# Patient Record
Sex: MALE | Race: WHITE | NOT HISPANIC OR LATINO | Employment: OTHER | URBAN - METROPOLITAN AREA
[De-identification: names, ages, dates, MRNs, and addresses within clinical notes are randomized per-mention and may not be internally consistent; named-entity substitution may affect disease eponyms.]

---

## 2017-01-01 ENCOUNTER — HOSPITAL ENCOUNTER (OUTPATIENT)
Dept: CARDIOLOGY | Facility: HOSPITAL | Age: 62
Discharge: HOME | End: 2017-12-06
Attending: INTERNAL MEDICINE

## 2017-01-01 ENCOUNTER — HOSPITAL ENCOUNTER (OUTPATIENT)
Dept: CARDIOLOGY | Facility: HOSPITAL | Age: 62
Discharge: HOME | End: 2017-12-06
Attending: INTERNAL MEDICINE | Admitting: INTERNAL MEDICINE

## 2017-01-01 ENCOUNTER — TELEPHONE (OUTPATIENT)
Dept: CARDIOLOGY | Facility: CLINIC | Age: 62
End: 2017-01-01

## 2017-01-01 ENCOUNTER — OFFICE VISIT (OUTPATIENT)
Dept: CARDIOLOGY | Facility: CLINIC | Age: 62
End: 2017-01-01

## 2017-01-01 ENCOUNTER — HOSPITAL ENCOUNTER (OUTPATIENT)
Facility: HOSPITAL | Age: 62
Setting detail: HOSPITAL OUTPATIENT SURGERY
Discharge: HOME | End: 2017-12-12
Attending: INTERNAL MEDICINE | Admitting: INTERNAL MEDICINE

## 2017-01-01 VITALS
BODY MASS INDEX: 34.24 KG/M2 | WEIGHT: 258.31 LBS | RESPIRATION RATE: 18 BRPM | HEIGHT: 73 IN | HEART RATE: 77 BPM | DIASTOLIC BLOOD PRESSURE: 86 MMHG | SYSTOLIC BLOOD PRESSURE: 142 MMHG | OXYGEN SATURATION: 95 % | TEMPERATURE: 98.5 F

## 2017-01-01 VITALS
SYSTOLIC BLOOD PRESSURE: 120 MMHG | HEART RATE: 81 BPM | BODY MASS INDEX: 35.65 KG/M2 | WEIGHT: 269 LBS | DIASTOLIC BLOOD PRESSURE: 74 MMHG | HEIGHT: 73 IN

## 2017-01-01 VITALS
HEART RATE: 74 BPM | BODY MASS INDEX: 35.65 KG/M2 | DIASTOLIC BLOOD PRESSURE: 62 MMHG | HEIGHT: 73 IN | SYSTOLIC BLOOD PRESSURE: 100 MMHG | WEIGHT: 269 LBS

## 2017-01-01 DIAGNOSIS — G47.33 OBSTRUCTIVE SLEEP APNEA: ICD-10-CM

## 2017-01-01 DIAGNOSIS — I27.20 PULMONARY HTN (HCC): ICD-10-CM

## 2017-01-01 DIAGNOSIS — I35.0 NONRHEUMATIC AORTIC VALVE STENOSIS: ICD-10-CM

## 2017-01-01 DIAGNOSIS — I36.1 NON-RHEUMATIC TRICUSPID VALVE INSUFFICIENCY: ICD-10-CM

## 2017-01-01 DIAGNOSIS — I48.21 PERMANENT ATRIAL FIBRILLATION (HCC): ICD-10-CM

## 2017-01-01 DIAGNOSIS — E11.51 TYPE 2 DIABETES MELLITUS WITH DIABETIC PERIPHERAL ANGIOPATHY WITHOUT GANGRENE, WITHOUT LONG-TERM CURRENT USE OF INSULIN (HCC): ICD-10-CM

## 2017-01-01 DIAGNOSIS — I27.20 PULMONARY HTN (HCC): Primary | ICD-10-CM

## 2017-01-01 DIAGNOSIS — N18.30 CKD (CHRONIC KIDNEY DISEASE) STAGE 3, GFR 30-59 ML/MIN (HCC): ICD-10-CM

## 2017-01-01 DIAGNOSIS — R06.09 DOE (DYSPNEA ON EXERTION): ICD-10-CM

## 2017-01-01 DIAGNOSIS — I50.810 RIGHT HEART FAILURE (HCC): ICD-10-CM

## 2017-01-01 DIAGNOSIS — I50.810 RIGHT HEART FAILURE (HCC): Primary | ICD-10-CM

## 2017-01-01 LAB
ANION GAP SERPL CALCULATED.3IONS-SCNC: 9.2 MMOL/L
AORTIC DIMENSIONLESS INDEX: 0.2 (DI)
ASCENDING AORTA: 3.6 CM
BASOPHILS # BLD AUTO: 0.02 10*3/MM3 (ref 0–0.2)
BASOPHILS NFR BLD AUTO: 0.2 % (ref 0–1.5)
BH CV ECHO MEAS - ACS: 0.92 CM
BH CV ECHO MEAS - AO MAX PG (FULL): 54.9 MMHG
BH CV ECHO MEAS - AO MAX PG: 56.7 MMHG
BH CV ECHO MEAS - AO MEAN PG (FULL): 27.7 MMHG
BH CV ECHO MEAS - AO MEAN PG: 28.9 MMHG
BH CV ECHO MEAS - AO ROOT AREA (BSA CORRECTED): 1.5
BH CV ECHO MEAS - AO ROOT AREA: 10.5 CM^2
BH CV ECHO MEAS - AO ROOT DIAM: 3.7 CM
BH CV ECHO MEAS - AO V2 MAX: 376.4 CM/SEC
BH CV ECHO MEAS - AO V2 MEAN: 237.9 CM/SEC
BH CV ECHO MEAS - AO V2 VTI: 91.3 CM
BH CV ECHO MEAS - AVA(I,A): 1 CM^2
BH CV ECHO MEAS - AVA(I,D): 1 CM^2
BH CV ECHO MEAS - AVA(V,A): 0.9 CM^2
BH CV ECHO MEAS - AVA(V,D): 0.9 CM^2
BH CV ECHO MEAS - BSA(HAYCOCK): 2.5 M^2
BH CV ECHO MEAS - BSA: 2.4 M^2
BH CV ECHO MEAS - BZI_BMI: 35.5 KILOGRAMS/M^2
BH CV ECHO MEAS - BZI_METRIC_HEIGHT: 185.4 CM
BH CV ECHO MEAS - BZI_METRIC_WEIGHT: 122 KG
BH CV ECHO MEAS - CONTRAST EF (2CH): 56.4 ML/M^2
BH CV ECHO MEAS - CONTRAST EF 4CH: 55.7 ML/M^2
BH CV ECHO MEAS - EDV(MOD-SP2): 117 ML
BH CV ECHO MEAS - EDV(MOD-SP4): 106 ML
BH CV ECHO MEAS - EDV(TEICH): 95.4 ML
BH CV ECHO MEAS - EF(CUBED): 67.8 %
BH CV ECHO MEAS - EF(MOD-SP2): 56.4 %
BH CV ECHO MEAS - EF(MOD-SP4): 55.7 %
BH CV ECHO MEAS - EF(TEICH): 59.4 %
BH CV ECHO MEAS - ESV(MOD-SP2): 51 ML
BH CV ECHO MEAS - ESV(MOD-SP4): 47 ML
BH CV ECHO MEAS - ESV(TEICH): 38.7 ML
BH CV ECHO MEAS - FS: 31.4 %
BH CV ECHO MEAS - IVS/LVPW: 1
BH CV ECHO MEAS - IVSD: 1.1 CM
BH CV ECHO MEAS - LAT PEAK E' VEL: 10 CM/SEC
BH CV ECHO MEAS - LV DIASTOLIC VOL/BSA (35-75): 43.4 ML/M^2
BH CV ECHO MEAS - LV MASS(C)D: 185.5 GRAMS
BH CV ECHO MEAS - LV MASS(C)DI: 76 GRAMS/M^2
BH CV ECHO MEAS - LV MAX PG: 1.8 MMHG
BH CV ECHO MEAS - LV MEAN PG: 1.1 MMHG
BH CV ECHO MEAS - LV SYSTOLIC VOL/BSA (12-30): 19.3 ML/M^2
BH CV ECHO MEAS - LV V1 MAX: 66.5 CM/SEC
BH CV ECHO MEAS - LV V1 MEAN: 50.8 CM/SEC
BH CV ECHO MEAS - LV V1 VTI: 18.4 CM
BH CV ECHO MEAS - LVIDD: 4.6 CM
BH CV ECHO MEAS - LVIDS: 3.1 CM
BH CV ECHO MEAS - LVLD AP2: 7.8 CM
BH CV ECHO MEAS - LVLD AP4: 8.5 CM
BH CV ECHO MEAS - LVLS AP2: 6.9 CM
BH CV ECHO MEAS - LVLS AP4: 7.1 CM
BH CV ECHO MEAS - LVOT AREA (M): 5.3 CM^2
BH CV ECHO MEAS - LVOT AREA: 5.1 CM^2
BH CV ECHO MEAS - LVOT DIAM: 2.6 CM
BH CV ECHO MEAS - LVPWD: 1.1 CM
BH CV ECHO MEAS - MED PEAK E' VEL: 9 CM/SEC
BH CV ECHO MEAS - MR MAX PG: 26.3 MMHG
BH CV ECHO MEAS - MR MAX VEL: 256.3 CM/SEC
BH CV ECHO MEAS - MV DEC SLOPE: 663 CM/SEC^2
BH CV ECHO MEAS - MV DEC TIME: 0.17 SEC
BH CV ECHO MEAS - MV E MAX VEL: 113.5 CM/SEC
BH CV ECHO MEAS - MV MAX PG: 5.9 MMHG
BH CV ECHO MEAS - MV MEAN PG: 2.5 MMHG
BH CV ECHO MEAS - MV P1/2T MAX VEL: 112 CM/SEC
BH CV ECHO MEAS - MV P1/2T: 49.5 MSEC
BH CV ECHO MEAS - MV V2 MAX: 121.3 CM/SEC
BH CV ECHO MEAS - MV V2 MEAN: 73.3 CM/SEC
BH CV ECHO MEAS - MV V2 VTI: 25.6 CM
BH CV ECHO MEAS - MVA P1/2T LCG: 2 CM^2
BH CV ECHO MEAS - MVA(P1/2T): 4.4 CM^2
BH CV ECHO MEAS - MVA(VTI): 3.7 CM^2
BH CV ECHO MEAS - PA ACC TIME: 0.12 SEC
BH CV ECHO MEAS - PA MAX PG (FULL): 1.8 MMHG
BH CV ECHO MEAS - PA MAX PG: 2.9 MMHG
BH CV ECHO MEAS - PA PR(ACCEL): 25.1 MMHG
BH CV ECHO MEAS - PA V2 MAX: 85.8 CM/SEC
BH CV ECHO MEAS - PVA(V,A): 2.6 CM^2
BH CV ECHO MEAS - PVA(V,D): 2.6 CM^2
BH CV ECHO MEAS - QP/QS: 0.43
BH CV ECHO MEAS - RAP SYSTOLE: 15 MMHG
BH CV ECHO MEAS - RV MAX PG: 1.2 MMHG
BH CV ECHO MEAS - RV MEAN PG: 0.51 MMHG
BH CV ECHO MEAS - RV V1 MAX: 53.8 CM/SEC
BH CV ECHO MEAS - RV V1 MEAN: 32.3 CM/SEC
BH CV ECHO MEAS - RV V1 VTI: 9.7 CM
BH CV ECHO MEAS - RVOT AREA: 4.2 CM^2
BH CV ECHO MEAS - RVOT DIAM: 2.3 CM
BH CV ECHO MEAS - RVSP: 77 MMHG
BH CV ECHO MEAS - SI(AO): 394.6 ML/M^2
BH CV ECHO MEAS - SI(CUBED): 26.3 ML/M^2
BH CV ECHO MEAS - SI(LVOT): 38.6 ML/M^2
BH CV ECHO MEAS - SI(MOD-SP2): 27 ML/M^2
BH CV ECHO MEAS - SI(MOD-SP4): 24.2 ML/M^2
BH CV ECHO MEAS - SI(TEICH): 23.2 ML/M^2
BH CV ECHO MEAS - SUP REN AO DIAM: 2.1 CM
BH CV ECHO MEAS - SV(AO): 963.1 ML
BH CV ECHO MEAS - SV(CUBED): 64.3 ML
BH CV ECHO MEAS - SV(LVOT): 94.3 ML
BH CV ECHO MEAS - SV(MOD-SP2): 66 ML
BH CV ECHO MEAS - SV(MOD-SP4): 59 ML
BH CV ECHO MEAS - SV(RVOT): 40.5 ML
BH CV ECHO MEAS - SV(TEICH): 56.7 ML
BH CV ECHO MEAS - TAPSE (>1.6): 1.5 CM2
BH CV ECHO MEAS - TR MAX VEL: 393.6 CM/SEC
BH CV NUCLEAR PRIOR STUDY: 2
BH CV STRESS BP STAGE 1: NORMAL
BH CV STRESS COMMENTS STAGE 1: NORMAL
BH CV STRESS DOSE REGADENOSON STAGE 1: 0.4
BH CV STRESS DURATION MIN STAGE 1: 0
BH CV STRESS DURATION SEC STAGE 1: 15
BH CV STRESS HR STAGE 1: 87
BH CV STRESS PROTOCOL 1: NORMAL
BH CV STRESS RECOVERY BP: NORMAL MMHG
BH CV STRESS RECOVERY HR: 78 BPM
BH CV STRESS STAGE 1: 1
BH CV XLRA - RV BASE: 3.9 CM
BH CV XLRA - TDI S': 7 CM/SEC
BUN BLD-MCNC: 38 MG/DL (ref 8–23)
BUN/CREAT SERPL: 28.6 (ref 7–25)
CALCIUM SPEC-SCNC: 8.4 MG/DL (ref 8.6–10.5)
CHLORIDE SERPL-SCNC: 102 MMOL/L (ref 98–107)
CO2 SERPL-SCNC: 26.8 MMOL/L (ref 22–29)
CREAT BLD-MCNC: 1.33 MG/DL (ref 0.76–1.27)
DEPRECATED RDW RBC AUTO: 48.2 FL (ref 37–54)
E/E' RATIO: 12.5
EOSINOPHIL # BLD AUTO: 0.43 10*3/MM3 (ref 0–0.7)
EOSINOPHIL NFR BLD AUTO: 4.8 % (ref 0.3–6.2)
ERYTHROCYTE [DISTWIDTH] IN BLOOD BY AUTOMATED COUNT: 17.9 % (ref 11.5–14.5)
GFR SERPL CREATININE-BSD FRML MDRD: 55 ML/MIN/1.73
GLUCOSE BLD-MCNC: 107 MG/DL (ref 65–99)
GLUCOSE BLDC GLUCOMTR-MCNC: 103 MG/DL (ref 70–130)
GLUCOSE BLDC GLUCOMTR-MCNC: 110 MG/DL (ref 70–130)
HCT VFR BLD AUTO: 38 % (ref 40.4–52.2)
HCT VFR BLDA CALC: 33 % (ref 38–51)
HCT VFR BLDA CALC: 34 % (ref 38–51)
HGB BLD-MCNC: 11.5 G/DL (ref 13.7–17.6)
HGB BLDA-MCNC: 11.2 G/DL (ref 12–17)
HGB BLDA-MCNC: 11.6 G/DL (ref 12–17)
IMM GRANULOCYTES # BLD: 0.06 10*3/MM3 (ref 0–0.03)
IMM GRANULOCYTES NFR BLD: 0.7 % (ref 0–0.5)
LEFT ATRIUM VOLUME INDEX: 43 ML/M2
LV EF NUC BP: 59 %
LYMPHOCYTES # BLD AUTO: 1.04 10*3/MM3 (ref 0.9–4.8)
LYMPHOCYTES NFR BLD AUTO: 11.6 % (ref 19.6–45.3)
MAXIMAL PREDICTED HEART RATE: 160 BPM
MCH RBC QN AUTO: 22.8 PG (ref 27–32.7)
MCHC RBC AUTO-ENTMCNC: 30.3 G/DL (ref 32.6–36.4)
MCV RBC AUTO: 75.4 FL (ref 79.8–96.2)
MONOCYTES # BLD AUTO: 0.67 10*3/MM3 (ref 0.2–1.2)
MONOCYTES NFR BLD AUTO: 7.5 % (ref 5–12)
NEUTROPHILS # BLD AUTO: 6.72 10*3/MM3 (ref 1.9–8.1)
NEUTROPHILS NFR BLD AUTO: 75.2 % (ref 42.7–76)
PERCENT MAX PREDICTED HR: 54.38 %
PLATELET # BLD AUTO: 172 10*3/MM3 (ref 140–500)
PMV BLD AUTO: 9.5 FL (ref 6–12)
POTASSIUM BLD-SCNC: 3.8 MMOL/L (ref 3.5–5.2)
RBC # BLD AUTO: 5.04 10*6/MM3 (ref 4.6–6)
SAO2 % BLDA: 66 % (ref 95–98)
SAO2 % BLDA: 75 % (ref 95–98)
SINUS: 4.1 CM
SODIUM BLD-SCNC: 138 MMOL/L (ref 136–145)
STJ: 3.1 CM
STRESS BASELINE BP: NORMAL MMHG
STRESS BASELINE HR: 70 BPM
STRESS PERCENT HR: 64 %
STRESS POST EXERCISE DUR SEC: 15 SEC
STRESS POST PEAK BP: NORMAL MMHG
STRESS POST PEAK HR: 87 BPM
STRESS TARGET HR: 136 BPM
WBC NRBC COR # BLD: 8.94 10*3/MM3 (ref 4.5–10.7)
Z-SCORE OF LEFT VENTRICULAR DIMENSION IN END SYSTOLE: 3

## 2017-01-01 PROCEDURE — 78492 MYOCRD IMG PET MLT RST&STRS: CPT

## 2017-01-01 PROCEDURE — 93306 TTE W/DOPPLER COMPLETE: CPT

## 2017-01-01 PROCEDURE — 93463 DRUG ADMIN & HEMODYNMIC MEAS: CPT | Performed by: INTERNAL MEDICINE

## 2017-01-01 PROCEDURE — 93017 CV STRESS TEST TRACING ONLY: CPT

## 2017-01-01 PROCEDURE — 25010000002 REGADENOSON 0.4 MG/5ML SOLUTION: Performed by: INTERNAL MEDICINE

## 2017-01-01 PROCEDURE — 80048 BASIC METABOLIC PNL TOTAL CA: CPT | Performed by: INTERNAL MEDICINE

## 2017-01-01 PROCEDURE — 82962 GLUCOSE BLOOD TEST: CPT

## 2017-01-01 PROCEDURE — 93306 TTE W/DOPPLER COMPLETE: CPT | Performed by: INTERNAL MEDICINE

## 2017-01-01 PROCEDURE — 93451 RIGHT HEART CATH: CPT | Performed by: INTERNAL MEDICINE

## 2017-01-01 PROCEDURE — 93000 ELECTROCARDIOGRAM COMPLETE: CPT | Performed by: INTERNAL MEDICINE

## 2017-01-01 PROCEDURE — C1769 GUIDE WIRE: HCPCS | Performed by: INTERNAL MEDICINE

## 2017-01-01 PROCEDURE — 78492 MYOCRD IMG PET MLT RST&STRS: CPT | Performed by: INTERNAL MEDICINE

## 2017-01-01 PROCEDURE — C1894 INTRO/SHEATH, NON-LASER: HCPCS | Performed by: INTERNAL MEDICINE

## 2017-01-01 PROCEDURE — 85014 HEMATOCRIT: CPT

## 2017-01-01 PROCEDURE — A9555 RB82 RUBIDIUM: HCPCS | Performed by: INTERNAL MEDICINE

## 2017-01-01 PROCEDURE — 93016 CV STRESS TEST SUPVJ ONLY: CPT | Performed by: INTERNAL MEDICINE

## 2017-01-01 PROCEDURE — 99205 OFFICE O/P NEW HI 60 MIN: CPT | Performed by: INTERNAL MEDICINE

## 2017-01-01 PROCEDURE — 85018 HEMOGLOBIN: CPT

## 2017-01-01 PROCEDURE — 25010000002 BH (CUPID ONLY) ADENOSINE CHARGE TOTAL VOLUME: Performed by: INTERNAL MEDICINE

## 2017-01-01 PROCEDURE — 93018 CV STRESS TEST I&R ONLY: CPT | Performed by: INTERNAL MEDICINE

## 2017-01-01 PROCEDURE — 0 RUBIDIUM CHLORIDE: Performed by: INTERNAL MEDICINE

## 2017-01-01 PROCEDURE — 85025 COMPLETE CBC W/AUTO DIFF WBC: CPT | Performed by: INTERNAL MEDICINE

## 2017-01-01 RX ORDER — INSULIN ASPART 100 [IU]/ML
INJECTION, SOLUTION INTRAVENOUS; SUBCUTANEOUS
COMMUNITY
Start: 2017-01-01 | End: 2017-01-01

## 2017-01-01 RX ORDER — BUDESONIDE AND FORMOTEROL FUMARATE DIHYDRATE 160; 4.5 UG/1; UG/1
2 AEROSOL RESPIRATORY (INHALATION)
COMMUNITY

## 2017-01-01 RX ORDER — CARVEDILOL 25 MG/1
25 TABLET ORAL 2 TIMES DAILY WITH MEALS
COMMUNITY
Start: 2017-01-01 | End: 2018-01-01 | Stop reason: HOSPADM

## 2017-01-01 RX ORDER — APIXABAN 5 MG/1
5 TABLET, FILM COATED ORAL EVERY 12 HOURS SCHEDULED
COMMUNITY
Start: 2017-01-01 | End: 2018-01-01

## 2017-01-01 RX ORDER — TORSEMIDE 100 MG/1
TABLET ORAL
Qty: 60 TABLET | Refills: 11 | Status: SHIPPED | OUTPATIENT
Start: 2017-01-01 | End: 2018-01-01 | Stop reason: SDUPTHER

## 2017-01-01 RX ORDER — SODIUM CHLORIDE 9 MG/ML
75 INJECTION, SOLUTION INTRAVENOUS CONTINUOUS
Status: DISCONTINUED | OUTPATIENT
Start: 2017-01-01 | End: 2017-01-01 | Stop reason: HOSPADM

## 2017-01-01 RX ORDER — SPIRONOLACTONE 25 MG/1
TABLET ORAL
Refills: 4 | COMMUNITY
Start: 2017-09-16 | End: 2017-01-01

## 2017-01-01 RX ORDER — BUDESONIDE AND FORMOTEROL FUMARATE DIHYDRATE 160; 4.5 UG/1; UG/1
AEROSOL RESPIRATORY (INHALATION)
COMMUNITY
Start: 2017-01-01 | End: 2017-01-01

## 2017-01-01 RX ORDER — METOLAZONE 2.5 MG/1
2.5 TABLET ORAL
COMMUNITY
Start: 2017-01-01 | End: 2018-01-01

## 2017-01-01 RX ORDER — CARVEDILOL 6.25 MG/1
TABLET ORAL
Refills: 4 | COMMUNITY
Start: 2017-09-16 | End: 2017-01-01 | Stop reason: ALTCHOICE

## 2017-01-01 RX ORDER — DIGOXIN 250 UG/1
TABLET ORAL
Refills: 3 | COMMUNITY
Start: 2017-09-16 | End: 2017-01-01

## 2017-01-01 RX ORDER — SILVER SULFADIAZINE 1 %
CREAM (GRAM) TOPICAL
Refills: 0 | COMMUNITY
Start: 2017-01-01 | End: 2017-01-01

## 2017-01-01 RX ORDER — MONTELUKAST SODIUM 10 MG/1
10 TABLET ORAL NIGHTLY
COMMUNITY
Start: 2017-01-01 | End: 2018-01-01

## 2017-01-01 RX ORDER — ATORVASTATIN CALCIUM 10 MG/1
10 TABLET, FILM COATED ORAL
COMMUNITY
Start: 2017-10-16 | End: 2017-01-01 | Stop reason: ALTCHOICE

## 2017-01-01 RX ORDER — SPIRONOLACTONE 50 MG/1
50 TABLET, FILM COATED ORAL DAILY
COMMUNITY
Start: 2017-01-01 | End: 2018-01-01

## 2017-01-01 RX ORDER — INSULIN GLARGINE 100 [IU]/ML
80 INJECTION, SOLUTION SUBCUTANEOUS
COMMUNITY
End: 2017-01-01

## 2017-01-01 RX ORDER — TORSEMIDE 100 MG/1
100 TABLET ORAL DAILY
COMMUNITY
Start: 2017-01-01 | End: 2017-01-01 | Stop reason: SDUPTHER

## 2017-01-01 RX ORDER — TIZANIDINE 4 MG/1
4 TABLET ORAL
COMMUNITY
End: 2017-01-01

## 2017-01-01 RX ORDER — BUMETANIDE 2 MG/1
TABLET ORAL
Refills: 4 | COMMUNITY
Start: 2017-09-16 | End: 2017-01-01 | Stop reason: ALTCHOICE

## 2017-01-01 RX ORDER — DILTIAZEM HYDROCHLORIDE 120 MG/1
CAPSULE, EXTENDED RELEASE ORAL
Refills: 2 | COMMUNITY
Start: 2017-10-14 | End: 2017-01-01

## 2017-01-01 RX ORDER — SODIUM CHLORIDE 0.9 % (FLUSH) 0.9 %
1-10 SYRINGE (ML) INJECTION AS NEEDED
Status: DISCONTINUED | OUTPATIENT
Start: 2017-01-01 | End: 2017-01-01 | Stop reason: HOSPADM

## 2017-01-01 RX ORDER — TIZANIDINE 4 MG/1
4 TABLET ORAL DAILY
COMMUNITY
Start: 2017-01-01 | End: 2018-01-01

## 2017-01-01 RX ORDER — PRAVASTATIN SODIUM 40 MG
TABLET ORAL
Refills: 4 | COMMUNITY
Start: 2017-10-14 | End: 2017-01-01

## 2017-01-01 RX ORDER — ESZOPICLONE 3 MG/1
3 TABLET, FILM COATED ORAL NIGHTLY
COMMUNITY
End: 2018-01-01

## 2017-01-01 RX ORDER — LOSARTAN POTASSIUM 50 MG/1
TABLET ORAL
COMMUNITY
Start: 2017-01-01 | End: 2017-01-01

## 2017-01-01 RX ORDER — BLOOD-GLUCOSE METER
KIT MISCELLANEOUS
COMMUNITY
Start: 2017-01-01 | End: 2017-01-01

## 2017-01-01 RX ORDER — SODIUM CHLORIDE 9 MG/ML
INJECTION, SOLUTION INTRAVENOUS CONTINUOUS PRN
Status: DISCONTINUED | OUTPATIENT
Start: 2017-01-01 | End: 2017-01-01 | Stop reason: HOSPADM

## 2017-01-01 RX ORDER — WARFARIN SODIUM 5 MG/1
TABLET ORAL
Refills: 4 | COMMUNITY
Start: 2017-09-16 | End: 2017-01-01

## 2017-01-01 RX ORDER — POTASSIUM CHLORIDE 20 MEQ/1
20 TABLET, EXTENDED RELEASE ORAL 2 TIMES DAILY
COMMUNITY
Start: 2017-01-01 | End: 2018-01-01

## 2017-01-01 RX ORDER — LIDOCAINE HYDROCHLORIDE 10 MG/ML
0.1 INJECTION, SOLUTION EPIDURAL; INFILTRATION; INTRACAUDAL; PERINEURAL ONCE AS NEEDED
Status: DISCONTINUED | OUTPATIENT
Start: 2017-01-01 | End: 2017-01-01 | Stop reason: HOSPADM

## 2017-01-01 RX ORDER — LIDOCAINE HYDROCHLORIDE 20 MG/ML
INJECTION, SOLUTION INFILTRATION; PERINEURAL AS NEEDED
Status: DISCONTINUED | OUTPATIENT
Start: 2017-01-01 | End: 2017-01-01 | Stop reason: HOSPADM

## 2017-01-01 RX ORDER — INSULIN DETEMIR 100 [IU]/ML
INJECTION, SOLUTION SUBCUTANEOUS
Refills: 4 | COMMUNITY
Start: 2017-10-20 | End: 2017-01-01

## 2017-01-01 RX ORDER — ESZOPICLONE 3 MG/1
TABLET, FILM COATED ORAL
COMMUNITY
Start: 2017-01-01 | End: 2017-01-01

## 2017-01-01 RX ORDER — POTASSIUM CHLORIDE 750 MG/1
TABLET, FILM COATED, EXTENDED RELEASE ORAL
Refills: 11 | COMMUNITY
Start: 2017-09-16 | End: 2017-01-01 | Stop reason: SDUPTHER

## 2017-01-01 RX ADMIN — REGADENOSON 0.4 MG: 0.08 INJECTION, SOLUTION INTRAVENOUS at 09:20

## 2017-01-01 RX ADMIN — SODIUM CHLORIDE 75 ML/HR: 9 INJECTION, SOLUTION INTRAVENOUS at 08:42

## 2017-01-01 RX ADMIN — RUBIDIUM CHLORIDE RB-82 1 DOSE: 150 INJECTION, SOLUTION INTRAVENOUS at 09:10

## 2017-01-01 RX ADMIN — RUBIDIUM CHLORIDE RB-82 1 DOSE: 150 INJECTION, SOLUTION INTRAVENOUS at 09:20

## 2017-11-15 PROBLEM — G47.33 OBSTRUCTIVE SLEEP APNEA: Status: ACTIVE | Noted: 2017-01-01

## 2017-11-15 PROBLEM — I48.21 PERMANENT ATRIAL FIBRILLATION (HCC): Status: ACTIVE | Noted: 2017-01-01

## 2017-11-15 PROBLEM — I27.20 PULMONARY HTN (HCC): Status: ACTIVE | Noted: 2017-01-01

## 2017-11-15 PROBLEM — I36.1 NON-RHEUMATIC TRICUSPID VALVE INSUFFICIENCY: Status: ACTIVE | Noted: 2017-01-01

## 2017-11-15 PROBLEM — N18.30 CKD (CHRONIC KIDNEY DISEASE) STAGE 3, GFR 30-59 ML/MIN (HCC): Status: ACTIVE | Noted: 2017-01-01

## 2017-11-15 PROBLEM — I35.0 NONRHEUMATIC AORTIC VALVE STENOSIS: Status: ACTIVE | Noted: 2017-01-01

## 2017-11-15 PROBLEM — I50.810 RIGHT HEART FAILURE (HCC): Status: ACTIVE | Noted: 2017-01-01

## 2017-11-15 PROBLEM — E11.51 TYPE 2 DIABETES MELLITUS WITH DIABETIC PERIPHERAL ANGIOPATHY WITHOUT GANGRENE, WITHOUT LONG-TERM CURRENT USE OF INSULIN (HCC): Status: ACTIVE | Noted: 2017-01-01

## 2017-11-15 NOTE — PROGRESS NOTES
PATIENTINFORMATION    Date of Office Visit: 2017  Encounter Provider: Oriana Steinberg MD  Place of Service: [facility] CARDIOLOGY  Patient Name: Bj Duarte  : 1955    Subjective:     Encounter Date:2017      Patient ID: Bj Duarte is a 62 y.o. male.      History of Present Illness    This is a patient who presents to me for the first time.  I see several of his family members and I knew his daughter when she was a drug rep that was living in [place].  The patient was hospitalized at [facility].  He had a foot wound which ended up having some osteomyelitis.  His biggest complaint though was that he was retaining fluid.  He had weeping of his lower extremities.  He reports that when he was admitted he weighed 282 pounds and seven days later he weighed 295 pounds.  He found out that instead of giving him diltiazem at 120 mg per day, he was being given 120 mg four times a day.  When his dose was decreased to just 240 mg per day he rapidly started losing fluid weight.  He was discharged on diltiazem but he ended up discontinuing this himself and his weight in my office today is 269 pounds.  He has a history of diabetes.  He has no known coronary disease.  He does have chronic kidney disease and asthma.  He has obstructive sleep apnea but cannot use CPAP.  He also reports a weight loss of 70 pounds since his sleep study.  He does not have any symptoms of sleep apnea at this time.  His echocardiogram at [facility] revealed normal left ventricular systolic function with an ejection fraction of 60-65%.  Diastolic dysfunction was not commented on due to atrial fibrillation.  He had mild-to-moderate left atrial enlargement.  There was mild right atrial enlargement.  He had moderate aortic stenosis.  He had mild-to-moderate tricuspid regurgitation with a right ventricular systolic pressure of 73 mmHg.  Currently he denies palpitations,  "lightheadedness, chest pain or fatigue.  He does complain of shortness of breath with exertion.  He states his edema is significantly improved but notes that he still has some.        Review of Systems   Constitution: Positive for malaise/fatigue and weight loss. Negative for fever and weight gain.   HENT: Negative for ear pain, hearing loss, nosebleeds and sore throat.    Eyes: Negative for double vision, pain, vision loss in left eye and vision loss in right eye.   Cardiovascular: Positive for leg swelling.        See history of present illness.   Respiratory: Positive for shortness of breath. Negative for cough, sleep disturbances due to breathing, snoring and wheezing.    Endocrine: Negative for cold intolerance, heat intolerance and polyuria.   Skin: Positive for poor wound healing. Negative for itching and rash.   Musculoskeletal: Positive for joint pain, joint swelling and myalgias.   Gastrointestinal: Negative for abdominal pain, diarrhea, hematochezia, nausea and vomiting.   Genitourinary: Negative for hematuria and hesitancy.   Neurological: Negative for numbness, paresthesias and seizures.   Psychiatric/Behavioral: Negative for depression. The patient is nervous/anxious.            ECG 12 Lead  Date/Time: 11/14/2017 9:25 AM  Performed by: DARWIN ENRIQUEZ  Authorized by: DARWIN ENRIQUEZ   Comparison: compared with previous ECG from 10/3/2017  Similar to previous ECG  Rhythm: atrial fibrillation  BPM: 81  Conduction: conduction normal  ST Segments: ST segments normal  QRS axis: right  Clinical impression: abnormal ECG               Objective:     /74 (BP Location: Left arm)  Pulse 81  Ht 73\" (185.4 cm)  Wt 269 lb (122 kg)  BMI 35.49 kg/m2 Body mass index is 35.49 kg/(m^2).     Physical Exam   Constitutional: He appears well-developed.   HENT:   Head: Normocephalic and atraumatic.   Eyes: Conjunctivae and lids are normal. Pupils are equal, round, and reactive to light. Lids are everted and swept, " no foreign bodies found.   Neck: Normal range of motion. No JVD present. Carotid bruit is not present. No tracheal deviation present. No thyroid mass present.   Cardiovascular: Normal rate and normal heart sounds.  An irregularly irregular rhythm present.   Pulses:       Dorsalis pedis pulses are 2+ on the right side, and 2+ on the left side.   1+ pitting edema in both lower extremities   Pulmonary/Chest: Effort normal and breath sounds normal.   Abdominal: Normal appearance and bowel sounds are normal.   Musculoskeletal: Normal range of motion.   Neurological: He is alert. He has normal strength.   Skin: Skin is warm, dry and intact.   Psychiatric: He has a normal mood and affect. His behavior is normal.   Vitals reviewed.          Assessment/Plan:       1. Chronic diastolic heart failure.  This has improved significantly since coming off calcium channel blockers.  He is currently on torsemide 100 mg daily and metolazone 2.5 mg twice a week.  He is also on Aldactone 25 mg twice a day.  Continue the same for now.    2. Dyspnea on exertion.  He had significant pulmonary hypertension in the hospital.  I am going to recheck an echocardiogram to see if that is still the case.  He may need a right heart catheterization.  He denies ever smoking.  He does have a history of asthma.  I do not see any cardiac reasons for his right ventricular systolic pressure to be so high.  I am hoping that since he has been diuresed so much, that his pulmonary pressures will have decreased.  He may also have dyspnea as his ischemic equivalent given that he is a diabetic.  I am going to check a PET stress test as well.    3. Permanent atrial fibrillation.  He is anticoagulated with Eliquis and rate controlled with Coreg.   4. Chronic kidney disease.  He requests a new nephrologist and I referred him to Dr. Ronald Olivares.   5. Obstructive sleep apnea.  He could not tolerate the CPAP.  He has lost a significant amount of weight since his  sleep study and no longer complains of symptoms consistent with sleep apnea.    6. Diabetes.   7. Foot wound.  He is currently on IV antibiotics.  Those should be completed and his PICC line removed on 11/20/2017.      I will go over the results of his stress test and echocardiogram when they are available.  I will see the patient back no later than three months.    He also has moderate aortic stenosis which I plan on following.  Pulmonary hypertension.    Orders Placed This Encounter   Procedures   • Ambulatory Referral to Nephrology     Referral Priority:   Routine     Referral Type:   Consultation     Referral Reason:   Specialty Services Required     Referred to Provider:   Ronald Olivares MD     Requested Specialty:   Nephrology     Number of Visits Requested:   1   • Stress Test With Pet Myocardial Perfusion     Standing Status:   Future     Standing Expiration Date:   11/14/2018     Order Specific Question:   What stress agent will be used?     Answer:   Regadenoson (Lexiscan)     Order Specific Question:   Difficulty walking criteria?     Answer:   Vascular - Claudication     Order Specific Question:   Reason for exam?     Answer:   Angina   • ECG 12 Lead     This order was created via procedure documentation   • Adult Transthoracic Echo Complete W/ Cont if Necessary Per Protocol     Standing Status:   Future     Standing Expiration Date:   11/14/2018     Order Specific Question:   Reason for exam?     Answer:   Dyspnea     Comments:   pulm htn      Head, Bj   Home Medication Instructions SARATH:    Printed on:11/15/17 0923   Medication Information                      carvedilol (COREG) 25 MG tablet  Take 25 mg by mouth 2 (Two) Times a Day With Meals.             ELIQUIS 5 MG tablet tablet  Take 5 mg by mouth Every 12 (Twelve) Hours.             eszopiclone (LUNESTA) 3 MG tablet  Take 3 mg by mouth Every Night. Take immediately before bedtime             metOLazone (ZAROXOLYN) 2.5 MG tablet  2.5mg po  two or three times weekly prn             montelukast (SINGULAIR) 10 MG tablet  Take 10 mg by mouth Every Night.             potassium chloride (K-DUR,KLOR-CON) 20 MEQ CR tablet  Take 20 mEq by mouth 2 (Two) Times a Day.             spironolactone (ALDACTONE) 50 MG tablet  Take 25 mg by mouth 2 (Two) Times a Day.             tiZANidine (ZANAFLEX) 4 MG tablet  Take 4 mg by mouth Daily.             torsemide (DEMADEX) 100 MG tablet  Take 100 mg by mouth Daily.                        Oriana Steinberg MD  11/15/17  9:26 AM

## 2017-12-06 NOTE — TELEPHONE ENCOUNTER
I called and spoke with him.  His stress test looked okay.  His echo showed continued moderate aortic stenosis.  He still has grade 3 diastolic dysfunction.  He still has severe pulmonary hypertension.  I talked to him about a right heart catheterization with adenosine challenge.

## 2017-12-07 NOTE — TELEPHONE ENCOUNTER
Patient has been scheduled for cath on 12/12 with Dr. Gallardo @ 10:30am. Pt has been called with instructions.   Thanks,   Georgina

## 2017-12-08 NOTE — TELEPHONE ENCOUNTER
Pt has procedure next week and has questions about which medications he needs to hold and what to take the morning of. Can you call and advise him

## 2017-12-11 NOTE — PERIOPERATIVE NURSING NOTE
Spoke w/pt. And verified time and location for arrival.  Explained  npo guidelines. Req.patient to bring a responsible  for d/c home and also to bring a written list of medications.  Reinforced need to hold the diabetic meds and water pill in the morning.  Patient acknowledges understanding of  Instruction.

## 2017-12-12 NOTE — TELEPHONE ENCOUNTER
On Pts after visit summary he was instructed to make an appointment after 1/11/2018. He wants to be seen before the end of the year, there are no currently open appointments we would have to open a spot. Do you want to see him before January? Any particular day or time?

## 2017-12-12 NOTE — PLAN OF CARE
Problem: Patient Care Overview (Adult)  Goal: Plan of Care Review  Outcome: Outcome(s) achieved Date Met:  12/12/17 12/12/17 1336   Coping/Psychosocial Response Interventions   Plan Of Care Reviewed With patient;family   Patient Care Overview   Progress improving   Outcome Evaluation   Outcome Summary/Follow up Plan ready for discharge       Goal: Adult Individualization and Mutuality  Outcome: Outcome(s) achieved Date Met:  12/12/17  Goal: Discharge Needs Assessment  Outcome: Outcome(s) achieved Date Met:  12/12/17    Problem: Cardiac Catheterization with/without PCI (Adult)  Goal: Signs and Symptoms of Listed Potential Problems Will be Absent or Manageable (Cardiac Catheterization with/without PCI)  Outcome: Outcome(s) achieved Date Met:  12/12/17

## 2017-12-12 NOTE — DISCHARGE INSTRUCTIONS
Outpatient Surgery Guidelines, Adult  Outpatient procedures are those for which the person having the procedure is allowed to go home the same day as the procedure. Various procedures are done on an outpatient basis. You should follow some general guidelines if you will be having an outpatient procedure.  AFTER THE  PROCEDURE  After surgery, you will be taken to a recovery area, where your progress will be monitored. If there are no complications, you will be allowed to go home when you are awake, stable, and taking fluids well. You may have numbness around the surgical site. Healing will take some time. You will have tenderness at the surgical site and may have some swelling and bruising. You may also have some nausea.  HOME CARE INSTRUCTIONS  · Do not lift anything heavier than 10 pounds (4.5 kg) or play contact sports until your health care provider says it is okay.  · Only take over-the-counter or prescription medicines as directed by your health care provider.  · Follow up with your health care provider as directed.  SEEK MEDICAL CARE IF:  · You have increased bleeding (more than a small spot) from the surgical site.  · You have redness, swelling, or increasing pain in the wound.  · You see pus coming from the wound.  · You have a fever > 101.  · You notice a bad smell coming from the wound or dressing.  · You feel lightheaded or faint.  · You develop a rash.  · You have trouble breathing.  · You develop allergies.  MAKE SURE YOU:  · Understand these instructions.  · Will watch your condition.  · Will get help right away if you are not doing well or get worse.

## 2017-12-12 NOTE — H&P (VIEW-ONLY)
PATIENTINFORMATION    Date of Office Visit: 2017  Encounter Provider: Oriana Steinberg MD  Place of Service: [facility] CARDIOLOGY  Patient Name: Bj Duarte  : 1955    Subjective:     Encounter Date:2017      Patient ID: Bj Duarte is a 62 y.o. male.      History of Present Illness    This is a patient who presents to me for the first time.  I see several of his family members and I knew his daughter when she was a drug rep that was living in [place].  The patient was hospitalized at [facility].  He had a foot wound which ended up having some osteomyelitis.  His biggest complaint though was that he was retaining fluid.  He had weeping of his lower extremities.  He reports that when he was admitted he weighed 282 pounds and seven days later he weighed 295 pounds.  He found out that instead of giving him diltiazem at 120 mg per day, he was being given 120 mg four times a day.  When his dose was decreased to just 240 mg per day he rapidly started losing fluid weight.  He was discharged on diltiazem but he ended up discontinuing this himself and his weight in my office today is 269 pounds.  He has a history of diabetes.  He has no known coronary disease.  He does have chronic kidney disease and asthma.  He has obstructive sleep apnea but cannot use CPAP.  He also reports a weight loss of 70 pounds since his sleep study.  He does not have any symptoms of sleep apnea at this time.  His echocardiogram at [facility] revealed normal left ventricular systolic function with an ejection fraction of 60-65%.  Diastolic dysfunction was not commented on due to atrial fibrillation.  He had mild-to-moderate left atrial enlargement.  There was mild right atrial enlargement.  He had moderate aortic stenosis.  He had mild-to-moderate tricuspid regurgitation with a right ventricular systolic pressure of 73 mmHg.  Currently he denies palpitations,  "lightheadedness, chest pain or fatigue.  He does complain of shortness of breath with exertion.  He states his edema is significantly improved but notes that he still has some.        Review of Systems   Constitution: Positive for malaise/fatigue and weight loss. Negative for fever and weight gain.   HENT: Negative for ear pain, hearing loss, nosebleeds and sore throat.    Eyes: Negative for double vision, pain, vision loss in left eye and vision loss in right eye.   Cardiovascular: Positive for leg swelling.        See history of present illness.   Respiratory: Positive for shortness of breath. Negative for cough, sleep disturbances due to breathing, snoring and wheezing.    Endocrine: Negative for cold intolerance, heat intolerance and polyuria.   Skin: Positive for poor wound healing. Negative for itching and rash.   Musculoskeletal: Positive for joint pain, joint swelling and myalgias.   Gastrointestinal: Negative for abdominal pain, diarrhea, hematochezia, nausea and vomiting.   Genitourinary: Negative for hematuria and hesitancy.   Neurological: Negative for numbness, paresthesias and seizures.   Psychiatric/Behavioral: Negative for depression. The patient is nervous/anxious.            ECG 12 Lead  Date/Time: 11/14/2017 9:25 AM  Performed by: DARWIN ENRIQUEZ  Authorized by: DARWIN ENRIQUEZ   Comparison: compared with previous ECG from 10/3/2017  Similar to previous ECG  Rhythm: atrial fibrillation  BPM: 81  Conduction: conduction normal  ST Segments: ST segments normal  QRS axis: right  Clinical impression: abnormal ECG               Objective:     /74 (BP Location: Left arm)  Pulse 81  Ht 73\" (185.4 cm)  Wt 269 lb (122 kg)  BMI 35.49 kg/m2 Body mass index is 35.49 kg/(m^2).     Physical Exam   Constitutional: He appears well-developed.   HENT:   Head: Normocephalic and atraumatic.   Eyes: Conjunctivae and lids are normal. Pupils are equal, round, and reactive to light. Lids are everted and swept, " no foreign bodies found.   Neck: Normal range of motion. No JVD present. Carotid bruit is not present. No tracheal deviation present. No thyroid mass present.   Cardiovascular: Normal rate and normal heart sounds.  An irregularly irregular rhythm present.   Pulses:       Dorsalis pedis pulses are 2+ on the right side, and 2+ on the left side.   1+ pitting edema in both lower extremities   Pulmonary/Chest: Effort normal and breath sounds normal.   Abdominal: Normal appearance and bowel sounds are normal.   Musculoskeletal: Normal range of motion.   Neurological: He is alert. He has normal strength.   Skin: Skin is warm, dry and intact.   Psychiatric: He has a normal mood and affect. His behavior is normal.   Vitals reviewed.          Assessment/Plan:       1. Chronic diastolic heart failure.  This has improved significantly since coming off calcium channel blockers.  He is currently on torsemide 100 mg daily and metolazone 2.5 mg twice a week.  He is also on Aldactone 25 mg twice a day.  Continue the same for now.    2. Dyspnea on exertion.  He had significant pulmonary hypertension in the hospital.  I am going to recheck an echocardiogram to see if that is still the case.  He may need a right heart catheterization.  He denies ever smoking.  He does have a history of asthma.  I do not see any cardiac reasons for his right ventricular systolic pressure to be so high.  I am hoping that since he has been diuresed so much, that his pulmonary pressures will have decreased.  He may also have dyspnea as his ischemic equivalent given that he is a diabetic.  I am going to check a PET stress test as well.    3. Permanent atrial fibrillation.  He is anticoagulated with Eliquis and rate controlled with Coreg.   4. Chronic kidney disease.  He requests a new nephrologist and I referred him to Dr. Ronald Olivares.   5. Obstructive sleep apnea.  He could not tolerate the CPAP.  He has lost a significant amount of weight since his  sleep study and no longer complains of symptoms consistent with sleep apnea.    6. Diabetes.   7. Foot wound.  He is currently on IV antibiotics.  Those should be completed and his PICC line removed on 11/20/2017.      I will go over the results of his stress test and echocardiogram when they are available.  I will see the patient back no later than three months.    He also has moderate aortic stenosis which I plan on following.  Pulmonary hypertension.    Orders Placed This Encounter   Procedures   • Ambulatory Referral to Nephrology     Referral Priority:   Routine     Referral Type:   Consultation     Referral Reason:   Specialty Services Required     Referred to Provider:   Ronald Olivares MD     Requested Specialty:   Nephrology     Number of Visits Requested:   1   • Stress Test With Pet Myocardial Perfusion     Standing Status:   Future     Standing Expiration Date:   11/14/2018     Order Specific Question:   What stress agent will be used?     Answer:   Regadenoson (Lexiscan)     Order Specific Question:   Difficulty walking criteria?     Answer:   Vascular - Claudication     Order Specific Question:   Reason for exam?     Answer:   Angina   • ECG 12 Lead     This order was created via procedure documentation   • Adult Transthoracic Echo Complete W/ Cont if Necessary Per Protocol     Standing Status:   Future     Standing Expiration Date:   11/14/2018     Order Specific Question:   Reason for exam?     Answer:   Dyspnea     Comments:   pulm htn      Head, Bj   Home Medication Instructions SARATH:    Printed on:11/15/17 0923   Medication Information                      carvedilol (COREG) 25 MG tablet  Take 25 mg by mouth 2 (Two) Times a Day With Meals.             ELIQUIS 5 MG tablet tablet  Take 5 mg by mouth Every 12 (Twelve) Hours.             eszopiclone (LUNESTA) 3 MG tablet  Take 3 mg by mouth Every Night. Take immediately before bedtime             metOLazone (ZAROXOLYN) 2.5 MG tablet  2.5mg po  two or three times weekly prn             montelukast (SINGULAIR) 10 MG tablet  Take 10 mg by mouth Every Night.             potassium chloride (K-DUR,KLOR-CON) 20 MEQ CR tablet  Take 20 mEq by mouth 2 (Two) Times a Day.             spironolactone (ALDACTONE) 50 MG tablet  Take 25 mg by mouth 2 (Two) Times a Day.             tiZANidine (ZANAFLEX) 4 MG tablet  Take 4 mg by mouth Daily.             torsemide (DEMADEX) 100 MG tablet  Take 100 mg by mouth Daily.                        Oriana Steinberg MD  11/15/17  9:26 AM

## 2017-12-12 NOTE — TELEPHONE ENCOUNTER
I will call him tomorrow.  The dictation on his catheter is not back.  We cannot open up a spot for him there is no emergency.

## 2017-12-12 NOTE — INTERVAL H&P NOTE
H&P reviewed. The patient was examined and there are no changes to the H&P.     Severe pulmonary hypertension.  TR.  Moderate AS.  Here for right heart catheterization and possible vasodilator study.

## 2017-12-13 NOTE — TELEPHONE ENCOUNTER
FYI daughter has called back stating that she wants to have him seen before the end of the year, next week, for surgery clearance. Please advise

## 2017-12-13 NOTE — TELEPHONE ENCOUNTER
Call Pt's daughter, Estrella. She advised that the Pt was initially scheduled to have a skin graft on his foot before he was seen and had cath performed yesterday. The Pt still needs to have the graft procedure but the surgeon will not go ahead with the procedure until he is cleared.    Estrella [phone]

## 2017-12-13 NOTE — TELEPHONE ENCOUNTER
Okay.  He is not showing up in epic.  I put a letter in epic.  It printed out if you can fax it to him.

## 2017-12-13 NOTE — TELEPHONE ENCOUNTER
I called both numbers for him.  Left messages at both numbers I was trying to reach him.  I don't really need to see him prior to surgery most likely.  I do need to talk to him about what kind of surgery it is.  I would like to increase his torsemide.  His wedge pressure was too high.

## 2017-12-13 NOTE — TELEPHONE ENCOUNTER
[phone] (p)  Chaka Yoon  [address] [facility] [address]   [place] [address]    I just wanted to clarify, you want the Pt to take 200mg torsemide in the morning and 50 mg in the afternoon correct?

## 2018-01-01 ENCOUNTER — HOSPITAL ENCOUNTER (INPATIENT)
Facility: HOSPITAL | Age: 63
LOS: 46 days | Discharge: NURSING, REHAB OR LONG-TERM CARE | End: 2018-09-09
Attending: INTERNAL MEDICINE | Admitting: THORACIC SURGERY (CARDIOTHORACIC VASCULAR SURGERY)

## 2018-01-01 ENCOUNTER — OUTSIDE FACILITY SERVICE (OUTPATIENT)
Dept: INTERNAL MEDICINE | Facility: CLINIC | Age: 63
End: 2018-01-01

## 2018-01-01 ENCOUNTER — APPOINTMENT (OUTPATIENT)
Dept: GENERAL RADIOLOGY | Facility: HOSPITAL | Age: 63
End: 2018-01-01

## 2018-01-01 ENCOUNTER — APPOINTMENT (OUTPATIENT)
Dept: GENERAL RADIOLOGY | Facility: HOSPITAL | Age: 63
End: 2018-01-01
Attending: INTERNAL MEDICINE

## 2018-01-01 ENCOUNTER — TELEPHONE (OUTPATIENT)
Dept: CARDIOLOGY | Facility: CLINIC | Age: 63
End: 2018-01-01

## 2018-01-01 ENCOUNTER — APPOINTMENT (OUTPATIENT)
Dept: CARDIOLOGY | Facility: HOSPITAL | Age: 63
End: 2018-01-01
Attending: SURGERY

## 2018-01-01 ENCOUNTER — TRANSCRIBE ORDERS (OUTPATIENT)
Dept: ADMINISTRATIVE | Facility: HOSPITAL | Age: 63
End: 2018-01-01

## 2018-01-01 ENCOUNTER — LAB (OUTPATIENT)
Dept: LAB | Facility: HOSPITAL | Age: 63
End: 2018-01-01

## 2018-01-01 ENCOUNTER — APPOINTMENT (OUTPATIENT)
Dept: CARDIOLOGY | Facility: HOSPITAL | Age: 63
End: 2018-01-01
Attending: HOSPITALIST

## 2018-01-01 ENCOUNTER — OFFICE VISIT (OUTPATIENT)
Dept: CARDIOLOGY | Facility: CLINIC | Age: 63
End: 2018-01-01

## 2018-01-01 ENCOUNTER — LAB (OUTPATIENT)
Dept: LAB | Facility: HOSPITAL | Age: 63
End: 2018-01-01
Attending: INTERNAL MEDICINE

## 2018-01-01 ENCOUNTER — APPOINTMENT (OUTPATIENT)
Dept: MRI IMAGING | Facility: HOSPITAL | Age: 63
End: 2018-01-01

## 2018-01-01 ENCOUNTER — ANESTHESIA (OUTPATIENT)
Dept: PERIOP | Facility: HOSPITAL | Age: 63
End: 2018-01-01

## 2018-01-01 ENCOUNTER — ANCILLARY PROCEDURE (OUTPATIENT)
Dept: PERIOP | Facility: HOSPITAL | Age: 63
End: 2018-01-01
Attending: ANESTHESIOLOGY

## 2018-01-01 ENCOUNTER — APPOINTMENT (OUTPATIENT)
Dept: ULTRASOUND IMAGING | Facility: HOSPITAL | Age: 63
End: 2018-01-01

## 2018-01-01 ENCOUNTER — HOSPITAL ENCOUNTER (INPATIENT)
Facility: HOSPITAL | Age: 63
LOS: 8 days | Discharge: HOME | End: 2018-06-30
Attending: FAMILY MEDICINE | Admitting: HOSPITALIST

## 2018-01-01 ENCOUNTER — HOSPITAL ENCOUNTER (INPATIENT)
Facility: HOSPITAL | Age: 63
LOS: 2 days | End: 2018-10-30
Attending: EMERGENCY MEDICINE | Admitting: INTERNAL MEDICINE

## 2018-01-01 ENCOUNTER — APPOINTMENT (OUTPATIENT)
Dept: CARDIOLOGY | Facility: HOSPITAL | Age: 63
End: 2018-01-01
Attending: THORACIC SURGERY (CARDIOTHORACIC VASCULAR SURGERY)

## 2018-01-01 ENCOUNTER — HOSPITAL ENCOUNTER (INPATIENT)
Facility: HOSPITAL | Age: 63
LOS: 1 days | End: 2018-10-30
Attending: INTERNAL MEDICINE | Admitting: INTERNAL MEDICINE

## 2018-01-01 ENCOUNTER — APPOINTMENT (OUTPATIENT)
Dept: GENERAL RADIOLOGY | Facility: HOSPITAL | Age: 63
End: 2018-01-01
Attending: SURGERY

## 2018-01-01 ENCOUNTER — TRANSCRIBE ORDERS (OUTPATIENT)
Dept: CARDIOLOGY | Facility: CLINIC | Age: 63
End: 2018-01-01

## 2018-01-01 ENCOUNTER — ANESTHESIA EVENT (OUTPATIENT)
Dept: PERIOP | Facility: HOSPITAL | Age: 63
End: 2018-01-01

## 2018-01-01 ENCOUNTER — APPOINTMENT (OUTPATIENT)
Dept: CT IMAGING | Facility: HOSPITAL | Age: 63
End: 2018-01-01

## 2018-01-01 ENCOUNTER — HOSPITAL ENCOUNTER (OUTPATIENT)
Dept: CARDIOLOGY | Facility: HOSPITAL | Age: 63
Discharge: HOME | End: 2018-07-12
Admitting: NURSE PRACTITIONER

## 2018-01-01 ENCOUNTER — OFFICE VISIT (OUTPATIENT)
Dept: CARDIAC SURGERY | Facility: CLINIC | Age: 63
End: 2018-01-01

## 2018-01-01 ENCOUNTER — APPOINTMENT (OUTPATIENT)
Dept: SLEEP MEDICINE | Facility: HOSPITAL | Age: 63
End: 2018-01-01

## 2018-01-01 ENCOUNTER — APPOINTMENT (OUTPATIENT)
Dept: CARDIOLOGY | Facility: HOSPITAL | Age: 63
End: 2018-01-01

## 2018-01-01 ENCOUNTER — HOSPITAL ENCOUNTER (OUTPATIENT)
Facility: HOSPITAL | Age: 63
Setting detail: HOSPITAL OUTPATIENT SURGERY
End: 2018-01-01
Attending: SURGERY | Admitting: SURGERY

## 2018-01-01 ENCOUNTER — APPOINTMENT (OUTPATIENT)
Dept: LAB | Facility: HOSPITAL | Age: 63
End: 2018-01-01

## 2018-01-01 ENCOUNTER — HOSPITAL ENCOUNTER (OUTPATIENT)
Dept: CARDIOLOGY | Facility: HOSPITAL | Age: 63
Discharge: HOME | End: 2018-07-17
Admitting: NURSE PRACTITIONER

## 2018-01-01 ENCOUNTER — APPOINTMENT (OUTPATIENT)
Dept: ULTRASOUND IMAGING | Facility: HOSPITAL | Age: 63
End: 2018-01-01
Attending: SURGERY

## 2018-01-01 ENCOUNTER — DOCUMENTATION (OUTPATIENT)
Dept: PODIATRY | Facility: HOSPITAL | Age: 63
End: 2018-01-01

## 2018-01-01 VITALS
WEIGHT: 257 LBS | HEART RATE: 70 BPM | SYSTOLIC BLOOD PRESSURE: 120 MMHG | RESPIRATION RATE: 16 BRPM | HEIGHT: 73 IN | BODY MASS INDEX: 34.06 KG/M2 | DIASTOLIC BLOOD PRESSURE: 70 MMHG

## 2018-01-01 VITALS
DIASTOLIC BLOOD PRESSURE: 68 MMHG | HEART RATE: 68 BPM | HEIGHT: 73 IN | WEIGHT: 284.6 LBS | SYSTOLIC BLOOD PRESSURE: 102 MMHG | BODY MASS INDEX: 37.72 KG/M2

## 2018-01-01 VITALS — HEART RATE: 113 BPM | OXYGEN SATURATION: 88 % | TEMPERATURE: 97.1 F | RESPIRATION RATE: 12 BRPM

## 2018-01-01 VITALS
RESPIRATION RATE: 16 BRPM | HEART RATE: 63 BPM | SYSTOLIC BLOOD PRESSURE: 110 MMHG | WEIGHT: 264.4 LBS | BODY MASS INDEX: 35.04 KG/M2 | HEIGHT: 73 IN | DIASTOLIC BLOOD PRESSURE: 78 MMHG

## 2018-01-01 VITALS
WEIGHT: 280 LBS | SYSTOLIC BLOOD PRESSURE: 105 MMHG | TEMPERATURE: 98.2 F | RESPIRATION RATE: 14 BRPM | OXYGEN SATURATION: 100 % | HEART RATE: 52 BPM | HEIGHT: 73 IN | BODY MASS INDEX: 37.11 KG/M2 | DIASTOLIC BLOOD PRESSURE: 61 MMHG

## 2018-01-01 VITALS
WEIGHT: 286 LBS | TEMPERATURE: 98.4 F | OXYGEN SATURATION: 95 % | SYSTOLIC BLOOD PRESSURE: 117 MMHG | DIASTOLIC BLOOD PRESSURE: 74 MMHG | HEIGHT: 73 IN | HEART RATE: 91 BPM | BODY MASS INDEX: 37.91 KG/M2 | RESPIRATION RATE: 16 BRPM

## 2018-01-01 VITALS
WEIGHT: 264.2 LBS | RESPIRATION RATE: 16 BRPM | OXYGEN SATURATION: 98 % | HEIGHT: 74 IN | BODY MASS INDEX: 33.91 KG/M2 | SYSTOLIC BLOOD PRESSURE: 95 MMHG | TEMPERATURE: 97.6 F | HEART RATE: 96 BPM | DIASTOLIC BLOOD PRESSURE: 59 MMHG

## 2018-01-01 VITALS
HEIGHT: 73 IN | DIASTOLIC BLOOD PRESSURE: 78 MMHG | RESPIRATION RATE: 16 BRPM | HEART RATE: 61 BPM | BODY MASS INDEX: 35.36 KG/M2 | WEIGHT: 266.8 LBS | SYSTOLIC BLOOD PRESSURE: 126 MMHG

## 2018-01-01 VITALS
HEART RATE: 71 BPM | DIASTOLIC BLOOD PRESSURE: 62 MMHG | HEIGHT: 73 IN | SYSTOLIC BLOOD PRESSURE: 108 MMHG | WEIGHT: 258 LBS | BODY MASS INDEX: 34.19 KG/M2

## 2018-01-01 VITALS
SYSTOLIC BLOOD PRESSURE: 120 MMHG | HEART RATE: 60 BPM | DIASTOLIC BLOOD PRESSURE: 68 MMHG | WEIGHT: 275 LBS | HEIGHT: 73 IN | BODY MASS INDEX: 36.45 KG/M2

## 2018-01-01 VITALS
SYSTOLIC BLOOD PRESSURE: 118 MMHG | RESPIRATION RATE: 20 BRPM | HEART RATE: 55 BPM | BODY MASS INDEX: 36.45 KG/M2 | DIASTOLIC BLOOD PRESSURE: 78 MMHG | TEMPERATURE: 98.9 F | OXYGEN SATURATION: 98 % | HEIGHT: 73 IN | WEIGHT: 275 LBS

## 2018-01-01 DIAGNOSIS — R60.1 GENERALIZED EDEMA: ICD-10-CM

## 2018-01-01 DIAGNOSIS — I36.1 NON-RHEUMATIC TRICUSPID VALVE INSUFFICIENCY: Primary | ICD-10-CM

## 2018-01-01 DIAGNOSIS — I27.20 PULMONARY HTN (HCC): ICD-10-CM

## 2018-01-01 DIAGNOSIS — Z13.6 SCREENING FOR ISCHEMIC HEART DISEASE: ICD-10-CM

## 2018-01-01 DIAGNOSIS — N18.30 CKD (CHRONIC KIDNEY DISEASE) STAGE 3, GFR 30-59 ML/MIN (HCC): ICD-10-CM

## 2018-01-01 DIAGNOSIS — I50.810 RIGHT HEART FAILURE (HCC): ICD-10-CM

## 2018-01-01 DIAGNOSIS — I50.23 ACUTE ON CHRONIC SYSTOLIC CONGESTIVE HEART FAILURE (HCC): ICD-10-CM

## 2018-01-01 DIAGNOSIS — I48.21 PERMANENT ATRIAL FIBRILLATION (HCC): ICD-10-CM

## 2018-01-01 DIAGNOSIS — D69.6 THROMBOCYTOPENIA (HCC): ICD-10-CM

## 2018-01-01 DIAGNOSIS — I35.0 NONRHEUMATIC AORTIC VALVE STENOSIS: ICD-10-CM

## 2018-01-01 DIAGNOSIS — E11.51 TYPE 2 DIABETES MELLITUS WITH DIABETIC PERIPHERAL ANGIOPATHY WITHOUT GANGRENE, WITHOUT LONG-TERM CURRENT USE OF INSULIN (HCC): ICD-10-CM

## 2018-01-01 DIAGNOSIS — Z01.810 PRE-OPERATIVE CARDIOVASCULAR EXAMINATION: Primary | ICD-10-CM

## 2018-01-01 DIAGNOSIS — G47.33 OBSTRUCTIVE SLEEP APNEA: ICD-10-CM

## 2018-01-01 DIAGNOSIS — I48.19 PERSISTENT ATRIAL FIBRILLATION (HCC): ICD-10-CM

## 2018-01-01 DIAGNOSIS — A41.9 SEPSIS, DUE TO UNSPECIFIED ORGANISM: Primary | ICD-10-CM

## 2018-01-01 DIAGNOSIS — N18.9 CHRONIC KIDNEY DISEASE, UNSPECIFIED CKD STAGE: ICD-10-CM

## 2018-01-01 DIAGNOSIS — E83.41 HYPERMAGNESEMIA: ICD-10-CM

## 2018-01-01 DIAGNOSIS — E87.5 HYPERKALEMIA: ICD-10-CM

## 2018-01-01 DIAGNOSIS — N18.30 CHRONIC KIDNEY DISEASE, STAGE III (MODERATE) (HCC): ICD-10-CM

## 2018-01-01 DIAGNOSIS — I50.33 ACUTE ON CHRONIC DIASTOLIC CONGESTIVE HEART FAILURE (HCC): Primary | ICD-10-CM

## 2018-01-01 DIAGNOSIS — I50.23 ACUTE ON CHRONIC SYSTOLIC CONGESTIVE HEART FAILURE (HCC): Primary | ICD-10-CM

## 2018-01-01 DIAGNOSIS — I27.20 PULMONARY HYPERTENSION (HCC): ICD-10-CM

## 2018-01-01 DIAGNOSIS — R60.1 GENERALIZED EDEMA: Primary | ICD-10-CM

## 2018-01-01 DIAGNOSIS — I36.1 NON-RHEUMATIC TRICUSPID VALVE INSUFFICIENCY: ICD-10-CM

## 2018-01-01 DIAGNOSIS — N18.30 CHRONIC KIDNEY DISEASE, STAGE III (MODERATE) (HCC): Primary | ICD-10-CM

## 2018-01-01 DIAGNOSIS — R11.0 NAUSEA: ICD-10-CM

## 2018-01-01 DIAGNOSIS — L08.9 INFECTED SEBACEOUS CYST: ICD-10-CM

## 2018-01-01 DIAGNOSIS — I35.0 NONRHEUMATIC AORTIC VALVE STENOSIS: Primary | ICD-10-CM

## 2018-01-01 DIAGNOSIS — I50.810 RIGHT HEART FAILURE (HCC): Primary | ICD-10-CM

## 2018-01-01 DIAGNOSIS — J90 PLEURAL EFFUSION, RIGHT: ICD-10-CM

## 2018-01-01 DIAGNOSIS — E87.6 HYPOKALEMIA: Primary | ICD-10-CM

## 2018-01-01 DIAGNOSIS — R94.31 ABNORMAL EKG: ICD-10-CM

## 2018-01-01 DIAGNOSIS — E87.6 HYPOKALEMIA: ICD-10-CM

## 2018-01-01 DIAGNOSIS — Z97.8 INDWELLING FOLEY CATHETER PRESENT: ICD-10-CM

## 2018-01-01 DIAGNOSIS — L72.3 INFECTED SEBACEOUS CYST: ICD-10-CM

## 2018-01-01 DIAGNOSIS — I50.33 ACUTE ON CHRONIC DIASTOLIC CONGESTIVE HEART FAILURE (HCC): ICD-10-CM

## 2018-01-01 DIAGNOSIS — I48.21 PERMANENT ATRIAL FIBRILLATION (HCC): Primary | ICD-10-CM

## 2018-01-01 DIAGNOSIS — Z01.810 PRE-OPERATIVE CARDIOVASCULAR EXAMINATION: ICD-10-CM

## 2018-01-01 DIAGNOSIS — I50.32 CHRONIC DIASTOLIC HEART FAILURE (HCC): ICD-10-CM

## 2018-01-01 DIAGNOSIS — R93.5 ABNORMAL CT OF THE ABDOMEN: ICD-10-CM

## 2018-01-01 LAB
A2 MACROGLOB SERPL-MCNC: 206 MG/DL (ref 110–276)
ABO + RH BLD: NORMAL
ABO GROUP BLD: NORMAL
ABO GROUP BLD: NORMAL
ACT BLD: 125 SECONDS (ref 82–152)
ACT BLD: 312 SECONDS (ref 82–152)
ACT BLD: 334 SECONDS (ref 82–152)
ACT BLD: 351 SECONDS (ref 82–152)
ACT BLD: 373 SECONDS (ref 82–152)
ACT BLD: 384 SECONDS (ref 82–152)
ACT BLD: 92 SECONDS (ref 82–152)
ALBUMIN 24H MFR UR ELPH: 19.9 %
ALBUMIN SERPL-MCNC: 1.6 G/DL (ref 3.5–5.2)
ALBUMIN SERPL-MCNC: 1.7 G/DL (ref 3.5–5.2)
ALBUMIN SERPL-MCNC: 1.8 G/DL (ref 3.5–5.2)
ALBUMIN SERPL-MCNC: 1.8 G/DL (ref 3.5–5.2)
ALBUMIN SERPL-MCNC: 1.9 G/DL (ref 2.9–4.4)
ALBUMIN SERPL-MCNC: 1.9 G/DL (ref 3.5–5.2)
ALBUMIN SERPL-MCNC: 2 G/DL (ref 3.5–5.2)
ALBUMIN SERPL-MCNC: 2.1 G/DL (ref 3.5–5.2)
ALBUMIN SERPL-MCNC: 2.2 G/DL (ref 3.5–5.2)
ALBUMIN SERPL-MCNC: 2.3 G/DL (ref 3.5–5.2)
ALBUMIN SERPL-MCNC: 2.4 G/DL (ref 3.5–5.2)
ALBUMIN SERPL-MCNC: 2.5 G/DL (ref 3.5–5.2)
ALBUMIN SERPL-MCNC: 2.6 G/DL (ref 3.5–5.2)
ALBUMIN SERPL-MCNC: 2.7 G/DL (ref 3.5–5.2)
ALBUMIN SERPL-MCNC: 2.8 G/DL (ref 3.5–5.2)
ALBUMIN SERPL-MCNC: 2.8 G/DL (ref 3.5–5.2)
ALBUMIN SERPL-MCNC: 3 G/DL (ref 3.5–5.2)
ALBUMIN UR-MCNC: <1.2 MG/L
ALBUMIN/GLOB SERPL: 0.5 G/DL
ALBUMIN/GLOB SERPL: 0.7 G/DL
ALBUMIN/GLOB SERPL: 0.8 G/DL
ALBUMIN/GLOB SERPL: 0.8 {RATIO} (ref 0.7–1.7)
ALP SERPL-CCNC: 186 U/L (ref 39–117)
ALP SERPL-CCNC: 540 U/L (ref 39–117)
ALP SERPL-CCNC: 78 U/L (ref 39–117)
ALP SERPL-CCNC: 84 U/L (ref 39–117)
ALP SERPL-CCNC: 87 U/L (ref 39–117)
ALP SERPL-CCNC: 87 U/L (ref 39–117)
ALPHA1 GLOB 24H MFR UR ELPH: 2.4 %
ALPHA1 GLOB FLD ELPH-MCNC: 0.3 G/DL (ref 0–0.4)
ALPHA2 GLOB 24H MFR UR ELPH: 14.2 %
ALPHA2 GLOB SERPL ELPH-MCNC: 0.7 G/DL (ref 0.4–1)
ALT SERPL W P-5'-P-CCNC: 15 U/L (ref 1–41)
ALT SERPL W P-5'-P-CCNC: 16 U/L (ref 1–41)
ALT SERPL W P-5'-P-CCNC: 17 IU/L (ref 0–55)
ALT SERPL W P-5'-P-CCNC: 18 U/L (ref 1–41)
ALT SERPL W P-5'-P-CCNC: 19 U/L (ref 1–41)
ALT SERPL W P-5'-P-CCNC: 20 U/L (ref 1–41)
ALT SERPL W P-5'-P-CCNC: 30 U/L (ref 1–41)
ANION GAP SERPL CALCULATED.3IONS-SCNC: 10.1 MMOL/L
ANION GAP SERPL CALCULATED.3IONS-SCNC: 10.2 MMOL/L
ANION GAP SERPL CALCULATED.3IONS-SCNC: 10.3 MMOL/L
ANION GAP SERPL CALCULATED.3IONS-SCNC: 10.4 MMOL/L
ANION GAP SERPL CALCULATED.3IONS-SCNC: 10.4 MMOL/L
ANION GAP SERPL CALCULATED.3IONS-SCNC: 10.5 MMOL/L
ANION GAP SERPL CALCULATED.3IONS-SCNC: 10.7 MMOL/L
ANION GAP SERPL CALCULATED.3IONS-SCNC: 10.8 MMOL/L
ANION GAP SERPL CALCULATED.3IONS-SCNC: 10.9 MMOL/L
ANION GAP SERPL CALCULATED.3IONS-SCNC: 10.9 MMOL/L
ANION GAP SERPL CALCULATED.3IONS-SCNC: 11 MMOL/L
ANION GAP SERPL CALCULATED.3IONS-SCNC: 11.1 MMOL/L
ANION GAP SERPL CALCULATED.3IONS-SCNC: 11.2 MMOL/L
ANION GAP SERPL CALCULATED.3IONS-SCNC: 11.5 MMOL/L
ANION GAP SERPL CALCULATED.3IONS-SCNC: 11.8 MMOL/L
ANION GAP SERPL CALCULATED.3IONS-SCNC: 11.8 MMOL/L
ANION GAP SERPL CALCULATED.3IONS-SCNC: 11.9 MMOL/L
ANION GAP SERPL CALCULATED.3IONS-SCNC: 11.9 MMOL/L
ANION GAP SERPL CALCULATED.3IONS-SCNC: 12 MMOL/L
ANION GAP SERPL CALCULATED.3IONS-SCNC: 12.3 MMOL/L
ANION GAP SERPL CALCULATED.3IONS-SCNC: 12.3 MMOL/L
ANION GAP SERPL CALCULATED.3IONS-SCNC: 12.6 MMOL/L
ANION GAP SERPL CALCULATED.3IONS-SCNC: 12.7 MMOL/L
ANION GAP SERPL CALCULATED.3IONS-SCNC: 12.8 MMOL/L
ANION GAP SERPL CALCULATED.3IONS-SCNC: 13.5 MMOL/L
ANION GAP SERPL CALCULATED.3IONS-SCNC: 13.8 MMOL/L
ANION GAP SERPL CALCULATED.3IONS-SCNC: 13.8 MMOL/L
ANION GAP SERPL CALCULATED.3IONS-SCNC: 13.9 MMOL/L
ANION GAP SERPL CALCULATED.3IONS-SCNC: 13.9 MMOL/L
ANION GAP SERPL CALCULATED.3IONS-SCNC: 14.2 MMOL/L
ANION GAP SERPL CALCULATED.3IONS-SCNC: 14.3 MMOL/L
ANION GAP SERPL CALCULATED.3IONS-SCNC: 14.3 MMOL/L
ANION GAP SERPL CALCULATED.3IONS-SCNC: 14.4 MMOL/L
ANION GAP SERPL CALCULATED.3IONS-SCNC: 14.8 MMOL/L
ANION GAP SERPL CALCULATED.3IONS-SCNC: 14.9 MMOL/L
ANION GAP SERPL CALCULATED.3IONS-SCNC: 14.9 MMOL/L
ANION GAP SERPL CALCULATED.3IONS-SCNC: 15.7 MMOL/L
ANION GAP SERPL CALCULATED.3IONS-SCNC: 15.8 MMOL/L
ANION GAP SERPL CALCULATED.3IONS-SCNC: 16 MMOL/L
ANION GAP SERPL CALCULATED.3IONS-SCNC: 16.1 MMOL/L
ANION GAP SERPL CALCULATED.3IONS-SCNC: 16.2 MMOL/L
ANION GAP SERPL CALCULATED.3IONS-SCNC: 16.9 MMOL/L
ANION GAP SERPL CALCULATED.3IONS-SCNC: 17.5 MMOL/L
ANION GAP SERPL CALCULATED.3IONS-SCNC: 17.9 MMOL/L
ANION GAP SERPL CALCULATED.3IONS-SCNC: 19.7 MMOL/L
ANION GAP SERPL CALCULATED.3IONS-SCNC: 5.9 MMOL/L
ANION GAP SERPL CALCULATED.3IONS-SCNC: 6.7 MMOL/L
ANION GAP SERPL CALCULATED.3IONS-SCNC: 6.9 MMOL/L
ANION GAP SERPL CALCULATED.3IONS-SCNC: 7 MMOL/L
ANION GAP SERPL CALCULATED.3IONS-SCNC: 7 MMOL/L
ANION GAP SERPL CALCULATED.3IONS-SCNC: 8 MMOL/L
ANION GAP SERPL CALCULATED.3IONS-SCNC: 8 MMOL/L
ANION GAP SERPL CALCULATED.3IONS-SCNC: 8.1 MMOL/L
ANION GAP SERPL CALCULATED.3IONS-SCNC: 8.4 MMOL/L
ANION GAP SERPL CALCULATED.3IONS-SCNC: 9 MMOL/L
ANION GAP SERPL CALCULATED.3IONS-SCNC: 9.2 MMOL/L
ANION GAP SERPL CALCULATED.3IONS-SCNC: 9.2 MMOL/L
ANION GAP SERPL CALCULATED.3IONS-SCNC: 9.3 MMOL/L
ANION GAP SERPL CALCULATED.3IONS-SCNC: 9.4 MMOL/L
ANION GAP SERPL CALCULATED.3IONS-SCNC: 9.6 MMOL/L
ANION GAP SERPL CALCULATED.3IONS-SCNC: 9.7 MMOL/L
ANION GAP SERPL CALCULATED.3IONS-SCNC: 9.8 MMOL/L
ANION GAP SERPL CALCULATED.3IONS-SCNC: 9.8 MMOL/L
ANISOCYTOSIS BLD QL: ABNORMAL
ANISOCYTOSIS BLD QL: NORMAL
AORTIC DIMENSIONLESS INDEX: 0.2 (DI)
APO A-I SERPL-MCNC: 117 MG/DL (ref 101–178)
APTT PPP: 25.6 SECONDS (ref 22.7–35.4)
APTT PPP: 29.8 SECONDS (ref 22.7–35.4)
APTT PPP: 36.1 SECONDS (ref 22.7–35.4)
APTT PPP: 36.7 SECONDS (ref 22.7–35.4)
ARTERIAL PATENCY WRIST A: ABNORMAL
ARTERIAL PATENCY WRIST A: POSITIVE
ASCENDING AORTA: 3.9 CM
AST SERPL W P-5'-P-CCNC: 31 IU/L (ref 0–40)
AST SERPL-CCNC: 17 U/L (ref 1–40)
AST SERPL-CCNC: 22 U/L (ref 1–40)
AST SERPL-CCNC: 24 U/L (ref 1–40)
AST SERPL-CCNC: 27 U/L (ref 1–40)
AST SERPL-CCNC: 33 U/L (ref 1–40)
AST SERPL-CCNC: 33 U/L (ref 1–40)
ATMOSPHERIC PRESS: 744 MMHG
ATMOSPHERIC PRESS: 747.8 MMHG
ATMOSPHERIC PRESS: 748.2 MMHG
ATMOSPHERIC PRESS: 748.3 MMHG
ATMOSPHERIC PRESS: 749 MMHG
B-GLOBULIN MFR UR ELPH: 24.9 %
B-GLOBULIN SERPL ELPH-MCNC: 0.8 G/DL (ref 0.7–1.3)
BACTERIA BLD CULT: ABNORMAL
BACTERIA SPEC AEROBE CULT: ABNORMAL
BACTERIA UR QL AUTO: ABNORMAL /HPF
BACTERIA UR QL AUTO: NORMAL /HPF
BASE EXCESS BLDA CALC-SCNC: 2 MMOL/L (ref -5–5)
BASE EXCESS BLDA CALC-SCNC: 2 MMOL/L (ref -5–5)
BASE EXCESS BLDA CALC-SCNC: 2.7 MMOL/L (ref 0–2)
BASE EXCESS BLDA CALC-SCNC: 2.8 MMOL/L (ref 0–2)
BASE EXCESS BLDA CALC-SCNC: 3 MMOL/L (ref -5–5)
BASE EXCESS BLDA CALC-SCNC: 3.7 MMOL/L (ref 0–2)
BASE EXCESS BLDA CALC-SCNC: 3.9 MMOL/L (ref 0–2)
BASE EXCESS BLDA CALC-SCNC: 4.4 MMOL/L (ref 0–2)
BASE EXCESS BLDA CALC-SCNC: 5 MMOL/L (ref -5–5)
BASOPHILS # BLD AUTO: 0.01 10*3/MM3 (ref 0–0.2)
BASOPHILS # BLD AUTO: 0.02 10*3/MM3 (ref 0–0.2)
BASOPHILS # BLD AUTO: 0.03 10*3/MM3 (ref 0–0.2)
BASOPHILS # BLD AUTO: 0.04 10*3/MM3 (ref 0–0.2)
BASOPHILS NFR BLD AUTO: 0 % (ref 0–1.5)
BASOPHILS NFR BLD AUTO: 0.1 % (ref 0–1.5)
BASOPHILS NFR BLD AUTO: 0.2 % (ref 0–1.5)
BASOPHILS NFR BLD AUTO: 0.3 % (ref 0–1.5)
BASOPHILS NFR BLD AUTO: 0.3 % (ref 0–1.5)
BASOPHILS NFR BLD AUTO: 0.4 % (ref 0–1.5)
BDY SITE: ABNORMAL
BH BB BLOOD EXPIRATION DATE: NORMAL
BH BB BLOOD TYPE BARCODE: 5100
BH BB BLOOD TYPE BARCODE: 6200
BH BB BLOOD TYPE BARCODE: 7300
BH BB BLOOD TYPE BARCODE: 9500
BH BB DISPENSE STATUS: NORMAL
BH BB PRODUCT CODE: NORMAL
BH BB UNIT NUMBER: NORMAL
BH CV ECHO MEAS - ACS: 1.2 CM
BH CV ECHO MEAS - ACS: 2 CM
BH CV ECHO MEAS - AI DEC SLOPE: 258 CM/SEC^2
BH CV ECHO MEAS - AI MAX PG: 53.5 MMHG
BH CV ECHO MEAS - AI MAX VEL: 365.3 CM/SEC
BH CV ECHO MEAS - AI P1/2T: 414.7 MSEC
BH CV ECHO MEAS - AO MAX PG (FULL): 59.9 MMHG
BH CV ECHO MEAS - AO MAX PG: 61.5 MMHG
BH CV ECHO MEAS - AO MEAN PG (FULL): 13 MMHG
BH CV ECHO MEAS - AO MEAN PG (FULL): 32.5 MMHG
BH CV ECHO MEAS - AO MEAN PG: 15 MMHG
BH CV ECHO MEAS - AO MEAN PG: 33.3 MMHG
BH CV ECHO MEAS - AO ROOT AREA (BSA CORRECTED): 1.8
BH CV ECHO MEAS - AO ROOT AREA: 15.4 CM^2
BH CV ECHO MEAS - AO ROOT DIAM: 4.4 CM
BH CV ECHO MEAS - AO V2 MAX: 246 CM/SEC
BH CV ECHO MEAS - AO V2 MAX: 391 CM/SEC
BH CV ECHO MEAS - AO V2 MEAN: 183 CM/SEC
BH CV ECHO MEAS - AO V2 MEAN: 269.4 CM/SEC
BH CV ECHO MEAS - AO V2 VTI: 48.2 CM
BH CV ECHO MEAS - AO V2 VTI: 86 CM
BH CV ECHO MEAS - AVA(I,A): 0.57 CM^2
BH CV ECHO MEAS - AVA(I,A): 1.2 CM^2
BH CV ECHO MEAS - AVA(I,D): 0.57 CM^2
BH CV ECHO MEAS - AVA(I,D): 1.2 CM^2
BH CV ECHO MEAS - AVA(V,A): 0.52 CM^2
BH CV ECHO MEAS - AVA(V,D): 0.52 CM^2
BH CV ECHO MEAS - BSA(HAYCOCK): 2.6 M^2
BH CV ECHO MEAS - BSA(HAYCOCK): 2.7 M^2
BH CV ECHO MEAS - BSA: 2.5 M^2
BH CV ECHO MEAS - BSA: 2.5 M^2
BH CV ECHO MEAS - BZI_BMI: 36.3 KILOGRAMS/M^2
BH CV ECHO MEAS - BZI_BMI: 38.4 KILOGRAMS/M^2
BH CV ECHO MEAS - BZI_METRIC_HEIGHT: 185.4 CM
BH CV ECHO MEAS - BZI_METRIC_HEIGHT: 185.4 CM
BH CV ECHO MEAS - BZI_METRIC_WEIGHT: 124.7 KG
BH CV ECHO MEAS - BZI_METRIC_WEIGHT: 132 KG
BH CV ECHO MEAS - CONTRAST EF (2CH): 50.8 ML/M^2
BH CV ECHO MEAS - CONTRAST EF (2CH): 55 ML/M^2
BH CV ECHO MEAS - CONTRAST EF 4CH: 55.8 ML/M^2
BH CV ECHO MEAS - CONTRAST EF 4CH: 56.1 ML/M^2
BH CV ECHO MEAS - EDV(CUBED): 103.8 ML
BH CV ECHO MEAS - EDV(MOD-SP2): 109 ML
BH CV ECHO MEAS - EDV(MOD-SP2): 122 ML
BH CV ECHO MEAS - EDV(MOD-SP4): 114 ML
BH CV ECHO MEAS - EDV(MOD-SP4): 86 ML
BH CV ECHO MEAS - EDV(TEICH): 102.4 ML
BH CV ECHO MEAS - EDV(TEICH): 88.9 ML
BH CV ECHO MEAS - EF(CUBED): 71.7 %
BH CV ECHO MEAS - EF(CUBED): 83.1 %
BH CV ECHO MEAS - EF(MOD-BP): 56 %
BH CV ECHO MEAS - EF(MOD-BP): 60 %
BH CV ECHO MEAS - EF(MOD-SP2): 50.8 %
BH CV ECHO MEAS - EF(MOD-SP2): 55 %
BH CV ECHO MEAS - EF(MOD-SP4): 55.8 %
BH CV ECHO MEAS - EF(MOD-SP4): 56.1 %
BH CV ECHO MEAS - EF(TEICH): 63.6 %
BH CV ECHO MEAS - EF(TEICH): 76 %
BH CV ECHO MEAS - ESV(CUBED): 17.6 ML
BH CV ECHO MEAS - ESV(MOD-SP2): 49 ML
BH CV ECHO MEAS - ESV(MOD-SP2): 60 ML
BH CV ECHO MEAS - ESV(MOD-SP4): 38 ML
BH CV ECHO MEAS - ESV(MOD-SP4): 50 ML
BH CV ECHO MEAS - ESV(TEICH): 24.6 ML
BH CV ECHO MEAS - ESV(TEICH): 32.3 ML
BH CV ECHO MEAS - FS: 34.4 %
BH CV ECHO MEAS - FS: 44.7 %
BH CV ECHO MEAS - IVS/LVPW: 0.97
BH CV ECHO MEAS - IVS/LVPW: 1
BH CV ECHO MEAS - IVSD: 1.1 CM
BH CV ECHO MEAS - IVSD: 1.3 CM
BH CV ECHO MEAS - LA DIMENSION: 6 CM
BH CV ECHO MEAS - LAT PEAK E' VEL: 8 CM/SEC
BH CV ECHO MEAS - LAT PEAK E' VEL: 9 CM/SEC
BH CV ECHO MEAS - LV DIASTOLIC VOL/BSA (35-75): 34.9 ML/M^2
BH CV ECHO MEAS - LV DIASTOLIC VOL/BSA (35-75): 45.2 ML/M^2
BH CV ECHO MEAS - LV MASS(C)D: 187.5 GRAMS
BH CV ECHO MEAS - LV MASS(C)D: 231.9 GRAMS
BH CV ECHO MEAS - LV MASS(C)DI: 74.3 GRAMS/M^2
BH CV ECHO MEAS - LV MASS(C)DI: 94.1 GRAMS/M^2
BH CV ECHO MEAS - LV MAX PG: 1.5 MMHG
BH CV ECHO MEAS - LV MEAN PG: 0.84 MMHG
BH CV ECHO MEAS - LV MEAN PG: 2 MMHG
BH CV ECHO MEAS - LV SYSTOLIC VOL/BSA (12-30): 15.4 ML/M^2
BH CV ECHO MEAS - LV SYSTOLIC VOL/BSA (12-30): 19.8 ML/M^2
BH CV ECHO MEAS - LV V1 MAX: 111 CM/SEC
BH CV ECHO MEAS - LV V1 MAX: 61.6 CM/SEC
BH CV ECHO MEAS - LV V1 MEAN: 43.1 CM/SEC
BH CV ECHO MEAS - LV V1 MEAN: 68.8 CM/SEC
BH CV ECHO MEAS - LV V1 VTI: 14.9 CM
BH CV ECHO MEAS - LV V1 VTI: 16.9 CM
BH CV ECHO MEAS - LVIDD: 4.4 CM
BH CV ECHO MEAS - LVIDD: 4.7 CM
BH CV ECHO MEAS - LVIDS: 2.6 CM
BH CV ECHO MEAS - LVIDS: 2.9 CM
BH CV ECHO MEAS - LVLD AP2: 8.1 CM
BH CV ECHO MEAS - LVLD AP2: 9.2 CM
BH CV ECHO MEAS - LVLD AP4: 8.1 CM
BH CV ECHO MEAS - LVLD AP4: 8.8 CM
BH CV ECHO MEAS - LVLS AP2: 6.4 CM
BH CV ECHO MEAS - LVLS AP2: 8 CM
BH CV ECHO MEAS - LVLS AP4: 7.6 CM
BH CV ECHO MEAS - LVLS AP4: 7.7 CM
BH CV ECHO MEAS - LVOT AREA (M): 3.1 CM^2
BH CV ECHO MEAS - LVOT AREA (M): 3.5 CM^2
BH CV ECHO MEAS - LVOT AREA: 3.3 CM^2
BH CV ECHO MEAS - LVOT AREA: 3.5 CM^2
BH CV ECHO MEAS - LVOT DIAM: 2 CM
BH CV ECHO MEAS - LVOT DIAM: 2.1 CM
BH CV ECHO MEAS - LVPWD: 1.1 CM
BH CV ECHO MEAS - LVPWD: 1.4 CM
BH CV ECHO MEAS - MED PEAK E' VEL: 5 CM/SEC
BH CV ECHO MEAS - MED PEAK E' VEL: 7 CM/SEC
BH CV ECHO MEAS - MV DEC SLOPE: 576.7 CM/SEC^2
BH CV ECHO MEAS - MV DEC SLOPE: 957.5 CM/SEC^2
BH CV ECHO MEAS - MV DEC TIME: 0.12 SEC
BH CV ECHO MEAS - MV DEC TIME: 0.17 SEC
BH CV ECHO MEAS - MV E MAX VEL: 109 CM/SEC
BH CV ECHO MEAS - MV E MAX VEL: 109.6 CM/SEC
BH CV ECHO MEAS - MV MAX PG: 6.2 MMHG
BH CV ECHO MEAS - MV MEAN PG: 2.1 MMHG
BH CV ECHO MEAS - MV MEAN PG: 6 MMHG
BH CV ECHO MEAS - MV P1/2T MAX VEL: 123.8 CM/SEC
BH CV ECHO MEAS - MV P1/2T MAX VEL: 175.5 CM/SEC
BH CV ECHO MEAS - MV P1/2T: 53.7 MSEC
BH CV ECHO MEAS - MV P1/2T: 62.9 MSEC
BH CV ECHO MEAS - MV V2 MAX: 124.3 CM/SEC
BH CV ECHO MEAS - MV V2 MEAN: 107.1 CM/SEC
BH CV ECHO MEAS - MV V2 MEAN: 63.6 CM/SEC
BH CV ECHO MEAS - MV V2 VTI: 27.7 CM
BH CV ECHO MEAS - MV V2 VTI: 28 CM
BH CV ECHO MEAS - MVA P1/2T LCG: 1.3 CM^2
BH CV ECHO MEAS - MVA P1/2T LCG: 1.8 CM^2
BH CV ECHO MEAS - MVA(P1/2T): 3.5 CM^2
BH CV ECHO MEAS - MVA(P1/2T): 4.1 CM^2
BH CV ECHO MEAS - MVA(VTI): 1.8 CM^2
BH CV ECHO MEAS - MVA(VTI): 2.1 CM^2
BH CV ECHO MEAS - PA ACC SLOPE: 13.8 CM/SEC^2
BH CV ECHO MEAS - PA ACC TIME: 0.09 SEC
BH CV ECHO MEAS - PA ACC TIME: 0.1 SEC
BH CV ECHO MEAS - PA MAX PG (FULL): 1.2 MMHG
BH CV ECHO MEAS - PA MAX PG: 1.9 MMHG
BH CV ECHO MEAS - PA MAX PG: 2.8 MMHG
BH CV ECHO MEAS - PA PR(ACCEL): 34.5 MMHG
BH CV ECHO MEAS - PA PR(ACCEL): 39.4 MMHG
BH CV ECHO MEAS - PA V2 MAX: 68.5 CM/SEC
BH CV ECHO MEAS - PA V2 MAX: 84.2 CM/SEC
BH CV ECHO MEAS - PVA(V,A): 2.6 CM^2
BH CV ECHO MEAS - PVA(V,D): 2.6 CM^2
BH CV ECHO MEAS - QP/QS: 0.57
BH CV ECHO MEAS - QP/QS: 0.93
BH CV ECHO MEAS - RAP SYSTOLE: 15 MMHG
BH CV ECHO MEAS - RAP SYSTOLE: 8 MMHG
BH CV ECHO MEAS - RV MAX PG: 0.7 MMHG
BH CV ECHO MEAS - RV MEAN PG: 0 MMHG
BH CV ECHO MEAS - RV MEAN PG: 0.42 MMHG
BH CV ECHO MEAS - RV V1 MAX: 41.7 CM/SEC
BH CV ECHO MEAS - RV V1 MEAN: 29.4 CM/SEC
BH CV ECHO MEAS - RV V1 MEAN: 30.6 CM/SEC
BH CV ECHO MEAS - RV V1 VTI: 10.6 CM
BH CV ECHO MEAS - RV V1 VTI: 7.3 CM
BH CV ECHO MEAS - RVOT AREA: 4.3 CM^2
BH CV ECHO MEAS - RVOT AREA: 4.5 CM^2
BH CV ECHO MEAS - RVOT DIAM: 2.3 CM
BH CV ECHO MEAS - RVOT DIAM: 2.4 CM
BH CV ECHO MEAS - RVSP: 62.2 MMHG
BH CV ECHO MEAS - RVSP: 79 MMHG
BH CV ECHO MEAS - SI(AO): 538.8 ML/M^2
BH CV ECHO MEAS - SI(CUBED): 25.2 ML/M^2
BH CV ECHO MEAS - SI(CUBED): 34.2 ML/M^2
BH CV ECHO MEAS - SI(LVOT): 19.9 ML/M^2
BH CV ECHO MEAS - SI(LVOT): 23.2 ML/M^2
BH CV ECHO MEAS - SI(MOD-SP2): 24.4 ML/M^2
BH CV ECHO MEAS - SI(MOD-SP2): 24.6 ML/M^2
BH CV ECHO MEAS - SI(MOD-SP4): 19.5 ML/M^2
BH CV ECHO MEAS - SI(MOD-SP4): 25.4 ML/M^2
BH CV ECHO MEAS - SI(TEICH): 23 ML/M^2
BH CV ECHO MEAS - SI(TEICH): 30.8 ML/M^2
BH CV ECHO MEAS - SUP REN AO DIAM: 2 CM
BH CV ECHO MEAS - SV(AO): 1327 ML
BH CV ECHO MEAS - SV(CUBED): 62.2 ML
BH CV ECHO MEAS - SV(CUBED): 86.2 ML
BH CV ECHO MEAS - SV(LVOT): 49.1 ML
BH CV ECHO MEAS - SV(LVOT): 58.5 ML
BH CV ECHO MEAS - SV(MOD-SP2): 60 ML
BH CV ECHO MEAS - SV(MOD-SP2): 62 ML
BH CV ECHO MEAS - SV(MOD-SP4): 48 ML
BH CV ECHO MEAS - SV(MOD-SP4): 64 ML
BH CV ECHO MEAS - SV(RVOT): 33.1 ML
BH CV ECHO MEAS - SV(RVOT): 45.6 ML
BH CV ECHO MEAS - SV(TEICH): 56.5 ML
BH CV ECHO MEAS - SV(TEICH): 77.8 ML
BH CV ECHO MEAS - TAPSE (>1.6): 1.4 CM2
BH CV ECHO MEAS - TR MAX VEL: 368 CM/SEC
BH CV ECHO MEAS - TR MAX VEL: 400 CM/SEC
BH CV ECHO MEASUREMENTS AVERAGE E/E' RATIO: 14.61
BH CV ECHO MEASUREMENTS AVERAGE E/E' RATIO: 15.57
BH CV LOWER ARTERIAL LEFT GREAT TOE SYS MAX: 115 MMHG
BH CV LOWER ARTERIAL LEFT TBI RATIO: 0.93
BH CV UPPER VENOUS LEFT AXILLARY AUGMENT: NORMAL
BH CV UPPER VENOUS LEFT AXILLARY COMPETENT: NORMAL
BH CV UPPER VENOUS LEFT AXILLARY COMPRESS: NORMAL
BH CV UPPER VENOUS LEFT AXILLARY PHASIC: NORMAL
BH CV UPPER VENOUS LEFT AXILLARY SPONT: NORMAL
BH CV UPPER VENOUS LEFT BASILIC FOREARM COMPRESS: NORMAL
BH CV UPPER VENOUS LEFT BASILIC UPPER COLOR: 1
BH CV UPPER VENOUS LEFT BASILIC UPPER COMPRESS: NORMAL
BH CV UPPER VENOUS LEFT BASILIC UPPER THROMBUS: NORMAL
BH CV UPPER VENOUS LEFT BRACHIAL COMPRESS: NORMAL
BH CV UPPER VENOUS LEFT CEPHALIC FOREARM COMPRESS: NORMAL
BH CV UPPER VENOUS LEFT CEPHALIC UPPER COMPRESS: NORMAL
BH CV UPPER VENOUS LEFT INTERNAL JUGULAR AUGMENT: NORMAL
BH CV UPPER VENOUS LEFT INTERNAL JUGULAR COMPETENT: NORMAL
BH CV UPPER VENOUS LEFT INTERNAL JUGULAR COMPRESS: NORMAL
BH CV UPPER VENOUS LEFT INTERNAL JUGULAR PHASIC: NORMAL
BH CV UPPER VENOUS LEFT INTERNAL JUGULAR SPONT: NORMAL
BH CV UPPER VENOUS LEFT RADIAL COMPRESS: NORMAL
BH CV UPPER VENOUS LEFT SUBCLAVIAN AUGMENT: NORMAL
BH CV UPPER VENOUS LEFT SUBCLAVIAN COMPETENT: NORMAL
BH CV UPPER VENOUS LEFT SUBCLAVIAN COMPRESS: NORMAL
BH CV UPPER VENOUS LEFT SUBCLAVIAN PHASIC: NORMAL
BH CV UPPER VENOUS LEFT SUBCLAVIAN SPONT: NORMAL
BH CV UPPER VENOUS LEFT ULNAR COMPRESS: NORMAL
BH CV UPPER VENOUS RIGHT INTERNAL JUGULAR AUGMENT: NORMAL
BH CV UPPER VENOUS RIGHT INTERNAL JUGULAR COMPETENT: NORMAL
BH CV UPPER VENOUS RIGHT INTERNAL JUGULAR COMPRESS: NORMAL
BH CV UPPER VENOUS RIGHT INTERNAL JUGULAR PHASIC: NORMAL
BH CV UPPER VENOUS RIGHT INTERNAL JUGULAR SPONT: NORMAL
BH CV UPPER VENOUS RIGHT SUBCLAVIAN AUGMENT: NORMAL
BH CV UPPER VENOUS RIGHT SUBCLAVIAN COMPETENT: NORMAL
BH CV UPPER VENOUS RIGHT SUBCLAVIAN COMPRESS: NORMAL
BH CV UPPER VENOUS RIGHT SUBCLAVIAN PHASIC: NORMAL
BH CV UPPER VENOUS RIGHT SUBCLAVIAN SPONT: NORMAL
BH CV VAS MEAS BASILIC ANTECUBITAL FOSSA LEFT: 0.28 CM
BH CV VAS MEAS BASILIC ANTECUBITAL FOSSA RIGHT: 0.17 CM
BH CV VAS MEAS BASILIC FOREARM LEFT - DIST: 0.25 CM
BH CV VAS MEAS BASILIC FOREARM LEFT - MID: 0.2 CM
BH CV VAS MEAS BASILIC FOREARM LEFT - PROX: 0.25 CM
BH CV VAS MEAS BASILIC FOREARM RIGHT - DIST: 0.17 CM
BH CV VAS MEAS BASILIC FOREARM RIGHT - MID: 0.21 CM
BH CV VAS MEAS BASILIC FOREARM RIGHT - PROX: 0.22 CM
BH CV VAS MEAS BASILIC UPPER ARM LEFT - DIST: 0.43 CM
BH CV VAS MEAS BASILIC UPPER ARM LEFT - MID: 0.43 CM
BH CV VAS MEAS BASILIC UPPER ARM RIGHT - MID: 0.5 CM
BH CV VAS MEAS CEPHALIC ANTECUBITAL FOSSA LEFT: 0.66 CM
BH CV VAS MEAS CEPHALIC ANTECUBITAL FOSSA RIGHT: 0.51 CM
BH CV VAS MEAS CEPHALIC FOREARM LEFT - DIST: 0.23 CM
BH CV VAS MEAS CEPHALIC FOREARM LEFT - MID: 0.26 CM
BH CV VAS MEAS CEPHALIC FOREARM LEFT - PROX: 0.29 CM
BH CV VAS MEAS CEPHALIC FOREARM RIGHT - DIST: 0.31 CM
BH CV VAS MEAS CEPHALIC UPPER ARM LEFT - DIST: 0.36 CM
BH CV VAS MEAS CEPHALIC UPPER ARM LEFT - MID: 0.29 CM
BH CV VAS MEAS CEPHALIC UPPER ARM LEFT - PROX: 0.23 CM
BH CV VAS MEAS CEPHALIC UPPER ARM RIGHT - DIST: 0.37 CM
BH CV VAS MEAS CEPHALIC UPPER ARM RIGHT - MID: 0.4 CM
BH CV VAS MEAS CEPHALIC UPPER ARM RIGHT - PROX: 0.31 CM
BH CV VAS MEAS RADIAL UPPER ARM LEFT - DIST: 0.22 CM
BH CV VAS MEAS RADIAL UPPER ARM LEFT - MID: 0.23 CM
BH CV VAS MEAS RADIAL UPPER ARM LEFT - PROX: 0.25 CM
BH CV VAS MEAS RADIAL UPPER ARM RIGHT - DIST: 0.18 CM
BH CV VAS MEAS RADIAL UPPER ARM RIGHT - MID: 0.18 CM
BH CV VAS MEAS RADIAL UPPER ARM RIGHT - PROX: 0.24 CM
BH CV XLRA - RV BASE: 3.7 CM
BH CV XLRA - RV BASE: 4 CM
BH CV XLRA - TDI S': 10 CM/SEC
BH CV XLRA - TDI S': 7 CM/SEC
BH CV XLRA MEAS LEFT DIST CCA EDV: 10.6 CM/SEC
BH CV XLRA MEAS LEFT DIST CCA PSV: 57.4 CM/SEC
BH CV XLRA MEAS LEFT DIST ICA EDV: -22.8 CM/SEC
BH CV XLRA MEAS LEFT DIST ICA PSV: -50.7 CM/SEC
BH CV XLRA MEAS LEFT ICA/CCA RATIO: 0.9
BH CV XLRA MEAS LEFT MID ICA EDV: -19.3 CM/SEC
BH CV XLRA MEAS LEFT MID ICA PSV: -51.6 CM/SEC
BH CV XLRA MEAS LEFT PROX CCA EDV: 10.2 CM/SEC
BH CV XLRA MEAS LEFT PROX CCA PSV: 60.1 CM/SEC
BH CV XLRA MEAS LEFT PROX ECA EDV: 9.04 CM/SEC
BH CV XLRA MEAS LEFT PROX ECA PSV: 34.2 CM/SEC
BH CV XLRA MEAS LEFT PROX ICA EDV: -12.4 CM/SEC
BH CV XLRA MEAS LEFT PROX ICA PSV: -38.1 CM/SEC
BH CV XLRA MEAS LEFT PROX SCLA PSV: 63.6 CM/SEC
BH CV XLRA MEAS LEFT VERTEBRAL A EDV: 15.3 CM/SEC
BH CV XLRA MEAS LEFT VERTEBRAL A PSV: 37.7 CM/SEC
BH CV XLRA MEAS RIGHT CCA RATIO VEL: 47.1 CM/SEC
BH CV XLRA MEAS RIGHT DIST CCA EDV: 11 CM/SEC
BH CV XLRA MEAS RIGHT DIST CCA PSV: 47.1 CM/SEC
BH CV XLRA MEAS RIGHT DIST ICA EDV: -21.7 CM/SEC
BH CV XLRA MEAS RIGHT DIST ICA PSV: -75 CM/SEC
BH CV XLRA MEAS RIGHT ICA RATIO VEL: -75 CM/SEC
BH CV XLRA MEAS RIGHT ICA/CCA RATIO: 1.5
BH CV XLRA MEAS RIGHT MID ICA EDV: -20.8 CM/SEC
BH CV XLRA MEAS RIGHT MID ICA PSV: -52.2 CM/SEC
BH CV XLRA MEAS RIGHT PROX CCA EDV: -9.4 CM/SEC
BH CV XLRA MEAS RIGHT PROX CCA PSV: -55 CM/SEC
BH CV XLRA MEAS RIGHT PROX ECA EDV: -5.1 CM/SEC
BH CV XLRA MEAS RIGHT PROX ECA PSV: -46.4 CM/SEC
BH CV XLRA MEAS RIGHT PROX ICA EDV: -16.1 CM/SEC
BH CV XLRA MEAS RIGHT PROX ICA PSV: -35.4 CM/SEC
BH CV XLRA MEAS RIGHT PROX SCLA PSV: 56.2 CM/SEC
BH CV XLRA MEAS RIGHT VERTEBRAL A EDV: 13.7 CM/SEC
BH CV XLRA MEAS RIGHT VERTEBRAL A PSV: 44 CM/SEC
BILIRUB CONJ SERPL-MCNC: <0.2 MG/DL (ref 0–0.3)
BILIRUB INDIRECT SERPL-MCNC: ABNORMAL MG/DL
BILIRUB SERPL-MCNC: 0.2 MG/DL (ref 0.1–1.2)
BILIRUB SERPL-MCNC: 0.2 MG/DL (ref 0.1–1.2)
BILIRUB SERPL-MCNC: 0.3 MG/DL (ref 0.1–1.2)
BILIRUB SERPL-MCNC: 0.3 MG/DL (ref 0.1–1.2)
BILIRUB SERPL-MCNC: 0.3 MG/DL (ref 0–1.2)
BILIRUB SERPL-MCNC: 0.4 MG/DL (ref 0.1–1.2)
BILIRUB SERPL-MCNC: 0.4 MG/DL (ref 0.1–1.2)
BILIRUB UR QL STRIP: NEGATIVE
BLD GP AB SCN SERPL QL: NEGATIVE
BUN BLD-MCNC: 26 MG/DL (ref 8–23)
BUN BLD-MCNC: 26 MG/DL (ref 8–23)
BUN BLD-MCNC: 27 MG/DL (ref 8–23)
BUN BLD-MCNC: 30 MG/DL (ref 8–23)
BUN BLD-MCNC: 32 MG/DL (ref 8–23)
BUN BLD-MCNC: 33 MG/DL (ref 8–23)
BUN BLD-MCNC: 33 MG/DL (ref 8–23)
BUN BLD-MCNC: 34 MG/DL (ref 8–23)
BUN BLD-MCNC: 34 MG/DL (ref 8–23)
BUN BLD-MCNC: 35 MG/DL (ref 8–23)
BUN BLD-MCNC: 36 MG/DL (ref 8–23)
BUN BLD-MCNC: 37 MG/DL (ref 8–23)
BUN BLD-MCNC: 40 MG/DL (ref 8–23)
BUN BLD-MCNC: 40 MG/DL (ref 8–23)
BUN BLD-MCNC: 42 MG/DL (ref 8–23)
BUN BLD-MCNC: 43 MG/DL (ref 8–23)
BUN BLD-MCNC: 43 MG/DL (ref 8–23)
BUN BLD-MCNC: 44 MG/DL (ref 8–23)
BUN BLD-MCNC: 46 MG/DL (ref 8–23)
BUN BLD-MCNC: 47 MG/DL (ref 8–23)
BUN BLD-MCNC: 47 MG/DL (ref 8–23)
BUN BLD-MCNC: 48 MG/DL (ref 8–23)
BUN BLD-MCNC: 48 MG/DL (ref 8–23)
BUN BLD-MCNC: 49 MG/DL (ref 8–23)
BUN BLD-MCNC: 50 MG/DL (ref 8–23)
BUN BLD-MCNC: 51 MG/DL (ref 8–23)
BUN BLD-MCNC: 51 MG/DL (ref 8–23)
BUN BLD-MCNC: 54 MG/DL (ref 8–23)
BUN BLD-MCNC: 58 MG/DL (ref 8–23)
BUN BLD-MCNC: 59 MG/DL (ref 8–23)
BUN BLD-MCNC: 64 MG/DL (ref 8–23)
BUN BLD-MCNC: 66 MG/DL (ref 8–23)
BUN BLD-MCNC: 69 MG/DL (ref 8–23)
BUN BLD-MCNC: 73 MG/DL (ref 8–23)
BUN BLD-MCNC: 74 MG/DL (ref 8–23)
BUN BLD-MCNC: 74 MG/DL (ref 8–23)
BUN BLD-MCNC: 75 MG/DL (ref 8–23)
BUN BLD-MCNC: 76 MG/DL (ref 8–23)
BUN BLD-MCNC: 76 MG/DL (ref 8–23)
BUN BLD-MCNC: 77 MG/DL (ref 8–23)
BUN BLD-MCNC: 77 MG/DL (ref 8–23)
BUN BLD-MCNC: 79 MG/DL (ref 8–23)
BUN BLD-MCNC: 80 MG/DL (ref 8–23)
BUN BLD-MCNC: 80 MG/DL (ref 8–23)
BUN BLD-MCNC: 81 MG/DL (ref 8–23)
BUN BLD-MCNC: 82 MG/DL (ref 8–23)
BUN BLD-MCNC: 83 MG/DL (ref 8–23)
BUN BLD-MCNC: 86 MG/DL (ref 8–23)
BUN BLD-MCNC: 87 MG/DL (ref 8–23)
BUN BLD-MCNC: 89 MG/DL (ref 8–23)
BUN BLD-MCNC: 90 MG/DL (ref 8–23)
BUN BLD-MCNC: 90 MG/DL (ref 8–23)
BUN BLD-MCNC: 91 MG/DL (ref 8–23)
BUN BLD-MCNC: 93 MG/DL (ref 8–23)
BUN BLD-MCNC: 96 MG/DL (ref 8–23)
BUN BLD-MCNC: 96 MG/DL (ref 8–23)
BUN BLD-MCNC: 97 MG/DL (ref 8–23)
BUN BLD-MCNC: 97 MG/DL (ref 8–23)
BUN BLD-MCNC: 98 MG/DL (ref 8–23)
BUN BLD-MCNC: 98 MG/DL (ref 8–23)
BUN BLD-MCNC: 99 MG/DL (ref 8–23)
BUN/CREAT SERPL: 13.1 (ref 7–25)
BUN/CREAT SERPL: 13.9 (ref 7–25)
BUN/CREAT SERPL: 14 (ref 7–25)
BUN/CREAT SERPL: 14.3 (ref 7–25)
BUN/CREAT SERPL: 15.1 (ref 7–25)
BUN/CREAT SERPL: 15.1 (ref 7–25)
BUN/CREAT SERPL: 15.2 (ref 7–25)
BUN/CREAT SERPL: 15.6 (ref 7–25)
BUN/CREAT SERPL: 16.1 (ref 7–25)
BUN/CREAT SERPL: 16.7 (ref 7–25)
BUN/CREAT SERPL: 16.7 (ref 7–25)
BUN/CREAT SERPL: 17.1 (ref 7–25)
BUN/CREAT SERPL: 18.5 (ref 7–25)
BUN/CREAT SERPL: 19.5 (ref 7–25)
BUN/CREAT SERPL: 19.6 (ref 7–25)
BUN/CREAT SERPL: 22.2 (ref 7–25)
BUN/CREAT SERPL: 22.9 (ref 7–25)
BUN/CREAT SERPL: 22.9 (ref 7–25)
BUN/CREAT SERPL: 23.3 (ref 7–25)
BUN/CREAT SERPL: 23.9 (ref 7–25)
BUN/CREAT SERPL: 24 (ref 7–25)
BUN/CREAT SERPL: 24.2 (ref 7–25)
BUN/CREAT SERPL: 25.1 (ref 7–25)
BUN/CREAT SERPL: 25.3 (ref 7–25)
BUN/CREAT SERPL: 26 (ref 7–25)
BUN/CREAT SERPL: 26.3 (ref 7–25)
BUN/CREAT SERPL: 26.9 (ref 7–25)
BUN/CREAT SERPL: 27.2 (ref 7–25)
BUN/CREAT SERPL: 27.9 (ref 7–25)
BUN/CREAT SERPL: 28.7 (ref 7–25)
BUN/CREAT SERPL: 28.8 (ref 7–25)
BUN/CREAT SERPL: 29.2 (ref 7–25)
BUN/CREAT SERPL: 29.3 (ref 7–25)
BUN/CREAT SERPL: 29.7 (ref 7–25)
BUN/CREAT SERPL: 29.7 (ref 7–25)
BUN/CREAT SERPL: 30.1 (ref 7–25)
BUN/CREAT SERPL: 30.1 (ref 7–25)
BUN/CREAT SERPL: 30.2 (ref 7–25)
BUN/CREAT SERPL: 30.2 (ref 7–25)
BUN/CREAT SERPL: 30.3 (ref 7–25)
BUN/CREAT SERPL: 32.6 (ref 7–25)
BUN/CREAT SERPL: 32.8 (ref 7–25)
BUN/CREAT SERPL: 32.9 (ref 7–25)
BUN/CREAT SERPL: 32.9 (ref 7–25)
BUN/CREAT SERPL: 33.4 (ref 7–25)
BUN/CREAT SERPL: 33.8 (ref 7–25)
BUN/CREAT SERPL: 33.9 (ref 7–25)
BUN/CREAT SERPL: 34.5 (ref 7–25)
BUN/CREAT SERPL: 34.6 (ref 7–25)
BUN/CREAT SERPL: 34.6 (ref 7–25)
BUN/CREAT SERPL: 34.8 (ref 7–25)
BUN/CREAT SERPL: 34.8 (ref 7–25)
BUN/CREAT SERPL: 35 (ref 7–25)
BUN/CREAT SERPL: 35.8 (ref 7–25)
BUN/CREAT SERPL: 36.1 (ref 7–25)
BUN/CREAT SERPL: 36.6 (ref 7–25)
BUN/CREAT SERPL: 37.4 (ref 7–25)
BUN/CREAT SERPL: 37.4 (ref 7–25)
BUN/CREAT SERPL: 37.7 (ref 7–25)
BUN/CREAT SERPL: 38.3 (ref 7–25)
BUN/CREAT SERPL: 38.7 (ref 7–25)
BUN/CREAT SERPL: 39.5 (ref 7–25)
BUN/CREAT SERPL: 39.8 (ref 7–25)
BUN/CREAT SERPL: 40.1 (ref 7–25)
BUN/CREAT SERPL: 40.9 (ref 7–25)
BUN/CREAT SERPL: 43.9 (ref 7–25)
BUN/CREAT SERPL: 44.4 (ref 7–25)
BUN/CREAT SERPL: 45.5 (ref 7–25)
BUN/CREAT SERPL: 49 (ref 7–25)
CA-I BLD-MCNC: 4.4 MG/DL (ref 4.6–5.4)
CA-I SERPL ISE-MCNC: 1.1 MMOL/L (ref 1.15–1.35)
CALCIUM SPEC-SCNC: 6.4 MG/DL (ref 8.6–10.5)
CALCIUM SPEC-SCNC: 7.1 MG/DL (ref 8.6–10.5)
CALCIUM SPEC-SCNC: 7.3 MG/DL (ref 8.6–10.5)
CALCIUM SPEC-SCNC: 7.4 MG/DL (ref 8.6–10.5)
CALCIUM SPEC-SCNC: 7.4 MG/DL (ref 8.6–10.5)
CALCIUM SPEC-SCNC: 7.5 MG/DL (ref 8.6–10.5)
CALCIUM SPEC-SCNC: 7.6 MG/DL (ref 8.6–10.5)
CALCIUM SPEC-SCNC: 7.7 MG/DL (ref 8.6–10.5)
CALCIUM SPEC-SCNC: 7.8 MG/DL (ref 8.6–10.5)
CALCIUM SPEC-SCNC: 7.9 MG/DL (ref 8.6–10.5)
CALCIUM SPEC-SCNC: 8 MG/DL (ref 8.6–10.5)
CALCIUM SPEC-SCNC: 8.1 MG/DL (ref 8.6–10.5)
CALCIUM SPEC-SCNC: 8.2 MG/DL (ref 8.6–10.5)
CALCIUM SPEC-SCNC: 8.3 MG/DL (ref 8.6–10.5)
CALCIUM SPEC-SCNC: 8.4 MG/DL (ref 8.6–10.5)
CALCIUM SPEC-SCNC: 8.5 MG/DL (ref 8.6–10.5)
CALCIUM SPEC-SCNC: 8.5 MG/DL (ref 8.6–10.5)
CALCIUM SPEC-SCNC: 8.7 MG/DL (ref 8.6–10.5)
CALCIUM SPEC-SCNC: 8.8 MG/DL (ref 8.6–10.5)
CALCIUM SPEC-SCNC: 9.2 MG/DL (ref 8.6–10.5)
CHLORIDE SERPL-SCNC: 100 MMOL/L (ref 98–107)
CHLORIDE SERPL-SCNC: 101 MMOL/L (ref 98–107)
CHLORIDE SERPL-SCNC: 101 MMOL/L (ref 98–107)
CHLORIDE SERPL-SCNC: 102 MMOL/L (ref 98–107)
CHLORIDE SERPL-SCNC: 102 MMOL/L (ref 98–107)
CHLORIDE SERPL-SCNC: 103 MMOL/L (ref 98–107)
CHLORIDE SERPL-SCNC: 109 MMOL/L (ref 98–107)
CHLORIDE SERPL-SCNC: 88 MMOL/L (ref 98–107)
CHLORIDE SERPL-SCNC: 89 MMOL/L (ref 98–107)
CHLORIDE SERPL-SCNC: 90 MMOL/L (ref 98–107)
CHLORIDE SERPL-SCNC: 91 MMOL/L (ref 98–107)
CHLORIDE SERPL-SCNC: 92 MMOL/L (ref 98–107)
CHLORIDE SERPL-SCNC: 93 MMOL/L (ref 98–107)
CHLORIDE SERPL-SCNC: 94 MMOL/L (ref 98–107)
CHLORIDE SERPL-SCNC: 95 MMOL/L (ref 98–107)
CHLORIDE SERPL-SCNC: 96 MMOL/L (ref 98–107)
CHLORIDE SERPL-SCNC: 97 MMOL/L (ref 98–107)
CHLORIDE SERPL-SCNC: 98 MMOL/L (ref 98–107)
CHLORIDE SERPL-SCNC: 99 MMOL/L (ref 98–107)
CHLORIDE UR-SCNC: <20 MMOL/L
CHLORIDE UR-SCNC: <20 MMOL/L
CHOLEST SERPL-MCNC: 162 MG/DL (ref 0–200)
CHOLEST SERPL-MCNC: 173 MG/DL (ref 100–199)
CHOLEST SERPL-MCNC: 174 MG/DL (ref 0–200)
CK SERPL-CCNC: 130 U/L (ref 20–200)
CLARITY UR: ABNORMAL
CLARITY UR: CLEAR
CLOSE TME COLL+ADP + EPINEP PNL BLD: 95 % (ref 86–100)
CO2 BLDA-SCNC: 29 MMOL/L (ref 24–29)
CO2 BLDA-SCNC: 29 MMOL/L (ref 24–29)
CO2 BLDA-SCNC: 30 MMOL/L (ref 24–29)
CO2 BLDA-SCNC: 32 MMOL/L (ref 24–29)
CO2 SERPL-SCNC: 19.1 MMOL/L (ref 22–29)
CO2 SERPL-SCNC: 19.1 MMOL/L (ref 22–29)
CO2 SERPL-SCNC: 19.8 MMOL/L (ref 22–29)
CO2 SERPL-SCNC: 19.8 MMOL/L (ref 22–29)
CO2 SERPL-SCNC: 20.6 MMOL/L (ref 22–29)
CO2 SERPL-SCNC: 21.1 MMOL/L (ref 22–29)
CO2 SERPL-SCNC: 21.1 MMOL/L (ref 22–29)
CO2 SERPL-SCNC: 21.3 MMOL/L (ref 22–29)
CO2 SERPL-SCNC: 22 MMOL/L (ref 22–29)
CO2 SERPL-SCNC: 22.1 MMOL/L (ref 22–29)
CO2 SERPL-SCNC: 22.1 MMOL/L (ref 22–29)
CO2 SERPL-SCNC: 22.2 MMOL/L (ref 22–29)
CO2 SERPL-SCNC: 22.3 MMOL/L (ref 22–29)
CO2 SERPL-SCNC: 22.6 MMOL/L (ref 22–29)
CO2 SERPL-SCNC: 22.8 MMOL/L (ref 22–29)
CO2 SERPL-SCNC: 22.8 MMOL/L (ref 22–29)
CO2 SERPL-SCNC: 22.9 MMOL/L (ref 22–29)
CO2 SERPL-SCNC: 23 MMOL/L (ref 22–29)
CO2 SERPL-SCNC: 23.1 MMOL/L (ref 22–29)
CO2 SERPL-SCNC: 23.5 MMOL/L (ref 22–29)
CO2 SERPL-SCNC: 23.5 MMOL/L (ref 22–29)
CO2 SERPL-SCNC: 23.6 MMOL/L (ref 22–29)
CO2 SERPL-SCNC: 23.7 MMOL/L (ref 22–29)
CO2 SERPL-SCNC: 23.7 MMOL/L (ref 22–29)
CO2 SERPL-SCNC: 23.8 MMOL/L (ref 22–29)
CO2 SERPL-SCNC: 23.9 MMOL/L (ref 22–29)
CO2 SERPL-SCNC: 24.1 MMOL/L (ref 22–29)
CO2 SERPL-SCNC: 24.1 MMOL/L (ref 22–29)
CO2 SERPL-SCNC: 24.2 MMOL/L (ref 22–29)
CO2 SERPL-SCNC: 24.3 MMOL/L (ref 22–29)
CO2 SERPL-SCNC: 24.6 MMOL/L (ref 22–29)
CO2 SERPL-SCNC: 25 MMOL/L (ref 22–29)
CO2 SERPL-SCNC: 25.1 MMOL/L (ref 22–29)
CO2 SERPL-SCNC: 25.2 MMOL/L (ref 22–29)
CO2 SERPL-SCNC: 25.2 MMOL/L (ref 22–29)
CO2 SERPL-SCNC: 25.3 MMOL/L (ref 22–29)
CO2 SERPL-SCNC: 25.4 MMOL/L (ref 22–29)
CO2 SERPL-SCNC: 25.7 MMOL/L (ref 22–29)
CO2 SERPL-SCNC: 26.1 MMOL/L (ref 22–29)
CO2 SERPL-SCNC: 26.2 MMOL/L (ref 22–29)
CO2 SERPL-SCNC: 26.2 MMOL/L (ref 22–29)
CO2 SERPL-SCNC: 26.3 MMOL/L (ref 22–29)
CO2 SERPL-SCNC: 26.3 MMOL/L (ref 22–29)
CO2 SERPL-SCNC: 27.4 MMOL/L (ref 22–29)
CO2 SERPL-SCNC: 27.5 MMOL/L (ref 22–29)
CO2 SERPL-SCNC: 27.8 MMOL/L (ref 22–29)
CO2 SERPL-SCNC: 28 MMOL/L (ref 22–29)
CO2 SERPL-SCNC: 28.2 MMOL/L (ref 22–29)
CO2 SERPL-SCNC: 28.5 MMOL/L (ref 22–29)
CO2 SERPL-SCNC: 28.7 MMOL/L (ref 22–29)
CO2 SERPL-SCNC: 30 MMOL/L (ref 22–29)
CO2 SERPL-SCNC: 30.6 MMOL/L (ref 22–29)
CO2 SERPL-SCNC: 31.3 MMOL/L (ref 22–29)
CO2 SERPL-SCNC: 32.1 MMOL/L (ref 22–29)
CO2 SERPL-SCNC: 32.9 MMOL/L (ref 22–29)
CO2 SERPL-SCNC: 34.9 MMOL/L (ref 22–29)
COLOR UR: ABNORMAL
COLOR UR: ABNORMAL
COLOR UR: YELLOW
CREAT BLD-MCNC: 1.33 MG/DL (ref 0.76–1.27)
CREAT BLD-MCNC: 1.45 MG/DL (ref 0.76–1.27)
CREAT BLD-MCNC: 1.46 MG/DL (ref 0.76–1.27)
CREAT BLD-MCNC: 1.46 MG/DL (ref 0.76–1.27)
CREAT BLD-MCNC: 1.53 MG/DL (ref 0.76–1.27)
CREAT BLD-MCNC: 1.53 MG/DL (ref 0.76–1.27)
CREAT BLD-MCNC: 1.56 MG/DL (ref 0.76–1.27)
CREAT BLD-MCNC: 1.56 MG/DL (ref 0.76–1.27)
CREAT BLD-MCNC: 1.58 MG/DL (ref 0.76–1.27)
CREAT BLD-MCNC: 1.6 MG/DL (ref 0.76–1.27)
CREAT BLD-MCNC: 1.61 MG/DL (ref 0.76–1.27)
CREAT BLD-MCNC: 1.62 MG/DL (ref 0.76–1.27)
CREAT BLD-MCNC: 1.67 MG/DL (ref 0.76–1.27)
CREAT BLD-MCNC: 1.67 MG/DL (ref 0.76–1.27)
CREAT BLD-MCNC: 1.69 MG/DL (ref 0.76–1.27)
CREAT BLD-MCNC: 1.69 MG/DL (ref 0.76–1.27)
CREAT BLD-MCNC: 1.71 MG/DL (ref 0.76–1.27)
CREAT BLD-MCNC: 1.73 MG/DL (ref 0.76–1.27)
CREAT BLD-MCNC: 1.74 MG/DL (ref 0.76–1.27)
CREAT BLD-MCNC: 1.75 MG/DL (ref 0.76–1.27)
CREAT BLD-MCNC: 1.75 MG/DL (ref 0.76–1.27)
CREAT BLD-MCNC: 1.79 MG/DL (ref 0.76–1.27)
CREAT BLD-MCNC: 1.81 MG/DL (ref 0.76–1.27)
CREAT BLD-MCNC: 1.86 MG/DL (ref 0.76–1.27)
CREAT BLD-MCNC: 1.87 MG/DL (ref 0.76–1.27)
CREAT BLD-MCNC: 1.89 MG/DL (ref 0.76–1.27)
CREAT BLD-MCNC: 1.91 MG/DL (ref 0.76–1.27)
CREAT BLD-MCNC: 1.93 MG/DL (ref 0.76–1.27)
CREAT BLD-MCNC: 1.94 MG/DL (ref 0.76–1.27)
CREAT BLD-MCNC: 1.98 MG/DL (ref 0.76–1.27)
CREAT BLD-MCNC: 2 MG/DL (ref 0.76–1.27)
CREAT BLD-MCNC: 2.01 MG/DL (ref 0.76–1.27)
CREAT BLD-MCNC: 2.06 MG/DL (ref 0.76–1.27)
CREAT BLD-MCNC: 2.16 MG/DL (ref 0.76–1.27)
CREAT BLD-MCNC: 2.17 MG/DL (ref 0.76–1.27)
CREAT BLD-MCNC: 2.17 MG/DL (ref 0.76–1.27)
CREAT BLD-MCNC: 2.19 MG/DL (ref 0.76–1.27)
CREAT BLD-MCNC: 2.22 MG/DL (ref 0.76–1.27)
CREAT BLD-MCNC: 2.23 MG/DL (ref 0.76–1.27)
CREAT BLD-MCNC: 2.23 MG/DL (ref 0.76–1.27)
CREAT BLD-MCNC: 2.25 MG/DL (ref 0.76–1.27)
CREAT BLD-MCNC: 2.25 MG/DL (ref 0.76–1.27)
CREAT BLD-MCNC: 2.28 MG/DL (ref 0.76–1.27)
CREAT BLD-MCNC: 2.33 MG/DL (ref 0.76–1.27)
CREAT BLD-MCNC: 2.36 MG/DL (ref 0.76–1.27)
CREAT BLD-MCNC: 2.43 MG/DL (ref 0.76–1.27)
CREAT BLD-MCNC: 2.47 MG/DL (ref 0.76–1.27)
CREAT BLD-MCNC: 2.49 MG/DL (ref 0.76–1.27)
CREAT BLD-MCNC: 2.57 MG/DL (ref 0.76–1.27)
CREAT BLD-MCNC: 2.58 MG/DL (ref 0.76–1.27)
CREAT BLD-MCNC: 2.62 MG/DL (ref 0.76–1.27)
CREAT BLD-MCNC: 2.65 MG/DL (ref 0.76–1.27)
CREAT BLD-MCNC: 2.69 MG/DL (ref 0.76–1.27)
CREAT BLD-MCNC: 2.69 MG/DL (ref 0.76–1.27)
CREAT BLD-MCNC: 2.73 MG/DL (ref 0.76–1.27)
CREAT BLD-MCNC: 2.74 MG/DL (ref 0.76–1.27)
CREAT BLD-MCNC: 2.74 MG/DL (ref 0.76–1.27)
CREAT BLD-MCNC: 2.76 MG/DL (ref 0.76–1.27)
CREAT BLD-MCNC: 2.77 MG/DL (ref 0.76–1.27)
CREAT BLD-MCNC: 2.8 MG/DL (ref 0.76–1.27)
CREAT BLD-MCNC: 2.8 MG/DL (ref 0.76–1.27)
CREAT BLD-MCNC: 2.81 MG/DL (ref 0.76–1.27)
CREAT BLD-MCNC: 2.84 MG/DL (ref 0.76–1.27)
CREAT BLD-MCNC: 2.93 MG/DL (ref 0.76–1.27)
CREAT BLD-MCNC: 3.02 MG/DL (ref 0.76–1.27)
CREAT BLD-MCNC: 3.35 MG/DL (ref 0.76–1.27)
CREAT BLD-MCNC: 3.46 MG/DL (ref 0.76–1.27)
CREAT UR-MCNC: 108.8 MG/DL
CREAT UR-MCNC: 23.9 MG/DL
CREAT UR-MCNC: 30.4 MG/DL
CREAT UR-MCNC: 89.2 MG/DL
CROSSMATCH INTERPRETATION: NORMAL
CROSSMATCH INTERPRETATION: NORMAL
CYTO UR: NORMAL
D-LACTATE SERPL-SCNC: 2.2 MMOL/L (ref 0.5–2)
D-LACTATE SERPL-SCNC: 2.6 MMOL/L (ref 0.5–2)
DACRYOCYTES BLD QL SMEAR: NORMAL
DACRYOCYTES BLD QL SMEAR: NORMAL
DEPRECATED RDW RBC AUTO: 46.9 FL (ref 37–54)
DEPRECATED RDW RBC AUTO: 47.8 FL (ref 37–54)
DEPRECATED RDW RBC AUTO: 48 FL (ref 37–54)
DEPRECATED RDW RBC AUTO: 48.2 FL (ref 37–54)
DEPRECATED RDW RBC AUTO: 48.8 FL (ref 37–54)
DEPRECATED RDW RBC AUTO: 48.9 FL (ref 37–54)
DEPRECATED RDW RBC AUTO: 48.9 FL (ref 37–54)
DEPRECATED RDW RBC AUTO: 49 FL (ref 37–54)
DEPRECATED RDW RBC AUTO: 49 FL (ref 37–54)
DEPRECATED RDW RBC AUTO: 49.1 FL (ref 37–54)
DEPRECATED RDW RBC AUTO: 49.3 FL (ref 37–54)
DEPRECATED RDW RBC AUTO: 49.5 FL (ref 37–54)
DEPRECATED RDW RBC AUTO: 49.6 FL (ref 37–54)
DEPRECATED RDW RBC AUTO: 49.7 FL (ref 37–54)
DEPRECATED RDW RBC AUTO: 49.8 FL (ref 37–54)
DEPRECATED RDW RBC AUTO: 49.9 FL (ref 37–54)
DEPRECATED RDW RBC AUTO: 50.3 FL (ref 37–54)
DEPRECATED RDW RBC AUTO: 50.9 FL (ref 37–54)
DEPRECATED RDW RBC AUTO: 51 FL (ref 37–54)
DEPRECATED RDW RBC AUTO: 52.6 FL (ref 37–54)
DEPRECATED RDW RBC AUTO: 52.8 FL (ref 37–54)
DEPRECATED RDW RBC AUTO: 53.8 FL (ref 37–54)
DEPRECATED RDW RBC AUTO: 54.5 FL (ref 37–54)
DEPRECATED RDW RBC AUTO: 56.4 FL (ref 37–54)
DEPRECATED RDW RBC AUTO: 56.5 FL (ref 37–54)
DEPRECATED RDW RBC AUTO: 57.9 FL (ref 37–54)
DEPRECATED RDW RBC AUTO: 61.4 FL (ref 37–54)
DEPRECATED RDW RBC AUTO: 63.4 FL (ref 37–54)
DEPRECATED RDW RBC AUTO: 64 FL (ref 37–54)
DEPRECATED RDW RBC AUTO: 64.2 FL (ref 37–54)
DEPRECATED RDW RBC AUTO: 66 FL (ref 37–54)
DEPRECATED RDW RBC AUTO: 66.7 FL (ref 37–54)
DEPRECATED RDW RBC AUTO: 67.6 FL (ref 37–54)
ELLIPTOCYTES BLD QL SMEAR: NORMAL
EOSINOPHIL # BLD AUTO: 0.02 10*3/MM3 (ref 0–0.7)
EOSINOPHIL # BLD AUTO: 0.06 10*3/MM3 (ref 0–0.7)
EOSINOPHIL # BLD AUTO: 0.13 10*3/MM3 (ref 0–0.7)
EOSINOPHIL # BLD AUTO: 0.19 10*3/MM3 (ref 0–0.7)
EOSINOPHIL # BLD AUTO: 0.21 10*3/MM3 (ref 0–0.7)
EOSINOPHIL # BLD AUTO: 0.22 10*3/MM3 (ref 0–0.7)
EOSINOPHIL # BLD AUTO: 0.23 10*3/MM3 (ref 0–0.7)
EOSINOPHIL # BLD AUTO: 0.25 10*3/MM3 (ref 0–0.7)
EOSINOPHIL # BLD AUTO: 0.29 10*3/MM3 (ref 0–0.7)
EOSINOPHIL # BLD AUTO: 0.3 10*3/MM3 (ref 0–0.7)
EOSINOPHIL # BLD AUTO: 0.31 10*3/MM3 (ref 0–0.7)
EOSINOPHIL # BLD AUTO: 0.33 10*3/MM3 (ref 0–0.7)
EOSINOPHIL # BLD AUTO: 0.35 10*3/MM3 (ref 0–0.7)
EOSINOPHIL # BLD AUTO: 0.39 10*3/MM3 (ref 0–0.7)
EOSINOPHIL # BLD AUTO: 0.41 10*3/MM3 (ref 0–0.7)
EOSINOPHIL # BLD AUTO: 0.41 10*3/MM3 (ref 0–0.7)
EOSINOPHIL # BLD AUTO: 0.42 10*3/MM3 (ref 0–0.7)
EOSINOPHIL # BLD AUTO: 0.44 10*3/MM3 (ref 0–0.7)
EOSINOPHIL # BLD AUTO: 0.47 10*3/MM3 (ref 0–0.7)
EOSINOPHIL # BLD AUTO: 0.62 10*3/MM3 (ref 0–0.7)
EOSINOPHIL # BLD AUTO: 0.66 10*3/MM3 (ref 0–0.7)
EOSINOPHIL # BLD MANUAL: 0.21 10*3/MM3 (ref 0–0.7)
EOSINOPHIL NFR BLD AUTO: 0.1 % (ref 0.3–6.2)
EOSINOPHIL NFR BLD AUTO: 0.3 % (ref 0.3–6.2)
EOSINOPHIL NFR BLD AUTO: 1.4 % (ref 0.3–6.2)
EOSINOPHIL NFR BLD AUTO: 1.5 % (ref 0.3–6.2)
EOSINOPHIL NFR BLD AUTO: 1.8 % (ref 0.3–6.2)
EOSINOPHIL NFR BLD AUTO: 1.8 % (ref 0.3–6.2)
EOSINOPHIL NFR BLD AUTO: 2.2 % (ref 0.3–6.2)
EOSINOPHIL NFR BLD AUTO: 2.3 % (ref 0.3–6.2)
EOSINOPHIL NFR BLD AUTO: 2.3 % (ref 0.3–6.2)
EOSINOPHIL NFR BLD AUTO: 2.4 % (ref 0.3–6.2)
EOSINOPHIL NFR BLD AUTO: 3.4 % (ref 0.3–6.2)
EOSINOPHIL NFR BLD AUTO: 3.5 % (ref 0.3–6.2)
EOSINOPHIL NFR BLD AUTO: 3.7 % (ref 0.3–6.2)
EOSINOPHIL NFR BLD AUTO: 3.8 % (ref 0.3–6.2)
EOSINOPHIL NFR BLD AUTO: 4.1 % (ref 0.3–6.2)
EOSINOPHIL NFR BLD AUTO: 4.5 % (ref 0.3–6.2)
EOSINOPHIL NFR BLD AUTO: 5 % (ref 0.3–6.2)
EOSINOPHIL NFR BLD AUTO: 5.2 % (ref 0.3–6.2)
EOSINOPHIL NFR BLD AUTO: 8.3 % (ref 0.3–6.2)
EOSINOPHIL NFR BLD MANUAL: 2 % (ref 0.3–6.2)
EOSINOPHIL SPEC QL MICRO: 0 % EOS/100 CELLS (ref 0–0)
ERYTHROCYTE [DISTWIDTH] IN BLOOD BY AUTOMATED COUNT: 16.9 % (ref 11.5–14.5)
ERYTHROCYTE [DISTWIDTH] IN BLOOD BY AUTOMATED COUNT: 17.2 % (ref 11.5–14.5)
ERYTHROCYTE [DISTWIDTH] IN BLOOD BY AUTOMATED COUNT: 17.3 % (ref 11.5–14.5)
ERYTHROCYTE [DISTWIDTH] IN BLOOD BY AUTOMATED COUNT: 17.3 % (ref 11.5–14.5)
ERYTHROCYTE [DISTWIDTH] IN BLOOD BY AUTOMATED COUNT: 17.4 % (ref 11.5–14.5)
ERYTHROCYTE [DISTWIDTH] IN BLOOD BY AUTOMATED COUNT: 17.5 % (ref 11.5–14.5)
ERYTHROCYTE [DISTWIDTH] IN BLOOD BY AUTOMATED COUNT: 17.5 % (ref 11.5–14.5)
ERYTHROCYTE [DISTWIDTH] IN BLOOD BY AUTOMATED COUNT: 17.6 % (ref 11.5–14.5)
ERYTHROCYTE [DISTWIDTH] IN BLOOD BY AUTOMATED COUNT: 17.7 % (ref 11.5–14.5)
ERYTHROCYTE [DISTWIDTH] IN BLOOD BY AUTOMATED COUNT: 17.7 % (ref 11.5–14.5)
ERYTHROCYTE [DISTWIDTH] IN BLOOD BY AUTOMATED COUNT: 17.8 % (ref 11.5–14.5)
ERYTHROCYTE [DISTWIDTH] IN BLOOD BY AUTOMATED COUNT: 18.1 % (ref 11.5–14.5)
ERYTHROCYTE [DISTWIDTH] IN BLOOD BY AUTOMATED COUNT: 18.3 % (ref 11.5–14.5)
ERYTHROCYTE [DISTWIDTH] IN BLOOD BY AUTOMATED COUNT: 18.6 % (ref 11.5–14.5)
ERYTHROCYTE [DISTWIDTH] IN BLOOD BY AUTOMATED COUNT: 19.1 % (ref 11.5–14.5)
ERYTHROCYTE [DISTWIDTH] IN BLOOD BY AUTOMATED COUNT: 19.3 % (ref 11.5–14.5)
ERYTHROCYTE [DISTWIDTH] IN BLOOD BY AUTOMATED COUNT: 19.5 % (ref 11.5–14.5)
ERYTHROCYTE [DISTWIDTH] IN BLOOD BY AUTOMATED COUNT: 19.7 % (ref 11.5–14.5)
ERYTHROCYTE [DISTWIDTH] IN BLOOD BY AUTOMATED COUNT: 20.1 % (ref 11.5–14.5)
ERYTHROCYTE [DISTWIDTH] IN BLOOD BY AUTOMATED COUNT: 20.5 % (ref 11.5–14.5)
ERYTHROCYTE [DISTWIDTH] IN BLOOD BY AUTOMATED COUNT: 20.6 % (ref 11.5–14.5)
ERYTHROCYTE [DISTWIDTH] IN BLOOD BY AUTOMATED COUNT: 20.6 % (ref 11.5–14.5)
ERYTHROCYTE [DISTWIDTH] IN BLOOD BY AUTOMATED COUNT: 20.8 % (ref 11.5–14.5)
ERYTHROCYTE [DISTWIDTH] IN BLOOD BY AUTOMATED COUNT: 21.4 % (ref 11.5–14.5)
ERYTHROCYTE [DISTWIDTH] IN BLOOD BY AUTOMATED COUNT: 22.1 % (ref 11.5–14.5)
ERYTHROCYTE [DISTWIDTH] IN BLOOD BY AUTOMATED COUNT: 22.8 % (ref 11.5–14.5)
ERYTHROCYTE [DISTWIDTH] IN BLOOD BY AUTOMATED COUNT: 22.9 % (ref 11.5–14.5)
ERYTHROCYTE [DISTWIDTH] IN BLOOD BY AUTOMATED COUNT: 22.9 % (ref 11.5–14.5)
FERRITIN SERPL-MCNC: 173.6 NG/ML (ref 30–400)
FIBRINOGEN PPP-MCNC: 357 MG/DL (ref 219–464)
FIBRINOGEN PPP-MCNC: 399 MG/DL (ref 219–464)
FIBROSIS SCORING:: ABNORMAL
FIBROSIS STAGE SERPL QL: ABNORMAL
FOLATE SERPL-MCNC: 5.86 NG/ML (ref 4.78–24.2)
GAMMA GLOB 24H MFR UR ELPH: 38.5 %
GAMMA GLOB SERPL ELPH-MCNC: 0.7 G/DL (ref 0.4–1.8)
GAS FLOW AIRWAY: 2 LPM
GFR SERPL CREATININE-BSD FRML MDRD: 18 ML/MIN/1.73
GFR SERPL CREATININE-BSD FRML MDRD: 19 ML/MIN/1.73
GFR SERPL CREATININE-BSD FRML MDRD: 21 ML/MIN/1.73
GFR SERPL CREATININE-BSD FRML MDRD: 22 ML/MIN/1.73
GFR SERPL CREATININE-BSD FRML MDRD: 23 ML/MIN/1.73
GFR SERPL CREATININE-BSD FRML MDRD: 24 ML/MIN/1.73
GFR SERPL CREATININE-BSD FRML MDRD: 25 ML/MIN/1.73
GFR SERPL CREATININE-BSD FRML MDRD: 26 ML/MIN/1.73
GFR SERPL CREATININE-BSD FRML MDRD: 27 ML/MIN/1.73
GFR SERPL CREATININE-BSD FRML MDRD: 28 ML/MIN/1.73
GFR SERPL CREATININE-BSD FRML MDRD: 29 ML/MIN/1.73
GFR SERPL CREATININE-BSD FRML MDRD: 29 ML/MIN/1.73
GFR SERPL CREATININE-BSD FRML MDRD: 30 ML/MIN/1.73
GFR SERPL CREATININE-BSD FRML MDRD: 31 ML/MIN/1.73
GFR SERPL CREATININE-BSD FRML MDRD: 33 ML/MIN/1.73
GFR SERPL CREATININE-BSD FRML MDRD: 34 ML/MIN/1.73
GFR SERPL CREATININE-BSD FRML MDRD: 34 ML/MIN/1.73
GFR SERPL CREATININE-BSD FRML MDRD: 35 ML/MIN/1.73
GFR SERPL CREATININE-BSD FRML MDRD: 35 ML/MIN/1.73
GFR SERPL CREATININE-BSD FRML MDRD: 36 ML/MIN/1.73
GFR SERPL CREATININE-BSD FRML MDRD: 37 ML/MIN/1.73
GFR SERPL CREATININE-BSD FRML MDRD: 37 ML/MIN/1.73
GFR SERPL CREATININE-BSD FRML MDRD: 38 ML/MIN/1.73
GFR SERPL CREATININE-BSD FRML MDRD: 39 ML/MIN/1.73
GFR SERPL CREATININE-BSD FRML MDRD: 40 ML/MIN/1.73
GFR SERPL CREATININE-BSD FRML MDRD: 41 ML/MIN/1.73
GFR SERPL CREATININE-BSD FRML MDRD: 42 ML/MIN/1.73
GFR SERPL CREATININE-BSD FRML MDRD: 42 ML/MIN/1.73
GFR SERPL CREATININE-BSD FRML MDRD: 44 ML/MIN/1.73
GFR SERPL CREATININE-BSD FRML MDRD: 45 ML/MIN/1.73
GFR SERPL CREATININE-BSD FRML MDRD: 45 ML/MIN/1.73
GFR SERPL CREATININE-BSD FRML MDRD: 46 ML/MIN/1.73
GFR SERPL CREATININE-BSD FRML MDRD: 47 ML/MIN/1.73
GFR SERPL CREATININE-BSD FRML MDRD: 47 ML/MIN/1.73
GFR SERPL CREATININE-BSD FRML MDRD: 49 ML/MIN/1.73
GFR SERPL CREATININE-BSD FRML MDRD: 49 ML/MIN/1.73
GFR SERPL CREATININE-BSD FRML MDRD: 50 ML/MIN/1.73
GFR SERPL CREATININE-BSD FRML MDRD: 55 ML/MIN/1.73
GGT SERPL-CCNC: 29 IU/L (ref 0–65)
GLOBULIN SER CALC-MCNC: 2.4 G/DL (ref 2.2–3.9)
GLOBULIN UR ELPH-MCNC: 2.5 GM/DL
GLOBULIN UR ELPH-MCNC: 2.6 GM/DL
GLOBULIN UR ELPH-MCNC: 2.6 GM/DL
GLOBULIN UR ELPH-MCNC: 2.7 GM/DL
GLOBULIN UR ELPH-MCNC: 3.4 GM/DL
GLUCOSE BLD-MCNC: 101 MG/DL (ref 65–99)
GLUCOSE BLD-MCNC: 103 MG/DL (ref 65–99)
GLUCOSE BLD-MCNC: 104 MG/DL (ref 65–99)
GLUCOSE BLD-MCNC: 104 MG/DL (ref 65–99)
GLUCOSE BLD-MCNC: 105 MG/DL (ref 65–99)
GLUCOSE BLD-MCNC: 106 MG/DL (ref 65–99)
GLUCOSE BLD-MCNC: 110 MG/DL (ref 65–99)
GLUCOSE BLD-MCNC: 110 MG/DL (ref 65–99)
GLUCOSE BLD-MCNC: 111 MG/DL (ref 65–99)
GLUCOSE BLD-MCNC: 113 MG/DL (ref 65–99)
GLUCOSE BLD-MCNC: 115 MG/DL (ref 65–99)
GLUCOSE BLD-MCNC: 116 MG/DL (ref 65–99)
GLUCOSE BLD-MCNC: 116 MG/DL (ref 65–99)
GLUCOSE BLD-MCNC: 117 MG/DL (ref 65–99)
GLUCOSE BLD-MCNC: 118 MG/DL (ref 65–99)
GLUCOSE BLD-MCNC: 122 MG/DL (ref 65–99)
GLUCOSE BLD-MCNC: 123 MG/DL (ref 65–99)
GLUCOSE BLD-MCNC: 126 MG/DL (ref 65–99)
GLUCOSE BLD-MCNC: 127 MG/DL (ref 65–99)
GLUCOSE BLD-MCNC: 128 MG/DL (ref 65–99)
GLUCOSE BLD-MCNC: 129 MG/DL (ref 65–99)
GLUCOSE BLD-MCNC: 130 MG/DL (ref 65–99)
GLUCOSE BLD-MCNC: 134 MG/DL (ref 65–99)
GLUCOSE BLD-MCNC: 135 MG/DL (ref 65–99)
GLUCOSE BLD-MCNC: 139 MG/DL (ref 65–99)
GLUCOSE BLD-MCNC: 140 MG/DL (ref 65–99)
GLUCOSE BLD-MCNC: 141 MG/DL (ref 65–99)
GLUCOSE BLD-MCNC: 144 MG/DL (ref 65–99)
GLUCOSE BLD-MCNC: 148 MG/DL (ref 65–99)
GLUCOSE BLD-MCNC: 148 MG/DL (ref 65–99)
GLUCOSE BLD-MCNC: 153 MG/DL (ref 65–99)
GLUCOSE BLD-MCNC: 154 MG/DL (ref 65–99)
GLUCOSE BLD-MCNC: 155 MG/DL (ref 65–99)
GLUCOSE BLD-MCNC: 156 MG/DL (ref 65–99)
GLUCOSE BLD-MCNC: 157 MG/DL (ref 65–99)
GLUCOSE BLD-MCNC: 159 MG/DL (ref 65–99)
GLUCOSE BLD-MCNC: 160 MG/DL (ref 65–99)
GLUCOSE BLD-MCNC: 161 MG/DL (ref 65–99)
GLUCOSE BLD-MCNC: 164 MG/DL (ref 65–99)
GLUCOSE BLD-MCNC: 167 MG/DL (ref 65–99)
GLUCOSE BLD-MCNC: 167 MG/DL (ref 65–99)
GLUCOSE BLD-MCNC: 168 MG/DL (ref 65–99)
GLUCOSE BLD-MCNC: 168 MG/DL (ref 65–99)
GLUCOSE BLD-MCNC: 169 MG/DL (ref 65–99)
GLUCOSE BLD-MCNC: 169 MG/DL (ref 65–99)
GLUCOSE BLD-MCNC: 172 MG/DL (ref 65–99)
GLUCOSE BLD-MCNC: 173 MG/DL (ref 65–99)
GLUCOSE BLD-MCNC: 181 MG/DL (ref 65–99)
GLUCOSE BLD-MCNC: 186 MG/DL (ref 65–99)
GLUCOSE BLD-MCNC: 195 MG/DL (ref 65–99)
GLUCOSE BLD-MCNC: 197 MG/DL (ref 65–99)
GLUCOSE BLD-MCNC: 198 MG/DL (ref 65–99)
GLUCOSE BLD-MCNC: 208 MG/DL (ref 65–99)
GLUCOSE BLD-MCNC: 209 MG/DL (ref 65–99)
GLUCOSE BLD-MCNC: 212 MG/DL (ref 65–99)
GLUCOSE BLD-MCNC: 216 MG/DL (ref 65–99)
GLUCOSE BLD-MCNC: 221 MG/DL (ref 65–99)
GLUCOSE BLD-MCNC: 230 MG/DL (ref 65–99)
GLUCOSE BLD-MCNC: 250 MG/DL (ref 65–99)
GLUCOSE BLD-MCNC: 41 MG/DL (ref 65–99)
GLUCOSE BLD-MCNC: 88 MG/DL (ref 65–99)
GLUCOSE BLD-MCNC: 89 MG/DL (ref 65–99)
GLUCOSE BLD-MCNC: 91 MG/DL (ref 65–99)
GLUCOSE BLD-MCNC: 93 MG/DL (ref 65–99)
GLUCOSE BLDC GLUCOMTR-MCNC: 100 MG/DL (ref 70–130)
GLUCOSE BLDC GLUCOMTR-MCNC: 102 MG/DL (ref 70–130)
GLUCOSE BLDC GLUCOMTR-MCNC: 103 MG/DL (ref 70–130)
GLUCOSE BLDC GLUCOMTR-MCNC: 103 MG/DL (ref 70–130)
GLUCOSE BLDC GLUCOMTR-MCNC: 104 MG/DL (ref 70–130)
GLUCOSE BLDC GLUCOMTR-MCNC: 104 MG/DL (ref 70–130)
GLUCOSE BLDC GLUCOMTR-MCNC: 105 MG/DL (ref 70–130)
GLUCOSE BLDC GLUCOMTR-MCNC: 106 MG/DL (ref 70–130)
GLUCOSE BLDC GLUCOMTR-MCNC: 106 MG/DL (ref 70–130)
GLUCOSE BLDC GLUCOMTR-MCNC: 107 MG/DL (ref 70–130)
GLUCOSE BLDC GLUCOMTR-MCNC: 108 MG/DL (ref 70–130)
GLUCOSE BLDC GLUCOMTR-MCNC: 109 MG/DL (ref 70–130)
GLUCOSE BLDC GLUCOMTR-MCNC: 110 MG/DL (ref 70–130)
GLUCOSE BLDC GLUCOMTR-MCNC: 110 MG/DL (ref 70–130)
GLUCOSE BLDC GLUCOMTR-MCNC: 112 MG/DL (ref 70–130)
GLUCOSE BLDC GLUCOMTR-MCNC: 113 MG/DL (ref 70–130)
GLUCOSE BLDC GLUCOMTR-MCNC: 114 MG/DL (ref 70–130)
GLUCOSE BLDC GLUCOMTR-MCNC: 114 MG/DL (ref 70–130)
GLUCOSE BLDC GLUCOMTR-MCNC: 115 MG/DL (ref 70–130)
GLUCOSE BLDC GLUCOMTR-MCNC: 116 MG/DL (ref 70–130)
GLUCOSE BLDC GLUCOMTR-MCNC: 117 MG/DL (ref 70–130)
GLUCOSE BLDC GLUCOMTR-MCNC: 117 MG/DL (ref 70–130)
GLUCOSE BLDC GLUCOMTR-MCNC: 118 MG/DL (ref 70–130)
GLUCOSE BLDC GLUCOMTR-MCNC: 119 MG/DL (ref 70–130)
GLUCOSE BLDC GLUCOMTR-MCNC: 120 MG/DL (ref 70–130)
GLUCOSE BLDC GLUCOMTR-MCNC: 121 MG/DL (ref 70–130)
GLUCOSE BLDC GLUCOMTR-MCNC: 122 MG/DL (ref 70–130)
GLUCOSE BLDC GLUCOMTR-MCNC: 123 MG/DL (ref 70–130)
GLUCOSE BLDC GLUCOMTR-MCNC: 124 MG/DL (ref 70–130)
GLUCOSE BLDC GLUCOMTR-MCNC: 124 MG/DL (ref 70–130)
GLUCOSE BLDC GLUCOMTR-MCNC: 125 MG/DL (ref 70–130)
GLUCOSE BLDC GLUCOMTR-MCNC: 126 MG/DL (ref 70–130)
GLUCOSE BLDC GLUCOMTR-MCNC: 127 MG/DL (ref 70–130)
GLUCOSE BLDC GLUCOMTR-MCNC: 127 MG/DL (ref 70–130)
GLUCOSE BLDC GLUCOMTR-MCNC: 128 MG/DL (ref 70–130)
GLUCOSE BLDC GLUCOMTR-MCNC: 128 MG/DL (ref 70–130)
GLUCOSE BLDC GLUCOMTR-MCNC: 129 MG/DL (ref 70–130)
GLUCOSE BLDC GLUCOMTR-MCNC: 129 MG/DL (ref 70–130)
GLUCOSE BLDC GLUCOMTR-MCNC: 130 MG/DL (ref 70–130)
GLUCOSE BLDC GLUCOMTR-MCNC: 131 MG/DL (ref 70–130)
GLUCOSE BLDC GLUCOMTR-MCNC: 131 MG/DL (ref 70–130)
GLUCOSE BLDC GLUCOMTR-MCNC: 132 MG/DL (ref 70–130)
GLUCOSE BLDC GLUCOMTR-MCNC: 132 MG/DL (ref 70–130)
GLUCOSE BLDC GLUCOMTR-MCNC: 134 MG/DL (ref 70–130)
GLUCOSE BLDC GLUCOMTR-MCNC: 135 MG/DL (ref 70–130)
GLUCOSE BLDC GLUCOMTR-MCNC: 136 MG/DL (ref 70–130)
GLUCOSE BLDC GLUCOMTR-MCNC: 138 MG/DL (ref 70–130)
GLUCOSE BLDC GLUCOMTR-MCNC: 139 MG/DL (ref 70–130)
GLUCOSE BLDC GLUCOMTR-MCNC: 139 MG/DL (ref 70–130)
GLUCOSE BLDC GLUCOMTR-MCNC: 142 MG/DL (ref 70–130)
GLUCOSE BLDC GLUCOMTR-MCNC: 143 MG/DL (ref 70–130)
GLUCOSE BLDC GLUCOMTR-MCNC: 144 MG/DL (ref 70–130)
GLUCOSE BLDC GLUCOMTR-MCNC: 146 MG/DL (ref 70–130)
GLUCOSE BLDC GLUCOMTR-MCNC: 146 MG/DL (ref 70–130)
GLUCOSE BLDC GLUCOMTR-MCNC: 147 MG/DL (ref 70–130)
GLUCOSE BLDC GLUCOMTR-MCNC: 149 MG/DL (ref 70–130)
GLUCOSE BLDC GLUCOMTR-MCNC: 151 MG/DL (ref 70–130)
GLUCOSE BLDC GLUCOMTR-MCNC: 152 MG/DL (ref 70–130)
GLUCOSE BLDC GLUCOMTR-MCNC: 153 MG/DL (ref 70–130)
GLUCOSE BLDC GLUCOMTR-MCNC: 154 MG/DL (ref 70–130)
GLUCOSE BLDC GLUCOMTR-MCNC: 156 MG/DL (ref 70–130)
GLUCOSE BLDC GLUCOMTR-MCNC: 157 MG/DL (ref 70–130)
GLUCOSE BLDC GLUCOMTR-MCNC: 157 MG/DL (ref 70–130)
GLUCOSE BLDC GLUCOMTR-MCNC: 159 MG/DL (ref 70–130)
GLUCOSE BLDC GLUCOMTR-MCNC: 160 MG/DL (ref 70–130)
GLUCOSE BLDC GLUCOMTR-MCNC: 161 MG/DL (ref 70–130)
GLUCOSE BLDC GLUCOMTR-MCNC: 161 MG/DL (ref 70–130)
GLUCOSE BLDC GLUCOMTR-MCNC: 162 MG/DL (ref 70–130)
GLUCOSE BLDC GLUCOMTR-MCNC: 163 MG/DL (ref 70–130)
GLUCOSE BLDC GLUCOMTR-MCNC: 164 MG/DL (ref 70–130)
GLUCOSE BLDC GLUCOMTR-MCNC: 164 MG/DL (ref 70–130)
GLUCOSE BLDC GLUCOMTR-MCNC: 165 MG/DL (ref 70–130)
GLUCOSE BLDC GLUCOMTR-MCNC: 165 MG/DL (ref 70–130)
GLUCOSE BLDC GLUCOMTR-MCNC: 166 MG/DL (ref 70–130)
GLUCOSE BLDC GLUCOMTR-MCNC: 167 MG/DL (ref 70–130)
GLUCOSE BLDC GLUCOMTR-MCNC: 168 MG/DL (ref 70–130)
GLUCOSE BLDC GLUCOMTR-MCNC: 168 MG/DL (ref 70–130)
GLUCOSE BLDC GLUCOMTR-MCNC: 169 MG/DL (ref 70–130)
GLUCOSE BLDC GLUCOMTR-MCNC: 170 MG/DL (ref 70–130)
GLUCOSE BLDC GLUCOMTR-MCNC: 171 MG/DL (ref 70–130)
GLUCOSE BLDC GLUCOMTR-MCNC: 171 MG/DL (ref 70–130)
GLUCOSE BLDC GLUCOMTR-MCNC: 172 MG/DL (ref 70–130)
GLUCOSE BLDC GLUCOMTR-MCNC: 173 MG/DL (ref 70–130)
GLUCOSE BLDC GLUCOMTR-MCNC: 174 MG/DL (ref 70–130)
GLUCOSE BLDC GLUCOMTR-MCNC: 174 MG/DL (ref 70–130)
GLUCOSE BLDC GLUCOMTR-MCNC: 175 MG/DL (ref 70–130)
GLUCOSE BLDC GLUCOMTR-MCNC: 176 MG/DL (ref 70–130)
GLUCOSE BLDC GLUCOMTR-MCNC: 177 MG/DL (ref 70–130)
GLUCOSE BLDC GLUCOMTR-MCNC: 178 MG/DL (ref 70–130)
GLUCOSE BLDC GLUCOMTR-MCNC: 179 MG/DL (ref 70–130)
GLUCOSE BLDC GLUCOMTR-MCNC: 180 MG/DL (ref 70–130)
GLUCOSE BLDC GLUCOMTR-MCNC: 182 MG/DL (ref 70–130)
GLUCOSE BLDC GLUCOMTR-MCNC: 182 MG/DL (ref 70–130)
GLUCOSE BLDC GLUCOMTR-MCNC: 183 MG/DL (ref 70–130)
GLUCOSE BLDC GLUCOMTR-MCNC: 184 MG/DL (ref 70–130)
GLUCOSE BLDC GLUCOMTR-MCNC: 185 MG/DL (ref 70–130)
GLUCOSE BLDC GLUCOMTR-MCNC: 186 MG/DL (ref 70–130)
GLUCOSE BLDC GLUCOMTR-MCNC: 187 MG/DL (ref 70–130)
GLUCOSE BLDC GLUCOMTR-MCNC: 188 MG/DL (ref 70–130)
GLUCOSE BLDC GLUCOMTR-MCNC: 189 MG/DL (ref 70–130)
GLUCOSE BLDC GLUCOMTR-MCNC: 189 MG/DL (ref 70–130)
GLUCOSE BLDC GLUCOMTR-MCNC: 190 MG/DL (ref 70–130)
GLUCOSE BLDC GLUCOMTR-MCNC: 190 MG/DL (ref 70–130)
GLUCOSE BLDC GLUCOMTR-MCNC: 191 MG/DL (ref 70–130)
GLUCOSE BLDC GLUCOMTR-MCNC: 193 MG/DL (ref 70–130)
GLUCOSE BLDC GLUCOMTR-MCNC: 193 MG/DL (ref 70–130)
GLUCOSE BLDC GLUCOMTR-MCNC: 194 MG/DL (ref 70–130)
GLUCOSE BLDC GLUCOMTR-MCNC: 194 MG/DL (ref 70–130)
GLUCOSE BLDC GLUCOMTR-MCNC: 195 MG/DL (ref 70–130)
GLUCOSE BLDC GLUCOMTR-MCNC: 197 MG/DL (ref 70–130)
GLUCOSE BLDC GLUCOMTR-MCNC: 198 MG/DL (ref 70–130)
GLUCOSE BLDC GLUCOMTR-MCNC: 200 MG/DL (ref 70–130)
GLUCOSE BLDC GLUCOMTR-MCNC: 201 MG/DL (ref 70–130)
GLUCOSE BLDC GLUCOMTR-MCNC: 202 MG/DL (ref 70–130)
GLUCOSE BLDC GLUCOMTR-MCNC: 203 MG/DL (ref 70–130)
GLUCOSE BLDC GLUCOMTR-MCNC: 204 MG/DL (ref 70–130)
GLUCOSE BLDC GLUCOMTR-MCNC: 204 MG/DL (ref 70–130)
GLUCOSE BLDC GLUCOMTR-MCNC: 205 MG/DL (ref 70–130)
GLUCOSE BLDC GLUCOMTR-MCNC: 206 MG/DL (ref 70–130)
GLUCOSE BLDC GLUCOMTR-MCNC: 206 MG/DL (ref 70–130)
GLUCOSE BLDC GLUCOMTR-MCNC: 207 MG/DL (ref 70–130)
GLUCOSE BLDC GLUCOMTR-MCNC: 208 MG/DL (ref 70–130)
GLUCOSE BLDC GLUCOMTR-MCNC: 209 MG/DL (ref 70–130)
GLUCOSE BLDC GLUCOMTR-MCNC: 210 MG/DL (ref 70–130)
GLUCOSE BLDC GLUCOMTR-MCNC: 211 MG/DL (ref 70–130)
GLUCOSE BLDC GLUCOMTR-MCNC: 212 MG/DL (ref 70–130)
GLUCOSE BLDC GLUCOMTR-MCNC: 212 MG/DL (ref 70–130)
GLUCOSE BLDC GLUCOMTR-MCNC: 213 MG/DL (ref 70–130)
GLUCOSE BLDC GLUCOMTR-MCNC: 213 MG/DL (ref 70–130)
GLUCOSE BLDC GLUCOMTR-MCNC: 214 MG/DL (ref 70–130)
GLUCOSE BLDC GLUCOMTR-MCNC: 214 MG/DL (ref 70–130)
GLUCOSE BLDC GLUCOMTR-MCNC: 215 MG/DL (ref 70–130)
GLUCOSE BLDC GLUCOMTR-MCNC: 216 MG/DL (ref 70–130)
GLUCOSE BLDC GLUCOMTR-MCNC: 217 MG/DL (ref 70–130)
GLUCOSE BLDC GLUCOMTR-MCNC: 217 MG/DL (ref 70–130)
GLUCOSE BLDC GLUCOMTR-MCNC: 218 MG/DL (ref 70–130)
GLUCOSE BLDC GLUCOMTR-MCNC: 219 MG/DL (ref 70–130)
GLUCOSE BLDC GLUCOMTR-MCNC: 220 MG/DL (ref 70–130)
GLUCOSE BLDC GLUCOMTR-MCNC: 220 MG/DL (ref 70–130)
GLUCOSE BLDC GLUCOMTR-MCNC: 221 MG/DL (ref 70–130)
GLUCOSE BLDC GLUCOMTR-MCNC: 225 MG/DL (ref 70–130)
GLUCOSE BLDC GLUCOMTR-MCNC: 227 MG/DL (ref 70–130)
GLUCOSE BLDC GLUCOMTR-MCNC: 228 MG/DL (ref 70–130)
GLUCOSE BLDC GLUCOMTR-MCNC: 229 MG/DL (ref 70–130)
GLUCOSE BLDC GLUCOMTR-MCNC: 231 MG/DL (ref 70–130)
GLUCOSE BLDC GLUCOMTR-MCNC: 233 MG/DL (ref 70–130)
GLUCOSE BLDC GLUCOMTR-MCNC: 233 MG/DL (ref 70–130)
GLUCOSE BLDC GLUCOMTR-MCNC: 237 MG/DL (ref 70–130)
GLUCOSE BLDC GLUCOMTR-MCNC: 239 MG/DL (ref 70–130)
GLUCOSE BLDC GLUCOMTR-MCNC: 241 MG/DL (ref 70–130)
GLUCOSE BLDC GLUCOMTR-MCNC: 243 MG/DL (ref 70–130)
GLUCOSE BLDC GLUCOMTR-MCNC: 245 MG/DL (ref 70–130)
GLUCOSE BLDC GLUCOMTR-MCNC: 250 MG/DL (ref 70–130)
GLUCOSE BLDC GLUCOMTR-MCNC: 251 MG/DL (ref 70–130)
GLUCOSE BLDC GLUCOMTR-MCNC: 259 MG/DL (ref 70–130)
GLUCOSE BLDC GLUCOMTR-MCNC: 260 MG/DL (ref 70–130)
GLUCOSE BLDC GLUCOMTR-MCNC: 261 MG/DL (ref 70–130)
GLUCOSE BLDC GLUCOMTR-MCNC: 279 MG/DL (ref 70–130)
GLUCOSE BLDC GLUCOMTR-MCNC: 284 MG/DL (ref 70–130)
GLUCOSE BLDC GLUCOMTR-MCNC: 285 MG/DL (ref 70–130)
GLUCOSE BLDC GLUCOMTR-MCNC: 285 MG/DL (ref 70–130)
GLUCOSE BLDC GLUCOMTR-MCNC: 296 MG/DL (ref 70–130)
GLUCOSE BLDC GLUCOMTR-MCNC: 30 MG/DL (ref 70–130)
GLUCOSE BLDC GLUCOMTR-MCNC: 309 MG/DL (ref 70–130)
GLUCOSE BLDC GLUCOMTR-MCNC: 330 MG/DL (ref 70–130)
GLUCOSE BLDC GLUCOMTR-MCNC: 342 MG/DL (ref 70–130)
GLUCOSE BLDC GLUCOMTR-MCNC: 36 MG/DL (ref 70–130)
GLUCOSE BLDC GLUCOMTR-MCNC: 44 MG/DL (ref 70–130)
GLUCOSE BLDC GLUCOMTR-MCNC: 46 MG/DL (ref 70–130)
GLUCOSE BLDC GLUCOMTR-MCNC: 50 MG/DL (ref 70–130)
GLUCOSE BLDC GLUCOMTR-MCNC: 58 MG/DL (ref 70–130)
GLUCOSE BLDC GLUCOMTR-MCNC: 58 MG/DL (ref 70–130)
GLUCOSE BLDC GLUCOMTR-MCNC: 64 MG/DL (ref 70–130)
GLUCOSE BLDC GLUCOMTR-MCNC: 67 MG/DL (ref 70–130)
GLUCOSE BLDC GLUCOMTR-MCNC: 68 MG/DL (ref 70–130)
GLUCOSE BLDC GLUCOMTR-MCNC: 72 MG/DL (ref 70–130)
GLUCOSE BLDC GLUCOMTR-MCNC: 72 MG/DL (ref 70–130)
GLUCOSE BLDC GLUCOMTR-MCNC: 74 MG/DL (ref 70–130)
GLUCOSE BLDC GLUCOMTR-MCNC: 75 MG/DL (ref 70–130)
GLUCOSE BLDC GLUCOMTR-MCNC: 80 MG/DL (ref 70–130)
GLUCOSE BLDC GLUCOMTR-MCNC: 82 MG/DL (ref 70–130)
GLUCOSE BLDC GLUCOMTR-MCNC: 83 MG/DL (ref 70–130)
GLUCOSE BLDC GLUCOMTR-MCNC: 85 MG/DL (ref 70–130)
GLUCOSE BLDC GLUCOMTR-MCNC: 87 MG/DL (ref 70–130)
GLUCOSE BLDC GLUCOMTR-MCNC: 88 MG/DL (ref 70–130)
GLUCOSE BLDC GLUCOMTR-MCNC: 88 MG/DL (ref 70–130)
GLUCOSE BLDC GLUCOMTR-MCNC: 89 MG/DL (ref 70–130)
GLUCOSE BLDC GLUCOMTR-MCNC: 90 MG/DL (ref 70–130)
GLUCOSE BLDC GLUCOMTR-MCNC: 92 MG/DL (ref 70–130)
GLUCOSE BLDC GLUCOMTR-MCNC: 92 MG/DL (ref 70–130)
GLUCOSE BLDC GLUCOMTR-MCNC: 94 MG/DL (ref 70–130)
GLUCOSE BLDC GLUCOMTR-MCNC: 94 MG/DL (ref 70–130)
GLUCOSE BLDC GLUCOMTR-MCNC: 95 MG/DL (ref 70–130)
GLUCOSE BLDC GLUCOMTR-MCNC: 96 MG/DL (ref 70–130)
GLUCOSE BLDC GLUCOMTR-MCNC: 97 MG/DL (ref 70–130)
GLUCOSE BLDC GLUCOMTR-MCNC: 97 MG/DL (ref 70–130)
GLUCOSE BLDC GLUCOMTR-MCNC: 98 MG/DL (ref 70–130)
GLUCOSE BLDC GLUCOMTR-MCNC: 98 MG/DL (ref 70–130)
GLUCOSE BLDC GLUCOMTR-MCNC: 99 MG/DL (ref 70–130)
GLUCOSE SERPL-MCNC: 145 MG/DL (ref 65–99)
GLUCOSE UR STRIP-MCNC: NEGATIVE MG/DL
HAPTOGLOB SERPL-MCNC: 176 MG/DL (ref 34–200)
HBA1C MFR BLD: 6.4 % (ref 4.8–5.6)
HBA1C MFR BLD: 7.1 % (ref 4.8–5.6)
HBV SURFACE AG SERPL QL IA: NORMAL
HBV SURFACE AG SERPL QL IA: NORMAL
HCO3 BLDA-SCNC: 27.2 MMOL/L (ref 22–28)
HCO3 BLDA-SCNC: 27.5 MMOL/L (ref 22–28)
HCO3 BLDA-SCNC: 27.6 MMOL/L (ref 22–26)
HCO3 BLDA-SCNC: 27.7 MMOL/L (ref 22–26)
HCO3 BLDA-SCNC: 27.7 MMOL/L (ref 22–28)
HCO3 BLDA-SCNC: 28.1 MMOL/L (ref 22–28)
HCO3 BLDA-SCNC: 28.1 MMOL/L (ref 22–28)
HCO3 BLDA-SCNC: 28.8 MMOL/L (ref 22–26)
HCO3 BLDA-SCNC: 29.8 MMOL/L (ref 22–26)
HCO3 BLDA-SCNC: 30.2 MMOL/L (ref 22–26)
HCO3 BLDA-SCNC: 30.8 MMOL/L (ref 22–26)
HCT VFR BLD AUTO: 25.9 % (ref 40.4–52.2)
HCT VFR BLD AUTO: 26 % (ref 40.4–52.2)
HCT VFR BLD AUTO: 26.3 % (ref 40.4–52.2)
HCT VFR BLD AUTO: 27.9 % (ref 40.4–52.2)
HCT VFR BLD AUTO: 29.3 % (ref 40.4–52.2)
HCT VFR BLD AUTO: 29.5 % (ref 40.4–52.2)
HCT VFR BLD AUTO: 30.1 % (ref 40.4–52.2)
HCT VFR BLD AUTO: 30.5 % (ref 40.4–52.2)
HCT VFR BLD AUTO: 30.5 % (ref 40.4–52.2)
HCT VFR BLD AUTO: 30.6 % (ref 40.4–52.2)
HCT VFR BLD AUTO: 30.7 % (ref 40.4–52.2)
HCT VFR BLD AUTO: 30.9 % (ref 40.4–52.2)
HCT VFR BLD AUTO: 31.2 % (ref 40.4–52.2)
HCT VFR BLD AUTO: 31.3 % (ref 40.4–52.2)
HCT VFR BLD AUTO: 31.3 % (ref 40.4–52.2)
HCT VFR BLD AUTO: 31.6 % (ref 40.4–52.2)
HCT VFR BLD AUTO: 31.7 % (ref 40.4–52.2)
HCT VFR BLD AUTO: 32.4 % (ref 40.4–52.2)
HCT VFR BLD AUTO: 32.5 % (ref 40.4–52.2)
HCT VFR BLD AUTO: 32.6 % (ref 40.4–52.2)
HCT VFR BLD AUTO: 33.1 % (ref 40.4–52.2)
HCT VFR BLD AUTO: 33.3 % (ref 40.4–52.2)
HCT VFR BLD AUTO: 33.6 % (ref 40.4–52.2)
HCT VFR BLD AUTO: 34.4 % (ref 40.4–52.2)
HCT VFR BLD AUTO: 35.9 % (ref 40.4–52.2)
HCT VFR BLD AUTO: 36 % (ref 40.4–52.2)
HCT VFR BLD AUTO: 36.5 % (ref 40.4–52.2)
HCT VFR BLD AUTO: 36.7 % (ref 40.4–52.2)
HCT VFR BLD AUTO: 36.8 % (ref 40.4–52.2)
HCT VFR BLD AUTO: 37 % (ref 40.4–52.2)
HCT VFR BLD AUTO: 37 % (ref 40.4–52.2)
HCT VFR BLD AUTO: 37.4 % (ref 40.4–52.2)
HCT VFR BLD AUTO: 38.3 % (ref 40.4–52.2)
HCT VFR BLD AUTO: 39 % (ref 40.4–52.2)
HCT VFR BLD AUTO: 39.2 % (ref 40.4–52.2)
HCT VFR BLD AUTO: 40.6 % (ref 40.4–52.2)
HCT VFR BLD AUTO: 41.7 % (ref 40.4–52.2)
HCT VFR BLDA CALC: 22 % (ref 38–51)
HCT VFR BLDA CALC: 27 % (ref 38–51)
HCT VFR BLDA CALC: 28 % (ref 38–51)
HCT VFR BLDA CALC: 31 % (ref 38–51)
HCT VFR BLDA CALC: 33 % (ref 38–51)
HCT VFR BLDA CALC: 33 % (ref 38–51)
HCV AB SER DONR QL: NORMAL
HCV AB SER DONR QL: NORMAL
HDLC SERPL-MCNC: 35 MG/DL (ref 40–60)
HDLC SERPL-MCNC: 40 MG/DL (ref 40–60)
HEPATITIS C RNA-NAA: NEGATIVE
HGB BLD-MCNC: 10 G/DL (ref 13.7–17.6)
HGB BLD-MCNC: 10.2 G/DL (ref 13.7–17.6)
HGB BLD-MCNC: 10.3 G/DL (ref 13.7–17.6)
HGB BLD-MCNC: 10.4 G/DL (ref 13.7–17.6)
HGB BLD-MCNC: 11 G/DL (ref 13.7–17.6)
HGB BLD-MCNC: 11.3 G/DL (ref 13.7–17.6)
HGB BLD-MCNC: 11.3 G/DL (ref 13.7–17.6)
HGB BLD-MCNC: 11.5 G/DL (ref 13.7–17.6)
HGB BLD-MCNC: 11.6 G/DL (ref 13.7–17.6)
HGB BLD-MCNC: 11.7 G/DL (ref 13.7–17.6)
HGB BLD-MCNC: 12 G/DL (ref 13.7–17.6)
HGB BLD-MCNC: 12 G/DL (ref 13.7–17.6)
HGB BLD-MCNC: 12.9 G/DL (ref 13.7–17.6)
HGB BLD-MCNC: 13.2 G/DL (ref 13.7–17.6)
HGB BLD-MCNC: 8 G/DL (ref 13.7–17.6)
HGB BLD-MCNC: 8.1 G/DL (ref 13.7–17.6)
HGB BLD-MCNC: 8.3 G/DL (ref 13.7–17.6)
HGB BLD-MCNC: 8.7 G/DL (ref 13.7–17.6)
HGB BLD-MCNC: 8.8 G/DL (ref 13.7–17.6)
HGB BLD-MCNC: 8.8 G/DL (ref 13.7–17.6)
HGB BLD-MCNC: 8.9 G/DL (ref 13.7–17.6)
HGB BLD-MCNC: 8.9 G/DL (ref 13.7–17.6)
HGB BLD-MCNC: 9 G/DL (ref 13.7–17.6)
HGB BLD-MCNC: 9 G/DL (ref 13.7–17.6)
HGB BLD-MCNC: 9.1 G/DL (ref 13.7–17.6)
HGB BLD-MCNC: 9.3 G/DL (ref 13.7–17.6)
HGB BLD-MCNC: 9.3 G/DL (ref 13.7–17.6)
HGB BLD-MCNC: 9.4 G/DL (ref 13.7–17.6)
HGB BLD-MCNC: 9.4 G/DL (ref 13.7–17.6)
HGB BLD-MCNC: 9.5 G/DL (ref 13.7–17.6)
HGB BLD-MCNC: 9.7 G/DL (ref 13.7–17.6)
HGB BLD-MCNC: 9.8 G/DL (ref 13.7–17.6)
HGB BLDA-MCNC: 10.5 G/DL (ref 12–17)
HGB BLDA-MCNC: 11.2 G/DL (ref 12–17)
HGB BLDA-MCNC: 11.2 G/DL (ref 12–17)
HGB BLDA-MCNC: 7.5 G/DL (ref 12–17)
HGB BLDA-MCNC: 9.2 G/DL (ref 12–17)
HGB BLDA-MCNC: 9.5 G/DL (ref 12–17)
HGB UR QL STRIP.AUTO: ABNORMAL
HGB UR QL STRIP.AUTO: NEGATIVE
HIV 1 & 2 AB SER-IMP: NORMAL
HIV1 P24 AG SER QL: NORMAL
HIV1+2 AB SER QL: NORMAL
HOLD SPECIMEN: NORMAL
HOROWITZ INDEX BLD+IHG-RTO: 100 %
HOROWITZ INDEX BLD+IHG-RTO: 40 %
HOROWITZ INDEX BLD+IHG-RTO: 40 %
HYALINE CASTS UR QL AUTO: ABNORMAL /LPF
HYALINE CASTS UR QL AUTO: NORMAL /LPF
HYPOCHROMIA BLD QL: ABNORMAL
HYPOCHROMIA BLD QL: NORMAL
IGA SERPL-MCNC: 228 MG/DL (ref 61–437)
IGG SERPL-MCNC: 631 MG/DL (ref 700–1600)
IGM SERPL-MCNC: 82 MG/DL (ref 20–172)
IMM GRANULOCYTES # BLD: 0.02 10*3/MM3 (ref 0–0.03)
IMM GRANULOCYTES # BLD: 0.03 10*3/MM3 (ref 0–0.03)
IMM GRANULOCYTES # BLD: 0.05 10*3/MM3 (ref 0–0.03)
IMM GRANULOCYTES # BLD: 0.05 10*3/MM3 (ref 0–0.03)
IMM GRANULOCYTES # BLD: 0.06 10*3/MM3 (ref 0–0.03)
IMM GRANULOCYTES # BLD: 0.07 10*3/MM3 (ref 0–0.03)
IMM GRANULOCYTES # BLD: 0.08 10*3/MM3 (ref 0–0.03)
IMM GRANULOCYTES # BLD: 0.09 10*3/MM3 (ref 0–0.03)
IMM GRANULOCYTES # BLD: 0.09 10*3/MM3 (ref 0–0.03)
IMM GRANULOCYTES # BLD: 0.1 10*3/MM3 (ref 0–0.03)
IMM GRANULOCYTES # BLD: 0.12 10*3/MM3 (ref 0–0.03)
IMM GRANULOCYTES # BLD: 0.18 10*3/MM3 (ref 0–0.03)
IMM GRANULOCYTES # BLD: 0.23 10*3/MM3 (ref 0–0.03)
IMM GRANULOCYTES NFR BLD: 0.2 % (ref 0–0.5)
IMM GRANULOCYTES NFR BLD: 0.2 % (ref 0–0.5)
IMM GRANULOCYTES NFR BLD: 0.3 % (ref 0–0.5)
IMM GRANULOCYTES NFR BLD: 0.4 % (ref 0–0.5)
IMM GRANULOCYTES NFR BLD: 0.4 % (ref 0–0.5)
IMM GRANULOCYTES NFR BLD: 0.5 % (ref 0–0.5)
IMM GRANULOCYTES NFR BLD: 0.5 % (ref 0–0.5)
IMM GRANULOCYTES NFR BLD: 0.6 % (ref 0–0.5)
IMM GRANULOCYTES NFR BLD: 0.6 % (ref 0–0.5)
IMM GRANULOCYTES NFR BLD: 0.7 % (ref 0–0.5)
IMM GRANULOCYTES NFR BLD: 0.8 % (ref 0–0.5)
IMM GRANULOCYTES NFR BLD: 0.9 % (ref 0–0.5)
IMM GRANULOCYTES NFR BLD: 1.1 % (ref 0–0.5)
IMM GRANULOCYTES NFR BLD: 1.1 % (ref 0–0.5)
INR PPP: 0.97 (ref 0.9–1.1)
INR PPP: 1.08 (ref 0.9–1.1)
INR PPP: 1.09 (ref 0.9–1.1)
INR PPP: 1.11 (ref 0.9–1.1)
INR PPP: 1.12 (ref 0.9–1.1)
INR PPP: 1.14 (ref 0.9–1.1)
INR PPP: 1.14 (ref 0.9–1.1)
INR PPP: 1.16 (ref 0.9–1.1)
INR PPP: 1.16 (ref 0.9–1.1)
INR PPP: 1.17 (ref 0.9–1.1)
INR PPP: 1.17 (ref 0.9–1.1)
INR PPP: 1.18 (ref 0.9–1.1)
INR PPP: 1.19 (ref 0.9–1.1)
INR PPP: 1.22 (ref 0.9–1.1)
INR PPP: 1.26 (ref 0.9–1.1)
INR PPP: 1.35 (ref 0.9–1.1)
INR PPP: 1.35 (ref 0.9–1.1)
INR PPP: 1.36 (ref 0.9–1.1)
INR PPP: 1.45 (ref 0.9–1.1)
INR PPP: 1.46 (ref 0.9–1.1)
INR PPP: 1.51 (ref 0.9–1.1)
INR PPP: 1.58 (ref 0.9–1.1)
INR PPP: 1.61 (ref 0.9–1.1)
INR PPP: 1.63 (ref 0.9–1.1)
INR PPP: 1.67 (ref 0.9–1.1)
INR PPP: 1.77 (ref 0.9–1.1)
INR PPP: 1.8 (ref 0.9–1.1)
INR PPP: 1.82 (ref 0.9–1.1)
INR PPP: 1.87 (ref 0.9–1.1)
INR PPP: 1.89 (ref 0.9–1.1)
INR PPP: 1.91 (ref 0.9–1.1)
INR PPP: 2.01 (ref 0.9–1.1)
INR PPP: 2.1 (ref 0.9–1.1)
INR PPP: 2.23 (ref 0.9–1.1)
INR PPP: 2.25 (ref 0.9–1.1)
INR PPP: 2.27 (ref 0.9–1.1)
INR PPP: 2.3 (ref 0.9–1.1)
INR PPP: 2.44 (ref 0.9–1.1)
INR PPP: 2.46 (ref 0.9–1.1)
INR PPP: 3.26 (ref 0.9–1.1)
INR PPP: 3.61 (ref 0.9–1.1)
INR PPP: 4.13 (ref 0.9–1.1)
INTERPRETATION SERPL IEP-IMP: ABNORMAL
INTERPRETATION UR IFE-IMP: NORMAL
INTERPRETATIONS: (REFERENCE): ABNORMAL
IRON 24H UR-MRATE: 12 MCG/DL (ref 59–158)
IRON 24H UR-MRATE: 16 MCG/DL (ref 59–158)
IRON SATN MFR SERPL: 5 % (ref 20–50)
IRON SATN MFR SERPL: 6 % (ref 20–50)
KETONES UR QL STRIP: ABNORMAL
KETONES UR QL STRIP: NEGATIVE
LAB AP CASE REPORT: NORMAL
LABORATORY COMMENT REPORT: ABNORMAL
LDLC SERPL CALC-MCNC: 103 MG/DL (ref 0–100)
LDLC SERPL CALC-MCNC: 98 MG/DL (ref 0–100)
LDLC/HDLC SERPL: 2.57 {RATIO}
LDLC/HDLC SERPL: 2.79 {RATIO}
LEFT ARM BP: NORMAL MMHG
LEFT ATRIUM VOLUME INDEX: 30 ML/M2
LEFT ATRIUM VOLUME INDEX: 47 ML/M2
LEFT ATRIUM VOLUME: 72 CM3
LEUKOCYTE ESTERASE UR QL STRIP.AUTO: ABNORMAL
LEUKOCYTE ESTERASE UR QL STRIP.AUTO: NEGATIVE
LIMITATIONS: (REFERENCE): ABNORMAL
LIVER FIBR SCORE SERPL CALC.FIBROSURE: 0.25 (ref 0–0.21)
LV EF 2D ECHO EST: 56 %
LYMPHOCYTES # BLD AUTO: 0.42 10*3/MM3 (ref 0.9–4.8)
LYMPHOCYTES # BLD AUTO: 0.42 10*3/MM3 (ref 0.9–4.8)
LYMPHOCYTES # BLD AUTO: 0.53 10*3/MM3 (ref 0.9–4.8)
LYMPHOCYTES # BLD AUTO: 0.56 10*3/MM3 (ref 0.9–4.8)
LYMPHOCYTES # BLD AUTO: 0.59 10*3/MM3 (ref 0.9–4.8)
LYMPHOCYTES # BLD AUTO: 0.68 10*3/MM3 (ref 0.9–4.8)
LYMPHOCYTES # BLD AUTO: 0.72 10*3/MM3 (ref 0.9–4.8)
LYMPHOCYTES # BLD AUTO: 0.76 10*3/MM3 (ref 0.9–4.8)
LYMPHOCYTES # BLD AUTO: 0.83 10*3/MM3 (ref 0.9–4.8)
LYMPHOCYTES # BLD AUTO: 0.85 10*3/MM3 (ref 0.9–4.8)
LYMPHOCYTES # BLD AUTO: 0.86 10*3/MM3 (ref 0.9–4.8)
LYMPHOCYTES # BLD AUTO: 0.87 10*3/MM3 (ref 0.9–4.8)
LYMPHOCYTES # BLD AUTO: 0.91 10*3/MM3 (ref 0.9–4.8)
LYMPHOCYTES # BLD AUTO: 0.92 10*3/MM3 (ref 0.9–4.8)
LYMPHOCYTES # BLD AUTO: 0.92 10*3/MM3 (ref 0.9–4.8)
LYMPHOCYTES # BLD AUTO: 0.99 10*3/MM3 (ref 0.9–4.8)
LYMPHOCYTES # BLD AUTO: 1 10*3/MM3 (ref 0.9–4.8)
LYMPHOCYTES # BLD AUTO: 1.02 10*3/MM3 (ref 0.9–4.8)
LYMPHOCYTES # BLD AUTO: 1.04 10*3/MM3 (ref 0.9–4.8)
LYMPHOCYTES # BLD AUTO: 1.45 10*3/MM3 (ref 0.9–4.8)
LYMPHOCYTES # BLD AUTO: 1.59 10*3/MM3 (ref 0.9–4.8)
LYMPHOCYTES # BLD MANUAL: 0.42 10*3/MM3 (ref 0.9–4.8)
LYMPHOCYTES NFR BLD AUTO: 10.1 % (ref 19.6–45.3)
LYMPHOCYTES NFR BLD AUTO: 10.1 % (ref 19.6–45.3)
LYMPHOCYTES NFR BLD AUTO: 11.2 % (ref 19.6–45.3)
LYMPHOCYTES NFR BLD AUTO: 11.4 % (ref 19.6–45.3)
LYMPHOCYTES NFR BLD AUTO: 11.4 % (ref 19.6–45.3)
LYMPHOCYTES NFR BLD AUTO: 12.1 % (ref 19.6–45.3)
LYMPHOCYTES NFR BLD AUTO: 13.8 % (ref 19.6–45.3)
LYMPHOCYTES NFR BLD AUTO: 2 % (ref 19.6–45.3)
LYMPHOCYTES NFR BLD AUTO: 2.1 % (ref 19.6–45.3)
LYMPHOCYTES NFR BLD AUTO: 4 % (ref 19.6–45.3)
LYMPHOCYTES NFR BLD AUTO: 4 % (ref 19.6–45.3)
LYMPHOCYTES NFR BLD AUTO: 5 % (ref 19.6–45.3)
LYMPHOCYTES NFR BLD AUTO: 6.2 % (ref 19.6–45.3)
LYMPHOCYTES NFR BLD AUTO: 6.4 % (ref 19.6–45.3)
LYMPHOCYTES NFR BLD AUTO: 6.9 % (ref 19.6–45.3)
LYMPHOCYTES NFR BLD AUTO: 7.4 % (ref 19.6–45.3)
LYMPHOCYTES NFR BLD AUTO: 7.7 % (ref 19.6–45.3)
LYMPHOCYTES NFR BLD AUTO: 8.4 % (ref 19.6–45.3)
LYMPHOCYTES NFR BLD AUTO: 8.5 % (ref 19.6–45.3)
LYMPHOCYTES NFR BLD MANUAL: 4 % (ref 19.6–45.3)
LYMPHOCYTES NFR BLD MANUAL: 5 % (ref 5–12)
Lab: ABNORMAL
M PROTEIN 24H MFR UR ELPH: NORMAL %
M-SPIKE: ABNORMAL G/DL
MAGNESIUM SERPL-MCNC: 1.7 MG/DL (ref 1.6–2.4)
MAGNESIUM SERPL-MCNC: 1.8 MG/DL (ref 1.6–2.4)
MAGNESIUM SERPL-MCNC: 1.9 MG/DL (ref 1.6–2.4)
MAGNESIUM SERPL-MCNC: 2 MG/DL (ref 1.6–2.4)
MAGNESIUM SERPL-MCNC: 2 MG/DL (ref 1.6–2.4)
MAGNESIUM SERPL-MCNC: 2.1 MG/DL (ref 1.6–2.4)
MAGNESIUM SERPL-MCNC: 2.2 MG/DL (ref 1.6–2.4)
MAGNESIUM SERPL-MCNC: 2.3 MG/DL (ref 1.6–2.4)
MAGNESIUM SERPL-MCNC: 2.4 MG/DL (ref 1.6–2.4)
MAGNESIUM SERPL-MCNC: 2.6 MG/DL (ref 1.6–2.4)
MAXIMAL PREDICTED HEART RATE: 159 BPM
MAXIMAL PREDICTED HEART RATE: 160 BPM
MCH RBC QN AUTO: 22.2 PG (ref 27–32.7)
MCH RBC QN AUTO: 22.3 PG (ref 27–32.7)
MCH RBC QN AUTO: 22.4 PG (ref 27–32.7)
MCH RBC QN AUTO: 22.5 PG (ref 27–32.7)
MCH RBC QN AUTO: 22.6 PG (ref 27–32.7)
MCH RBC QN AUTO: 22.7 PG (ref 27–32.7)
MCH RBC QN AUTO: 22.9 PG (ref 27–32.7)
MCH RBC QN AUTO: 23 PG (ref 27–32.7)
MCH RBC QN AUTO: 23 PG (ref 27–32.7)
MCH RBC QN AUTO: 23.1 PG (ref 27–32.7)
MCH RBC QN AUTO: 23.2 PG (ref 27–32.7)
MCH RBC QN AUTO: 23.2 PG (ref 27–32.7)
MCH RBC QN AUTO: 23.3 PG (ref 27–32.7)
MCH RBC QN AUTO: 23.5 PG (ref 27–32.7)
MCH RBC QN AUTO: 23.6 PG (ref 27–32.7)
MCH RBC QN AUTO: 23.6 PG (ref 27–32.7)
MCH RBC QN AUTO: 23.7 PG (ref 27–32.7)
MCH RBC QN AUTO: 23.8 PG (ref 27–32.7)
MCH RBC QN AUTO: 24 PG (ref 27–32.7)
MCH RBC QN AUTO: 24.1 PG (ref 27–32.7)
MCH RBC QN AUTO: 24.2 PG (ref 27–32.7)
MCH RBC QN AUTO: 24.4 PG (ref 27–32.7)
MCH RBC QN AUTO: 24.5 PG (ref 27–32.7)
MCH RBC QN AUTO: 24.6 PG (ref 27–32.7)
MCH RBC QN AUTO: 26.4 PG (ref 27–32.7)
MCH RBC QN AUTO: 26.5 PG (ref 27–32.7)
MCHC RBC AUTO-ENTMCNC: 27.8 G/DL (ref 32.6–36.4)
MCHC RBC AUTO-ENTMCNC: 27.8 G/DL (ref 32.6–36.4)
MCHC RBC AUTO-ENTMCNC: 28.2 G/DL (ref 32.6–36.4)
MCHC RBC AUTO-ENTMCNC: 28.2 G/DL (ref 32.6–36.4)
MCHC RBC AUTO-ENTMCNC: 28.5 G/DL (ref 32.6–36.4)
MCHC RBC AUTO-ENTMCNC: 28.5 G/DL (ref 32.6–36.4)
MCHC RBC AUTO-ENTMCNC: 28.6 G/DL (ref 32.6–36.4)
MCHC RBC AUTO-ENTMCNC: 28.8 G/DL (ref 32.6–36.4)
MCHC RBC AUTO-ENTMCNC: 28.8 G/DL (ref 32.6–36.4)
MCHC RBC AUTO-ENTMCNC: 28.9 G/DL (ref 32.6–36.4)
MCHC RBC AUTO-ENTMCNC: 29.1 G/DL (ref 32.6–36.4)
MCHC RBC AUTO-ENTMCNC: 29.2 G/DL (ref 32.6–36.4)
MCHC RBC AUTO-ENTMCNC: 29.2 G/DL (ref 32.6–36.4)
MCHC RBC AUTO-ENTMCNC: 29.5 G/DL (ref 32.6–36.4)
MCHC RBC AUTO-ENTMCNC: 29.8 G/DL (ref 32.6–36.4)
MCHC RBC AUTO-ENTMCNC: 29.9 G/DL (ref 32.6–36.4)
MCHC RBC AUTO-ENTMCNC: 30 G/DL (ref 32.6–36.4)
MCHC RBC AUTO-ENTMCNC: 30.1 G/DL (ref 32.6–36.4)
MCHC RBC AUTO-ENTMCNC: 30.2 G/DL (ref 32.6–36.4)
MCHC RBC AUTO-ENTMCNC: 30.4 G/DL (ref 32.6–36.4)
MCHC RBC AUTO-ENTMCNC: 30.6 G/DL (ref 32.6–36.4)
MCHC RBC AUTO-ENTMCNC: 30.6 G/DL (ref 32.6–36.4)
MCHC RBC AUTO-ENTMCNC: 30.7 G/DL (ref 32.6–36.4)
MCHC RBC AUTO-ENTMCNC: 30.8 G/DL (ref 32.6–36.4)
MCHC RBC AUTO-ENTMCNC: 30.9 G/DL (ref 32.6–36.4)
MCHC RBC AUTO-ENTMCNC: 31 G/DL (ref 32.6–36.4)
MCHC RBC AUTO-ENTMCNC: 31.1 G/DL (ref 32.6–36.4)
MCHC RBC AUTO-ENTMCNC: 31.2 G/DL (ref 32.6–36.4)
MCHC RBC AUTO-ENTMCNC: 31.3 G/DL (ref 32.6–36.4)
MCHC RBC AUTO-ENTMCNC: 31.4 G/DL (ref 32.6–36.4)
MCHC RBC AUTO-ENTMCNC: 31.5 G/DL (ref 32.6–36.4)
MCHC RBC AUTO-ENTMCNC: 31.5 G/DL (ref 32.6–36.4)
MCHC RBC AUTO-ENTMCNC: 31.6 G/DL (ref 32.6–36.4)
MCHC RBC AUTO-ENTMCNC: 31.7 G/DL (ref 32.6–36.4)
MCHC RBC AUTO-ENTMCNC: 31.8 G/DL (ref 32.6–36.4)
MCV RBC AUTO: 74.4 FL (ref 79.8–96.2)
MCV RBC AUTO: 75.5 FL (ref 79.8–96.2)
MCV RBC AUTO: 75.7 FL (ref 79.8–96.2)
MCV RBC AUTO: 75.8 FL (ref 79.8–96.2)
MCV RBC AUTO: 76.7 FL (ref 79.8–96.2)
MCV RBC AUTO: 76.8 FL (ref 79.8–96.2)
MCV RBC AUTO: 76.9 FL (ref 79.8–96.2)
MCV RBC AUTO: 77 FL (ref 79.8–96.2)
MCV RBC AUTO: 77.1 FL (ref 79.8–96.2)
MCV RBC AUTO: 77.2 FL (ref 79.8–96.2)
MCV RBC AUTO: 77.2 FL (ref 79.8–96.2)
MCV RBC AUTO: 77.3 FL (ref 79.8–96.2)
MCV RBC AUTO: 77.3 FL (ref 79.8–96.2)
MCV RBC AUTO: 77.4 FL (ref 79.8–96.2)
MCV RBC AUTO: 77.4 FL (ref 79.8–96.2)
MCV RBC AUTO: 77.5 FL (ref 79.8–96.2)
MCV RBC AUTO: 77.5 FL (ref 79.8–96.2)
MCV RBC AUTO: 77.8 FL (ref 79.8–96.2)
MCV RBC AUTO: 77.9 FL (ref 79.8–96.2)
MCV RBC AUTO: 78.8 FL (ref 79.8–96.2)
MCV RBC AUTO: 79.1 FL (ref 79.8–96.2)
MCV RBC AUTO: 79.3 FL (ref 79.8–96.2)
MCV RBC AUTO: 79.7 FL (ref 79.8–96.2)
MCV RBC AUTO: 79.9 FL (ref 79.8–96.2)
MCV RBC AUTO: 79.9 FL (ref 79.8–96.2)
MCV RBC AUTO: 80.8 FL (ref 79.8–96.2)
MCV RBC AUTO: 81.1 FL (ref 79.8–96.2)
MCV RBC AUTO: 81.6 FL (ref 79.8–96.2)
MCV RBC AUTO: 81.7 FL (ref 79.8–96.2)
MCV RBC AUTO: 82.8 FL (ref 79.8–96.2)
MCV RBC AUTO: 83 FL (ref 79.8–96.2)
MCV RBC AUTO: 83.3 FL (ref 79.8–96.2)
MCV RBC AUTO: 83.7 FL (ref 79.8–96.2)
MICROALBUMIN/CREAT UR: NORMAL MG/G
MICROCYTES BLD QL: NORMAL
MICROCYTES BLD QL: NORMAL
MODALITY: ABNORMAL
MONOCYTES # BLD AUTO: 0.2 10*3/MM3 (ref 0.2–1.2)
MONOCYTES # BLD AUTO: 0.42 10*3/MM3 (ref 0.2–1.2)
MONOCYTES # BLD AUTO: 0.46 10*3/MM3 (ref 0.2–1.2)
MONOCYTES # BLD AUTO: 0.47 10*3/MM3 (ref 0.2–1.2)
MONOCYTES # BLD AUTO: 0.52 10*3/MM3 (ref 0.2–1.2)
MONOCYTES # BLD AUTO: 0.61 10*3/MM3 (ref 0.2–1.2)
MONOCYTES # BLD AUTO: 0.63 10*3/MM3 (ref 0.2–1.2)
MONOCYTES # BLD AUTO: 0.65 10*3/MM3 (ref 0.2–1.2)
MONOCYTES # BLD AUTO: 0.72 10*3/MM3 (ref 0.2–1.2)
MONOCYTES # BLD AUTO: 0.73 10*3/MM3 (ref 0.2–1.2)
MONOCYTES # BLD AUTO: 0.74 10*3/MM3 (ref 0.2–1.2)
MONOCYTES # BLD AUTO: 0.82 10*3/MM3 (ref 0.2–1.2)
MONOCYTES # BLD AUTO: 0.85 10*3/MM3 (ref 0.2–1.2)
MONOCYTES # BLD AUTO: 0.85 10*3/MM3 (ref 0.2–1.2)
MONOCYTES # BLD AUTO: 0.89 10*3/MM3 (ref 0.2–1.2)
MONOCYTES # BLD AUTO: 1.17 10*3/MM3 (ref 0.2–1.2)
MONOCYTES # BLD AUTO: 1.17 10*3/MM3 (ref 0.2–1.2)
MONOCYTES # BLD AUTO: 1.18 10*3/MM3 (ref 0.2–1.2)
MONOCYTES # BLD AUTO: 1.23 10*3/MM3 (ref 0.2–1.2)
MONOCYTES # BLD AUTO: 1.27 10*3/MM3 (ref 0.2–1.2)
MONOCYTES # BLD AUTO: 1.47 10*3/MM3 (ref 0.2–1.2)
MONOCYTES # BLD AUTO: 1.51 10*3/MM3 (ref 0.2–1.2)
MONOCYTES NFR BLD AUTO: 1 % (ref 5–12)
MONOCYTES NFR BLD AUTO: 10.3 % (ref 5–12)
MONOCYTES NFR BLD AUTO: 10.8 % (ref 5–12)
MONOCYTES NFR BLD AUTO: 11.8 % (ref 5–12)
MONOCYTES NFR BLD AUTO: 2.2 % (ref 5–12)
MONOCYTES NFR BLD AUTO: 4.5 % (ref 5–12)
MONOCYTES NFR BLD AUTO: 5.1 % (ref 5–12)
MONOCYTES NFR BLD AUTO: 5.8 % (ref 5–12)
MONOCYTES NFR BLD AUTO: 6.3 % (ref 5–12)
MONOCYTES NFR BLD AUTO: 6.4 % (ref 5–12)
MONOCYTES NFR BLD AUTO: 6.8 % (ref 5–12)
MONOCYTES NFR BLD AUTO: 7.1 % (ref 5–12)
MONOCYTES NFR BLD AUTO: 7.1 % (ref 5–12)
MONOCYTES NFR BLD AUTO: 7.2 % (ref 5–12)
MONOCYTES NFR BLD AUTO: 7.7 % (ref 5–12)
MONOCYTES NFR BLD AUTO: 8.5 % (ref 5–12)
MONOCYTES NFR BLD AUTO: 8.6 % (ref 5–12)
MONOCYTES NFR BLD AUTO: 8.8 % (ref 5–12)
MONOCYTES NFR BLD AUTO: 9 % (ref 5–12)
MONOCYTES NFR BLD AUTO: 9.7 % (ref 5–12)
MONOCYTES NFR BLD AUTO: 9.7 % (ref 5–12)
NASH GRADE (REFERENCE): ABNORMAL
NASH SCORE (REFERENCE): 0.25
NASH SCORING (REFERENCE): ABNORMAL
NEUTROPHILS # BLD AUTO: 11.44 10*3/MM3 (ref 1.9–8.1)
NEUTROPHILS # BLD AUTO: 12.55 10*3/MM3 (ref 1.9–8.1)
NEUTROPHILS # BLD AUTO: 13 10*3/MM3 (ref 1.9–8.1)
NEUTROPHILS # BLD AUTO: 13.78 10*3/MM3 (ref 1.9–8.1)
NEUTROPHILS # BLD AUTO: 15.67 10*3/MM3 (ref 1.9–8.1)
NEUTROPHILS # BLD AUTO: 18.82 10*3/MM3 (ref 1.9–8.1)
NEUTROPHILS # BLD AUTO: 19.24 10*3/MM3 (ref 1.9–8.1)
NEUTROPHILS # BLD AUTO: 20.4 10*3/MM3 (ref 1.9–8.1)
NEUTROPHILS # BLD AUTO: 5.33 10*3/MM3 (ref 1.9–8.1)
NEUTROPHILS # BLD AUTO: 5.95 10*3/MM3 (ref 1.9–8.1)
NEUTROPHILS # BLD AUTO: 6.43 10*3/MM3 (ref 1.9–8.1)
NEUTROPHILS # BLD AUTO: 6.59 10*3/MM3 (ref 1.9–8.1)
NEUTROPHILS # BLD AUTO: 6.8 10*3/MM3 (ref 1.9–8.1)
NEUTROPHILS # BLD AUTO: 6.86 10*3/MM3 (ref 1.9–8.1)
NEUTROPHILS # BLD AUTO: 7.08 10*3/MM3 (ref 1.9–8.1)
NEUTROPHILS # BLD AUTO: 7.26 10*3/MM3 (ref 1.9–8.1)
NEUTROPHILS # BLD AUTO: 7.82 10*3/MM3 (ref 1.9–8.1)
NEUTROPHILS # BLD AUTO: 8.14 10*3/MM3 (ref 1.9–8.1)
NEUTROPHILS # BLD AUTO: 8.61 10*3/MM3 (ref 1.9–8.1)
NEUTROPHILS # BLD AUTO: 9.01 10*3/MM3 (ref 1.9–8.1)
NEUTROPHILS # BLD AUTO: 9.25 10*3/MM3 (ref 1.9–8.1)
NEUTROPHILS # BLD AUTO: 9.29 10*3/MM3 (ref 1.9–8.1)
NEUTROPHILS NFR BLD AUTO: 73.9 % (ref 42.7–76)
NEUTROPHILS NFR BLD AUTO: 74.2 % (ref 42.7–76)
NEUTROPHILS NFR BLD AUTO: 74.9 % (ref 42.7–76)
NEUTROPHILS NFR BLD AUTO: 75.2 % (ref 42.7–76)
NEUTROPHILS NFR BLD AUTO: 76.8 % (ref 42.7–76)
NEUTROPHILS NFR BLD AUTO: 77.4 % (ref 42.7–76)
NEUTROPHILS NFR BLD AUTO: 77.8 % (ref 42.7–76)
NEUTROPHILS NFR BLD AUTO: 78.5 % (ref 42.7–76)
NEUTROPHILS NFR BLD AUTO: 78.9 % (ref 42.7–76)
NEUTROPHILS NFR BLD AUTO: 80.2 % (ref 42.7–76)
NEUTROPHILS NFR BLD AUTO: 80.7 % (ref 42.7–76)
NEUTROPHILS NFR BLD AUTO: 82.3 % (ref 42.7–76)
NEUTROPHILS NFR BLD AUTO: 82.4 % (ref 42.7–76)
NEUTROPHILS NFR BLD AUTO: 82.6 % (ref 42.7–76)
NEUTROPHILS NFR BLD AUTO: 83 % (ref 42.7–76)
NEUTROPHILS NFR BLD AUTO: 83.6 % (ref 42.7–76)
NEUTROPHILS NFR BLD AUTO: 85 % (ref 42.7–76)
NEUTROPHILS NFR BLD AUTO: 87.7 % (ref 42.7–76)
NEUTROPHILS NFR BLD AUTO: 89.9 % (ref 42.7–76)
NEUTROPHILS NFR BLD AUTO: 95.5 % (ref 42.7–76)
NEUTROPHILS NFR BLD AUTO: 96.7 % (ref 42.7–76)
NEUTROPHILS NFR BLD MANUAL: 89 % (ref 42.7–76)
NITRITE UR QL STRIP: NEGATIVE
NRBC BLD MANUAL-RTO: 0 /100 WBC (ref 0–0)
NRBC BLD MANUAL-RTO: 0.2 /100 WBC (ref 0–0)
NT-PROBNP SERPL-MCNC: 2678 PG/ML (ref 5–900)
NT-PROBNP SERPL-MCNC: 3041 PG/ML (ref 5–900)
NT-PROBNP SERPL-MCNC: 3439 PG/ML (ref 0–900)
NT-PROBNP SERPL-MCNC: ABNORMAL PG/ML (ref 0–900)
NT-PROBNP SERPL-MCNC: ABNORMAL PG/ML (ref 5–900)
O2 A-A PPRESDIFF RESPIRATORY: 0.4 MMHG
O2 A-A PPRESDIFF RESPIRATORY: 0.7 MMHG
O2 A-A PPRESDIFF RESPIRATORY: 0.7 MMHG
OVALOCYTES BLD QL SMEAR: ABNORMAL
OVALOCYTES BLD QL SMEAR: NORMAL
PATH REPORT.FINAL DX SPEC: NORMAL
PATH REPORT.GROSS SPEC: NORMAL
PCO2 BLDA: 37 MM HG (ref 35–45)
PCO2 BLDA: 38.5 MM HG (ref 35–45)
PCO2 BLDA: 39.4 MM HG (ref 35–45)
PCO2 BLDA: 40 MM HG (ref 35–45)
PCO2 BLDA: 40.2 MM HG (ref 35–45)
PCO2 BLDA: 41.8 MM HG (ref 35–45)
PCO2 BLDA: 46.8 MM HG (ref 35–45)
PCO2 BLDA: 52.7 MM HG (ref 35–45)
PCO2 BLDA: 55.1 MM HG (ref 35–45)
PCO2 BLDA: 55.9 MM HG (ref 35–45)
PCO2 BLDA: 60.3 MM HG (ref 35–45)
PEEP RESPIRATORY: 5 CM[H2O]
PEEP RESPIRATORY: 5 CM[H2O]
PEEP RESPIRATORY: 7.5 CM[H2O]
PF4 HEPARIN CMPLX AB SER-ACNC: 0.18 OD (ref 0–0.4)
PF4 HEPARIN CMPLX AB SER-ACNC: 0.26 OD (ref 0–0.4)
PH BLDA: 7.3 PH UNITS (ref 7.35–7.6)
PH BLDA: 7.33 PH UNITS (ref 7.35–7.6)
PH BLDA: 7.35 PH UNITS (ref 7.35–7.6)
PH BLDA: 7.35 PH UNITS (ref 7.35–7.6)
PH BLDA: 7.38 PH UNITS (ref 7.35–7.6)
PH BLDA: 7.42 PH UNITS (ref 7.35–7.45)
PH BLDA: 7.44 PH UNITS (ref 7.35–7.45)
PH BLDA: 7.46 PH UNITS (ref 7.35–7.45)
PH BLDA: 7.46 PH UNITS (ref 7.35–7.45)
PH BLDA: 7.47 PH UNITS (ref 7.35–7.6)
PH BLDA: 7.49 PH UNITS (ref 7.35–7.45)
PH UR STRIP.AUTO: 6.5 [PH] (ref 5–8)
PH UR STRIP.AUTO: 7.5 [PH] (ref 5–8)
PH UR STRIP.AUTO: <=5 [PH] (ref 5–8)
PH UR STRIP.AUTO: >=9 [PH] (ref 5–8)
PHOSPHATE SERPL-MCNC: 2.5 MG/DL (ref 2.5–4.5)
PHOSPHATE SERPL-MCNC: 2.9 MG/DL (ref 2.5–4.5)
PHOSPHATE SERPL-MCNC: 3 MG/DL (ref 2.5–4.5)
PHOSPHATE SERPL-MCNC: 3 MG/DL (ref 2.5–4.5)
PHOSPHATE SERPL-MCNC: 3.1 MG/DL (ref 2.5–4.5)
PHOSPHATE SERPL-MCNC: 3.2 MG/DL (ref 2.5–4.5)
PHOSPHATE SERPL-MCNC: 3.3 MG/DL (ref 2.5–4.5)
PHOSPHATE SERPL-MCNC: 3.3 MG/DL (ref 2.5–4.5)
PHOSPHATE SERPL-MCNC: 3.4 MG/DL (ref 2.5–4.5)
PHOSPHATE SERPL-MCNC: 3.5 MG/DL (ref 2.5–4.5)
PHOSPHATE SERPL-MCNC: 3.7 MG/DL (ref 2.5–4.5)
PHOSPHATE SERPL-MCNC: 3.8 MG/DL (ref 2.5–4.5)
PHOSPHATE SERPL-MCNC: 3.8 MG/DL (ref 2.5–4.5)
PHOSPHATE SERPL-MCNC: 3.9 MG/DL (ref 2.5–4.5)
PHOSPHATE SERPL-MCNC: 3.9 MG/DL (ref 2.5–4.5)
PHOSPHATE SERPL-MCNC: 4.1 MG/DL (ref 2.5–4.5)
PHOSPHATE SERPL-MCNC: 4.2 MG/DL (ref 2.5–4.5)
PHOSPHATE SERPL-MCNC: 4.3 MG/DL (ref 2.5–4.5)
PHOSPHATE SERPL-MCNC: 4.4 MG/DL (ref 2.5–4.5)
PHOSPHATE SERPL-MCNC: 4.5 MG/DL (ref 2.5–4.5)
PHOSPHATE SERPL-MCNC: 4.6 MG/DL (ref 2.5–4.5)
PHOSPHATE SERPL-MCNC: 4.7 MG/DL (ref 2.5–4.5)
PHOSPHATE SERPL-MCNC: 4.8 MG/DL (ref 2.5–4.5)
PHOSPHATE SERPL-MCNC: 4.9 MG/DL (ref 2.5–4.5)
PHOSPHATE SERPL-MCNC: 5.1 MG/DL (ref 2.5–4.5)
PHOSPHATE SERPL-MCNC: 5.3 MG/DL (ref 2.5–4.5)
PHOSPHATE SERPL-MCNC: 5.3 MG/DL (ref 2.5–4.5)
PHOSPHATE SERPL-MCNC: 5.8 MG/DL (ref 2.5–4.5)
PHOSPHATE SERPL-MCNC: 5.8 MG/DL (ref 2.5–4.5)
PHOSPHATE SERPL-MCNC: 5.9 MG/DL (ref 2.5–4.5)
PLAT MORPH BLD: NORMAL
PLATELET # BLD AUTO: 105 10*3/MM3 (ref 140–500)
PLATELET # BLD AUTO: 115 10*3/MM3 (ref 140–500)
PLATELET # BLD AUTO: 117 10*3/MM3 (ref 140–500)
PLATELET # BLD AUTO: 124 10*3/MM3 (ref 140–500)
PLATELET # BLD AUTO: 126 10*3/MM3 (ref 140–500)
PLATELET # BLD AUTO: 129 10*3/MM3 (ref 140–500)
PLATELET # BLD AUTO: 130 10*3/MM3 (ref 140–500)
PLATELET # BLD AUTO: 138 10*3/MM3 (ref 140–500)
PLATELET # BLD AUTO: 158 10*3/MM3 (ref 140–500)
PLATELET # BLD AUTO: 169 10*3/MM3 (ref 140–500)
PLATELET # BLD AUTO: 175 10*3/MM3 (ref 140–500)
PLATELET # BLD AUTO: 176 10*3/MM3 (ref 140–500)
PLATELET # BLD AUTO: 195 10*3/MM3 (ref 140–500)
PLATELET # BLD AUTO: 195 10*3/MM3 (ref 140–500)
PLATELET # BLD AUTO: 29 10*3/MM3 (ref 140–500)
PLATELET # BLD AUTO: 30 10*3/MM3 (ref 140–500)
PLATELET # BLD AUTO: 34 10*3/MM3 (ref 140–500)
PLATELET # BLD AUTO: 35 10*3/MM3 (ref 140–500)
PLATELET # BLD AUTO: 40 10*3/MM3 (ref 140–500)
PLATELET # BLD AUTO: 41 10*3/MM3 (ref 140–500)
PLATELET # BLD AUTO: 45 10*3/MM3 (ref 140–500)
PLATELET # BLD AUTO: 48 10*3/MM3 (ref 140–500)
PLATELET # BLD AUTO: 49 10*3/MM3 (ref 140–500)
PLATELET # BLD AUTO: 49 10*3/MM3 (ref 140–500)
PLATELET # BLD AUTO: 50 10*3/MM3 (ref 140–500)
PLATELET # BLD AUTO: 52 10*3/MM3 (ref 140–500)
PLATELET # BLD AUTO: 60 10*3/MM3 (ref 140–500)
PLATELET # BLD AUTO: 60 10*3/MM3 (ref 140–500)
PLATELET # BLD AUTO: 64 10*3/MM3 (ref 140–500)
PLATELET # BLD AUTO: 69 10*3/MM3 (ref 140–500)
PLATELET # BLD AUTO: 71 10*3/MM3 (ref 140–500)
PLATELET # BLD AUTO: 73 10*3/MM3 (ref 140–500)
PLATELET # BLD AUTO: 78 10*3/MM3 (ref 140–500)
PLATELET # BLD AUTO: 79 10*3/MM3 (ref 140–500)
PLATELET # BLD AUTO: 86 10*3/MM3 (ref 140–500)
PLATELET # BLD AUTO: 92 10*3/MM3 (ref 140–500)
PLATELET # BLD AUTO: 97 10*3/MM3 (ref 140–500)
PLATELET # BLD AUTO: 98 10*3/MM3 (ref 140–500)
PLATELETS.RETICULATED NFR BLD AUTO: 4.6 % (ref 0.9–6.5)
PMV BLD AUTO: 10 FL (ref 6–12)
PMV BLD AUTO: 10 FL (ref 6–12)
PMV BLD AUTO: 10.1 FL (ref 6–12)
PMV BLD AUTO: 10.1 FL (ref 6–12)
PMV BLD AUTO: 10.2 FL (ref 6–12)
PMV BLD AUTO: 10.3 FL (ref 6–12)
PMV BLD AUTO: 10.3 FL (ref 6–12)
PMV BLD AUTO: 10.4 FL (ref 6–12)
PMV BLD AUTO: 10.5 FL (ref 6–12)
PMV BLD AUTO: 10.5 FL (ref 6–12)
PMV BLD AUTO: 9.1 FL (ref 6–12)
PMV BLD AUTO: 9.4 FL (ref 6–12)
PMV BLD AUTO: 9.6 FL (ref 6–12)
PMV BLD AUTO: 9.7 FL (ref 6–12)
PMV BLD AUTO: 9.8 FL (ref 6–12)
PMV BLD AUTO: 9.9 FL (ref 6–12)
PMV BLD AUTO: 9.9 FL (ref 6–12)
PMV BLD AUTO: ABNORMAL FL (ref 6–12)
PO2 BLDA: 108.1 MM HG (ref 80–100)
PO2 BLDA: 169.4 MM HG (ref 80–100)
PO2 BLDA: 410 MMHG (ref 80–105)
PO2 BLDA: 43 MMHG (ref 80–105)
PO2 BLDA: 472.2 MM HG (ref 80–100)
PO2 BLDA: 480 MMHG (ref 80–105)
PO2 BLDA: 505 MMHG (ref 80–105)
PO2 BLDA: 512 MMHG (ref 80–105)
PO2 BLDA: 53 MMHG (ref 80–105)
PO2 BLDA: 90.4 MM HG (ref 80–100)
PO2 BLDA: 92.6 MM HG (ref 80–100)
POLYCHROMASIA BLD QL SMEAR: ABNORMAL
POLYCHROMASIA BLD QL SMEAR: NORMAL
POTASSIUM BLD-SCNC: 2.7 MMOL/L (ref 3.5–5.2)
POTASSIUM BLD-SCNC: 2.7 MMOL/L (ref 3.5–5.2)
POTASSIUM BLD-SCNC: 2.8 MMOL/L (ref 3.5–5.2)
POTASSIUM BLD-SCNC: 3 MMOL/L (ref 3.5–5.2)
POTASSIUM BLD-SCNC: 3.1 MMOL/L (ref 3.5–5.2)
POTASSIUM BLD-SCNC: 3.1 MMOL/L (ref 3.5–5.2)
POTASSIUM BLD-SCNC: 3.2 MMOL/L (ref 3.5–5.2)
POTASSIUM BLD-SCNC: 3.2 MMOL/L (ref 3.5–5.2)
POTASSIUM BLD-SCNC: 3.3 MMOL/L (ref 3.5–5.2)
POTASSIUM BLD-SCNC: 3.4 MMOL/L (ref 3.5–5.2)
POTASSIUM BLD-SCNC: 3.5 MMOL/L (ref 3.5–5.2)
POTASSIUM BLD-SCNC: 3.6 MMOL/L (ref 3.5–5.2)
POTASSIUM BLD-SCNC: 3.7 MMOL/L (ref 3.5–5.2)
POTASSIUM BLD-SCNC: 3.8 MMOL/L (ref 3.5–5.2)
POTASSIUM BLD-SCNC: 3.8 MMOL/L (ref 3.5–5.2)
POTASSIUM BLD-SCNC: 3.9 MMOL/L (ref 3.5–5.2)
POTASSIUM BLD-SCNC: 3.9 MMOL/L (ref 3.5–5.2)
POTASSIUM BLD-SCNC: 4 MMOL/L (ref 3.5–5.2)
POTASSIUM BLD-SCNC: 4.1 MMOL/L (ref 3.5–5.2)
POTASSIUM BLD-SCNC: 4.2 MMOL/L (ref 3.5–5.2)
POTASSIUM BLD-SCNC: 4.3 MMOL/L (ref 3.5–5.2)
POTASSIUM BLD-SCNC: 4.4 MMOL/L (ref 3.5–5.2)
POTASSIUM BLD-SCNC: 4.5 MMOL/L (ref 3.5–5.2)
POTASSIUM BLD-SCNC: 4.5 MMOL/L (ref 3.5–5.2)
POTASSIUM BLD-SCNC: 4.6 MMOL/L (ref 3.5–5.2)
POTASSIUM BLD-SCNC: 4.7 MMOL/L (ref 3.5–5.2)
POTASSIUM BLD-SCNC: 4.8 MMOL/L (ref 3.5–5.2)
POTASSIUM BLD-SCNC: 4.8 MMOL/L (ref 3.5–5.2)
POTASSIUM BLD-SCNC: 4.9 MMOL/L (ref 3.5–5.2)
POTASSIUM BLD-SCNC: 5 MMOL/L (ref 3.5–5.2)
POTASSIUM BLD-SCNC: 5.1 MMOL/L (ref 3.5–5.2)
POTASSIUM BLD-SCNC: 5.2 MMOL/L (ref 3.5–5.2)
POTASSIUM BLD-SCNC: 5.2 MMOL/L (ref 3.5–5.2)
POTASSIUM BLD-SCNC: 5.3 MMOL/L (ref 3.5–5.2)
POTASSIUM BLD-SCNC: 5.4 MMOL/L (ref 3.5–5.2)
POTASSIUM BLD-SCNC: 5.5 MMOL/L (ref 3.5–5.2)
POTASSIUM BLDA-SCNC: 3.4 MMOL/L (ref 3.5–4.9)
POTASSIUM BLDA-SCNC: 3.5 MMOL/L (ref 3.5–4.9)
POTASSIUM BLDA-SCNC: 3.5 MMOL/L (ref 3.5–4.9)
POTASSIUM BLDA-SCNC: 3.9 MMOL/L (ref 3.5–4.9)
PREALB SERPL-MCNC: 7.4 MG/DL (ref 20–40)
PROCALCITONIN SERPL-MCNC: 3.97 NG/ML (ref 0.1–0.25)
PROT SERPL-MCNC: 4.3 G/DL (ref 6–8.5)
PROT SERPL-MCNC: 4.4 G/DL (ref 6–8.5)
PROT SERPL-MCNC: 4.4 G/DL (ref 6–8.5)
PROT SERPL-MCNC: 4.8 G/DL (ref 6–8.5)
PROT SERPL-MCNC: 4.8 G/DL (ref 6–8.5)
PROT SERPL-MCNC: 5 G/DL (ref 6–8.5)
PROT SERPL-MCNC: 5.1 G/DL (ref 6–8.5)
PROT UR QL STRIP: ABNORMAL
PROT UR QL STRIP: NEGATIVE
PROT UR-MCNC: 13 MG/DL
PROT UR-MCNC: 4.3 MG/DL
PROT UR-MCNC: 9 MG/DL
PROT/CREAT UR: 376.6 MG/G CREA
PROTHROMBIN TIME: 12.7 SECONDS (ref 11.7–14.2)
PROTHROMBIN TIME: 13.8 SECONDS (ref 11.7–14.2)
PROTHROMBIN TIME: 13.9 SECONDS (ref 11.7–14.2)
PROTHROMBIN TIME: 14.1 SECONDS (ref 11.7–14.2)
PROTHROMBIN TIME: 14.2 SECONDS (ref 11.7–14.2)
PROTHROMBIN TIME: 14.4 SECONDS (ref 11.7–14.2)
PROTHROMBIN TIME: 14.4 SECONDS (ref 11.7–14.2)
PROTHROMBIN TIME: 14.6 SECONDS (ref 11.7–14.2)
PROTHROMBIN TIME: 14.6 SECONDS (ref 11.7–14.2)
PROTHROMBIN TIME: 14.7 SECONDS (ref 11.7–14.2)
PROTHROMBIN TIME: 14.7 SECONDS (ref 11.7–14.2)
PROTHROMBIN TIME: 14.8 SECONDS (ref 11.7–14.2)
PROTHROMBIN TIME: 14.9 SECONDS (ref 11.7–14.2)
PROTHROMBIN TIME: 15.2 SECONDS (ref 11.7–14.2)
PROTHROMBIN TIME: 15.6 SECONDS (ref 11.7–14.2)
PROTHROMBIN TIME: 16.4 SECONDS (ref 11.7–14.2)
PROTHROMBIN TIME: 16.5 SECONDS (ref 11.7–14.2)
PROTHROMBIN TIME: 16.6 SECONDS (ref 11.7–14.2)
PROTHROMBIN TIME: 17.4 SECONDS (ref 11.7–14.2)
PROTHROMBIN TIME: 17.5 SECONDS (ref 11.7–14.2)
PROTHROMBIN TIME: 18 SECONDS (ref 11.7–14.2)
PROTHROMBIN TIME: 18.6 SECONDS (ref 11.7–14.2)
PROTHROMBIN TIME: 18.9 SECONDS (ref 11.7–14.2)
PROTHROMBIN TIME: 19.1 SECONDS (ref 11.7–14.2)
PROTHROMBIN TIME: 19.4 SECONDS (ref 11.7–14.2)
PROTHROMBIN TIME: 20.3 SECONDS (ref 11.7–14.2)
PROTHROMBIN TIME: 20.6 SECONDS (ref 11.7–14.2)
PROTHROMBIN TIME: 20.8 SECONDS (ref 11.7–14.2)
PROTHROMBIN TIME: 21.2 SECONDS (ref 11.7–14.2)
PROTHROMBIN TIME: 21.3 SECONDS (ref 11.7–14.2)
PROTHROMBIN TIME: 21.5 SECONDS (ref 11.7–14.2)
PROTHROMBIN TIME: 22.4 SECONDS (ref 11.7–14.2)
PROTHROMBIN TIME: 23.2 SECONDS (ref 11.7–14.2)
PROTHROMBIN TIME: 24.3 SECONDS (ref 11.7–14.2)
PROTHROMBIN TIME: 24.5 SECONDS (ref 11.7–14.2)
PROTHROMBIN TIME: 24.7 SECONDS (ref 11.7–14.2)
PROTHROMBIN TIME: 24.9 SECONDS (ref 11.7–14.2)
PROTHROMBIN TIME: 26.1 SECONDS (ref 11.7–14.2)
PROTHROMBIN TIME: 26.3 SECONDS (ref 11.7–14.2)
PROTHROMBIN TIME: 32.7 SECONDS (ref 11.7–14.2)
PROTHROMBIN TIME: 35.4 SECONDS (ref 11.7–14.2)
PROTHROMBIN TIME: 39.4 SECONDS (ref 11.7–14.2)
PSV: 8 CMH2O
PTH-INTACT SERPL-MCNC: 128.6 PG/ML (ref 15–65)
RBC # BLD AUTO: 3.36 10*6/MM3 (ref 4.6–6)
RBC # BLD AUTO: 3.37 10*6/MM3 (ref 4.6–6)
RBC # BLD AUTO: 3.38 10*6/MM3 (ref 4.6–6)
RBC # BLD AUTO: 3.6 10*6/MM3 (ref 4.6–6)
RBC # BLD AUTO: 3.69 10*6/MM3 (ref 4.6–6)
RBC # BLD AUTO: 3.88 10*6/MM3 (ref 4.6–6)
RBC # BLD AUTO: 3.88 10*6/MM3 (ref 4.6–6)
RBC # BLD AUTO: 3.91 10*6/MM3 (ref 4.6–6)
RBC # BLD AUTO: 3.92 10*6/MM3 (ref 4.6–6)
RBC # BLD AUTO: 3.97 10*6/MM3 (ref 4.6–6)
RBC # BLD AUTO: 3.98 10*6/MM3 (ref 4.6–6)
RBC # BLD AUTO: 4.02 10*6/MM3 (ref 4.6–6)
RBC # BLD AUTO: 4.03 10*6/MM3 (ref 4.6–6)
RBC # BLD AUTO: 4.04 10*6/MM3 (ref 4.6–6)
RBC # BLD AUTO: 4.07 10*6/MM3 (ref 4.6–6)
RBC # BLD AUTO: 4.07 10*6/MM3 (ref 4.6–6)
RBC # BLD AUTO: 4.08 10*6/MM3 (ref 4.6–6)
RBC # BLD AUTO: 4.09 10*6/MM3 (ref 4.6–6)
RBC # BLD AUTO: 4.11 10*6/MM3 (ref 4.6–6)
RBC # BLD AUTO: 4.16 10*6/MM3 (ref 4.6–6)
RBC # BLD AUTO: 4.18 10*6/MM3 (ref 4.6–6)
RBC # BLD AUTO: 4.18 10*6/MM3 (ref 4.6–6)
RBC # BLD AUTO: 4.2 10*6/MM3 (ref 4.6–6)
RBC # BLD AUTO: 4.21 10*6/MM3 (ref 4.6–6)
RBC # BLD AUTO: 4.41 10*6/MM3 (ref 4.6–6)
RBC # BLD AUTO: 4.43 10*6/MM3 (ref 4.6–6)
RBC # BLD AUTO: 4.44 10*6/MM3 (ref 4.6–6)
RBC # BLD AUTO: 4.63 10*6/MM3 (ref 4.6–6)
RBC # BLD AUTO: 4.64 10*6/MM3 (ref 4.6–6)
RBC # BLD AUTO: 4.77 10*6/MM3 (ref 4.6–6)
RBC # BLD AUTO: 4.79 10*6/MM3 (ref 4.6–6)
RBC # BLD AUTO: 4.84 10*6/MM3 (ref 4.6–6)
RBC # BLD AUTO: 4.89 10*6/MM3 (ref 4.6–6)
RBC # BLD AUTO: 4.98 10*6/MM3 (ref 4.6–6)
RBC # BLD AUTO: 4.99 10*6/MM3 (ref 4.6–6)
RBC # BLD AUTO: 5.04 10*6/MM3 (ref 4.6–6)
RBC # BLD AUTO: 5.27 10*6/MM3 (ref 4.6–6)
RBC # UR: ABNORMAL /HPF
RBC # UR: NORMAL /HPF
REF LAB TEST METHOD: ABNORMAL
REF LAB TEST METHOD: NORMAL
REF LAB TEST METHOD: NORMAL
RETICS/RBC NFR AUTO: 3.11 % (ref 0.5–1.5)
RH BLD: POSITIVE
RH BLD: POSITIVE
SAO2 % BLDA: 100 % (ref 95–98)
SAO2 % BLDA: 60 % (ref 95–98)
SAO2 % BLDA: 64 % (ref 95–98)
SAO2 % BLDA: 76 % (ref 95–98)
SAO2 % BLDA: 84 % (ref 95–98)
SAO2 % BLDA: 94 % (ref 95–98)
SAO2 % BLDA: 99 % (ref 95–98)
SAO2 % BLDCOA: 100 % (ref 92–99)
SAO2 % BLDCOA: 97.2 % (ref 92–99)
SAO2 % BLDCOA: 97.7 % (ref 92–99)
SAO2 % BLDCOA: 98.4 % (ref 92–99)
SAO2 % BLDCOA: 99.6 % (ref 92–99)
SET MECH RESP RATE: 10
SET MECH RESP RATE: 12
SET MECH RESP RATE: 20
SINUS: 4 CM
SODIUM BLD-SCNC: 127 MMOL/L (ref 136–145)
SODIUM BLD-SCNC: 128 MMOL/L (ref 136–145)
SODIUM BLD-SCNC: 129 MMOL/L (ref 136–145)
SODIUM BLD-SCNC: 130 MMOL/L (ref 136–145)
SODIUM BLD-SCNC: 130 MMOL/L (ref 136–145)
SODIUM BLD-SCNC: 131 MMOL/L (ref 136–145)
SODIUM BLD-SCNC: 132 MMOL/L (ref 136–145)
SODIUM BLD-SCNC: 133 MMOL/L (ref 136–145)
SODIUM BLD-SCNC: 134 MMOL/L (ref 136–145)
SODIUM BLD-SCNC: 135 MMOL/L (ref 136–145)
SODIUM BLD-SCNC: 136 MMOL/L (ref 136–145)
SODIUM BLD-SCNC: 137 MMOL/L (ref 136–145)
SODIUM BLD-SCNC: 138 MMOL/L (ref 136–145)
SODIUM BLD-SCNC: 139 MMOL/L (ref 136–145)
SODIUM BLD-SCNC: 142 MMOL/L (ref 136–145)
SODIUM UR-SCNC: <20 MMOL/L
SP GR UR STRIP: 1.01 (ref 1–1.03)
SP GR UR STRIP: 1.02 (ref 1–1.03)
SQUAMOUS #/AREA URNS HPF: ABNORMAL /HPF
SQUAMOUS #/AREA URNS HPF: NORMAL /HPF
STEATOSIS GRADE (REFERENCE): ABNORMAL
STEATOSIS GRADING (REFERENCE): ABNORMAL
STEATOSIS SCORE (REFERENCE): 0.13 (ref 0–0.3)
STJ: 3 CM
STRESS TARGET HR: 135 BPM
STRESS TARGET HR: 136 BPM
T&S EXPIRATION DATE: NORMAL
T4 FREE SERPL-MCNC: 0.52 NG/DL (ref 0.93–1.7)
T4 FREE SERPL-MCNC: 1.06 NG/DL (ref 0.93–1.7)
TIBC SERPL-MCNC: 238 MCG/DL (ref 298–536)
TIBC SERPL-MCNC: 255 MCG/DL
TOTAL RATE: 11 BREATHS/MINUTE
TOTAL RATE: 11 BREATHS/MINUTE
TOTAL RATE: 12 BREATHS/MINUTE
TOTAL RATE: 16 BREATHS/MINUTE
TRANSFERRIN SERPL-MCNC: 108 MG/DL (ref 200–360)
TRANSFERRIN SERPL-MCNC: 160 MG/DL (ref 200–360)
TRANSFERRIN SERPL-MCNC: 171 MG/DL (ref 200–360)
TRIGL SERPL-MCNC: 116 MG/DL (ref 0–149)
TRIGL SERPL-MCNC: 207 MG/DL (ref 0–150)
TRIGL SERPL-MCNC: 96 MG/DL (ref 0–150)
TROPONIN T SERPL-MCNC: 0.07 NG/ML (ref 0–0.03)
TROPONIN T SERPL-MCNC: 0.07 NG/ML (ref 0–0.03)
TROPONIN T SERPL-MCNC: 0.08 NG/ML (ref 0–0.03)
TROPONIN T SERPL-MCNC: 0.09 NG/ML (ref 0–0.03)
TROPONIN T SERPL-MCNC: 0.11 NG/ML (ref 0–0.03)
TSH SERPL DL<=0.05 MIU/L-ACNC: 4.74 MIU/ML (ref 0.27–4.2)
TSH SERPL DL<=0.05 MIU/L-ACNC: 6.4 MIU/ML (ref 0.27–4.2)
TSH SERPL DL<=0.05 MIU/L-ACNC: 9.81 MIU/ML (ref 0.27–4.2)
UNIT  ABO: NORMAL
UNIT  RH: NORMAL
UPPER ARTERIAL RIGHT ARM BRACHIAL SYS MAX: 124 MMHG
URATE SERPL-MCNC: 11.3 MG/DL (ref 3.4–7)
UROBILINOGEN UR QL STRIP: ABNORMAL
UROBILINOGEN UR QL STRIP: NORMAL
VANCOMYCIN SERPL-MCNC: 42.2 MCG/ML (ref 5–40)
VENTILATOR MODE: ABNORMAL
VENTILATOR MODE: AC
VENTILATOR MODE: AC
VIT B12 BLD-MCNC: 1438 PG/ML (ref 211–946)
VLDLC SERPL-MCNC: 19.2 MG/DL (ref 5–40)
VLDLC SERPL-MCNC: 41.4 MG/DL (ref 5–40)
VT ON VENT VENT: 500 ML
VT ON VENT VENT: 750 ML
WBC MORPH BLD: NORMAL
WBC NRBC COR # BLD: 10.14 10*3/MM3 (ref 4.5–10.7)
WBC NRBC COR # BLD: 10.44 10*3/MM3 (ref 4.5–10.7)
WBC NRBC COR # BLD: 10.54 10*3/MM3 (ref 4.5–10.7)
WBC NRBC COR # BLD: 10.7 10*3/MM3 (ref 4.5–10.7)
WBC NRBC COR # BLD: 11.3 10*3/MM3 (ref 4.5–10.7)
WBC NRBC COR # BLD: 11.5 10*3/MM3 (ref 4.5–10.7)
WBC NRBC COR # BLD: 11.74 10*3/MM3 (ref 4.5–10.7)
WBC NRBC COR # BLD: 11.95 10*3/MM3 (ref 4.5–10.7)
WBC NRBC COR # BLD: 12.08 10*3/MM3 (ref 4.5–10.7)
WBC NRBC COR # BLD: 12.96 10*3/MM3 (ref 4.5–10.7)
WBC NRBC COR # BLD: 13.79 10*3/MM3 (ref 4.5–10.7)
WBC NRBC COR # BLD: 13.96 10*3/MM3 (ref 4.5–10.7)
WBC NRBC COR # BLD: 15.28 10*3/MM3 (ref 4.5–10.7)
WBC NRBC COR # BLD: 16.75 10*3/MM3 (ref 4.5–10.7)
WBC NRBC COR # BLD: 18.76 10*3/MM3 (ref 4.5–10.7)
WBC NRBC COR # BLD: 19.9 10*3/MM3 (ref 4.5–10.7)
WBC NRBC COR # BLD: 21.36 10*3/MM3 (ref 4.5–10.7)
WBC NRBC COR # BLD: 21.47 10*3/MM3 (ref 4.5–10.7)
WBC NRBC COR # BLD: 7.21 10*3/MM3 (ref 4.5–10.7)
WBC NRBC COR # BLD: 7.59 10*3/MM3 (ref 4.5–10.7)
WBC NRBC COR # BLD: 8 10*3/MM3 (ref 4.5–10.7)
WBC NRBC COR # BLD: 8.18 10*3/MM3 (ref 4.5–10.7)
WBC NRBC COR # BLD: 8.19 10*3/MM3 (ref 4.5–10.7)
WBC NRBC COR # BLD: 8.2 10*3/MM3 (ref 4.5–10.7)
WBC NRBC COR # BLD: 8.26 10*3/MM3 (ref 4.5–10.7)
WBC NRBC COR # BLD: 8.39 10*3/MM3 (ref 4.5–10.7)
WBC NRBC COR # BLD: 8.47 10*3/MM3 (ref 4.5–10.7)
WBC NRBC COR # BLD: 8.5 10*3/MM3 (ref 4.5–10.7)
WBC NRBC COR # BLD: 8.77 10*3/MM3 (ref 4.5–10.7)
WBC NRBC COR # BLD: 9 10*3/MM3 (ref 4.5–10.7)
WBC NRBC COR # BLD: 9.11 10*3/MM3 (ref 4.5–10.7)
WBC NRBC COR # BLD: 9.17 10*3/MM3 (ref 4.5–10.7)
WBC NRBC COR # BLD: 9.2 10*3/MM3 (ref 4.5–10.7)
WBC NRBC COR # BLD: 9.47 10*3/MM3 (ref 4.5–10.7)
WBC NRBC COR # BLD: 9.48 10*3/MM3 (ref 4.5–10.7)
WBC NRBC COR # BLD: 9.55 10*3/MM3 (ref 4.5–10.7)
WBC NRBC COR # BLD: 9.96 10*3/MM3 (ref 4.5–10.7)
WBC UR QL AUTO: ABNORMAL /HPF
WBC UR QL AUTO: NORMAL /HPF
WEIGHT: (REFERENCE): 83 LBS
WHOLE BLOOD HOLD SPECIMEN: NORMAL
WHOLE BLOOD HOLD SPECIMEN: NORMAL
Z-SCORE OF LEFT VENTRICULAR DIMENSION IN END SYSTOLE: 8

## 2018-01-01 PROCEDURE — 33405 REPLACEMENT AORTIC VALVE OPN: CPT | Performed by: PHYSICIAN ASSISTANT

## 2018-01-01 PROCEDURE — 94799 UNLISTED PULMONARY SVC/PX: CPT

## 2018-01-01 PROCEDURE — 80069 RENAL FUNCTION PANEL: CPT | Performed by: INTERNAL MEDICINE

## 2018-01-01 PROCEDURE — 82947 ASSAY GLUCOSE BLOOD QUANT: CPT

## 2018-01-01 PROCEDURE — 94640 AIRWAY INHALATION TREATMENT: CPT

## 2018-01-01 PROCEDURE — 63710000001 INSULIN ASPART PER 5 UNITS: Performed by: INTERNAL MEDICINE

## 2018-01-01 PROCEDURE — 63710000001 INSULIN ASPART PER 5 UNITS: Performed by: HOSPITALIST

## 2018-01-01 PROCEDURE — 99231 SBSQ HOSP IP/OBS SF/LOW 25: CPT | Performed by: NURSE PRACTITIONER

## 2018-01-01 PROCEDURE — 85610 PROTHROMBIN TIME: CPT | Performed by: NURSE PRACTITIONER

## 2018-01-01 PROCEDURE — C1729 CATH, DRAINAGE: HCPCS | Performed by: THORACIC SURGERY (CARDIOTHORACIC VASCULAR SURGERY)

## 2018-01-01 PROCEDURE — 99232 SBSQ HOSP IP/OBS MODERATE 35: CPT | Performed by: INTERNAL MEDICINE

## 2018-01-01 PROCEDURE — 86022 PLATELET ANTIBODIES: CPT | Performed by: INTERNAL MEDICINE

## 2018-01-01 PROCEDURE — 0399T ADULT TRANSTHORACIC ECHO COMPLETE W/ CONT IF NECESSARY PER PROTOCOL: CPT | Performed by: INTERNAL MEDICINE

## 2018-01-01 PROCEDURE — 82962 GLUCOSE BLOOD TEST: CPT

## 2018-01-01 PROCEDURE — 63710000001 INSULIN DETEMIR PER 5 UNITS: Performed by: INTERNAL MEDICINE

## 2018-01-01 PROCEDURE — 99024 POSTOP FOLLOW-UP VISIT: CPT | Performed by: NURSE PRACTITIONER

## 2018-01-01 PROCEDURE — 99024 POSTOP FOLLOW-UP VISIT: CPT | Performed by: THORACIC SURGERY (CARDIOTHORACIC VASCULAR SURGERY)

## 2018-01-01 PROCEDURE — 86803 HEPATITIS C AB TEST: CPT | Performed by: INTERNAL MEDICINE

## 2018-01-01 PROCEDURE — 83735 ASSAY OF MAGNESIUM: CPT | Performed by: THORACIC SURGERY (CARDIOTHORACIC VASCULAR SURGERY)

## 2018-01-01 PROCEDURE — P9035 PLATELET PHERES LEUKOREDUCED: HCPCS

## 2018-01-01 PROCEDURE — 82436 ASSAY OF URINE CHLORIDE: CPT | Performed by: INTERNAL MEDICINE

## 2018-01-01 PROCEDURE — 82947 ASSAY GLUCOSE BLOOD QUANT: CPT | Performed by: INTERNAL MEDICINE

## 2018-01-01 PROCEDURE — 85025 COMPLETE CBC W/AUTO DIFF WBC: CPT | Performed by: HOSPITALIST

## 2018-01-01 PROCEDURE — 99232 SBSQ HOSP IP/OBS MODERATE 35: CPT | Performed by: NURSE PRACTITIONER

## 2018-01-01 PROCEDURE — 25010000003 CEFAZOLIN IN DEXTROSE 2-4 GM/100ML-% SOLUTION: Performed by: SURGERY

## 2018-01-01 PROCEDURE — 97110 THERAPEUTIC EXERCISES: CPT

## 2018-01-01 PROCEDURE — 85027 COMPLETE CBC AUTOMATED: CPT | Performed by: NURSE PRACTITIONER

## 2018-01-01 PROCEDURE — 83735 ASSAY OF MAGNESIUM: CPT | Performed by: INTERNAL MEDICINE

## 2018-01-01 PROCEDURE — 25010000002 HEPARIN (PORCINE) PER 1000 UNITS: Performed by: INTERNAL MEDICINE

## 2018-01-01 PROCEDURE — 80076 HEPATIC FUNCTION PANEL: CPT | Performed by: HOSPITALIST

## 2018-01-01 PROCEDURE — 80048 BASIC METABOLIC PNL TOTAL CA: CPT | Performed by: INTERNAL MEDICINE

## 2018-01-01 PROCEDURE — 86901 BLOOD TYPING SEROLOGIC RH(D): CPT

## 2018-01-01 PROCEDURE — 36600 WITHDRAWAL OF ARTERIAL BLOOD: CPT

## 2018-01-01 PROCEDURE — 96374 THER/PROPH/DIAG INJ IV PUSH: CPT

## 2018-01-01 PROCEDURE — 0399T HC MYOCARDL STRAIN IMAG QUAN ASSMT PER SESS: CPT

## 2018-01-01 PROCEDURE — 25010000002 ENOXAPARIN PER 10 MG: Performed by: THORACIC SURGERY (CARDIOTHORACIC VASCULAR SURGERY)

## 2018-01-01 PROCEDURE — 85027 COMPLETE CBC AUTOMATED: CPT | Performed by: THORACIC SURGERY (CARDIOTHORACIC VASCULAR SURGERY)

## 2018-01-01 PROCEDURE — 94799 UNLISTED PULMONARY SVC/PX: CPT | Performed by: NURSE PRACTITIONER

## 2018-01-01 PROCEDURE — 85025 COMPLETE CBC W/AUTO DIFF WBC: CPT | Performed by: THORACIC SURGERY (CARDIOTHORACIC VASCULAR SURGERY)

## 2018-01-01 PROCEDURE — 85730 THROMBOPLASTIN TIME PARTIAL: CPT | Performed by: THORACIC SURGERY (CARDIOTHORACIC VASCULAR SURGERY)

## 2018-01-01 PROCEDURE — 25010000002 VANCOMYCIN: Performed by: NURSE PRACTITIONER

## 2018-01-01 PROCEDURE — 71045 X-RAY EXAM CHEST 1 VIEW: CPT

## 2018-01-01 PROCEDURE — 25010000002 MORPHINE PER 10 MG: Performed by: INTERNAL MEDICINE

## 2018-01-01 PROCEDURE — 84134 ASSAY OF PREALBUMIN: CPT | Performed by: NURSE PRACTITIONER

## 2018-01-01 PROCEDURE — 25010000002 METOCLOPRAMIDE PER 10 MG: Performed by: THORACIC SURGERY (CARDIOTHORACIC VASCULAR SURGERY)

## 2018-01-01 PROCEDURE — 84466 ASSAY OF TRANSFERRIN: CPT | Performed by: NURSE PRACTITIONER

## 2018-01-01 PROCEDURE — 25010000002 NA FERRIC GLUC CPLX PER 12.5 MG: Performed by: INTERNAL MEDICINE

## 2018-01-01 PROCEDURE — 93306 TTE W/DOPPLER COMPLETE: CPT

## 2018-01-01 PROCEDURE — 83735 ASSAY OF MAGNESIUM: CPT | Performed by: NURSE PRACTITIONER

## 2018-01-01 PROCEDURE — 99213 OFFICE O/P EST LOW 20 MIN: CPT | Performed by: INTERNAL MEDICINE

## 2018-01-01 PROCEDURE — 02UJ0JZ SUPPLEMENT TRICUSPID VALVE WITH SYNTHETIC SUBSTITUTE, OPEN APPROACH: ICD-10-PCS | Performed by: THORACIC SURGERY (CARDIOTHORACIC VASCULAR SURGERY)

## 2018-01-01 PROCEDURE — 25010000002 PIPERACILLIN SOD-TAZOBACTAM PER 1 G: Performed by: INTERNAL MEDICINE

## 2018-01-01 PROCEDURE — 63710000001 INSULIN DETEMER PER 5 UNITS: Performed by: HOSPITALIST

## 2018-01-01 PROCEDURE — 85347 COAGULATION TIME ACTIVATED: CPT

## 2018-01-01 PROCEDURE — 84145 PROCALCITONIN (PCT): CPT | Performed by: EMERGENCY MEDICINE

## 2018-01-01 PROCEDURE — 84450 TRANSFERASE (AST) (SGOT): CPT | Performed by: INTERNAL MEDICINE

## 2018-01-01 PROCEDURE — 71046 X-RAY EXAM CHEST 2 VIEWS: CPT

## 2018-01-01 PROCEDURE — 63710000001 INSULIN ASPART PER 5 UNITS: Performed by: THORACIC SURGERY (CARDIOTHORACIC VASCULAR SURGERY)

## 2018-01-01 PROCEDURE — 80202 ASSAY OF VANCOMYCIN: CPT | Performed by: INTERNAL MEDICINE

## 2018-01-01 PROCEDURE — 93970 EXTREMITY STUDY: CPT

## 2018-01-01 PROCEDURE — 86900 BLOOD TYPING SEROLOGIC ABO: CPT

## 2018-01-01 PROCEDURE — 36415 COLL VENOUS BLD VENIPUNCTURE: CPT

## 2018-01-01 PROCEDURE — 80061 LIPID PANEL: CPT | Performed by: HOSPITALIST

## 2018-01-01 PROCEDURE — P9046 ALBUMIN (HUMAN), 25%, 20 ML: HCPCS

## 2018-01-01 PROCEDURE — 25010000003 CEFTRIAXONE PER 250 MG: Performed by: UROLOGY

## 2018-01-01 PROCEDURE — 25010000002 FUROSEMIDE PER 20 MG

## 2018-01-01 PROCEDURE — 86927 PLASMA FRESH FROZEN: CPT

## 2018-01-01 PROCEDURE — 82803 BLOOD GASES ANY COMBINATION: CPT

## 2018-01-01 PROCEDURE — 33464 VALVULOPLASTY TRICUSPID: CPT | Performed by: THORACIC SURGERY (CARDIOTHORACIC VASCULAR SURGERY)

## 2018-01-01 PROCEDURE — 25010000002 ONDANSETRON PER 1 MG: Performed by: INTERNAL MEDICINE

## 2018-01-01 PROCEDURE — 85027 COMPLETE CBC AUTOMATED: CPT | Performed by: INTERNAL MEDICINE

## 2018-01-01 PROCEDURE — 99231 SBSQ HOSP IP/OBS SF/LOW 25: CPT | Performed by: INTERNAL MEDICINE

## 2018-01-01 PROCEDURE — 99233 SBSQ HOSP IP/OBS HIGH 50: CPT | Performed by: INTERNAL MEDICINE

## 2018-01-01 PROCEDURE — 83036 HEMOGLOBIN GLYCOSYLATED A1C: CPT | Performed by: THORACIC SURGERY (CARDIOTHORACIC VASCULAR SURGERY)

## 2018-01-01 PROCEDURE — 25010000002 ALBUMIN HUMAN 25% PER 50 ML: Performed by: INTERNAL MEDICINE

## 2018-01-01 PROCEDURE — 93010 ELECTROCARDIOGRAM REPORT: CPT | Performed by: INTERNAL MEDICINE

## 2018-01-01 PROCEDURE — 85384 FIBRINOGEN ACTIVITY: CPT | Performed by: INTERNAL MEDICINE

## 2018-01-01 PROCEDURE — 82550 ASSAY OF CK (CPK): CPT | Performed by: INTERNAL MEDICINE

## 2018-01-01 PROCEDURE — 25010000002 ONDANSETRON PER 1 MG: Performed by: ANESTHESIOLOGY

## 2018-01-01 PROCEDURE — 85610 PROTHROMBIN TIME: CPT | Performed by: THORACIC SURGERY (CARDIOTHORACIC VASCULAR SURGERY)

## 2018-01-01 PROCEDURE — 87040 BLOOD CULTURE FOR BACTERIA: CPT | Performed by: EMERGENCY MEDICINE

## 2018-01-01 PROCEDURE — 70450 CT HEAD/BRAIN W/O DYE: CPT

## 2018-01-01 PROCEDURE — 80048 BASIC METABOLIC PNL TOTAL CA: CPT

## 2018-01-01 PROCEDURE — 4A023N8 MEASUREMENT OF CARDIAC SAMPLING AND PRESSURE, BILATERAL, PERCUTANEOUS APPROACH: ICD-10-PCS | Performed by: INTERNAL MEDICINE

## 2018-01-01 PROCEDURE — 25010000002 ONDANSETRON PER 1 MG: Performed by: THORACIC SURGERY (CARDIOTHORACIC VASCULAR SURGERY)

## 2018-01-01 PROCEDURE — 99215 OFFICE O/P EST HI 40 MIN: CPT | Performed by: INTERNAL MEDICINE

## 2018-01-01 PROCEDURE — 93005 ELECTROCARDIOGRAM TRACING: CPT | Performed by: THORACIC SURGERY (CARDIOTHORACIC VASCULAR SURGERY)

## 2018-01-01 PROCEDURE — 84484 ASSAY OF TROPONIN QUANT: CPT | Performed by: PHYSICIAN ASSISTANT

## 2018-01-01 PROCEDURE — 82043 UR ALBUMIN QUANTITATIVE: CPT | Performed by: INTERNAL MEDICINE

## 2018-01-01 PROCEDURE — 63510000001 EPOETIN ALFA PER 1000 UNITS: Performed by: INTERNAL MEDICINE

## 2018-01-01 PROCEDURE — 99214 OFFICE O/P EST MOD 30 MIN: CPT | Performed by: NURSE PRACTITIONER

## 2018-01-01 PROCEDURE — 25010000002 AZITHROMYCIN PER 500 MG: Performed by: HOSPITALIST

## 2018-01-01 PROCEDURE — 84156 ASSAY OF PROTEIN URINE: CPT | Performed by: INTERNAL MEDICINE

## 2018-01-01 PROCEDURE — 84443 ASSAY THYROID STIM HORMONE: CPT | Performed by: INTERNAL MEDICINE

## 2018-01-01 PROCEDURE — 81003 URINALYSIS AUTO W/O SCOPE: CPT | Performed by: THORACIC SURGERY (CARDIOTHORACIC VASCULAR SURGERY)

## 2018-01-01 PROCEDURE — P9046 ALBUMIN (HUMAN), 25%, 20 ML: HCPCS | Performed by: INTERNAL MEDICINE

## 2018-01-01 PROCEDURE — 87086 URINE CULTURE/COLONY COUNT: CPT | Performed by: EMERGENCY MEDICINE

## 2018-01-01 PROCEDURE — 25010000002 FENTANYL CITRATE (PF) 100 MCG/2ML SOLUTION: Performed by: INTERNAL MEDICINE

## 2018-01-01 PROCEDURE — 25010000003 POTASSIUM CHLORIDE PER 2 MEQ: Performed by: INTERNAL MEDICINE

## 2018-01-01 PROCEDURE — 87086 URINE CULTURE/COLONY COUNT: CPT | Performed by: NURSE PRACTITIONER

## 2018-01-01 PROCEDURE — 87899 AGENT NOS ASSAY W/OPTIC: CPT | Performed by: INTERNAL MEDICINE

## 2018-01-01 PROCEDURE — 93000 ELECTROCARDIOGRAM COMPLETE: CPT | Performed by: INTERNAL MEDICINE

## 2018-01-01 PROCEDURE — 99253 IP/OBS CNSLTJ NEW/EST LOW 45: CPT | Performed by: PSYCHIATRY & NEUROLOGY

## 2018-01-01 PROCEDURE — 25010000002 PROPOFOL 10 MG/ML EMULSION: Performed by: ANESTHESIOLOGY

## 2018-01-01 PROCEDURE — 84300 ASSAY OF URINE SODIUM: CPT | Performed by: INTERNAL MEDICINE

## 2018-01-01 PROCEDURE — 83735 ASSAY OF MAGNESIUM: CPT

## 2018-01-01 PROCEDURE — 82570 ASSAY OF URINE CREATININE: CPT | Performed by: INTERNAL MEDICINE

## 2018-01-01 PROCEDURE — 84484 ASSAY OF TROPONIN QUANT: CPT | Performed by: EMERGENCY MEDICINE

## 2018-01-01 PROCEDURE — 25010000002 PROPOFOL 10 MG/ML EMULSION: Performed by: NURSE ANESTHETIST, CERTIFIED REGISTERED

## 2018-01-01 PROCEDURE — 85610 PROTHROMBIN TIME: CPT | Performed by: INTERNAL MEDICINE

## 2018-01-01 PROCEDURE — C1894 INTRO/SHEATH, NON-LASER: HCPCS | Performed by: INTERNAL MEDICINE

## 2018-01-01 PROCEDURE — 82247 BILIRUBIN TOTAL: CPT | Performed by: INTERNAL MEDICINE

## 2018-01-01 PROCEDURE — 87340 HEPATITIS B SURFACE AG IA: CPT | Performed by: INTERNAL MEDICINE

## 2018-01-01 PROCEDURE — 82746 ASSAY OF FOLIC ACID SERUM: CPT | Performed by: HOSPITALIST

## 2018-01-01 PROCEDURE — C1769 GUIDE WIRE: HCPCS | Performed by: INTERNAL MEDICINE

## 2018-01-01 PROCEDURE — 87077 CULTURE AEROBIC IDENTIFY: CPT | Performed by: EMERGENCY MEDICINE

## 2018-01-01 PROCEDURE — 85055 RETICULATED PLATELET ASSAY: CPT | Performed by: INTERNAL MEDICINE

## 2018-01-01 PROCEDURE — 85025 COMPLETE CBC W/AUTO DIFF WBC: CPT | Performed by: INTERNAL MEDICINE

## 2018-01-01 PROCEDURE — 85025 COMPLETE CBC W/AUTO DIFF WBC: CPT | Performed by: NURSE PRACTITIONER

## 2018-01-01 PROCEDURE — 85384 FIBRINOGEN ACTIVITY: CPT | Performed by: THORACIC SURGERY (CARDIOTHORACIC VASCULAR SURGERY)

## 2018-01-01 PROCEDURE — 25010000003 POTASSIUM CHLORIDE 10 MEQ/100ML SOLUTION: Performed by: INTERNAL MEDICINE

## 2018-01-01 PROCEDURE — 81001 URINALYSIS AUTO W/SCOPE: CPT | Performed by: NURSE PRACTITIONER

## 2018-01-01 PROCEDURE — 25010000002 ALBUMIN HUMAN 25% PER 50 ML

## 2018-01-01 PROCEDURE — 83880 ASSAY OF NATRIURETIC PEPTIDE: CPT | Performed by: INTERNAL MEDICINE

## 2018-01-01 PROCEDURE — 63710000001 INSULIN DETEMIR PER 5 UNITS: Performed by: THORACIC SURGERY (CARDIOTHORACIC VASCULAR SURGERY)

## 2018-01-01 PROCEDURE — 83735 ASSAY OF MAGNESIUM: CPT | Performed by: HOSPITALIST

## 2018-01-01 PROCEDURE — 93005 ELECTROCARDIOGRAM TRACING: CPT | Performed by: HOSPITALIST

## 2018-01-01 PROCEDURE — 99307 SBSQ NF CARE SF MDM 10: CPT | Performed by: NURSE PRACTITIONER

## 2018-01-01 PROCEDURE — 76942 ECHO GUIDE FOR BIOPSY: CPT

## 2018-01-01 PROCEDURE — 99254 IP/OBS CNSLTJ NEW/EST MOD 60: CPT | Performed by: INTERNAL MEDICINE

## 2018-01-01 PROCEDURE — 85025 COMPLETE CBC W/AUTO DIFF WBC: CPT | Performed by: PHYSICIAN ASSISTANT

## 2018-01-01 PROCEDURE — 93306 TTE W/DOPPLER COMPLETE: CPT | Performed by: INTERNAL MEDICINE

## 2018-01-01 PROCEDURE — 25010000002 FENTANYL CITRATE (PF) 100 MCG/2ML SOLUTION: Performed by: ANESTHESIOLOGY

## 2018-01-01 PROCEDURE — 86850 RBC ANTIBODY SCREEN: CPT | Performed by: THORACIC SURGERY (CARDIOTHORACIC VASCULAR SURGERY)

## 2018-01-01 PROCEDURE — 33405 REPLACEMENT AORTIC VALVE OPN: CPT | Performed by: THORACIC SURGERY (CARDIOTHORACIC VASCULAR SURGERY)

## 2018-01-01 PROCEDURE — 25010000002 VANCOMYCIN 10 G RECONSTITUTED SOLUTION: Performed by: THORACIC SURGERY (CARDIOTHORACIC VASCULAR SURGERY)

## 2018-01-01 PROCEDURE — 84100 ASSAY OF PHOSPHORUS: CPT | Performed by: INTERNAL MEDICINE

## 2018-01-01 PROCEDURE — 25010000002 LORAZEPAM PER 2 MG: Performed by: INTERNAL MEDICINE

## 2018-01-01 PROCEDURE — 83880 ASSAY OF NATRIURETIC PEPTIDE: CPT | Performed by: EMERGENCY MEDICINE

## 2018-01-01 PROCEDURE — 84466 ASSAY OF TRANSFERRIN: CPT | Performed by: INTERNAL MEDICINE

## 2018-01-01 PROCEDURE — 85025 COMPLETE CBC W/AUTO DIFF WBC: CPT

## 2018-01-01 PROCEDURE — 80048 BASIC METABOLIC PNL TOTAL CA: CPT | Performed by: NURSE PRACTITIONER

## 2018-01-01 PROCEDURE — 88311 DECALCIFY TISSUE: CPT | Performed by: INTERNAL MEDICINE

## 2018-01-01 PROCEDURE — 85007 BL SMEAR W/DIFF WBC COUNT: CPT | Performed by: NURSE PRACTITIONER

## 2018-01-01 PROCEDURE — 25010000002 FENTANYL CITRATE (PF) 100 MCG/2ML SOLUTION: Performed by: NURSE ANESTHETIST, CERTIFIED REGISTERED

## 2018-01-01 PROCEDURE — 25010000002 BH (CUPID ONLY) ADENOSINE CHARGE TOTAL VOLUME: Performed by: INTERNAL MEDICINE

## 2018-01-01 PROCEDURE — 81001 URINALYSIS AUTO W/SCOPE: CPT | Performed by: INTERNAL MEDICINE

## 2018-01-01 PROCEDURE — B2111ZZ FLUOROSCOPY OF MULTIPLE CORONARY ARTERIES USING LOW OSMOLAR CONTRAST: ICD-10-PCS | Performed by: INTERNAL MEDICINE

## 2018-01-01 PROCEDURE — 86334 IMMUNOFIX E-PHORESIS SERUM: CPT | Performed by: INTERNAL MEDICINE

## 2018-01-01 PROCEDURE — 83540 ASSAY OF IRON: CPT | Performed by: HOSPITALIST

## 2018-01-01 PROCEDURE — 25010000002 HEPARIN (PORCINE) PER 1000 UNITS: Performed by: SURGERY

## 2018-01-01 PROCEDURE — 86900 BLOOD TYPING SEROLOGIC ABO: CPT | Performed by: THORACIC SURGERY (CARDIOTHORACIC VASCULAR SURGERY)

## 2018-01-01 PROCEDURE — 83880 ASSAY OF NATRIURETIC PEPTIDE: CPT | Performed by: HOSPITALIST

## 2018-01-01 PROCEDURE — 84439 ASSAY OF FREE THYROXINE: CPT | Performed by: EMERGENCY MEDICINE

## 2018-01-01 PROCEDURE — 93453 R&L HRT CATH W/VENTRICLGRPHY: CPT | Performed by: INTERNAL MEDICINE

## 2018-01-01 PROCEDURE — 99309 SBSQ NF CARE MODERATE MDM 30: CPT | Performed by: NURSE PRACTITIONER

## 2018-01-01 PROCEDURE — 85014 HEMATOCRIT: CPT

## 2018-01-01 PROCEDURE — 3E080GC INTRODUCTION OF OTHER THERAPEUTIC SUBSTANCE INTO HEART, OPEN APPROACH: ICD-10-PCS | Performed by: THORACIC SURGERY (CARDIOTHORACIC VASCULAR SURGERY)

## 2018-01-01 PROCEDURE — P9017 PLASMA 1 DONOR FRZ W/IN 8 HR: HCPCS

## 2018-01-01 PROCEDURE — 93456 R HRT CORONARY ARTERY ANGIO: CPT | Performed by: INTERNAL MEDICINE

## 2018-01-01 PROCEDURE — 84466 ASSAY OF TRANSFERRIN: CPT | Performed by: HOSPITALIST

## 2018-01-01 PROCEDURE — 73718 MRI LOWER EXTREMITY W/O DYE: CPT

## 2018-01-01 PROCEDURE — 86335 IMMUNFIX E-PHORSIS/URINE/CSF: CPT | Performed by: INTERNAL MEDICINE

## 2018-01-01 PROCEDURE — 85007 BL SMEAR W/DIFF WBC COUNT: CPT | Performed by: INTERNAL MEDICINE

## 2018-01-01 PROCEDURE — 85018 HEMOGLOBIN: CPT

## 2018-01-01 PROCEDURE — 87186 SC STD MICRODIL/AGAR DIL: CPT | Performed by: NURSE PRACTITIONER

## 2018-01-01 PROCEDURE — 80048 BASIC METABOLIC PNL TOTAL CA: CPT | Performed by: THORACIC SURGERY (CARDIOTHORACIC VASCULAR SURGERY)

## 2018-01-01 PROCEDURE — 97110 THERAPEUTIC EXERCISES: CPT | Performed by: OCCUPATIONAL THERAPIST

## 2018-01-01 PROCEDURE — 99285 EMERGENCY DEPT VISIT HI MDM: CPT

## 2018-01-01 PROCEDURE — 83970 ASSAY OF PARATHORMONE: CPT | Performed by: INTERNAL MEDICINE

## 2018-01-01 PROCEDURE — 84443 ASSAY THYROID STIM HORMONE: CPT | Performed by: HOSPITALIST

## 2018-01-01 PROCEDURE — 84484 ASSAY OF TROPONIN QUANT: CPT | Performed by: INTERNAL MEDICINE

## 2018-01-01 PROCEDURE — 80053 COMPREHEN METABOLIC PANEL: CPT | Performed by: THORACIC SURGERY (CARDIOTHORACIC VASCULAR SURGERY)

## 2018-01-01 PROCEDURE — 88307 TISSUE EXAM BY PATHOLOGIST: CPT | Performed by: THORACIC SURGERY (CARDIOTHORACIC VASCULAR SURGERY)

## 2018-01-01 PROCEDURE — 25010000003 CEFAZOLIN IN DEXTROSE 2-4 GM/100ML-% SOLUTION: Performed by: NURSE ANESTHETIST, CERTIFIED REGISTERED

## 2018-01-01 PROCEDURE — 25010000002 HEPARIN (PORCINE) PER 1000 UNITS: Performed by: ANESTHESIOLOGY

## 2018-01-01 PROCEDURE — 82607 VITAMIN B-12: CPT | Performed by: HOSPITALIST

## 2018-01-01 PROCEDURE — 80069 RENAL FUNCTION PANEL: CPT | Performed by: THORACIC SURGERY (CARDIOTHORACIC VASCULAR SURGERY)

## 2018-01-01 PROCEDURE — 25010000003 POTASSIUM CHLORIDE PER 2 MEQ: Performed by: THORACIC SURGERY (CARDIOTHORACIC VASCULAR SURGERY)

## 2018-01-01 PROCEDURE — 97535 SELF CARE MNGMENT TRAINING: CPT

## 2018-01-01 PROCEDURE — 77012 CT SCAN FOR NEEDLE BIOPSY: CPT

## 2018-01-01 PROCEDURE — 0JBR0ZZ EXCISION OF LEFT FOOT SUBCUTANEOUS TISSUE AND FASCIA, OPEN APPROACH: ICD-10-PCS | Performed by: SURGERY

## 2018-01-01 PROCEDURE — 88305 TISSUE EXAM BY PATHOLOGIST: CPT | Performed by: INTERNAL MEDICINE

## 2018-01-01 PROCEDURE — 84156 ASSAY OF PROTEIN URINE: CPT

## 2018-01-01 PROCEDURE — 99239 HOSP IP/OBS DSCHRG MGMT >30: CPT | Performed by: INTERNAL MEDICINE

## 2018-01-01 PROCEDURE — 74176 CT ABD & PELVIS W/O CONTRAST: CPT

## 2018-01-01 PROCEDURE — 33464 VALVULOPLASTY TRICUSPID: CPT | Performed by: PHYSICIAN ASSISTANT

## 2018-01-01 PROCEDURE — 84132 ASSAY OF SERUM POTASSIUM: CPT | Performed by: THORACIC SURGERY (CARDIOTHORACIC VASCULAR SURGERY)

## 2018-01-01 PROCEDURE — 99310 SBSQ NF CARE HIGH MDM 45: CPT | Performed by: NURSE PRACTITIONER

## 2018-01-01 PROCEDURE — 73630 X-RAY EXAM OF FOOT: CPT

## 2018-01-01 PROCEDURE — 85045 AUTOMATED RETICULOCYTE COUNT: CPT | Performed by: HOSPITALIST

## 2018-01-01 PROCEDURE — 25010000002 CEFTRIAXONE PER 250 MG: Performed by: HOSPITALIST

## 2018-01-01 PROCEDURE — 85576 BLOOD PLATELET AGGREGATION: CPT | Performed by: THORACIC SURGERY (CARDIOTHORACIC VASCULAR SURGERY)

## 2018-01-01 PROCEDURE — 99214 OFFICE O/P EST MOD 30 MIN: CPT | Performed by: INTERNAL MEDICINE

## 2018-01-01 PROCEDURE — 0HDMXZZ EXTRACTION OF RIGHT FOOT SKIN, EXTERNAL APPROACH: ICD-10-PCS | Performed by: SURGERY

## 2018-01-01 PROCEDURE — 82465 ASSAY BLD/SERUM CHOLESTEROL: CPT | Performed by: INTERNAL MEDICINE

## 2018-01-01 PROCEDURE — 94660 CPAP INITIATION&MGMT: CPT

## 2018-01-01 PROCEDURE — 82977 ASSAY OF GGT: CPT | Performed by: INTERNAL MEDICINE

## 2018-01-01 PROCEDURE — 93000 ELECTROCARDIOGRAM COMPLETE: CPT | Performed by: NURSE PRACTITIONER

## 2018-01-01 PROCEDURE — 87186 SC STD MICRODIL/AGAR DIL: CPT | Performed by: EMERGENCY MEDICINE

## 2018-01-01 PROCEDURE — 86901 BLOOD TYPING SEROLOGIC RH(D): CPT | Performed by: THORACIC SURGERY (CARDIOTHORACIC VASCULAR SURGERY)

## 2018-01-01 PROCEDURE — 81003 URINALYSIS AUTO W/O SCOPE: CPT

## 2018-01-01 PROCEDURE — 85610 PROTHROMBIN TIME: CPT | Performed by: EMERGENCY MEDICINE

## 2018-01-01 PROCEDURE — 84132 ASSAY OF SERUM POTASSIUM: CPT | Performed by: INTERNAL MEDICINE

## 2018-01-01 PROCEDURE — 83036 HEMOGLOBIN GLYCOSYLATED A1C: CPT | Performed by: HOSPITALIST

## 2018-01-01 PROCEDURE — 25010000002 VANCOMYCIN PER 500 MG

## 2018-01-01 PROCEDURE — 85730 THROMBOPLASTIN TIME PARTIAL: CPT | Performed by: INTERNAL MEDICINE

## 2018-01-01 PROCEDURE — 81001 URINALYSIS AUTO W/SCOPE: CPT | Performed by: EMERGENCY MEDICINE

## 2018-01-01 PROCEDURE — 84165 PROTEIN E-PHORESIS SERUM: CPT | Performed by: INTERNAL MEDICINE

## 2018-01-01 PROCEDURE — 97162 PT EVAL MOD COMPLEX 30 MIN: CPT

## 2018-01-01 PROCEDURE — 02RF08Z REPLACEMENT OF AORTIC VALVE WITH ZOOPLASTIC TISSUE, OPEN APPROACH: ICD-10-PCS | Performed by: THORACIC SURGERY (CARDIOTHORACIC VASCULAR SURGERY)

## 2018-01-01 PROCEDURE — 76775 US EXAM ABDO BACK WALL LIM: CPT

## 2018-01-01 PROCEDURE — 83540 ASSAY OF IRON: CPT | Performed by: INTERNAL MEDICINE

## 2018-01-01 PROCEDURE — 0 IOPAMIDOL PER 1 ML: Performed by: INTERNAL MEDICINE

## 2018-01-01 PROCEDURE — 25010000002 ALBUMIN HUMAN 5% PER 50 ML: Performed by: THORACIC SURGERY (CARDIOTHORACIC VASCULAR SURGERY)

## 2018-01-01 PROCEDURE — 87150 DNA/RNA AMPLIFIED PROBE: CPT | Performed by: EMERGENCY MEDICINE

## 2018-01-01 PROCEDURE — 83605 ASSAY OF LACTIC ACID: CPT | Performed by: EMERGENCY MEDICINE

## 2018-01-01 PROCEDURE — 83880 ASSAY OF NATRIURETIC PEPTIDE: CPT | Performed by: THORACIC SURGERY (CARDIOTHORACIC VASCULAR SURGERY)

## 2018-01-01 PROCEDURE — B246ZZ4 ULTRASONOGRAPHY OF RIGHT AND LEFT HEART, TRANSESOPHAGEAL: ICD-10-PCS | Performed by: THORACIC SURGERY (CARDIOTHORACIC VASCULAR SURGERY)

## 2018-01-01 PROCEDURE — 99152 MOD SED SAME PHYS/QHP 5/>YRS: CPT | Performed by: INTERNAL MEDICINE

## 2018-01-01 PROCEDURE — 80061 LIPID PANEL: CPT | Performed by: THORACIC SURGERY (CARDIOTHORACIC VASCULAR SURGERY)

## 2018-01-01 PROCEDURE — 97166 OT EVAL MOD COMPLEX 45 MIN: CPT | Performed by: OCCUPATIONAL THERAPIST

## 2018-01-01 PROCEDURE — 25010000002 EPINEPHRINE PER 0.1 MG: Performed by: ANESTHESIOLOGY

## 2018-01-01 PROCEDURE — 93005 ELECTROCARDIOGRAM TRACING: CPT | Performed by: INTERNAL MEDICINE

## 2018-01-01 PROCEDURE — 25010000002 PHENYLEPHRINE PER 1 ML: Performed by: ANESTHESIOLOGY

## 2018-01-01 PROCEDURE — 80053 COMPREHEN METABOLIC PANEL: CPT | Performed by: EMERGENCY MEDICINE

## 2018-01-01 PROCEDURE — 87205 SMEAR GRAM STAIN: CPT | Performed by: INTERNAL MEDICINE

## 2018-01-01 PROCEDURE — 93880 EXTRACRANIAL BILAT STUDY: CPT

## 2018-01-01 PROCEDURE — 85730 THROMBOPLASTIN TIME PARTIAL: CPT | Performed by: EMERGENCY MEDICINE

## 2018-01-01 PROCEDURE — 0W993ZZ DRAINAGE OF RIGHT PLEURAL CAVITY, PERCUTANEOUS APPROACH: ICD-10-PCS | Performed by: RADIOLOGY

## 2018-01-01 PROCEDURE — 84550 ASSAY OF BLOOD/URIC ACID: CPT | Performed by: INTERNAL MEDICINE

## 2018-01-01 PROCEDURE — 83883 ASSAY NEPHELOMETRY NOT SPEC: CPT | Performed by: INTERNAL MEDICINE

## 2018-01-01 PROCEDURE — 63710000001 INSULIN REGULAR HUMAN PER 5 UNITS: Performed by: ANESTHESIOLOGY

## 2018-01-01 PROCEDURE — 0HBRXZZ EXCISION OF TOE NAIL, EXTERNAL APPROACH: ICD-10-PCS | Performed by: PODIATRIST

## 2018-01-01 PROCEDURE — 83735 ASSAY OF MAGNESIUM: CPT | Performed by: EMERGENCY MEDICINE

## 2018-01-01 PROCEDURE — C1713 ANCHOR/SCREW BN/BN,TIS/BN: HCPCS | Performed by: THORACIC SURGERY (CARDIOTHORACIC VASCULAR SURGERY)

## 2018-01-01 PROCEDURE — C1751 CATH, INF, PER/CENT/MIDLINE: HCPCS | Performed by: ANESTHESIOLOGY

## 2018-01-01 PROCEDURE — 25010000002 MANNITOL PER 50 ML: Performed by: INTERNAL MEDICINE

## 2018-01-01 PROCEDURE — 5A1221Z PERFORMANCE OF CARDIAC OUTPUT, CONTINUOUS: ICD-10-PCS | Performed by: THORACIC SURGERY (CARDIOTHORACIC VASCULAR SURGERY)

## 2018-01-01 PROCEDURE — 07DR3ZX EXTRACTION OF ILIAC BONE MARROW, PERCUTANEOUS APPROACH, DIAGNOSTIC: ICD-10-PCS | Performed by: RADIOLOGY

## 2018-01-01 PROCEDURE — 36430 TRANSFUSION BLD/BLD COMPNT: CPT

## 2018-01-01 PROCEDURE — 93005 ELECTROCARDIOGRAM TRACING: CPT | Performed by: PHYSICIAN ASSISTANT

## 2018-01-01 PROCEDURE — 93923 UPR/LXTR ART STDY 3+ LVLS: CPT

## 2018-01-01 PROCEDURE — 93005 ELECTROCARDIOGRAM TRACING: CPT | Performed by: EMERGENCY MEDICINE

## 2018-01-01 PROCEDURE — 83880 ASSAY OF NATRIURETIC PEPTIDE: CPT | Performed by: FAMILY MEDICINE

## 2018-01-01 PROCEDURE — 93318 ECHO TRANSESOPHAGEAL INTRAOP: CPT | Performed by: ANESTHESIOLOGY

## 2018-01-01 PROCEDURE — 25010000003 POTASSIUM CHLORIDE 10 MEQ/100ML SOLUTION: Performed by: HOSPITALIST

## 2018-01-01 PROCEDURE — 25010000002 HEPARIN (PORCINE) PER 1000 UNITS

## 2018-01-01 PROCEDURE — 82330 ASSAY OF CALCIUM: CPT | Performed by: THORACIC SURGERY (CARDIOTHORACIC VASCULAR SURGERY)

## 2018-01-01 PROCEDURE — 82784 ASSAY IGA/IGD/IGG/IGM EACH: CPT | Performed by: INTERNAL MEDICINE

## 2018-01-01 PROCEDURE — 84132 ASSAY OF SERUM POTASSIUM: CPT | Performed by: HOSPITALIST

## 2018-01-01 PROCEDURE — 83010 ASSAY OF HAPTOGLOBIN QUANT: CPT | Performed by: INTERNAL MEDICINE

## 2018-01-01 PROCEDURE — 5A1D70Z PERFORMANCE OF URINARY FILTRATION, INTERMITTENT, LESS THAN 6 HOURS PER DAY: ICD-10-PCS | Performed by: INTERNAL MEDICINE

## 2018-01-01 PROCEDURE — 25010000002 PROPOFOL 1000 MG/ML EMULSION: Performed by: THORACIC SURGERY (CARDIOTHORACIC VASCULAR SURGERY)

## 2018-01-01 PROCEDURE — 77001 FLUOROGUIDE FOR VEIN DEVICE: CPT

## 2018-01-01 PROCEDURE — 84484 ASSAY OF TROPONIN QUANT: CPT | Performed by: HOSPITALIST

## 2018-01-01 PROCEDURE — 25010000002 PIPERACILLIN SOD-TAZOBACTAM PER 1 G: Performed by: EMERGENCY MEDICINE

## 2018-01-01 PROCEDURE — P9012 CRYOPRECIPITATE EACH UNIT: HCPCS

## 2018-01-01 PROCEDURE — 85007 BL SMEAR W/DIFF WBC COUNT: CPT | Performed by: HOSPITALIST

## 2018-01-01 PROCEDURE — 25010000003 PROTAMINE SULFATE PER 10 MG: Performed by: THORACIC SURGERY (CARDIOTHORACIC VASCULAR SURGERY)

## 2018-01-01 PROCEDURE — 84166 PROTEIN E-PHORESIS/URINE/CSF: CPT | Performed by: INTERNAL MEDICINE

## 2018-01-01 PROCEDURE — 88305 TISSUE EXAM BY PATHOLOGIST: CPT | Performed by: THORACIC SURGERY (CARDIOTHORACIC VASCULAR SURGERY)

## 2018-01-01 PROCEDURE — 93005 ELECTROCARDIOGRAM TRACING: CPT | Performed by: NURSE PRACTITIONER

## 2018-01-01 PROCEDURE — 63710000001 DIPHENHYDRAMINE PER 50 MG: Performed by: PHYSICIAN ASSISTANT

## 2018-01-01 PROCEDURE — 84439 ASSAY OF FREE THYROXINE: CPT | Performed by: INTERNAL MEDICINE

## 2018-01-01 PROCEDURE — 86920 COMPATIBILITY TEST SPIN: CPT

## 2018-01-01 PROCEDURE — 93463 DRUG ADMIN & HEMODYNMIC MEAS: CPT | Performed by: INTERNAL MEDICINE

## 2018-01-01 PROCEDURE — 82728 ASSAY OF FERRITIN: CPT | Performed by: INTERNAL MEDICINE

## 2018-01-01 PROCEDURE — 25010000002 VANCOMYCIN 10 G RECONSTITUTED SOLUTION: Performed by: EMERGENCY MEDICINE

## 2018-01-01 PROCEDURE — 81001 URINALYSIS AUTO W/SCOPE: CPT | Performed by: UROLOGY

## 2018-01-01 PROCEDURE — 25010000002 MIDAZOLAM PER 1 MG: Performed by: ANESTHESIOLOGY

## 2018-01-01 PROCEDURE — 82172 ASSAY OF APOLIPOPROTEIN: CPT | Performed by: INTERNAL MEDICINE

## 2018-01-01 PROCEDURE — 82570 ASSAY OF URINE CREATININE: CPT

## 2018-01-01 PROCEDURE — G0432 EIA HIV-1/HIV-2 SCREEN: HCPCS | Performed by: INTERNAL MEDICINE

## 2018-01-01 PROCEDURE — 02B70ZK EXCISION OF LEFT ATRIAL APPENDAGE, OPEN APPROACH: ICD-10-PCS | Performed by: THORACIC SURGERY (CARDIOTHORACIC VASCULAR SURGERY)

## 2018-01-01 PROCEDURE — 84484 ASSAY OF TROPONIN QUANT: CPT | Performed by: FAMILY MEDICINE

## 2018-01-01 PROCEDURE — 25010000002 FUROSEMIDE PER 20 MG: Performed by: NURSE PRACTITIONER

## 2018-01-01 PROCEDURE — 85007 BL SMEAR W/DIFF WBC COUNT: CPT

## 2018-01-01 PROCEDURE — P9041 ALBUMIN (HUMAN),5%, 50ML: HCPCS | Performed by: THORACIC SURGERY (CARDIOTHORACIC VASCULAR SURGERY)

## 2018-01-01 PROCEDURE — 94002 VENT MGMT INPAT INIT DAY: CPT

## 2018-01-01 PROCEDURE — 84478 ASSAY OF TRIGLYCERIDES: CPT | Performed by: INTERNAL MEDICINE

## 2018-01-01 PROCEDURE — 97110 THERAPEUTIC EXERCISES: CPT | Performed by: PHYSICAL THERAPIST

## 2018-01-01 PROCEDURE — 80053 COMPREHEN METABOLIC PANEL: CPT | Performed by: HOSPITALIST

## 2018-01-01 PROCEDURE — 99253 IP/OBS CNSLTJ NEW/EST LOW 45: CPT | Performed by: INTERNAL MEDICINE

## 2018-01-01 PROCEDURE — 93971 EXTREMITY STUDY: CPT

## 2018-01-01 PROCEDURE — 84155 ASSAY OF PROTEIN SERUM: CPT | Performed by: INTERNAL MEDICINE

## 2018-01-01 PROCEDURE — 85025 COMPLETE CBC W/AUTO DIFF WBC: CPT | Performed by: EMERGENCY MEDICINE

## 2018-01-01 PROCEDURE — 99255 IP/OBS CONSLTJ NEW/EST HI 80: CPT | Performed by: INTERNAL MEDICINE

## 2018-01-01 PROCEDURE — 87521 HEPATITIS C PROBE&RVRS TRNSC: CPT | Performed by: INTERNAL MEDICINE

## 2018-01-01 PROCEDURE — 25010000002 MIDAZOLAM PER 1 MG: Performed by: INTERNAL MEDICINE

## 2018-01-01 PROCEDURE — 94762 N-INVAS EAR/PLS OXIMTRY CONT: CPT

## 2018-01-01 PROCEDURE — B2151ZZ FLUOROSCOPY OF LEFT HEART USING LOW OSMOLAR CONTRAST: ICD-10-PCS | Performed by: INTERNAL MEDICINE

## 2018-01-01 PROCEDURE — 80053 COMPREHEN METABOLIC PANEL: CPT | Performed by: PHYSICIAN ASSISTANT

## 2018-01-01 PROCEDURE — 25010000002 IRON SUCROSE PER 1 MG: Performed by: HOSPITALIST

## 2018-01-01 PROCEDURE — 36415 COLL VENOUS BLD VENIPUNCTURE: CPT | Performed by: NURSE PRACTITIONER

## 2018-01-01 PROCEDURE — 84460 ALANINE AMINO (ALT) (SGPT): CPT | Performed by: INTERNAL MEDICINE

## 2018-01-01 PROCEDURE — 71250 CT THORAX DX C-: CPT

## 2018-01-01 PROCEDURE — 02HV33Z INSERTION OF INFUSION DEVICE INTO SUPERIOR VENA CAVA, PERCUTANEOUS APPROACH: ICD-10-PCS | Performed by: SURGERY

## 2018-01-01 PROCEDURE — 99305 1ST NF CARE MODERATE MDM 35: CPT | Performed by: FAMILY MEDICINE

## 2018-01-01 PROCEDURE — 85027 COMPLETE CBC AUTOMATED: CPT

## 2018-01-01 PROCEDURE — 99024 POSTOP FOLLOW-UP VISIT: CPT | Performed by: PHYSICIAN ASSISTANT

## 2018-01-01 PROCEDURE — 25810000003 SODIUM CHLORIDE 0.9 % WITH KCL 20 MEQ 20-0.9 MEQ/L-% SOLUTION: Performed by: HOSPITALIST

## 2018-01-01 PROCEDURE — C1750 CATH, HEMODIALYSIS,LONG-TERM: HCPCS | Performed by: SURGERY

## 2018-01-01 PROCEDURE — 87077 CULTURE AEROBIC IDENTIFY: CPT | Performed by: NURSE PRACTITIONER

## 2018-01-01 PROCEDURE — 84443 ASSAY THYROID STIM HORMONE: CPT | Performed by: EMERGENCY MEDICINE

## 2018-01-01 PROCEDURE — 25010000002 HEPARIN (PORCINE) PER 1000 UNITS: Performed by: THORACIC SURGERY (CARDIOTHORACIC VASCULAR SURGERY)

## 2018-01-01 PROCEDURE — 25010000002 MIDAZOLAM PER 1 MG: Performed by: NURSE ANESTHETIST, CERTIFIED REGISTERED

## 2018-01-01 DEVICE — IMPLANTABLE DEVICE: Type: IMPLANTABLE DEVICE | Site: HEART | Status: FUNCTIONAL

## 2018-01-01 DEVICE — VLV AORT AVALUS BIOPROSTHESIS 25MM: Type: IMPLANTABLE DEVICE | Site: HEART | Status: FUNCTIONAL

## 2018-01-01 RX ORDER — CARVEDILOL 12.5 MG/1
12.5 TABLET ORAL 2 TIMES DAILY WITH MEALS
Qty: 60 TABLET | Refills: 0 | Status: SHIPPED | OUTPATIENT
Start: 2018-01-01 | End: 2018-01-01 | Stop reason: HOSPADM

## 2018-01-01 RX ORDER — MORPHINE SULFATE 4 MG/ML
4 INJECTION, SOLUTION INTRAMUSCULAR; INTRAVENOUS
Status: CANCELLED | OUTPATIENT
Start: 2018-01-01 | End: 2018-11-09

## 2018-01-01 RX ORDER — HEPARIN SODIUM 1000 [USP'U]/ML
3000 INJECTION, SOLUTION INTRAVENOUS; SUBCUTANEOUS ONCE
Status: COMPLETED | OUTPATIENT
Start: 2018-01-01 | End: 2018-01-01

## 2018-01-01 RX ORDER — WARFARIN SODIUM 5 MG/1
5 TABLET ORAL
Status: COMPLETED | OUTPATIENT
Start: 2018-01-01 | End: 2018-01-01

## 2018-01-01 RX ORDER — MAGNESIUM SULFATE 1 G/100ML
1 INJECTION INTRAVENOUS EVERY 8 HOURS
Status: DISCONTINUED | OUTPATIENT
Start: 2018-01-01 | End: 2018-01-01

## 2018-01-01 RX ORDER — ACETAMINOPHEN 325 MG/1
650 TABLET ORAL EVERY 4 HOURS PRN
Status: DISCONTINUED | OUTPATIENT
Start: 2018-01-01 | End: 2018-01-01

## 2018-01-01 RX ORDER — BUDESONIDE AND FORMOTEROL FUMARATE DIHYDRATE 160; 4.5 UG/1; UG/1
2 AEROSOL RESPIRATORY (INHALATION)
Status: DISCONTINUED | OUTPATIENT
Start: 2018-01-01 | End: 2018-01-01

## 2018-01-01 RX ORDER — ACETAMINOPHEN 325 MG/1
650 TABLET ORAL EVERY 4 HOURS PRN
Status: DISCONTINUED | OUTPATIENT
Start: 2018-01-01 | End: 2018-10-31 | Stop reason: HOSPADM

## 2018-01-01 RX ORDER — MORPHINE SULFATE 2 MG/ML
1 INJECTION, SOLUTION INTRAMUSCULAR; INTRAVENOUS
Status: DISCONTINUED | OUTPATIENT
Start: 2018-01-01 | End: 2018-01-01

## 2018-01-01 RX ORDER — ALBUMIN (HUMAN) 12.5 G/50ML
12.5 SOLUTION INTRAVENOUS AS NEEDED
Status: ACTIVE | OUTPATIENT
Start: 2018-01-01 | End: 2018-01-01

## 2018-01-01 RX ORDER — BUMETANIDE 0.25 MG/ML
2 INJECTION INTRAMUSCULAR; INTRAVENOUS EVERY 8 HOURS
Status: COMPLETED | OUTPATIENT
Start: 2018-01-01 | End: 2018-01-01

## 2018-01-01 RX ORDER — NYSTATIN 100000 [USP'U]/G
POWDER TOPICAL EVERY 12 HOURS SCHEDULED
Status: DISCONTINUED | OUTPATIENT
Start: 2018-01-01 | End: 2018-01-01 | Stop reason: HOSPADM

## 2018-01-01 RX ORDER — LORAZEPAM 2 MG/ML
2 INJECTION INTRAMUSCULAR
Status: DISCONTINUED | OUTPATIENT
Start: 2018-01-01 | End: 2018-01-01 | Stop reason: HOSPADM

## 2018-01-01 RX ORDER — FENTANYL CITRATE 50 UG/ML
50 INJECTION, SOLUTION INTRAMUSCULAR; INTRAVENOUS
Status: DISCONTINUED | OUTPATIENT
Start: 2018-01-01 | End: 2018-01-01 | Stop reason: HOSPADM

## 2018-01-01 RX ORDER — POTASSIUM CHLORIDE 750 MG/1
40 CAPSULE, EXTENDED RELEASE ORAL ONCE
Status: COMPLETED | OUTPATIENT
Start: 2018-01-01 | End: 2018-01-01

## 2018-01-01 RX ORDER — LORAZEPAM 2 MG/ML
2 INJECTION INTRAMUSCULAR
Status: CANCELLED | OUTPATIENT
Start: 2018-01-01 | End: 2018-11-09

## 2018-01-01 RX ORDER — ZOLPIDEM TARTRATE 5 MG/1
10 TABLET ORAL ONCE
Status: COMPLETED | OUTPATIENT
Start: 2018-01-01 | End: 2018-01-01

## 2018-01-01 RX ORDER — MIDODRINE HYDROCHLORIDE 5 MG/1
5 TABLET ORAL
Status: DISCONTINUED | OUTPATIENT
Start: 2018-01-01 | End: 2018-01-01 | Stop reason: HOSPADM

## 2018-01-01 RX ORDER — LORAZEPAM 2 MG/ML
0.5 CONCENTRATE ORAL
Status: DISCONTINUED | OUTPATIENT
Start: 2018-01-01 | End: 2018-10-31 | Stop reason: HOSPADM

## 2018-01-01 RX ORDER — DIPHENOXYLATE HYDROCHLORIDE AND ATROPINE SULFATE 2.5; .025 MG/1; MG/1
1 TABLET ORAL
Status: DISCONTINUED | OUTPATIENT
Start: 2018-01-01 | End: 2018-10-31 | Stop reason: HOSPADM

## 2018-01-01 RX ORDER — HEPARIN SODIUM 1000 [USP'U]/ML
3000 INJECTION, SOLUTION INTRAVENOUS; SUBCUTANEOUS AS NEEDED
Status: DISCONTINUED | OUTPATIENT
Start: 2018-01-01 | End: 2018-01-01 | Stop reason: HOSPADM

## 2018-01-01 RX ORDER — TIZANIDINE 4 MG/1
TABLET ORAL
Refills: 2 | COMMUNITY
Start: 2018-01-01 | End: 2018-01-01 | Stop reason: HOSPADM

## 2018-01-01 RX ORDER — MEPERIDINE HYDROCHLORIDE 25 MG/ML
25 INJECTION INTRAMUSCULAR; INTRAVENOUS; SUBCUTANEOUS EVERY 4 HOURS PRN
Status: ACTIVE | OUTPATIENT
Start: 2018-01-01 | End: 2018-01-01

## 2018-01-01 RX ORDER — BACITRACIN ZINC 500 [IU]/G
OINTMENT TOPICAL
COMMUNITY
Start: 2018-01-01

## 2018-01-01 RX ORDER — OXYCODONE HYDROCHLORIDE 10 MG/1
10 TABLET ORAL EVERY 4 HOURS PRN
Qty: 360 TABLET | Refills: 0 | Status: SHIPPED | OUTPATIENT
Start: 2018-01-01

## 2018-01-01 RX ORDER — POTASSIUM CHLORIDE 750 MG/1
40 CAPSULE, EXTENDED RELEASE ORAL
Status: DISCONTINUED | OUTPATIENT
Start: 2018-01-01 | End: 2018-01-01

## 2018-01-01 RX ORDER — SODIUM CHLORIDE 9 MG/ML
30 INJECTION, SOLUTION INTRAVENOUS CONTINUOUS
Status: DISCONTINUED | OUTPATIENT
Start: 2018-01-01 | End: 2018-01-01

## 2018-01-01 RX ORDER — SUCRALFATE ORAL 1 G/10ML
1 SUSPENSION ORAL EVERY 6 HOURS SCHEDULED
Status: DISCONTINUED | OUTPATIENT
Start: 2018-01-01 | End: 2018-01-01 | Stop reason: HOSPADM

## 2018-01-01 RX ORDER — CARVEDILOL 25 MG/1
25 TABLET ORAL 2 TIMES DAILY WITH MEALS
Status: DISCONTINUED | OUTPATIENT
Start: 2018-01-01 | End: 2018-01-01

## 2018-01-01 RX ORDER — ACETAMINOPHEN 325 MG/1
650 TABLET ORAL EVERY 4 HOURS PRN
Status: DISCONTINUED | OUTPATIENT
Start: 2018-01-01 | End: 2018-01-01 | Stop reason: HOSPADM

## 2018-01-01 RX ORDER — GLYCOPYRROLATE 0.2 MG/ML
0.2 INJECTION INTRAMUSCULAR; INTRAVENOUS
Status: DISCONTINUED | OUTPATIENT
Start: 2018-01-01 | End: 2018-10-31 | Stop reason: HOSPADM

## 2018-01-01 RX ORDER — MORPHINE SULFATE 20 MG/ML
20 SOLUTION ORAL
Status: DISCONTINUED | OUTPATIENT
Start: 2018-01-01 | End: 2018-10-31 | Stop reason: HOSPADM

## 2018-01-01 RX ORDER — ONDANSETRON 4 MG/1
4 TABLET, FILM COATED ORAL EVERY 6 HOURS PRN
COMMUNITY

## 2018-01-01 RX ORDER — SODIUM CHLORIDE AND POTASSIUM CHLORIDE 150; 900 MG/100ML; MG/100ML
75 INJECTION, SOLUTION INTRAVENOUS CONTINUOUS
Status: DISCONTINUED | OUTPATIENT
Start: 2018-01-01 | End: 2018-01-01

## 2018-01-01 RX ORDER — METOLAZONE 5 MG/1
10 TABLET ORAL ONCE
Status: COMPLETED | OUTPATIENT
Start: 2018-01-01 | End: 2018-01-01

## 2018-01-01 RX ORDER — MORPHINE SULFATE 20 MG/ML
5 SOLUTION ORAL
Status: DISCONTINUED | OUTPATIENT
Start: 2018-01-01 | End: 2018-01-01 | Stop reason: HOSPADM

## 2018-01-01 RX ORDER — ZOLPIDEM TARTRATE 5 MG/1
10 TABLET ORAL NIGHTLY PRN
Status: DISPENSED | OUTPATIENT
Start: 2018-01-01 | End: 2018-01-01

## 2018-01-01 RX ORDER — DOPAMINE HYDROCHLORIDE 160 MG/100ML
2-20 INJECTION, SOLUTION INTRAVENOUS CONTINUOUS PRN
Status: DISCONTINUED | OUTPATIENT
Start: 2018-01-01 | End: 2018-01-01

## 2018-01-01 RX ORDER — ACETAMINOPHEN 160 MG/5ML
650 SOLUTION ORAL EVERY 4 HOURS PRN
Status: DISCONTINUED | OUTPATIENT
Start: 2018-01-01 | End: 2018-10-31 | Stop reason: HOSPADM

## 2018-01-01 RX ORDER — FLUCONAZOLE 200 MG/1
200 TABLET ORAL ONCE
Status: COMPLETED | OUTPATIENT
Start: 2018-01-01 | End: 2018-01-01

## 2018-01-01 RX ORDER — MORPHINE SULFATE 20 MG/ML
10 SOLUTION ORAL
Status: DISCONTINUED | OUTPATIENT
Start: 2018-01-01 | End: 2018-01-01 | Stop reason: HOSPADM

## 2018-01-01 RX ORDER — POTASSIUM CHLORIDE 20 MEQ/1
TABLET, EXTENDED RELEASE ORAL
Refills: 11 | COMMUNITY
Start: 2018-01-01 | End: 2018-01-01 | Stop reason: HOSPADM

## 2018-01-01 RX ORDER — WARFARIN SODIUM 4 MG/1
4 TABLET ORAL
Status: DISCONTINUED | OUTPATIENT
Start: 2018-01-01 | End: 2018-01-01

## 2018-01-01 RX ORDER — BISACODYL 10 MG
10 SUPPOSITORY, RECTAL RECTAL ONCE
Status: DISCONTINUED | OUTPATIENT
Start: 2018-01-01 | End: 2018-01-01 | Stop reason: HOSPADM

## 2018-01-01 RX ORDER — ACETAMINOPHEN 325 MG/1
650 TABLET ORAL EVERY 4 HOURS PRN
Status: CANCELLED | OUTPATIENT
Start: 2018-01-01

## 2018-01-01 RX ORDER — TERAZOSIN 2 MG/1
2 CAPSULE ORAL NIGHTLY
Status: DISCONTINUED | OUTPATIENT
Start: 2018-01-01 | End: 2018-01-01 | Stop reason: HOSPADM

## 2018-01-01 RX ORDER — FERROUS SULFATE 325(65) MG
325 TABLET ORAL
Status: DISCONTINUED | OUTPATIENT
Start: 2018-01-01 | End: 2018-01-01 | Stop reason: HOSPADM

## 2018-01-01 RX ORDER — OXYCODONE AND ACETAMINOPHEN 7.5; 325 MG/1; MG/1
1 TABLET ORAL ONCE AS NEEDED
Status: DISCONTINUED | OUTPATIENT
Start: 2018-01-01 | End: 2018-01-01

## 2018-01-01 RX ORDER — MORPHINE SULFATE 20 MG/ML
5 SOLUTION ORAL
Status: CANCELLED | OUTPATIENT
Start: 2018-01-01 | End: 2018-11-09

## 2018-01-01 RX ORDER — DIPHENOXYLATE HYDROCHLORIDE AND ATROPINE SULFATE 2.5; .025 MG/1; MG/1
1 TABLET ORAL
Status: CANCELLED | OUTPATIENT
Start: 2018-01-01

## 2018-01-01 RX ORDER — IPRATROPIUM BROMIDE AND ALBUTEROL SULFATE 2.5; .5 MG/3ML; MG/3ML
3 SOLUTION RESPIRATORY (INHALATION) EVERY 4 HOURS PRN
Status: DISCONTINUED | OUTPATIENT
Start: 2018-01-01 | End: 2018-01-01

## 2018-01-01 RX ORDER — SPIRONOLACTONE 100 MG/1
100 TABLET, FILM COATED ORAL DAILY
Qty: 30 TABLET | Refills: 0 | Status: SHIPPED | OUTPATIENT
Start: 2018-01-01 | End: 2018-01-01 | Stop reason: HOSPADM

## 2018-01-01 RX ORDER — NITROGLYCERIN 0.4 MG/1
0.4 TABLET SUBLINGUAL
Status: DISCONTINUED | OUTPATIENT
Start: 2018-01-01 | End: 2018-01-01

## 2018-01-01 RX ORDER — MILRINONE LACTATE 0.2 MG/ML
.25-.75 INJECTION, SOLUTION INTRAVENOUS CONTINUOUS PRN
Status: DISCONTINUED | OUTPATIENT
Start: 2018-01-01 | End: 2018-01-01

## 2018-01-01 RX ORDER — FLUTICASONE PROPIONATE 50 MCG
2 SPRAY, SUSPENSION (ML) NASAL DAILY
Qty: 1 BOTTLE | Refills: 11 | Status: SHIPPED | OUTPATIENT
Start: 2018-01-01

## 2018-01-01 RX ORDER — HYDROCODONE BITARTRATE AND ACETAMINOPHEN 5; 325 MG/1; MG/1
2 TABLET ORAL EVERY 4 HOURS PRN
Status: DISPENSED | OUTPATIENT
Start: 2018-01-01 | End: 2018-01-01

## 2018-01-01 RX ORDER — LIDOCAINE HYDROCHLORIDE 10 MG/ML
20 INJECTION, SOLUTION INFILTRATION; PERINEURAL ONCE
Status: COMPLETED | OUTPATIENT
Start: 2018-01-01 | End: 2018-01-01

## 2018-01-01 RX ORDER — SODIUM CHLORIDE 0.9 % (FLUSH) 0.9 %
1-10 SYRINGE (ML) INJECTION AS NEEDED
Status: DISCONTINUED | OUTPATIENT
Start: 2018-01-01 | End: 2018-01-01 | Stop reason: HOSPADM

## 2018-01-01 RX ORDER — EPHEDRINE SULFATE 50 MG/ML
5 INJECTION, SOLUTION INTRAVENOUS ONCE AS NEEDED
Status: DISCONTINUED | OUTPATIENT
Start: 2018-01-01 | End: 2018-01-01

## 2018-01-01 RX ORDER — PROPOFOL 10 MG/ML
VIAL (ML) INTRAVENOUS CONTINUOUS PRN
Status: DISCONTINUED | OUTPATIENT
Start: 2018-01-01 | End: 2018-01-01 | Stop reason: SURG

## 2018-01-01 RX ORDER — MIDAZOLAM HYDROCHLORIDE 1 MG/ML
2 INJECTION INTRAMUSCULAR; INTRAVENOUS
Status: DISCONTINUED | OUTPATIENT
Start: 2018-01-01 | End: 2018-01-01 | Stop reason: HOSPADM

## 2018-01-01 RX ORDER — MORPHINE SULFATE 10 MG/ML
4 INJECTION INTRAMUSCULAR; INTRAVENOUS; SUBCUTANEOUS EVERY 4 HOURS PRN
Status: DISCONTINUED | OUTPATIENT
Start: 2018-01-01 | End: 2018-01-01

## 2018-01-01 RX ORDER — SILDENAFIL CITRATE 20 MG/1
10 TABLET ORAL EVERY 8 HOURS SCHEDULED
Status: DISCONTINUED | OUTPATIENT
Start: 2018-01-01 | End: 2018-01-01

## 2018-01-01 RX ORDER — POTASSIUM CHLORIDE 7.45 MG/ML
10 INJECTION INTRAVENOUS
Status: COMPLETED | OUTPATIENT
Start: 2018-01-01 | End: 2018-01-01

## 2018-01-01 RX ORDER — CYCLOBENZAPRINE HCL 10 MG
10 TABLET ORAL EVERY 8 HOURS PRN
Status: DISCONTINUED | OUTPATIENT
Start: 2018-01-01 | End: 2018-01-01 | Stop reason: HOSPADM

## 2018-01-01 RX ORDER — IPRATROPIUM BROMIDE AND ALBUTEROL SULFATE 2.5; .5 MG/3ML; MG/3ML
3 SOLUTION RESPIRATORY (INHALATION)
Status: DISCONTINUED | OUTPATIENT
Start: 2018-01-01 | End: 2018-01-01

## 2018-01-01 RX ORDER — TOLVAPTAN 30 MG/1
15 TABLET ORAL ONCE
Status: COMPLETED | OUTPATIENT
Start: 2018-01-01 | End: 2018-01-01

## 2018-01-01 RX ORDER — ACETAMINOPHEN 650 MG/1
650 SUPPOSITORY RECTAL EVERY 4 HOURS PRN
Status: DISCONTINUED | OUTPATIENT
Start: 2018-01-01 | End: 2018-01-01 | Stop reason: HOSPADM

## 2018-01-01 RX ORDER — CHLORHEXIDINE GLUCONATE 500 MG/1
1 CLOTH TOPICAL EVERY 12 HOURS PRN
Status: CANCELLED | OUTPATIENT
Start: 2018-01-01

## 2018-01-01 RX ORDER — WARFARIN SODIUM 3 MG/1
3 TABLET ORAL
Status: COMPLETED | OUTPATIENT
Start: 2018-01-01 | End: 2018-01-01

## 2018-01-01 RX ORDER — CEFAZOLIN SODIUM 2 G/100ML
INJECTION, SOLUTION INTRAVENOUS AS NEEDED
Status: DISCONTINUED | OUTPATIENT
Start: 2018-01-01 | End: 2018-01-01 | Stop reason: SURG

## 2018-01-01 RX ORDER — DIAPER,BRIEF,INFANT-TODD,DISP
EACH MISCELLANEOUS EVERY 12 HOURS SCHEDULED
Status: DISCONTINUED | OUTPATIENT
Start: 2018-01-01 | End: 2018-01-01 | Stop reason: HOSPADM

## 2018-01-01 RX ORDER — PANTOPRAZOLE SODIUM 40 MG/1
40 TABLET, DELAYED RELEASE ORAL DAILY
Status: DISCONTINUED | OUTPATIENT
Start: 2018-01-01 | End: 2018-01-01

## 2018-01-01 RX ORDER — MANNITOL 250 MG/ML
50 INJECTION, SOLUTION INTRAVENOUS AS NEEDED
Status: DISCONTINUED | OUTPATIENT
Start: 2018-01-01 | End: 2018-01-01

## 2018-01-01 RX ORDER — ALBUMIN (HUMAN) 12.5 G/50ML
50 SOLUTION INTRAVENOUS ONCE
Status: DISCONTINUED | OUTPATIENT
Start: 2018-01-01 | End: 2018-01-01

## 2018-01-01 RX ORDER — SPIRONOLACTONE 25 MG/1
25 TABLET ORAL DAILY
Status: DISCONTINUED | OUTPATIENT
Start: 2018-01-01 | End: 2018-01-01

## 2018-01-01 RX ORDER — MORPHINE SULFATE 2 MG/ML
1 INJECTION, SOLUTION INTRAMUSCULAR; INTRAVENOUS EVERY 4 HOURS PRN
Status: DISCONTINUED | OUTPATIENT
Start: 2018-01-01 | End: 2018-01-01

## 2018-01-01 RX ORDER — CARVEDILOL 6.25 MG/1
6.25 TABLET ORAL EVERY 12 HOURS SCHEDULED
Status: DISCONTINUED | OUTPATIENT
Start: 2018-01-01 | End: 2018-01-01

## 2018-01-01 RX ORDER — LORAZEPAM 2 MG/ML
2 INJECTION INTRAMUSCULAR
Status: DISCONTINUED | OUTPATIENT
Start: 2018-01-01 | End: 2018-10-31 | Stop reason: HOSPADM

## 2018-01-01 RX ORDER — SODIUM CHLORIDE 9 MG/ML
INJECTION, SOLUTION INTRAVENOUS CONTINUOUS PRN
Status: DISCONTINUED | OUTPATIENT
Start: 2018-01-01 | End: 2018-01-01 | Stop reason: SURG

## 2018-01-01 RX ORDER — OXYCODONE HYDROCHLORIDE 10 MG/1
10 TABLET ORAL EVERY 4 HOURS PRN
Qty: 60 TABLET | Refills: 0 | Status: SHIPPED | OUTPATIENT
Start: 2018-01-01 | End: 2018-01-01

## 2018-01-01 RX ORDER — ALBUMIN (HUMAN) 12.5 G/50ML
25 SOLUTION INTRAVENOUS
Status: COMPLETED | OUTPATIENT
Start: 2018-01-01 | End: 2018-01-01

## 2018-01-01 RX ORDER — LORAZEPAM 2 MG/ML
1 CONCENTRATE ORAL
Status: DISCONTINUED | OUTPATIENT
Start: 2018-01-01 | End: 2018-01-01 | Stop reason: HOSPADM

## 2018-01-01 RX ORDER — LEVOTHYROXINE SODIUM 0.05 MG/1
50 TABLET ORAL
Status: DISCONTINUED | OUTPATIENT
Start: 2018-01-01 | End: 2018-01-01 | Stop reason: HOSPADM

## 2018-01-01 RX ORDER — TRANEXAMIC ACID 100 MG/ML
INJECTION, SOLUTION INTRAVENOUS AS NEEDED
Status: DISCONTINUED | OUTPATIENT
Start: 2018-01-01 | End: 2018-01-01 | Stop reason: SURG

## 2018-01-01 RX ORDER — PANTOPRAZOLE SODIUM 40 MG/1
40 TABLET, DELAYED RELEASE ORAL DAILY
Status: DISCONTINUED | OUTPATIENT
Start: 2018-01-01 | End: 2018-01-01 | Stop reason: HOSPADM

## 2018-01-01 RX ORDER — PROMETHAZINE HYDROCHLORIDE 25 MG/ML
12.5 INJECTION, SOLUTION INTRAMUSCULAR; INTRAVENOUS EVERY 6 HOURS PRN
Status: DISCONTINUED | OUTPATIENT
Start: 2018-01-01 | End: 2018-01-01 | Stop reason: HOSPADM

## 2018-01-01 RX ORDER — ALUMINA, MAGNESIA, AND SIMETHICONE 2400; 2400; 240 MG/30ML; MG/30ML; MG/30ML
15 SUSPENSION ORAL EVERY 6 HOURS PRN
Status: CANCELLED | OUTPATIENT
Start: 2018-01-01

## 2018-01-01 RX ORDER — WARFARIN SODIUM 10 MG/1
10 TABLET ORAL
Status: DISCONTINUED | OUTPATIENT
Start: 2018-01-01 | End: 2018-01-01

## 2018-01-01 RX ORDER — GLYCOPYRROLATE 0.2 MG/ML
0.2 INJECTION INTRAMUSCULAR; INTRAVENOUS
Status: CANCELLED | OUTPATIENT
Start: 2018-01-01

## 2018-01-01 RX ORDER — FLUTICASONE PROPIONATE 50 MCG
2 SPRAY, SUSPENSION (ML) NASAL DAILY
Status: DISCONTINUED | OUTPATIENT
Start: 2018-01-01 | End: 2018-01-01 | Stop reason: HOSPADM

## 2018-01-01 RX ORDER — GUAIFENESIN 600 MG/1
600 TABLET, EXTENDED RELEASE ORAL 2 TIMES DAILY
Status: DISCONTINUED | OUTPATIENT
Start: 2018-01-01 | End: 2018-01-01

## 2018-01-01 RX ORDER — MONTELUKAST SODIUM 10 MG/1
10 TABLET ORAL DAILY
Status: DISCONTINUED | OUTPATIENT
Start: 2018-01-01 | End: 2018-01-01

## 2018-01-01 RX ORDER — LEVOTHYROXINE SODIUM 0.05 MG/1
50 TABLET ORAL DAILY
Qty: 90 TABLET | Refills: 3 | Status: SHIPPED | OUTPATIENT
Start: 2018-01-01

## 2018-01-01 RX ORDER — MONTELUKAST SODIUM 10 MG/1
10 TABLET ORAL DAILY
COMMUNITY

## 2018-01-01 RX ORDER — DEXTROSE MONOHYDRATE 25 G/50ML
25 INJECTION, SOLUTION INTRAVENOUS
Status: DISCONTINUED | OUTPATIENT
Start: 2018-01-01 | End: 2018-01-01 | Stop reason: HOSPADM

## 2018-01-01 RX ORDER — POTASSIUM CHLORIDE 750 MG/1
40 CAPSULE, EXTENDED RELEASE ORAL ONCE
Status: DISCONTINUED | OUTPATIENT
Start: 2018-01-01 | End: 2018-01-01

## 2018-01-01 RX ORDER — BUDESONIDE AND FORMOTEROL FUMARATE DIHYDRATE 160; 4.5 UG/1; UG/1
2 AEROSOL RESPIRATORY (INHALATION) DAILY
Status: DISCONTINUED | OUTPATIENT
Start: 2018-01-01 | End: 2018-01-01 | Stop reason: HOSPADM

## 2018-01-01 RX ORDER — MAGNESIUM SULFATE 1 G/100ML
1 INJECTION INTRAVENOUS AS NEEDED
Status: DISCONTINUED | OUTPATIENT
Start: 2018-01-01 | End: 2018-01-01 | Stop reason: HOSPADM

## 2018-01-01 RX ORDER — FLUMAZENIL 0.1 MG/ML
0.2 INJECTION INTRAVENOUS AS NEEDED
Status: DISCONTINUED | OUTPATIENT
Start: 2018-01-01 | End: 2018-01-01

## 2018-01-01 RX ORDER — METOLAZONE 5 MG/1
5 TABLET ORAL DAILY
Status: DISCONTINUED | OUTPATIENT
Start: 2018-01-01 | End: 2018-01-01 | Stop reason: HOSPADM

## 2018-01-01 RX ORDER — ALBUMIN (HUMAN) 12.5 G/50ML
50 SOLUTION INTRAVENOUS EVERY 12 HOURS
Status: COMPLETED | OUTPATIENT
Start: 2018-01-01 | End: 2018-01-01

## 2018-01-01 RX ORDER — HEPARIN SODIUM 5000 [USP'U]/ML
5000 INJECTION, SOLUTION INTRAVENOUS; SUBCUTANEOUS EVERY 8 HOURS SCHEDULED
Status: DISCONTINUED | OUTPATIENT
Start: 2018-01-01 | End: 2018-01-01

## 2018-01-01 RX ORDER — LABETALOL HYDROCHLORIDE 5 MG/ML
5 INJECTION, SOLUTION INTRAVENOUS
Status: DISCONTINUED | OUTPATIENT
Start: 2018-01-01 | End: 2018-01-01

## 2018-01-01 RX ORDER — BUMETANIDE 2 MG/1
2 TABLET ORAL 2 TIMES DAILY
Status: DISCONTINUED | OUTPATIENT
Start: 2018-01-01 | End: 2018-01-01

## 2018-01-01 RX ORDER — GUAIFENESIN 600 MG/1
600 TABLET, EXTENDED RELEASE ORAL 2 TIMES DAILY
Status: DISCONTINUED | OUTPATIENT
Start: 2018-01-01 | End: 2018-01-01 | Stop reason: HOSPADM

## 2018-01-01 RX ORDER — POTASSIUM CHLORIDE 1.5 G/1.77G
40 POWDER, FOR SOLUTION ORAL AS NEEDED
Status: DISCONTINUED | OUTPATIENT
Start: 2018-01-01 | End: 2018-01-01 | Stop reason: HOSPADM

## 2018-01-01 RX ORDER — GLYCOPYRROLATE 0.2 MG/ML
0.2 INJECTION INTRAMUSCULAR; INTRAVENOUS
Status: DISCONTINUED | OUTPATIENT
Start: 2018-01-01 | End: 2018-01-01 | Stop reason: HOSPADM

## 2018-01-01 RX ORDER — ACETAMINOPHEN 325 MG/1
650 TABLET ORAL EVERY 6 HOURS PRN
Status: DISCONTINUED | OUTPATIENT
Start: 2018-01-01 | End: 2018-01-01

## 2018-01-01 RX ORDER — ZOLPIDEM TARTRATE 10 MG/1
TABLET ORAL
Refills: 2 | COMMUNITY
Start: 2018-01-01

## 2018-01-01 RX ORDER — LACTULOSE 10 G/15ML
20 SOLUTION ORAL DAILY
Status: DISCONTINUED | OUTPATIENT
Start: 2018-01-01 | End: 2018-01-01 | Stop reason: HOSPADM

## 2018-01-01 RX ORDER — MAGNESIUM SULFATE HEPTAHYDRATE 40 MG/ML
2 INJECTION, SOLUTION INTRAVENOUS AS NEEDED
Status: DISCONTINUED | OUTPATIENT
Start: 2018-01-01 | End: 2018-01-01 | Stop reason: HOSPADM

## 2018-01-01 RX ORDER — DEXTROSE MONOHYDRATE 25 G/50ML
50 INJECTION, SOLUTION INTRAVENOUS
Status: DISCONTINUED | OUTPATIENT
Start: 2018-01-01 | End: 2018-01-01 | Stop reason: HOSPADM

## 2018-01-01 RX ORDER — POTASSIUM CHLORIDE 750 MG/1
10 TABLET, FILM COATED, EXTENDED RELEASE ORAL DAILY
Qty: 30 TABLET | Refills: 0 | OUTPATIENT
Start: 2018-01-01

## 2018-01-01 RX ORDER — LORAZEPAM 2 MG/ML
2 CONCENTRATE ORAL
Status: CANCELLED | OUTPATIENT
Start: 2018-01-01 | End: 2018-11-09

## 2018-01-01 RX ORDER — MORPHINE SULFATE 4 MG/ML
1 INJECTION, SOLUTION INTRAMUSCULAR; INTRAVENOUS
Status: CANCELLED | OUTPATIENT
Start: 2018-01-01 | End: 2018-11-09

## 2018-01-01 RX ORDER — SODIUM CHLORIDE 9 MG/ML
125 INJECTION, SOLUTION INTRAVENOUS CONTINUOUS
Status: DISCONTINUED | OUTPATIENT
Start: 2018-01-01 | End: 2018-01-01

## 2018-01-01 RX ORDER — TORSEMIDE 20 MG/1
20 TABLET ORAL DAILY
Status: DISCONTINUED | OUTPATIENT
Start: 2018-01-01 | End: 2018-01-01

## 2018-01-01 RX ORDER — DIPHENOXYLATE HYDROCHLORIDE AND ATROPINE SULFATE 2.5; .025 MG/1; MG/1
1 TABLET ORAL
Status: DISCONTINUED | OUTPATIENT
Start: 2018-01-01 | End: 2018-01-01 | Stop reason: HOSPADM

## 2018-01-01 RX ORDER — METOLAZONE 5 MG/1
5 TABLET ORAL ONCE
Status: COMPLETED | OUTPATIENT
Start: 2018-01-01 | End: 2018-01-01

## 2018-01-01 RX ORDER — POTASSIUM CHLORIDE 7.45 MG/ML
10 INJECTION INTRAVENOUS
Status: DISCONTINUED | OUTPATIENT
Start: 2018-01-01 | End: 2018-01-01 | Stop reason: HOSPADM

## 2018-01-01 RX ORDER — LORAZEPAM 2 MG/ML
1 CONCENTRATE ORAL
Status: DISCONTINUED | OUTPATIENT
Start: 2018-01-01 | End: 2018-01-01 | Stop reason: SDUPTHER

## 2018-01-01 RX ORDER — SODIUM CHLORIDE 0.9 % (FLUSH) 0.9 %
10 SYRINGE (ML) INJECTION AS NEEDED
Status: DISCONTINUED | OUTPATIENT
Start: 2018-01-01 | End: 2018-10-31 | Stop reason: HOSPADM

## 2018-01-01 RX ORDER — DIPHENHYDRAMINE HCL 25 MG
25 CAPSULE ORAL EVERY 6 HOURS PRN
Status: DISCONTINUED | OUTPATIENT
Start: 2018-01-01 | End: 2018-01-01 | Stop reason: HOSPADM

## 2018-01-01 RX ORDER — HYDROCODONE BITARTRATE AND ACETAMINOPHEN 10; 325 MG/1; MG/1
TABLET ORAL
Qty: 120 TABLET | Refills: 0 | Status: SHIPPED | OUTPATIENT
Start: 2018-01-01

## 2018-01-01 RX ORDER — MIDODRINE HYDROCHLORIDE 5 MG/1
5 TABLET ORAL
Qty: 270 TABLET | Refills: 3 | Status: SHIPPED | OUTPATIENT
Start: 2018-01-01

## 2018-01-01 RX ORDER — MORPHINE SULFATE 4 MG/ML
2 INJECTION, SOLUTION INTRAMUSCULAR; INTRAVENOUS
Status: CANCELLED | OUTPATIENT
Start: 2018-01-01 | End: 2018-11-09

## 2018-01-01 RX ORDER — LORAZEPAM 2 MG/ML
1 INJECTION INTRAMUSCULAR
Status: DISCONTINUED | OUTPATIENT
Start: 2018-01-01 | End: 2018-10-31 | Stop reason: HOSPADM

## 2018-01-01 RX ORDER — ZOLPIDEM TARTRATE 5 MG/1
5 TABLET ORAL NIGHTLY PRN
Status: DISCONTINUED | OUTPATIENT
Start: 2018-01-01 | End: 2018-01-01

## 2018-01-01 RX ORDER — SUFENTANIL CITRATE 50 UG/ML
INJECTION EPIDURAL; INTRAVENOUS AS NEEDED
Status: DISCONTINUED | OUTPATIENT
Start: 2018-01-01 | End: 2018-01-01 | Stop reason: SURG

## 2018-01-01 RX ORDER — ALUMINA, MAGNESIA, AND SIMETHICONE 2400; 2400; 240 MG/30ML; MG/30ML; MG/30ML
15 SUSPENSION ORAL EVERY 6 HOURS PRN
Status: DISCONTINUED | OUTPATIENT
Start: 2018-01-01 | End: 2018-01-01 | Stop reason: HOSPADM

## 2018-01-01 RX ORDER — ONDANSETRON 2 MG/ML
4 INJECTION INTRAMUSCULAR; INTRAVENOUS EVERY 6 HOURS PRN
Status: DISCONTINUED | OUTPATIENT
Start: 2018-01-01 | End: 2018-01-01

## 2018-01-01 RX ORDER — TOLVAPTAN 15 MG/1
15 TABLET ORAL ONCE
Status: COMPLETED | OUTPATIENT
Start: 2018-01-01 | End: 2018-01-01

## 2018-01-01 RX ORDER — ACETAMINOPHEN 160 MG/5ML
650 SOLUTION ORAL EVERY 4 HOURS PRN
Status: DISCONTINUED | OUTPATIENT
Start: 2018-01-01 | End: 2018-01-01 | Stop reason: HOSPADM

## 2018-01-01 RX ORDER — METOLAZONE 5 MG/1
5 TABLET ORAL DAILY
Status: DISCONTINUED | OUTPATIENT
Start: 2018-01-01 | End: 2018-01-01

## 2018-01-01 RX ORDER — DEXTROSE MONOHYDRATE 25 G/50ML
INJECTION, SOLUTION INTRAVENOUS
Status: COMPLETED
Start: 2018-01-01 | End: 2018-01-01

## 2018-01-01 RX ORDER — NOREPINEPHRINE BITARTRATE 1 MG/ML
INJECTION, SOLUTION INTRAVENOUS CONTINUOUS PRN
Status: DISCONTINUED | OUTPATIENT
Start: 2018-01-01 | End: 2018-01-01 | Stop reason: SURG

## 2018-01-01 RX ORDER — GUAIFENESIN 600 MG/1
600 TABLET, EXTENDED RELEASE ORAL 2 TIMES DAILY
Qty: 60 TABLET | Refills: 0 | Status: SHIPPED | OUTPATIENT
Start: 2018-01-01 | End: 2018-01-01

## 2018-01-01 RX ORDER — SILDENAFIL CITRATE 20 MG/1
20 TABLET ORAL EVERY 8 HOURS SCHEDULED
Status: DISCONTINUED | OUTPATIENT
Start: 2018-01-01 | End: 2018-01-01

## 2018-01-01 RX ORDER — SODIUM CHLORIDE 9 MG/ML
100 INJECTION, SOLUTION INTRAVENOUS CONTINUOUS
Status: DISCONTINUED | OUTPATIENT
Start: 2018-01-01 | End: 2018-01-01

## 2018-01-01 RX ORDER — ACETAMINOPHEN 160 MG/5ML
650 SOLUTION ORAL EVERY 4 HOURS PRN
Status: CANCELLED | OUTPATIENT
Start: 2018-01-01

## 2018-01-01 RX ORDER — FENTANYL CITRATE 50 UG/ML
INJECTION, SOLUTION INTRAMUSCULAR; INTRAVENOUS AS NEEDED
Status: DISCONTINUED | OUTPATIENT
Start: 2018-01-01 | End: 2018-01-01 | Stop reason: SURG

## 2018-01-01 RX ORDER — OXYCODONE HYDROCHLORIDE 5 MG/1
10 TABLET ORAL EVERY 4 HOURS PRN
Status: DISPENSED | OUTPATIENT
Start: 2018-01-01 | End: 2018-01-01

## 2018-01-01 RX ORDER — WARFARIN SODIUM 5 MG/1
5 TABLET ORAL
Status: DISCONTINUED | OUTPATIENT
Start: 2018-01-01 | End: 2018-01-01

## 2018-01-01 RX ORDER — PROPOFOL 10 MG/ML
VIAL (ML) INTRAVENOUS AS NEEDED
Status: DISCONTINUED | OUTPATIENT
Start: 2018-01-01 | End: 2018-01-01 | Stop reason: SURG

## 2018-01-01 RX ORDER — MAGNESIUM SULFATE HEPTAHYDRATE 40 MG/ML
2 INJECTION, SOLUTION INTRAVENOUS AS NEEDED
Status: DISCONTINUED | OUTPATIENT
Start: 2018-01-01 | End: 2018-01-01

## 2018-01-01 RX ORDER — FAMOTIDINE 10 MG/ML
20 INJECTION, SOLUTION INTRAVENOUS ONCE
Status: COMPLETED | OUTPATIENT
Start: 2018-01-01 | End: 2018-01-01

## 2018-01-01 RX ORDER — BUMETANIDE 0.25 MG/ML
2 INJECTION INTRAMUSCULAR; INTRAVENOUS 2 TIMES DAILY
Status: DISCONTINUED | OUTPATIENT
Start: 2018-01-01 | End: 2018-01-01

## 2018-01-01 RX ORDER — BUDESONIDE AND FORMOTEROL FUMARATE DIHYDRATE 160; 4.5 UG/1; UG/1
2 AEROSOL RESPIRATORY (INHALATION)
Status: DISCONTINUED | OUTPATIENT
Start: 2018-01-01 | End: 2018-01-01 | Stop reason: HOSPADM

## 2018-01-01 RX ORDER — NALOXONE HCL 0.4 MG/ML
0.2 VIAL (ML) INJECTION AS NEEDED
Status: DISCONTINUED | OUTPATIENT
Start: 2018-01-01 | End: 2018-01-01 | Stop reason: HOSPADM

## 2018-01-01 RX ORDER — POTASSIUM CHLORIDE 750 MG/1
10 CAPSULE, EXTENDED RELEASE ORAL DAILY
Status: DISCONTINUED | OUTPATIENT
Start: 2018-01-01 | End: 2018-01-01

## 2018-01-01 RX ORDER — WARFARIN SODIUM 3 MG/1
3 TABLET ORAL
Status: DISCONTINUED | OUTPATIENT
Start: 2018-01-01 | End: 2018-01-01

## 2018-01-01 RX ORDER — ATORVASTATIN CALCIUM 10 MG/1
10 TABLET, FILM COATED ORAL NIGHTLY
Status: DISCONTINUED | OUTPATIENT
Start: 2018-01-01 | End: 2018-01-01 | Stop reason: HOSPADM

## 2018-01-01 RX ORDER — LORAZEPAM 2 MG/ML
1 CONCENTRATE ORAL
Status: CANCELLED | OUTPATIENT
Start: 2018-01-01 | End: 2018-11-09

## 2018-01-01 RX ORDER — ONDANSETRON 2 MG/ML
4 INJECTION INTRAMUSCULAR; INTRAVENOUS EVERY 6 HOURS PRN
Status: DISCONTINUED | OUTPATIENT
Start: 2018-01-01 | End: 2018-01-01 | Stop reason: HOSPADM

## 2018-01-01 RX ORDER — POTASSIUM CHLORIDE 750 MG/1
40 CAPSULE, EXTENDED RELEASE ORAL DAILY
Status: DISCONTINUED | OUTPATIENT
Start: 2018-01-01 | End: 2018-01-01

## 2018-01-01 RX ORDER — PANTOPRAZOLE SODIUM 40 MG/1
40 TABLET, DELAYED RELEASE ORAL DAILY
Qty: 30 TABLET | Refills: 0 | Status: ON HOLD | OUTPATIENT
Start: 2018-01-01 | End: 2018-01-01

## 2018-01-01 RX ORDER — LORAZEPAM 2 MG/ML
2 CONCENTRATE ORAL
Status: DISCONTINUED | OUTPATIENT
Start: 2018-01-01 | End: 2018-01-01 | Stop reason: SDUPTHER

## 2018-01-01 RX ORDER — CEFAZOLIN SODIUM 2 G/100ML
2 INJECTION, SOLUTION INTRAVENOUS ONCE
Status: COMPLETED | OUTPATIENT
Start: 2018-01-01 | End: 2018-01-01

## 2018-01-01 RX ORDER — POLYETHYLENE GLYCOL 3350 17 G/17G
17 POWDER, FOR SOLUTION ORAL DAILY
Status: DISCONTINUED | OUTPATIENT
Start: 2018-01-01 | End: 2018-01-01 | Stop reason: HOSPADM

## 2018-01-01 RX ORDER — SODIUM CHLORIDE 9 MG/ML
9 INJECTION, SOLUTION INTRAVENOUS CONTINUOUS PRN
Status: DISCONTINUED | OUTPATIENT
Start: 2018-01-01 | End: 2018-01-01 | Stop reason: HOSPADM

## 2018-01-01 RX ORDER — ASPIRIN 81 MG/1
81 TABLET ORAL DAILY
Status: DISCONTINUED | OUTPATIENT
Start: 2018-01-01 | End: 2018-01-01 | Stop reason: HOSPADM

## 2018-01-01 RX ORDER — MORPHINE SULFATE 20 MG/ML
20 SOLUTION ORAL
Status: DISCONTINUED | OUTPATIENT
Start: 2018-01-01 | End: 2018-01-01 | Stop reason: HOSPADM

## 2018-01-01 RX ORDER — ALBUMIN (HUMAN) 12.5 G/50ML
12.5 SOLUTION INTRAVENOUS AS NEEDED
Status: DISCONTINUED | OUTPATIENT
Start: 2018-01-01 | End: 2018-01-01 | Stop reason: SDUPTHER

## 2018-01-01 RX ORDER — CEPHALEXIN 250 MG/5ML
500 POWDER, FOR SUSPENSION ORAL EVERY 12 HOURS SCHEDULED
Status: DISCONTINUED | OUTPATIENT
Start: 2018-01-01 | End: 2018-01-01 | Stop reason: HOSPADM

## 2018-01-01 RX ORDER — LORAZEPAM 2 MG/ML
1 INJECTION INTRAMUSCULAR
Status: DISCONTINUED | OUTPATIENT
Start: 2018-01-01 | End: 2018-01-01 | Stop reason: HOSPADM

## 2018-01-01 RX ORDER — DOCUSATE SODIUM 100 MG/1
100 CAPSULE, LIQUID FILLED ORAL 2 TIMES DAILY
COMMUNITY

## 2018-01-01 RX ORDER — ONDANSETRON 2 MG/ML
4 INJECTION INTRAMUSCULAR; INTRAVENOUS EVERY 6 HOURS PRN
Status: DISCONTINUED | OUTPATIENT
Start: 2018-01-01 | End: 2018-10-31 | Stop reason: HOSPADM

## 2018-01-01 RX ORDER — MORPHINE SULFATE 10 MG/ML
6 INJECTION INTRAMUSCULAR; INTRAVENOUS; SUBCUTANEOUS
Status: DISCONTINUED | OUTPATIENT
Start: 2018-01-01 | End: 2018-01-01 | Stop reason: HOSPADM

## 2018-01-01 RX ORDER — ACETAMINOPHEN 325 MG/1
650 TABLET ORAL EVERY 4 HOURS PRN
Status: DISCONTINUED | OUTPATIENT
Start: 2018-01-01 | End: 2018-01-01 | Stop reason: SDUPTHER

## 2018-01-01 RX ORDER — WARFARIN SODIUM 2.5 MG/1
2.5 TABLET ORAL
Status: DISCONTINUED | OUTPATIENT
Start: 2018-01-01 | End: 2018-01-01

## 2018-01-01 RX ORDER — POTASSIUM CHLORIDE 1500 MG/1
20 TABLET, FILM COATED, EXTENDED RELEASE ORAL TAKE AS DIRECTED
Qty: 90 TABLET | Refills: 11 | Status: SHIPPED | OUTPATIENT
Start: 2018-01-01 | End: 2018-01-01 | Stop reason: HOSPADM

## 2018-01-01 RX ORDER — SPIRONOLACTONE 50 MG/1
50 TABLET, FILM COATED ORAL DAILY
Status: DISCONTINUED | OUTPATIENT
Start: 2018-01-01 | End: 2018-01-01

## 2018-01-01 RX ORDER — IPRATROPIUM BROMIDE AND ALBUTEROL SULFATE 2.5; .5 MG/3ML; MG/3ML
3 SOLUTION RESPIRATORY (INHALATION) EVERY 6 HOURS PRN
Status: DISCONTINUED | OUTPATIENT
Start: 2018-01-01 | End: 2018-10-31 | Stop reason: HOSPADM

## 2018-01-01 RX ORDER — NICOTINE POLACRILEX 4 MG
15 LOZENGE BUCCAL
Status: DISCONTINUED | OUTPATIENT
Start: 2018-01-01 | End: 2018-01-01 | Stop reason: HOSPADM

## 2018-01-01 RX ORDER — MONTELUKAST SODIUM 10 MG/1
10 TABLET ORAL DAILY
Status: DISCONTINUED | OUTPATIENT
Start: 2018-01-01 | End: 2018-01-01 | Stop reason: HOSPADM

## 2018-01-01 RX ORDER — MORPHINE SULFATE 10 MG/ML
6 INJECTION INTRAMUSCULAR; INTRAVENOUS; SUBCUTANEOUS
Status: DISCONTINUED | OUTPATIENT
Start: 2018-01-01 | End: 2018-01-01 | Stop reason: SDUPTHER

## 2018-01-01 RX ORDER — DIPHENHYDRAMINE HCL 25 MG
25 CAPSULE ORAL NIGHTLY PRN
Status: DISCONTINUED | OUTPATIENT
Start: 2018-01-01 | End: 2018-01-01

## 2018-01-01 RX ORDER — SPIRONOLACTONE 25 MG/1
25 TABLET ORAL ONCE
Status: COMPLETED | OUTPATIENT
Start: 2018-01-01 | End: 2018-01-01

## 2018-01-01 RX ORDER — HEPARIN SODIUM 1000 [USP'U]/ML
10000 INJECTION, SOLUTION INTRAVENOUS; SUBCUTANEOUS
Status: DISPENSED | OUTPATIENT
Start: 2018-01-01 | End: 2018-01-01

## 2018-01-01 RX ORDER — LORAZEPAM 2 MG/ML
2 CONCENTRATE ORAL
Status: DISCONTINUED | OUTPATIENT
Start: 2018-01-01 | End: 2018-01-01 | Stop reason: HOSPADM

## 2018-01-01 RX ORDER — CEFTRIAXONE SODIUM 1 G/50ML
1 INJECTION, SOLUTION INTRAVENOUS EVERY 24 HOURS
Status: DISCONTINUED | OUTPATIENT
Start: 2018-01-01 | End: 2018-01-01

## 2018-01-01 RX ORDER — WARFARIN SODIUM 1 MG/1
1 TABLET ORAL
Status: DISCONTINUED | OUTPATIENT
Start: 2018-01-01 | End: 2018-01-01

## 2018-01-01 RX ORDER — MAGNESIUM HYDROXIDE 1200 MG/15ML
LIQUID ORAL AS NEEDED
Status: DISCONTINUED | OUTPATIENT
Start: 2018-01-01 | End: 2018-01-01 | Stop reason: HOSPADM

## 2018-01-01 RX ORDER — BISACODYL 10 MG
10 SUPPOSITORY, RECTAL RECTAL DAILY PRN
Status: DISCONTINUED | OUTPATIENT
Start: 2018-01-01 | End: 2018-01-01 | Stop reason: HOSPADM

## 2018-01-01 RX ORDER — MAGNESIUM SULFATE HEPTAHYDRATE 40 MG/ML
4 INJECTION, SOLUTION INTRAVENOUS AS NEEDED
Status: DISCONTINUED | OUTPATIENT
Start: 2018-01-01 | End: 2018-01-01

## 2018-01-01 RX ORDER — ECHINACEA PURPUREA EXTRACT 125 MG
1 TABLET ORAL AS NEEDED
Status: DISCONTINUED | OUTPATIENT
Start: 2018-01-01 | End: 2018-01-01

## 2018-01-01 RX ORDER — TORSEMIDE 20 MG/1
40 TABLET ORAL DAILY
Status: DISCONTINUED | OUTPATIENT
Start: 2018-01-01 | End: 2018-01-01

## 2018-01-01 RX ORDER — SPIRONOLACTONE 50 MG/1
50 TABLET, FILM COATED ORAL
Status: DISCONTINUED | OUTPATIENT
Start: 2018-01-01 | End: 2018-01-01

## 2018-01-01 RX ORDER — SODIUM FERRIC GLUCONATE COMPLEX IN SUCROSE 12.5 MG/ML
125 INJECTION INTRAVENOUS ONCE
Status: DISCONTINUED | OUTPATIENT
Start: 2018-01-01 | End: 2018-01-01

## 2018-01-01 RX ORDER — ACETAMINOPHEN 650 MG/1
650 SUPPOSITORY RECTAL EVERY 4 HOURS PRN
Status: DISCONTINUED | OUTPATIENT
Start: 2018-01-01 | End: 2018-01-01 | Stop reason: SDUPTHER

## 2018-01-01 RX ORDER — LORAZEPAM 2 MG/ML
0.5 CONCENTRATE ORAL
Status: CANCELLED | OUTPATIENT
Start: 2018-01-01 | End: 2018-11-09

## 2018-01-01 RX ORDER — LORAZEPAM 2 MG/ML
0.5 INJECTION INTRAMUSCULAR
Status: CANCELLED | OUTPATIENT
Start: 2018-01-01 | End: 2018-11-08

## 2018-01-01 RX ORDER — SENNA AND DOCUSATE SODIUM 50; 8.6 MG/1; MG/1
2 TABLET, FILM COATED ORAL NIGHTLY
Status: DISCONTINUED | OUTPATIENT
Start: 2018-01-01 | End: 2018-01-01 | Stop reason: HOSPADM

## 2018-01-01 RX ORDER — SPIRONOLACTONE 100 MG/1
100 TABLET, FILM COATED ORAL DAILY
Status: DISCONTINUED | OUTPATIENT
Start: 2018-01-01 | End: 2018-01-01

## 2018-01-01 RX ORDER — ECHINACEA PURPUREA EXTRACT 125 MG
2 TABLET ORAL AS NEEDED
Status: DISCONTINUED | OUTPATIENT
Start: 2018-01-01 | End: 2018-01-01 | Stop reason: HOSPADM

## 2018-01-01 RX ORDER — ACETAMINOPHEN 650 MG/1
650 SUPPOSITORY RECTAL EVERY 4 HOURS PRN
Status: CANCELLED | OUTPATIENT
Start: 2018-01-01

## 2018-01-01 RX ORDER — CHLORHEXIDINE GLUCONATE 500 MG/1
1 CLOTH TOPICAL EVERY 12 HOURS
Status: COMPLETED | OUTPATIENT
Start: 2018-01-01 | End: 2018-01-01

## 2018-01-01 RX ORDER — PROMETHAZINE HYDROCHLORIDE 25 MG/1
12.5 TABLET ORAL EVERY 6 HOURS PRN
Status: DISCONTINUED | OUTPATIENT
Start: 2018-01-01 | End: 2018-01-01 | Stop reason: HOSPADM

## 2018-01-01 RX ORDER — POTASSIUM CHLORIDE 29.8 MG/ML
20 INJECTION INTRAVENOUS
Status: DISCONTINUED | OUTPATIENT
Start: 2018-01-01 | End: 2018-01-01

## 2018-01-01 RX ORDER — SODIUM CHLORIDE 0.9 % (FLUSH) 0.9 %
1-10 SYRINGE (ML) INJECTION AS NEEDED
Status: DISCONTINUED | OUTPATIENT
Start: 2018-01-01 | End: 2018-10-31 | Stop reason: HOSPADM

## 2018-01-01 RX ORDER — ONDANSETRON 4 MG/1
4 TABLET, FILM COATED ORAL EVERY 6 HOURS PRN
Status: DISCONTINUED | OUTPATIENT
Start: 2018-01-01 | End: 2018-01-01

## 2018-01-01 RX ORDER — FENTANYL CITRATE 50 UG/ML
INJECTION, SOLUTION INTRAMUSCULAR; INTRAVENOUS AS NEEDED
Status: DISCONTINUED | OUTPATIENT
Start: 2018-01-01 | End: 2018-01-01 | Stop reason: HOSPADM

## 2018-01-01 RX ORDER — GUAIFENESIN 200 MG/1
600 TABLET ORAL 2 TIMES DAILY
COMMUNITY

## 2018-01-01 RX ORDER — WARFARIN SODIUM 2 MG/1
2 TABLET ORAL
Status: COMPLETED | OUTPATIENT
Start: 2018-01-01 | End: 2018-01-01

## 2018-01-01 RX ORDER — POTASSIUM CHLORIDE 750 MG/1
10 TABLET, FILM COATED, EXTENDED RELEASE ORAL DAILY
Qty: 30 TABLET | Refills: 0 | Status: SHIPPED | OUTPATIENT
Start: 2018-01-01 | End: 2018-01-01 | Stop reason: ALTCHOICE

## 2018-01-01 RX ORDER — FLUMAZENIL 0.1 MG/ML
0.2 INJECTION INTRAVENOUS AS NEEDED
Status: DISCONTINUED | OUTPATIENT
Start: 2018-01-01 | End: 2018-01-01 | Stop reason: HOSPADM

## 2018-01-01 RX ORDER — POTASSIUM CHLORIDE 29.8 MG/ML
20 INJECTION INTRAVENOUS
Status: COMPLETED | OUTPATIENT
Start: 2018-01-01 | End: 2018-01-01

## 2018-01-01 RX ORDER — ZOLPIDEM TARTRATE 5 MG/1
10 TABLET ORAL NIGHTLY PRN
Status: DISCONTINUED | OUTPATIENT
Start: 2018-01-01 | End: 2018-01-01

## 2018-01-01 RX ORDER — LORAZEPAM 2 MG/ML
0.5 CONCENTRATE ORAL
Status: CANCELLED | OUTPATIENT
Start: 2018-01-01 | End: 2018-11-08

## 2018-01-01 RX ORDER — PANTOPRAZOLE SODIUM 40 MG/1
40 TABLET, DELAYED RELEASE ORAL DAILY
Qty: 30 TABLET | Refills: 0 | Status: SHIPPED | OUTPATIENT
Start: 2018-01-01 | End: 2018-01-01

## 2018-01-01 RX ORDER — ONDANSETRON 4 MG/1
4 TABLET, ORALLY DISINTEGRATING ORAL EVERY 6 HOURS PRN
Status: DISCONTINUED | OUTPATIENT
Start: 2018-01-01 | End: 2018-01-01 | Stop reason: HOSPADM

## 2018-01-01 RX ORDER — SPIRONOLACTONE 50 MG/1
50 TABLET, FILM COATED ORAL ONCE
Status: COMPLETED | OUTPATIENT
Start: 2018-01-01 | End: 2018-01-01

## 2018-01-01 RX ORDER — DIPHENHYDRAMINE HYDROCHLORIDE 50 MG/ML
12.5 INJECTION INTRAMUSCULAR; INTRAVENOUS
Status: DISCONTINUED | OUTPATIENT
Start: 2018-01-01 | End: 2018-01-01 | Stop reason: HOSPADM

## 2018-01-01 RX ORDER — LORAZEPAM 2 MG/ML
0.5 INJECTION INTRAMUSCULAR
Status: DISCONTINUED | OUTPATIENT
Start: 2018-01-01 | End: 2018-01-01 | Stop reason: HOSPADM

## 2018-01-01 RX ORDER — ACETAMINOPHEN 160 MG/5ML
650 SOLUTION ORAL EVERY 4 HOURS PRN
Status: DISCONTINUED | OUTPATIENT
Start: 2018-01-01 | End: 2018-01-01 | Stop reason: SDUPTHER

## 2018-01-01 RX ORDER — HEPARIN SODIUM 1000 [USP'U]/ML
INJECTION, SOLUTION INTRAVENOUS; SUBCUTANEOUS AS NEEDED
Status: DISCONTINUED | OUTPATIENT
Start: 2018-01-01 | End: 2018-01-01 | Stop reason: HOSPADM

## 2018-01-01 RX ORDER — TORSEMIDE 100 MG/1
100 TABLET ORAL DAILY
Status: DISCONTINUED | OUTPATIENT
Start: 2018-01-01 | End: 2018-01-01

## 2018-01-01 RX ORDER — PANTOPRAZOLE SODIUM 40 MG/1
40 TABLET, DELAYED RELEASE ORAL DAILY
COMMUNITY

## 2018-01-01 RX ORDER — BUMETANIDE 0.25 MG/ML
1 INJECTION INTRAMUSCULAR; INTRAVENOUS ONCE
Status: COMPLETED | OUTPATIENT
Start: 2018-01-01 | End: 2018-01-01

## 2018-01-01 RX ORDER — TORSEMIDE 20 MG/1
20 TABLET ORAL
Status: DISCONTINUED | OUTPATIENT
Start: 2018-01-01 | End: 2018-01-01

## 2018-01-01 RX ORDER — BUMETANIDE 0.25 MG/ML
4 INJECTION INTRAMUSCULAR; INTRAVENOUS ONCE
Status: COMPLETED | OUTPATIENT
Start: 2018-01-01 | End: 2018-01-01

## 2018-01-01 RX ORDER — ONDANSETRON 2 MG/ML
4 INJECTION INTRAMUSCULAR; INTRAVENOUS ONCE AS NEEDED
Status: DISCONTINUED | OUTPATIENT
Start: 2018-01-01 | End: 2018-01-01 | Stop reason: HOSPADM

## 2018-01-01 RX ORDER — MIDAZOLAM HYDROCHLORIDE 1 MG/ML
INJECTION INTRAMUSCULAR; INTRAVENOUS AS NEEDED
Status: DISCONTINUED | OUTPATIENT
Start: 2018-01-01 | End: 2018-01-01 | Stop reason: HOSPADM

## 2018-01-01 RX ORDER — CEPHALEXIN 250 MG/5ML
250 POWDER, FOR SUSPENSION ORAL EVERY 8 HOURS SCHEDULED
Status: DISCONTINUED | OUTPATIENT
Start: 2018-01-01 | End: 2018-01-01

## 2018-01-01 RX ORDER — SCOLOPAMINE TRANSDERMAL SYSTEM 1 MG/1
1 PATCH, EXTENDED RELEASE TRANSDERMAL
Status: CANCELLED | OUTPATIENT
Start: 2018-01-01

## 2018-01-01 RX ORDER — SODIUM CHLORIDE 0.9 % (FLUSH) 0.9 %
3 SYRINGE (ML) INJECTION EVERY 12 HOURS SCHEDULED
Status: CANCELLED | OUTPATIENT
Start: 2018-01-01

## 2018-01-01 RX ORDER — LORAZEPAM 2 MG/ML
0.5 CONCENTRATE ORAL
Status: DISCONTINUED | OUTPATIENT
Start: 2018-01-01 | End: 2018-01-01 | Stop reason: HOSPADM

## 2018-01-01 RX ORDER — MIDAZOLAM HYDROCHLORIDE 1 MG/ML
INJECTION INTRAMUSCULAR; INTRAVENOUS AS NEEDED
Status: DISCONTINUED | OUTPATIENT
Start: 2018-01-01 | End: 2018-01-01 | Stop reason: SURG

## 2018-01-01 RX ORDER — SODIUM CHLORIDE 0.9 % (FLUSH) 0.9 %
30 SYRINGE (ML) INJECTION ONCE AS NEEDED
Status: DISCONTINUED | OUTPATIENT
Start: 2018-01-01 | End: 2018-01-01

## 2018-01-01 RX ORDER — SODIUM CHLORIDE 9 MG/ML
125 INJECTION, SOLUTION INTRAVENOUS CONTINUOUS
Status: ACTIVE | OUTPATIENT
Start: 2018-01-01 | End: 2018-01-01

## 2018-01-01 RX ORDER — LORAZEPAM 2 MG/ML
1 INJECTION INTRAMUSCULAR
Status: DISCONTINUED | OUTPATIENT
Start: 2018-01-01 | End: 2018-01-01 | Stop reason: SDUPTHER

## 2018-01-01 RX ORDER — LORAZEPAM 2 MG/ML
1 CONCENTRATE ORAL
Status: CANCELLED | OUTPATIENT
Start: 2018-01-01 | End: 2018-11-08

## 2018-01-01 RX ORDER — SCOLOPAMINE TRANSDERMAL SYSTEM 1 MG/1
1 PATCH, EXTENDED RELEASE TRANSDERMAL
Status: DISCONTINUED | OUTPATIENT
Start: 2018-01-01 | End: 2018-01-01 | Stop reason: HOSPADM

## 2018-01-01 RX ORDER — MORPHINE SULFATE 20 MG/ML
20 SOLUTION ORAL
Status: CANCELLED | OUTPATIENT
Start: 2018-01-01 | End: 2018-11-09

## 2018-01-01 RX ORDER — MIDODRINE HYDROCHLORIDE 5 MG/1
10 TABLET ORAL 3 TIMES DAILY
Status: DISCONTINUED | OUTPATIENT
Start: 2018-01-01 | End: 2018-01-01

## 2018-01-01 RX ORDER — POTASSIUM CHLORIDE 750 MG/1
40 CAPSULE, EXTENDED RELEASE ORAL AS NEEDED
Status: DISCONTINUED | OUTPATIENT
Start: 2018-01-01 | End: 2018-01-01

## 2018-01-01 RX ORDER — OXYCODONE HYDROCHLORIDE 5 MG/1
TABLET ORAL
Qty: 6 TABLET | Refills: 0 | Status: SHIPPED | OUTPATIENT
Start: 2018-01-01

## 2018-01-01 RX ORDER — SODIUM CHLORIDE 0.9 % (FLUSH) 0.9 %
1-10 SYRINGE (ML) INJECTION AS NEEDED
Status: CANCELLED | OUTPATIENT
Start: 2018-01-01

## 2018-01-01 RX ORDER — HEPARIN SODIUM 1000 [USP'U]/ML
4000 INJECTION, SOLUTION INTRAVENOUS; SUBCUTANEOUS ONCE
Status: COMPLETED | OUTPATIENT
Start: 2018-01-01 | End: 2018-01-01

## 2018-01-01 RX ORDER — ALPRAZOLAM 0.25 MG/1
0.25 TABLET ORAL EVERY 8 HOURS PRN
Status: DISCONTINUED | OUTPATIENT
Start: 2018-01-01 | End: 2018-01-01

## 2018-01-01 RX ORDER — TORSEMIDE 100 MG/1
100 TABLET ORAL DAILY
Qty: 30 TABLET | Refills: 11 | Status: SHIPPED | OUTPATIENT
Start: 2018-01-01

## 2018-01-01 RX ORDER — MORPHINE SULFATE 20 MG/ML
10 SOLUTION ORAL
Status: CANCELLED | OUTPATIENT
Start: 2018-01-01 | End: 2018-11-09

## 2018-01-01 RX ORDER — ASPIRIN 81 MG/1
81 TABLET ORAL DAILY
Qty: 90 TABLET | Refills: 3 | Status: SHIPPED | OUTPATIENT
Start: 2018-01-01

## 2018-01-01 RX ORDER — SODIUM CHLORIDE 0.9 % (FLUSH) 0.9 %
3 SYRINGE (ML) INJECTION EVERY 12 HOURS SCHEDULED
Status: DISCONTINUED | OUTPATIENT
Start: 2018-01-01 | End: 2018-10-31 | Stop reason: HOSPADM

## 2018-01-01 RX ORDER — DEXTROSE MONOHYDRATE 100 MG/ML
20 INJECTION, SOLUTION INTRAVENOUS CONTINUOUS
Status: DISCONTINUED | OUTPATIENT
Start: 2018-01-01 | End: 2018-01-01

## 2018-01-01 RX ORDER — TORSEMIDE 100 MG/1
100 TABLET ORAL DAILY
Status: DISCONTINUED | OUTPATIENT
Start: 2018-01-01 | End: 2018-01-01 | Stop reason: HOSPADM

## 2018-01-01 RX ORDER — TERAZOSIN 2 MG/1
2 CAPSULE ORAL NIGHTLY
Status: DISCONTINUED | OUTPATIENT
Start: 2018-01-01 | End: 2018-01-01

## 2018-01-01 RX ORDER — MORPHINE SULFATE 4 MG/ML
2 INJECTION, SOLUTION INTRAMUSCULAR; INTRAVENOUS
Status: DISCONTINUED | OUTPATIENT
Start: 2018-01-01 | End: 2018-01-01 | Stop reason: HOSPADM

## 2018-01-01 RX ORDER — SODIUM CHLORIDE 0.9 % (FLUSH) 0.9 %
3 SYRINGE (ML) INJECTION EVERY 12 HOURS SCHEDULED
Status: DISCONTINUED | OUTPATIENT
Start: 2018-01-01 | End: 2018-01-01 | Stop reason: HOSPADM

## 2018-01-01 RX ORDER — CHLORHEXIDINE GLUCONATE 0.12 MG/ML
15 RINSE ORAL ONCE
Status: CANCELLED | OUTPATIENT
Start: 2018-01-01 | End: 2018-01-01

## 2018-01-01 RX ORDER — IPRATROPIUM BROMIDE AND ALBUTEROL SULFATE 2.5; .5 MG/3ML; MG/3ML
3 SOLUTION RESPIRATORY (INHALATION) EVERY 6 HOURS PRN
Status: DISCONTINUED | OUTPATIENT
Start: 2018-01-01 | End: 2018-01-01 | Stop reason: HOSPADM

## 2018-01-01 RX ORDER — SODIUM CHLORIDE 9 MG/ML
30 INJECTION, SOLUTION INTRAVENOUS CONTINUOUS PRN
Status: DISCONTINUED | OUTPATIENT
Start: 2018-01-01 | End: 2018-01-01

## 2018-01-01 RX ORDER — CHLORHEXIDINE GLUCONATE 0.12 MG/ML
15 RINSE ORAL EVERY 12 HOURS SCHEDULED
Status: COMPLETED | OUTPATIENT
Start: 2018-01-01 | End: 2018-01-01

## 2018-01-01 RX ORDER — LORAZEPAM 2 MG/ML
2 CONCENTRATE ORAL
Status: CANCELLED | OUTPATIENT
Start: 2018-01-01 | End: 2018-11-08

## 2018-01-01 RX ORDER — ONDANSETRON 2 MG/ML
INJECTION INTRAMUSCULAR; INTRAVENOUS AS NEEDED
Status: DISCONTINUED | OUTPATIENT
Start: 2018-01-01 | End: 2018-01-01 | Stop reason: SURG

## 2018-01-01 RX ORDER — MORPHINE SULFATE 10 MG/ML
6 INJECTION INTRAMUSCULAR; INTRAVENOUS; SUBCUTANEOUS
Status: CANCELLED | OUTPATIENT
Start: 2018-01-01 | End: 2018-11-09

## 2018-01-01 RX ORDER — ALBUMIN (HUMAN) 12.5 G/50ML
12.5 SOLUTION INTRAVENOUS AS NEEDED
Status: CANCELLED | OUTPATIENT
Start: 2018-01-01 | End: 2018-01-01

## 2018-01-01 RX ORDER — GUAIFENESIN 600 MG/1
1200 TABLET, EXTENDED RELEASE ORAL EVERY 12 HOURS SCHEDULED
Status: DISCONTINUED | OUTPATIENT
Start: 2018-01-01 | End: 2018-01-01 | Stop reason: HOSPADM

## 2018-01-01 RX ORDER — NALOXONE HCL 0.4 MG/ML
0.4 VIAL (ML) INJECTION
Status: DISCONTINUED | OUTPATIENT
Start: 2018-01-01 | End: 2018-01-01 | Stop reason: HOSPADM

## 2018-01-01 RX ORDER — ALBUMIN (HUMAN) 12.5 G/50ML
12.5 SOLUTION INTRAVENOUS ONCE
Status: DISCONTINUED | OUTPATIENT
Start: 2018-01-01 | End: 2018-01-01

## 2018-01-01 RX ORDER — POTASSIUM CHLORIDE 7.45 MG/ML
10 INJECTION INTRAVENOUS
Status: DISCONTINUED | OUTPATIENT
Start: 2018-01-01 | End: 2018-01-01

## 2018-01-01 RX ORDER — MORPHINE SULFATE 2 MG/ML
4 INJECTION, SOLUTION INTRAMUSCULAR; INTRAVENOUS
Status: ACTIVE | OUTPATIENT
Start: 2018-01-01 | End: 2018-01-01

## 2018-01-01 RX ORDER — LABETALOL HYDROCHLORIDE 5 MG/ML
5 INJECTION, SOLUTION INTRAVENOUS
Status: DISCONTINUED | OUTPATIENT
Start: 2018-01-01 | End: 2018-01-01 | Stop reason: HOSPADM

## 2018-01-01 RX ORDER — CEFAZOLIN SODIUM IN 0.9 % NACL 3 G/100 ML
3 INTRAVENOUS SOLUTION, PIGGYBACK (ML) INTRAVENOUS ONCE
Status: CANCELLED | OUTPATIENT
Start: 2018-01-01 | End: 2018-01-01

## 2018-01-01 RX ORDER — MORPHINE SULFATE 4 MG/ML
1 INJECTION, SOLUTION INTRAMUSCULAR; INTRAVENOUS
Status: DISCONTINUED | OUTPATIENT
Start: 2018-01-01 | End: 2018-10-31 | Stop reason: HOSPADM

## 2018-01-01 RX ORDER — ALBUMIN (HUMAN) 12.5 G/50ML
50 SOLUTION INTRAVENOUS EVERY 6 HOURS
Status: DISCONTINUED | OUTPATIENT
Start: 2018-01-01 | End: 2018-01-01

## 2018-01-01 RX ORDER — LIDOCAINE HYDROCHLORIDE 10 MG/ML
0.5 INJECTION, SOLUTION EPIDURAL; INFILTRATION; INTRACAUDAL; PERINEURAL ONCE AS NEEDED
Status: DISCONTINUED | OUTPATIENT
Start: 2018-01-01 | End: 2018-01-01 | Stop reason: HOSPADM

## 2018-01-01 RX ORDER — POTASSIUM CHLORIDE 750 MG/1
20 CAPSULE, EXTENDED RELEASE ORAL EVERY 8 HOURS
Status: COMPLETED | OUTPATIENT
Start: 2018-01-01 | End: 2018-01-01

## 2018-01-01 RX ORDER — DIPHENOXYLATE HYDROCHLORIDE AND ATROPINE SULFATE 2.5; .025 MG/1; MG/1
1 TABLET ORAL
Status: DISCONTINUED | OUTPATIENT
Start: 2018-01-01 | End: 2018-01-01 | Stop reason: SDUPTHER

## 2018-01-01 RX ORDER — IPRATROPIUM BROMIDE AND ALBUTEROL SULFATE 2.5; .5 MG/3ML; MG/3ML
SOLUTION RESPIRATORY (INHALATION)
Status: COMPLETED
Start: 2018-01-01 | End: 2018-01-01

## 2018-01-01 RX ORDER — OXYCODONE AND ACETAMINOPHEN 10; 325 MG/1; MG/1
TABLET ORAL
Qty: 6 TABLET | Refills: 0 | Status: SHIPPED | OUTPATIENT
Start: 2018-01-01

## 2018-01-01 RX ORDER — HYDROMORPHONE HCL 110MG/55ML
1.5 PATIENT CONTROLLED ANALGESIA SYRINGE INTRAVENOUS
Status: DISCONTINUED | OUTPATIENT
Start: 2018-01-01 | End: 2018-01-01 | Stop reason: HOSPADM

## 2018-01-01 RX ORDER — MIDAZOLAM HYDROCHLORIDE 1 MG/ML
2 INJECTION INTRAMUSCULAR; INTRAVENOUS
Status: DISCONTINUED | OUTPATIENT
Start: 2018-01-01 | End: 2018-01-01

## 2018-01-01 RX ORDER — ALBUMIN (HUMAN) 12.5 G/50ML
12.5 SOLUTION INTRAVENOUS AS NEEDED
Status: DISCONTINUED | OUTPATIENT
Start: 2018-01-01 | End: 2018-01-01

## 2018-01-01 RX ORDER — LORAZEPAM 2 MG/ML
1 INJECTION INTRAMUSCULAR
Status: CANCELLED | OUTPATIENT
Start: 2018-01-01 | End: 2018-11-08

## 2018-01-01 RX ORDER — SODIUM CHLORIDE 0.9 % (FLUSH) 0.9 %
10 SYRINGE (ML) INJECTION AS NEEDED
Status: CANCELLED | OUTPATIENT
Start: 2018-01-01

## 2018-01-01 RX ORDER — GLYCOPYRROLATE 0.2 MG/ML
0.4 INJECTION INTRAMUSCULAR; INTRAVENOUS
Status: DISCONTINUED | OUTPATIENT
Start: 2018-01-01 | End: 2018-01-01 | Stop reason: HOSPADM

## 2018-01-01 RX ORDER — HYDROMORPHONE HCL 110MG/55ML
1.5 PATIENT CONTROLLED ANALGESIA SYRINGE INTRAVENOUS
Status: CANCELLED | OUTPATIENT
Start: 2018-01-01 | End: 2018-11-09

## 2018-01-01 RX ORDER — GLYCOPYRROLATE 0.2 MG/ML
0.4 INJECTION INTRAMUSCULAR; INTRAVENOUS
Status: DISCONTINUED | OUTPATIENT
Start: 2018-01-01 | End: 2018-10-31 | Stop reason: HOSPADM

## 2018-01-01 RX ORDER — MIDAZOLAM HYDROCHLORIDE 1 MG/ML
1 INJECTION INTRAMUSCULAR; INTRAVENOUS
Status: DISCONTINUED | OUTPATIENT
Start: 2018-01-01 | End: 2018-01-01 | Stop reason: HOSPADM

## 2018-01-01 RX ORDER — HEPARIN SODIUM 5000 [USP'U]/ML
5000 INJECTION, SOLUTION INTRAVENOUS; SUBCUTANEOUS TAKE AS DIRECTED
Status: DISCONTINUED | OUTPATIENT
Start: 2018-01-01 | End: 2018-01-01 | Stop reason: HOSPADM

## 2018-01-01 RX ORDER — POTASSIUM CHLORIDE 750 MG/1
40 CAPSULE, EXTENDED RELEASE ORAL AS NEEDED
Status: DISCONTINUED | OUTPATIENT
Start: 2018-01-01 | End: 2018-01-01 | Stop reason: HOSPADM

## 2018-01-01 RX ORDER — PROTAMINE SULFATE 10 MG/ML
INJECTION, SOLUTION INTRAVENOUS AS NEEDED
Status: DISCONTINUED | OUTPATIENT
Start: 2018-01-01 | End: 2018-01-01 | Stop reason: HOSPADM

## 2018-01-01 RX ORDER — ALUMINA, MAGNESIA, AND SIMETHICONE 2400; 2400; 240 MG/30ML; MG/30ML; MG/30ML
15 SUSPENSION ORAL EVERY 6 HOURS PRN
Status: DISCONTINUED | OUTPATIENT
Start: 2018-01-01 | End: 2018-10-31 | Stop reason: HOSPADM

## 2018-01-01 RX ORDER — HYDROMORPHONE HCL 110MG/55ML
1.5 PATIENT CONTROLLED ANALGESIA SYRINGE INTRAVENOUS
Status: DISCONTINUED | OUTPATIENT
Start: 2018-01-01 | End: 2018-10-31 | Stop reason: HOSPADM

## 2018-01-01 RX ORDER — CARVEDILOL 3.12 MG/1
3.12 TABLET ORAL
Status: COMPLETED | OUTPATIENT
Start: 2018-01-01 | End: 2018-01-01

## 2018-01-01 RX ORDER — LORAZEPAM 2 MG/ML
2 INJECTION INTRAMUSCULAR
Status: DISCONTINUED | OUTPATIENT
Start: 2018-01-01 | End: 2018-01-01 | Stop reason: SDUPTHER

## 2018-01-01 RX ORDER — WARFARIN SODIUM 6 MG/1
6 TABLET ORAL
Status: COMPLETED | OUTPATIENT
Start: 2018-01-01 | End: 2018-01-01

## 2018-01-01 RX ORDER — POTASSIUM CHLORIDE 750 MG/1
40 CAPSULE, EXTENDED RELEASE ORAL
Status: DISCONTINUED | OUTPATIENT
Start: 2018-01-01 | End: 2018-01-01 | Stop reason: HOSPADM

## 2018-01-01 RX ORDER — SODIUM CHLORIDE 9 MG/ML
INJECTION, SOLUTION INTRAVENOUS CONTINUOUS PRN
Status: COMPLETED | OUTPATIENT
Start: 2018-01-01 | End: 2018-01-01

## 2018-01-01 RX ORDER — SCOLOPAMINE TRANSDERMAL SYSTEM 1 MG/1
1 PATCH, EXTENDED RELEASE TRANSDERMAL
Status: DISCONTINUED | OUTPATIENT
Start: 2018-01-01 | End: 2018-10-31 | Stop reason: HOSPADM

## 2018-01-01 RX ORDER — FLUCONAZOLE 150 MG/1
150 TABLET ORAL EVERY 24 HOURS
Status: COMPLETED | OUTPATIENT
Start: 2018-01-01 | End: 2018-01-01

## 2018-01-01 RX ORDER — DEXTROSE MONOHYDRATE 25 G/50ML
50 INJECTION, SOLUTION INTRAVENOUS
Status: DISCONTINUED | OUTPATIENT
Start: 2018-01-01 | End: 2018-10-31 | Stop reason: HOSPADM

## 2018-01-01 RX ORDER — GLYCOPYRROLATE 0.2 MG/ML
0.4 INJECTION INTRAMUSCULAR; INTRAVENOUS
Status: CANCELLED | OUTPATIENT
Start: 2018-01-01

## 2018-01-01 RX ORDER — CARVEDILOL 12.5 MG/1
12.5 TABLET ORAL 2 TIMES DAILY WITH MEALS
Status: DISCONTINUED | OUTPATIENT
Start: 2018-01-01 | End: 2018-01-01

## 2018-01-01 RX ORDER — ACETAMINOPHEN 650 MG/1
650 SUPPOSITORY RECTAL EVERY 4 HOURS PRN
Status: DISCONTINUED | OUTPATIENT
Start: 2018-01-01 | End: 2018-10-31 | Stop reason: HOSPADM

## 2018-01-01 RX ORDER — LOPERAMIDE HYDROCHLORIDE 2 MG/1
2 CAPSULE ORAL AS NEEDED
COMMUNITY

## 2018-01-01 RX ORDER — TORSEMIDE 100 MG/1
100 TABLET ORAL DAILY
Qty: 60 TABLET | Refills: 11
Start: 2018-01-01 | End: 2018-01-01 | Stop reason: HOSPADM

## 2018-01-01 RX ORDER — ESCITALOPRAM OXALATE 10 MG/1
10 TABLET ORAL DAILY
COMMUNITY

## 2018-01-01 RX ORDER — MORPHINE SULFATE 4 MG/ML
1 INJECTION, SOLUTION INTRAMUSCULAR; INTRAVENOUS
Status: DISCONTINUED | OUTPATIENT
Start: 2018-01-01 | End: 2018-01-01 | Stop reason: HOSPADM

## 2018-01-01 RX ORDER — ALBUMIN (HUMAN) 12.5 G/50ML
50 SOLUTION INTRAVENOUS EVERY 6 HOURS
Status: COMPLETED | OUTPATIENT
Start: 2018-01-01 | End: 2018-01-01

## 2018-01-01 RX ORDER — MORPHINE SULFATE 2 MG/ML
1 INJECTION, SOLUTION INTRAMUSCULAR; INTRAVENOUS EVERY 4 HOURS PRN
Status: ACTIVE | OUTPATIENT
Start: 2018-01-01 | End: 2018-01-01

## 2018-01-01 RX ORDER — POTASSIUM CHLORIDE 7.45 MG/ML
10 INJECTION INTRAVENOUS
Status: DISPENSED | OUTPATIENT
Start: 2018-01-01 | End: 2018-01-01

## 2018-01-01 RX ORDER — IPRATROPIUM BROMIDE AND ALBUTEROL SULFATE 2.5; .5 MG/3ML; MG/3ML
3 SOLUTION RESPIRATORY (INHALATION) EVERY 4 HOURS PRN
Status: DISCONTINUED | OUTPATIENT
Start: 2018-01-01 | End: 2018-01-01 | Stop reason: HOSPADM

## 2018-01-01 RX ORDER — ASPIRIN 81 MG/1
81 TABLET, CHEWABLE ORAL DAILY
COMMUNITY
Start: 2018-01-01

## 2018-01-01 RX ORDER — ONDANSETRON 4 MG/1
4 TABLET, FILM COATED ORAL EVERY 6 HOURS PRN
Status: DISCONTINUED | OUTPATIENT
Start: 2018-01-01 | End: 2018-01-01 | Stop reason: HOSPADM

## 2018-01-01 RX ORDER — FENTANYL CITRATE 50 UG/ML
50 INJECTION, SOLUTION INTRAMUSCULAR; INTRAVENOUS
Status: DISCONTINUED | OUTPATIENT
Start: 2018-01-01 | End: 2018-01-01

## 2018-01-01 RX ORDER — HEPARIN SODIUM 1000 [USP'U]/ML
INJECTION, SOLUTION INTRAVENOUS; SUBCUTANEOUS AS NEEDED
Status: DISCONTINUED | OUTPATIENT
Start: 2018-01-01 | End: 2018-01-01 | Stop reason: SURG

## 2018-01-01 RX ORDER — POTASSIUM CHLORIDE 750 MG/1
20 CAPSULE, EXTENDED RELEASE ORAL
Status: DISCONTINUED | OUTPATIENT
Start: 2018-01-01 | End: 2018-01-01

## 2018-01-01 RX ORDER — SPIRONOLACTONE 100 MG/1
100 TABLET, FILM COATED ORAL DAILY
Status: DISCONTINUED | OUTPATIENT
Start: 2018-01-01 | End: 2018-01-01 | Stop reason: HOSPADM

## 2018-01-01 RX ORDER — HYDROCODONE BITARTRATE AND ACETAMINOPHEN 5; 325 MG/1; MG/1
1 TABLET ORAL EVERY 4 HOURS PRN
Status: DISCONTINUED | OUTPATIENT
Start: 2018-01-01 | End: 2018-01-01

## 2018-01-01 RX ORDER — BUMETANIDE 2 MG/1
2 TABLET ORAL DAILY
Status: DISCONTINUED | OUTPATIENT
Start: 2018-01-01 | End: 2018-01-01

## 2018-01-01 RX ORDER — POTASSIUM CHLORIDE 750 MG/1
40 CAPSULE, EXTENDED RELEASE ORAL
Qty: 360 CAPSULE | Refills: 0 | Status: SHIPPED | OUTPATIENT
Start: 2018-01-01 | End: 2018-01-01

## 2018-01-01 RX ORDER — FUROSEMIDE 10 MG/ML
40 INJECTION INTRAMUSCULAR; INTRAVENOUS EVERY 6 HOURS PRN
Status: DISCONTINUED | OUTPATIENT
Start: 2018-01-01 | End: 2018-01-01

## 2018-01-01 RX ORDER — INSULIN ASPART 100 [IU]/ML
INJECTION, SOLUTION INTRAVENOUS; SUBCUTANEOUS
Refills: 4 | COMMUNITY
Start: 2018-01-01 | End: 2018-01-01 | Stop reason: ALTCHOICE

## 2018-01-01 RX ORDER — NITROGLYCERIN 20 MG/100ML
5-200 INJECTION INTRAVENOUS
Status: DISCONTINUED | OUTPATIENT
Start: 2018-01-01 | End: 2018-01-01

## 2018-01-01 RX ORDER — DEXTROSE MONOHYDRATE 25 G/50ML
25 INJECTION, SOLUTION INTRAVENOUS
Status: DISCONTINUED | OUTPATIENT
Start: 2018-01-01 | End: 2018-01-01

## 2018-01-01 RX ORDER — TEMAZEPAM 15 MG/1
15 CAPSULE ORAL NIGHTLY PRN
Status: DISCONTINUED | OUTPATIENT
Start: 2018-01-01 | End: 2018-01-01

## 2018-01-01 RX ORDER — LIDOCAINE HYDROCHLORIDE AND EPINEPHRINE 10; 10 MG/ML; UG/ML
10 INJECTION, SOLUTION INFILTRATION; PERINEURAL ONCE
Status: COMPLETED | OUTPATIENT
Start: 2018-01-01 | End: 2018-01-01

## 2018-01-01 RX ORDER — LORAZEPAM 2 MG/ML
0.5 INJECTION INTRAMUSCULAR
Status: DISCONTINUED | OUTPATIENT
Start: 2018-01-01 | End: 2018-01-01 | Stop reason: SDUPTHER

## 2018-01-01 RX ORDER — DEXTROSE MONOHYDRATE 25 G/50ML
50 INJECTION, SOLUTION INTRAVENOUS
Status: CANCELLED | OUTPATIENT
Start: 2018-01-01

## 2018-01-01 RX ORDER — POTASSIUM CHLORIDE 750 MG/1
40 CAPSULE, EXTENDED RELEASE ORAL 2 TIMES DAILY WITH MEALS
Status: DISCONTINUED | OUTPATIENT
Start: 2018-01-01 | End: 2018-01-01

## 2018-01-01 RX ORDER — LORAZEPAM 2 MG/ML
0.5 INJECTION INTRAMUSCULAR
Status: DISCONTINUED | OUTPATIENT
Start: 2018-01-01 | End: 2018-10-31 | Stop reason: HOSPADM

## 2018-01-01 RX ORDER — HYDROCODONE BITARTRATE AND ACETAMINOPHEN 7.5; 325 MG/1; MG/1
1 TABLET ORAL ONCE AS NEEDED
Status: DISCONTINUED | OUTPATIENT
Start: 2018-01-01 | End: 2018-01-01

## 2018-01-01 RX ORDER — METOLAZONE 2.5 MG/1
2.5 TABLET ORAL EVERY OTHER DAY
Qty: 30 TABLET | Refills: 11 | Status: SHIPPED | OUTPATIENT
Start: 2018-01-01 | End: 2018-01-01 | Stop reason: SDUPTHER

## 2018-01-01 RX ORDER — ONDANSETRON 2 MG/ML
4 INJECTION INTRAMUSCULAR; INTRAVENOUS EVERY 6 HOURS PRN
Status: CANCELLED | OUTPATIENT
Start: 2018-01-01

## 2018-01-01 RX ORDER — FUROSEMIDE 10 MG/ML
40 INJECTION INTRAMUSCULAR; INTRAVENOUS ONCE
Status: COMPLETED | OUTPATIENT
Start: 2018-01-01 | End: 2018-01-01

## 2018-01-01 RX ORDER — CEFDINIR 300 MG/1
300 CAPSULE ORAL EVERY 12 HOURS SCHEDULED
Status: DISCONTINUED | OUTPATIENT
Start: 2018-01-01 | End: 2018-01-01 | Stop reason: HOSPADM

## 2018-01-01 RX ORDER — IPRATROPIUM BROMIDE AND ALBUTEROL SULFATE 2.5; .5 MG/3ML; MG/3ML
3 SOLUTION RESPIRATORY (INHALATION) EVERY 6 HOURS PRN
Status: CANCELLED | OUTPATIENT
Start: 2018-01-01

## 2018-01-01 RX ORDER — BISACODYL 5 MG/1
10 TABLET, DELAYED RELEASE ORAL DAILY PRN
Status: DISCONTINUED | OUTPATIENT
Start: 2018-01-01 | End: 2018-01-01 | Stop reason: HOSPADM

## 2018-01-01 RX ORDER — POTASSIUM CHLORIDE 750 MG/1
40 CAPSULE, EXTENDED RELEASE ORAL 4 TIMES DAILY
Status: DISCONTINUED | OUTPATIENT
Start: 2018-01-01 | End: 2018-01-01

## 2018-01-01 RX ORDER — METOCLOPRAMIDE HYDROCHLORIDE 5 MG/ML
10 INJECTION INTRAMUSCULAR; INTRAVENOUS EVERY 6 HOURS
Status: DISCONTINUED | OUTPATIENT
Start: 2018-01-01 | End: 2018-01-01

## 2018-01-01 RX ORDER — ALPRAZOLAM 0.25 MG/1
0.25 TABLET ORAL EVERY 8 HOURS PRN
Status: DISPENSED | OUTPATIENT
Start: 2018-01-01 | End: 2018-01-01

## 2018-01-01 RX ORDER — CEPHALEXIN 250 MG/5ML
500 POWDER, FOR SUSPENSION ORAL EVERY 12 HOURS SCHEDULED
Qty: 100 ML | Refills: 0 | Status: SHIPPED | OUTPATIENT
Start: 2018-01-01 | End: 2018-01-01

## 2018-01-01 RX ORDER — MORPHINE SULFATE 4 MG/ML
4 INJECTION, SOLUTION INTRAMUSCULAR; INTRAVENOUS
Status: DISCONTINUED | OUTPATIENT
Start: 2018-01-01 | End: 2018-10-31 | Stop reason: HOSPADM

## 2018-01-01 RX ORDER — MORPHINE SULFATE 4 MG/ML
4 INJECTION, SOLUTION INTRAMUSCULAR; INTRAVENOUS
Status: DISCONTINUED | OUTPATIENT
Start: 2018-01-01 | End: 2018-01-01 | Stop reason: HOSPADM

## 2018-01-01 RX ORDER — CEFDINIR 300 MG/1
300 CAPSULE ORAL EVERY 12 HOURS SCHEDULED
Qty: 4 CAPSULE | Refills: 0 | Status: SHIPPED | OUTPATIENT
Start: 2018-01-01 | End: 2018-01-01

## 2018-01-01 RX ORDER — LORAZEPAM 2 MG/ML
2 INJECTION INTRAMUSCULAR
Status: CANCELLED | OUTPATIENT
Start: 2018-01-01 | End: 2018-11-08

## 2018-01-01 RX ORDER — CARVEDILOL 25 MG/1
TABLET ORAL
Refills: 0 | COMMUNITY
Start: 2018-01-01 | End: 2018-01-01 | Stop reason: ALTCHOICE

## 2018-01-01 RX ORDER — BISACODYL 5 MG/1
10 TABLET, DELAYED RELEASE ORAL DAILY PRN
Status: DISCONTINUED | OUTPATIENT
Start: 2018-01-01 | End: 2018-01-01

## 2018-01-01 RX ORDER — MORPHINE SULFATE 20 MG/ML
5 SOLUTION ORAL
Status: DISCONTINUED | OUTPATIENT
Start: 2018-01-01 | End: 2018-10-31 | Stop reason: HOSPADM

## 2018-01-01 RX ORDER — METOLAZONE 5 MG/1
5 TABLET ORAL DAILY
Qty: 30 TABLET | Refills: 0 | Status: SHIPPED | OUTPATIENT
Start: 2018-01-01

## 2018-01-01 RX ORDER — ZOLPIDEM TARTRATE 5 MG/1
10 TABLET ORAL NIGHTLY PRN
Status: DISCONTINUED | OUTPATIENT
Start: 2018-01-01 | End: 2018-01-01 | Stop reason: HOSPADM

## 2018-01-01 RX ORDER — GUAIFENESIN 600 MG/1
600 TABLET, EXTENDED RELEASE ORAL 2 TIMES DAILY
Qty: 60 TABLET | Refills: 0 | Status: ON HOLD | OUTPATIENT
Start: 2018-01-01 | End: 2018-01-01

## 2018-01-01 RX ORDER — VECURONIUM BROMIDE 1 MG/ML
INJECTION, POWDER, LYOPHILIZED, FOR SOLUTION INTRAVENOUS AS NEEDED
Status: DISCONTINUED | OUTPATIENT
Start: 2018-01-01 | End: 2018-01-01 | Stop reason: SURG

## 2018-01-01 RX ORDER — CHLORHEXIDINE GLUCONATE 0.12 MG/ML
15 RINSE ORAL EVERY 12 HOURS
Status: DISCONTINUED | OUTPATIENT
Start: 2018-01-01 | End: 2018-01-01

## 2018-01-01 RX ORDER — HEPARIN SODIUM 1000 [USP'U]/ML
3600 INJECTION, SOLUTION INTRAVENOUS; SUBCUTANEOUS ONCE
Status: COMPLETED | OUTPATIENT
Start: 2018-01-01 | End: 2018-01-01

## 2018-01-01 RX ORDER — MORPHINE SULFATE 10 MG/ML
4 INJECTION INTRAMUSCULAR; INTRAVENOUS; SUBCUTANEOUS
Status: DISCONTINUED | OUTPATIENT
Start: 2018-01-01 | End: 2018-01-01

## 2018-01-01 RX ORDER — PANTOPRAZOLE SODIUM 40 MG/1
40 TABLET, DELAYED RELEASE ORAL
Status: DISCONTINUED | OUTPATIENT
Start: 2018-01-01 | End: 2018-01-01 | Stop reason: HOSPADM

## 2018-01-01 RX ORDER — CEFAZOLIN SODIUM IN 0.9 % NACL 3 G/100 ML
3 INTRAVENOUS SOLUTION, PIGGYBACK (ML) INTRAVENOUS
Status: DISCONTINUED | OUTPATIENT
Start: 2018-01-01 | End: 2018-01-01

## 2018-01-01 RX ORDER — MORPHINE SULFATE 20 MG/ML
10 SOLUTION ORAL
Status: DISCONTINUED | OUTPATIENT
Start: 2018-01-01 | End: 2018-10-31 | Stop reason: HOSPADM

## 2018-01-01 RX ORDER — ONDANSETRON 2 MG/ML
4 INJECTION INTRAMUSCULAR; INTRAVENOUS ONCE AS NEEDED
Status: DISCONTINUED | OUTPATIENT
Start: 2018-01-01 | End: 2018-01-01

## 2018-01-01 RX ORDER — LORAZEPAM 2 MG/ML
1 CONCENTRATE ORAL
Status: DISCONTINUED | OUTPATIENT
Start: 2018-01-01 | End: 2018-10-31 | Stop reason: HOSPADM

## 2018-01-01 RX ORDER — LIDOCAINE HYDROCHLORIDE AND EPINEPHRINE 10; 10 MG/ML; UG/ML
10 INJECTION, SOLUTION INFILTRATION; PERINEURAL ONCE
Status: DISCONTINUED | OUTPATIENT
Start: 2018-01-01 | End: 2018-01-01

## 2018-01-01 RX ORDER — LORAZEPAM 2 MG/ML
0.5 CONCENTRATE ORAL
Status: DISCONTINUED | OUTPATIENT
Start: 2018-01-01 | End: 2018-01-01 | Stop reason: SDUPTHER

## 2018-01-01 RX ORDER — AZITHROMYCIN 250 MG/1
500 TABLET, FILM COATED ORAL
Status: DISCONTINUED | OUTPATIENT
Start: 2018-01-01 | End: 2018-01-01

## 2018-01-01 RX ORDER — SODIUM CHLORIDE 0.9 % (FLUSH) 0.9 %
10 SYRINGE (ML) INJECTION AS NEEDED
Status: DISCONTINUED | OUTPATIENT
Start: 2018-01-01 | End: 2018-01-01 | Stop reason: HOSPADM

## 2018-01-01 RX ORDER — POTASSIUM CHLORIDE 750 MG/1
30 CAPSULE, EXTENDED RELEASE ORAL
Status: DISCONTINUED | OUTPATIENT
Start: 2018-01-01 | End: 2018-01-01

## 2018-01-01 RX ORDER — OXYCODONE HYDROCHLORIDE 5 MG/1
TABLET ORAL
Qty: 6 TABLET | Refills: 0 | Status: SHIPPED | OUTPATIENT
Start: 2018-01-01 | End: 2018-01-01 | Stop reason: SDUPTHER

## 2018-01-01 RX ORDER — METOLAZONE 5 MG/1
5 TABLET ORAL DAILY
COMMUNITY
End: 2018-01-01 | Stop reason: HOSPADM

## 2018-01-01 RX ORDER — ALBUMIN, HUMAN INJ 5% 5 %
1500 SOLUTION INTRAVENOUS AS NEEDED
Status: DISPENSED | OUTPATIENT
Start: 2018-01-01 | End: 2018-01-01

## 2018-01-01 RX ORDER — FERROUS SULFATE 325(65) MG
325 TABLET ORAL
Qty: 90 TABLET | Refills: 3 | Status: SHIPPED | OUTPATIENT
Start: 2018-01-01

## 2018-01-01 RX ORDER — DOXAZOSIN 2 MG/1
2 TABLET ORAL NIGHTLY
COMMUNITY
End: 2018-01-01 | Stop reason: HOSPADM

## 2018-01-01 RX ORDER — LORAZEPAM 2 MG/ML
1 INJECTION INTRAMUSCULAR
Status: CANCELLED | OUTPATIENT
Start: 2018-01-01 | End: 2018-11-09

## 2018-01-01 RX ORDER — MORPHINE SULFATE 2 MG/ML
2 INJECTION, SOLUTION INTRAMUSCULAR; INTRAVENOUS
Status: DISCONTINUED | OUTPATIENT
Start: 2018-01-01 | End: 2018-01-01

## 2018-01-01 RX ORDER — LIDOCAINE HYDROCHLORIDE 20 MG/ML
INJECTION, SOLUTION INFILTRATION; PERINEURAL AS NEEDED
Status: DISCONTINUED | OUTPATIENT
Start: 2018-01-01 | End: 2018-01-01 | Stop reason: SURG

## 2018-01-01 RX ORDER — CARVEDILOL 3.12 MG/1
3.12 TABLET ORAL EVERY 12 HOURS SCHEDULED
Status: DISCONTINUED | OUTPATIENT
Start: 2018-01-01 | End: 2018-01-01

## 2018-01-01 RX ORDER — NALOXONE HCL 0.4 MG/ML
0.4 VIAL (ML) INJECTION
Status: DISCONTINUED | OUTPATIENT
Start: 2018-01-01 | End: 2018-01-01

## 2018-01-01 RX ORDER — LIDOCAINE HYDROCHLORIDE 20 MG/ML
INJECTION, SOLUTION INFILTRATION; PERINEURAL AS NEEDED
Status: DISCONTINUED | OUTPATIENT
Start: 2018-01-01 | End: 2018-01-01 | Stop reason: HOSPADM

## 2018-01-01 RX ORDER — EPHEDRINE SULFATE 50 MG/ML
5 INJECTION, SOLUTION INTRAVENOUS ONCE AS NEEDED
Status: DISCONTINUED | OUTPATIENT
Start: 2018-01-01 | End: 2018-01-01 | Stop reason: HOSPADM

## 2018-01-01 RX ORDER — POTASSIUM CHLORIDE 29.8 MG/ML
20 INJECTION INTRAVENOUS ONCE
Status: COMPLETED | OUTPATIENT
Start: 2018-01-01 | End: 2018-01-01

## 2018-01-01 RX ORDER — CARVEDILOL 12.5 MG/1
12.5 TABLET ORAL 2 TIMES DAILY WITH MEALS
Status: DISCONTINUED | OUTPATIENT
Start: 2018-01-01 | End: 2018-01-01 | Stop reason: HOSPADM

## 2018-01-01 RX ORDER — LORAZEPAM 2 MG/ML
0.5 INJECTION INTRAMUSCULAR
Status: CANCELLED | OUTPATIENT
Start: 2018-01-01 | End: 2018-11-09

## 2018-01-01 RX ORDER — MORPHINE SULFATE 4 MG/ML
2 INJECTION, SOLUTION INTRAMUSCULAR; INTRAVENOUS
Status: DISCONTINUED | OUTPATIENT
Start: 2018-01-01 | End: 2018-10-31 | Stop reason: HOSPADM

## 2018-01-01 RX ORDER — CEFTRIAXONE SODIUM 2 G/50ML
2 INJECTION, SOLUTION INTRAVENOUS EVERY 24 HOURS
Status: COMPLETED | OUTPATIENT
Start: 2018-01-01 | End: 2018-01-01

## 2018-01-01 RX ORDER — ONDANSETRON 4 MG/1
4 TABLET, ORALLY DISINTEGRATING ORAL EVERY 6 HOURS PRN
Status: DISCONTINUED | OUTPATIENT
Start: 2018-01-01 | End: 2018-01-01

## 2018-01-01 RX ORDER — NICOTINE POLACRILEX 4 MG
15 LOZENGE BUCCAL
Status: DISCONTINUED | OUTPATIENT
Start: 2018-01-01 | End: 2018-01-01

## 2018-01-01 RX ORDER — LORAZEPAM 2 MG/ML
2 CONCENTRATE ORAL
Status: DISCONTINUED | OUTPATIENT
Start: 2018-01-01 | End: 2018-10-31 | Stop reason: HOSPADM

## 2018-01-01 RX ORDER — METOLAZONE 2.5 MG/1
2.5 TABLET ORAL WEEKLY
Qty: 30 TABLET | Refills: 11
Start: 2018-01-01 | End: 2018-01-01

## 2018-01-01 RX ORDER — POTASSIUM CHLORIDE 1.5 G/1.77G
40 POWDER, FOR SOLUTION ORAL ONCE
Status: COMPLETED | OUTPATIENT
Start: 2018-01-01 | End: 2018-01-01

## 2018-01-01 RX ORDER — OXYCODONE HYDROCHLORIDE 5 MG/1
10 TABLET ORAL EVERY 4 HOURS PRN
Status: DISCONTINUED | OUTPATIENT
Start: 2018-01-01 | End: 2018-01-01 | Stop reason: HOSPADM

## 2018-01-01 RX ADMIN — OXYCODONE HYDROCHLORIDE 10 MG: 5 TABLET ORAL at 18:52

## 2018-01-01 RX ADMIN — SPIRONOLACTONE 50 MG: 50 TABLET ORAL at 15:09

## 2018-01-01 RX ADMIN — INSULIN ASPART 2 UNITS: 100 INJECTION, SOLUTION INTRAVENOUS; SUBCUTANEOUS at 20:48

## 2018-01-01 RX ADMIN — FERROUS SULFATE TAB 325 MG (65 MG ELEMENTAL FE) 325 MG: 325 (65 FE) TAB at 09:23

## 2018-01-01 RX ADMIN — GUAIFENESIN 1200 MG: 600 TABLET, EXTENDED RELEASE ORAL at 21:22

## 2018-01-01 RX ADMIN — NYSTATIN: 100000 POWDER TOPICAL at 09:23

## 2018-01-01 RX ADMIN — NYSTATIN 500000 UNITS: 100000 SUSPENSION ORAL at 09:47

## 2018-01-01 RX ADMIN — CEPHALEXIN 500 MG: 250 POWDER, FOR SUSPENSION ORAL at 09:04

## 2018-01-01 RX ADMIN — WARFARIN SODIUM 5 MG: 5 TABLET ORAL at 17:48

## 2018-01-01 RX ADMIN — CARVEDILOL 6.25 MG: 6.25 TABLET, FILM COATED ORAL at 09:26

## 2018-01-01 RX ADMIN — INSULIN ASPART 4 UNITS: 100 INJECTION, SOLUTION INTRAVENOUS; SUBCUTANEOUS at 11:06

## 2018-01-01 RX ADMIN — MONTELUKAST 10 MG: 10 TABLET, FILM COATED ORAL at 09:41

## 2018-01-01 RX ADMIN — OXYCODONE HYDROCHLORIDE 10 MG: 5 TABLET ORAL at 06:01

## 2018-01-01 RX ADMIN — MONTELUKAST 10 MG: 10 TABLET, FILM COATED ORAL at 09:05

## 2018-01-01 RX ADMIN — BUDESONIDE AND FORMOTEROL FUMARATE DIHYDRATE 2 PUFF: 160; 4.5 AEROSOL RESPIRATORY (INHALATION) at 08:20

## 2018-01-01 RX ADMIN — ASPIRIN 81 MG: 81 TABLET, FILM COATED ORAL at 09:49

## 2018-01-01 RX ADMIN — LACTULOSE 20 G: 20 SOLUTION ORAL at 08:19

## 2018-01-01 RX ADMIN — GUAIFENESIN 1200 MG: 600 TABLET, EXTENDED RELEASE ORAL at 20:54

## 2018-01-01 RX ADMIN — Medication 10 ML: at 07:58

## 2018-01-01 RX ADMIN — METOCLOPRAMIDE 10 MG: 5 INJECTION, SOLUTION INTRAMUSCULAR; INTRAVENOUS at 05:21

## 2018-01-01 RX ADMIN — ASPIRIN 81 MG: 81 TABLET, FILM COATED ORAL at 08:39

## 2018-01-01 RX ADMIN — OXYCODONE HYDROCHLORIDE 10 MG: 5 TABLET ORAL at 22:03

## 2018-01-01 RX ADMIN — CEFTRIAXONE SODIUM 2 G: 2 INJECTION, SOLUTION INTRAVENOUS at 17:29

## 2018-01-01 RX ADMIN — OXYCODONE HYDROCHLORIDE 10 MG: 5 TABLET ORAL at 07:11

## 2018-01-01 RX ADMIN — POTASSIUM CHLORIDE 40 MEQ: 750 CAPSULE, EXTENDED RELEASE ORAL at 17:56

## 2018-01-01 RX ADMIN — PANTOPRAZOLE SODIUM 40 MG: 40 TABLET, DELAYED RELEASE ORAL at 05:57

## 2018-01-01 RX ADMIN — ASPIRIN 81 MG: 81 TABLET, FILM COATED ORAL at 09:38

## 2018-01-01 RX ADMIN — PANTOPRAZOLE SODIUM 40 MG: 40 TABLET, DELAYED RELEASE ORAL at 08:32

## 2018-01-01 RX ADMIN — GUAIFENESIN 1200 MG: 600 TABLET, EXTENDED RELEASE ORAL at 10:07

## 2018-01-01 RX ADMIN — GUAIFENESIN 1200 MG: 600 TABLET, EXTENDED RELEASE ORAL at 20:34

## 2018-01-01 RX ADMIN — BISACODYL 10 MG: 5 TABLET ORAL at 08:20

## 2018-01-01 RX ADMIN — TERAZOSIN HYDROCHLORIDE 2 MG: 2 CAPSULE ORAL at 21:00

## 2018-01-01 RX ADMIN — BUDESONIDE AND FORMOTEROL FUMARATE DIHYDRATE 2 PUFF: 160; 4.5 AEROSOL RESPIRATORY (INHALATION) at 06:51

## 2018-01-01 RX ADMIN — PANTOPRAZOLE SODIUM 40 MG: 40 TABLET, DELAYED RELEASE ORAL at 08:02

## 2018-01-01 RX ADMIN — HYDROCORTISONE: 1 CREAM TOPICAL at 08:02

## 2018-01-01 RX ADMIN — CARVEDILOL 6.25 MG: 6.25 TABLET, FILM COATED ORAL at 20:19

## 2018-01-01 RX ADMIN — INSULIN ASPART 4 UNITS: 100 INJECTION, SOLUTION INTRAVENOUS; SUBCUTANEOUS at 21:01

## 2018-01-01 RX ADMIN — BUDESONIDE AND FORMOTEROL FUMARATE DIHYDRATE 2 PUFF: 160; 4.5 AEROSOL RESPIRATORY (INHALATION) at 07:18

## 2018-01-01 RX ADMIN — MONTELUKAST 10 MG: 10 TABLET, FILM COATED ORAL at 08:32

## 2018-01-01 RX ADMIN — NYSTATIN 500000 UNITS: 100000 SUSPENSION ORAL at 18:12

## 2018-01-01 RX ADMIN — GUAIFENESIN 1200 MG: 600 TABLET, EXTENDED RELEASE ORAL at 21:25

## 2018-01-01 RX ADMIN — INSULIN ASPART 8 UNITS: 100 INJECTION, SOLUTION INTRAVENOUS; SUBCUTANEOUS at 23:55

## 2018-01-01 RX ADMIN — SILDENAFIL 20 MG: 20 TABLET ORAL at 05:32

## 2018-01-01 RX ADMIN — OXYCODONE HYDROCHLORIDE 10 MG: 5 TABLET ORAL at 07:00

## 2018-01-01 RX ADMIN — SUCRALFATE 1 G: 1 SUSPENSION ORAL at 19:04

## 2018-01-01 RX ADMIN — FLUTICASONE PROPIONATE 2 SPRAY: 50 SPRAY, METERED NASAL at 14:19

## 2018-01-01 RX ADMIN — INSULIN ASPART 3 UNITS: 100 INJECTION, SOLUTION INTRAVENOUS; SUBCUTANEOUS at 17:04

## 2018-01-01 RX ADMIN — ASPIRIN 81 MG: 81 TABLET, FILM COATED ORAL at 09:41

## 2018-01-01 RX ADMIN — INSULIN ASPART 5 UNITS: 100 INJECTION, SOLUTION INTRAVENOUS; SUBCUTANEOUS at 16:47

## 2018-01-01 RX ADMIN — OXYCODONE HYDROCHLORIDE 10 MG: 5 TABLET ORAL at 12:34

## 2018-01-01 RX ADMIN — BUDESONIDE AND FORMOTEROL FUMARATE DIHYDRATE 2 PUFF: 160; 4.5 AEROSOL RESPIRATORY (INHALATION) at 08:01

## 2018-01-01 RX ADMIN — BUMETANIDE 2 MG: 2 TABLET ORAL at 21:13

## 2018-01-01 RX ADMIN — MUPIROCIN 10 APPLICATION: 20 OINTMENT TOPICAL at 08:00

## 2018-01-01 RX ADMIN — Medication 10 ML: at 17:49

## 2018-01-01 RX ADMIN — BUDESONIDE AND FORMOTEROL FUMARATE DIHYDRATE 2 PUFF: 160; 4.5 AEROSOL RESPIRATORY (INHALATION) at 08:17

## 2018-01-01 RX ADMIN — PANTOPRAZOLE SODIUM 40 MG: 40 TABLET, DELAYED RELEASE ORAL at 08:21

## 2018-01-01 RX ADMIN — TORSEMIDE 100 MG: 100 TABLET ORAL at 09:48

## 2018-01-01 RX ADMIN — NYSTATIN 500000 UNITS: 100000 SUSPENSION ORAL at 16:55

## 2018-01-01 RX ADMIN — GUAIFENESIN 1200 MG: 600 TABLET, EXTENDED RELEASE ORAL at 21:59

## 2018-01-01 RX ADMIN — NYSTATIN 500000 UNITS: 100000 SUSPENSION ORAL at 20:42

## 2018-01-01 RX ADMIN — INSULIN ASPART 6 UNITS: 100 INJECTION, SOLUTION INTRAVENOUS; SUBCUTANEOUS at 16:26

## 2018-01-01 RX ADMIN — TORSEMIDE 100 MG: 100 TABLET ORAL at 09:51

## 2018-01-01 RX ADMIN — OXYCODONE HYDROCHLORIDE 10 MG: 5 TABLET ORAL at 12:46

## 2018-01-01 RX ADMIN — Medication 10 ML: at 12:23

## 2018-01-01 RX ADMIN — FLUTICASONE PROPIONATE 2 SPRAY: 50 SPRAY, METERED NASAL at 09:49

## 2018-01-01 RX ADMIN — POTASSIUM CHLORIDE 40 MEQ: 750 CAPSULE, EXTENDED RELEASE ORAL at 03:39

## 2018-01-01 RX ADMIN — HYDROCORTISONE: 1 CREAM TOPICAL at 20:21

## 2018-01-01 RX ADMIN — OXYCODONE HYDROCHLORIDE 10 MG: 5 TABLET ORAL at 14:20

## 2018-01-01 RX ADMIN — MUPIROCIN 10 APPLICATION: 20 OINTMENT TOPICAL at 20:59

## 2018-01-01 RX ADMIN — HEPARIN SODIUM 3000 UNITS: 1000 INJECTION INTRAVENOUS; SUBCUTANEOUS at 17:20

## 2018-01-01 RX ADMIN — NYSTATIN 500000 UNITS: 100000 SUSPENSION ORAL at 18:27

## 2018-01-01 RX ADMIN — INSULIN ASPART 2 UNITS: 100 INJECTION, SOLUTION INTRAVENOUS; SUBCUTANEOUS at 08:02

## 2018-01-01 RX ADMIN — APIXABAN 5 MG: 5 TABLET, FILM COATED ORAL at 21:23

## 2018-01-01 RX ADMIN — SUCRALFATE 1 G: 1 SUSPENSION ORAL at 11:26

## 2018-01-01 RX ADMIN — MUPIROCIN 10 APPLICATION: 20 OINTMENT TOPICAL at 08:56

## 2018-01-01 RX ADMIN — GUAIFENESIN 1200 MG: 600 TABLET, EXTENDED RELEASE ORAL at 08:30

## 2018-01-01 RX ADMIN — ATORVASTATIN CALCIUM 10 MG: 10 TABLET, FILM COATED ORAL at 20:35

## 2018-01-01 RX ADMIN — PANTOPRAZOLE SODIUM 40 MG: 40 TABLET, DELAYED RELEASE ORAL at 09:19

## 2018-01-01 RX ADMIN — MONTELUKAST 10 MG: 10 TABLET, FILM COATED ORAL at 09:53

## 2018-01-01 RX ADMIN — TORSEMIDE 40 MG: 20 TABLET ORAL at 08:32

## 2018-01-01 RX ADMIN — ACETAMINOPHEN 650 MG: 325 TABLET, FILM COATED ORAL at 07:57

## 2018-01-01 RX ADMIN — CARVEDILOL 6.25 MG: 6.25 TABLET, FILM COATED ORAL at 08:52

## 2018-01-01 RX ADMIN — DOCUSATE SODIUM -SENNOSIDES 2 TABLET: 50; 8.6 TABLET, COATED ORAL at 20:20

## 2018-01-01 RX ADMIN — INSULIN DETEMIR 18 UNITS: 100 INJECTION, SOLUTION SUBCUTANEOUS at 21:50

## 2018-01-01 RX ADMIN — VANCOMYCIN HYDROCHLORIDE 2750 MG: 10 INJECTION, POWDER, LYOPHILIZED, FOR SOLUTION INTRAVENOUS at 12:15

## 2018-01-01 RX ADMIN — CHLORHEXIDINE GLUCONATE 15 ML: 1.2 RINSE ORAL at 06:10

## 2018-01-01 RX ADMIN — GUAIFENESIN 1200 MG: 600 TABLET, EXTENDED RELEASE ORAL at 08:50

## 2018-01-01 RX ADMIN — GUAIFENESIN 1200 MG: 600 TABLET, EXTENDED RELEASE ORAL at 09:31

## 2018-01-01 RX ADMIN — SUFENTANIL CITRATE 25 MCG: 50 INJECTION, SOLUTION EPIDURAL; INTRAVENOUS at 08:04

## 2018-01-01 RX ADMIN — MIDODRINE HYDROCHLORIDE 5 MG: 5 TABLET ORAL at 21:30

## 2018-01-01 RX ADMIN — TORSEMIDE 100 MG: 100 TABLET ORAL at 09:04

## 2018-01-01 RX ADMIN — BUDESONIDE AND FORMOTEROL FUMARATE DIHYDRATE 2 PUFF: 160; 4.5 AEROSOL RESPIRATORY (INHALATION) at 11:13

## 2018-01-01 RX ADMIN — OXYCODONE HYDROCHLORIDE 10 MG: 5 TABLET ORAL at 21:39

## 2018-01-01 RX ADMIN — MIDODRINE HYDROCHLORIDE 5 MG: 5 TABLET ORAL at 17:39

## 2018-01-01 RX ADMIN — NYSTATIN 500000 UNITS: 100000 SUSPENSION ORAL at 17:59

## 2018-01-01 RX ADMIN — CARVEDILOL 6.25 MG: 6.25 TABLET, FILM COATED ORAL at 09:23

## 2018-01-01 RX ADMIN — APIXABAN 5 MG: 5 TABLET, FILM COATED ORAL at 09:24

## 2018-01-01 RX ADMIN — ASPIRIN 81 MG: 81 TABLET, FILM COATED ORAL at 09:29

## 2018-01-01 RX ADMIN — INSULIN ASPART 8 UNITS: 100 INJECTION, SOLUTION INTRAVENOUS; SUBCUTANEOUS at 06:27

## 2018-01-01 RX ADMIN — CARVEDILOL 6.25 MG: 6.25 TABLET, FILM COATED ORAL at 21:32

## 2018-01-01 RX ADMIN — NYSTATIN 500000 UNITS: 100000 SUSPENSION ORAL at 21:12

## 2018-01-01 RX ADMIN — SUCRALFATE 1 G: 1 SUSPENSION ORAL at 06:37

## 2018-01-01 RX ADMIN — ONDANSETRON HYDROCHLORIDE 4 MG: 2 INJECTION, SOLUTION INTRAMUSCULAR; INTRAVENOUS at 18:39

## 2018-01-01 RX ADMIN — Medication 1 MG: at 06:54

## 2018-01-01 RX ADMIN — INSULIN ASPART 2 UNITS: 100 INJECTION, SOLUTION INTRAVENOUS; SUBCUTANEOUS at 21:25

## 2018-01-01 RX ADMIN — CARVEDILOL 6.25 MG: 6.25 TABLET, FILM COATED ORAL at 09:41

## 2018-01-01 RX ADMIN — VECURONIUM BROMIDE 8 MG: 1 INJECTION, POWDER, LYOPHILIZED, FOR SOLUTION INTRAVENOUS at 09:27

## 2018-01-01 RX ADMIN — POLYETHYLENE GLYCOL 3350 17 G: 17 POWDER, FOR SOLUTION ORAL at 13:56

## 2018-01-01 RX ADMIN — BUDESONIDE AND FORMOTEROL FUMARATE DIHYDRATE 2 PUFF: 160; 4.5 AEROSOL RESPIRATORY (INHALATION) at 07:10

## 2018-01-01 RX ADMIN — INSULIN ASPART 4 UNITS: 100 INJECTION, SOLUTION INTRAVENOUS; SUBCUTANEOUS at 12:31

## 2018-01-01 RX ADMIN — INSULIN ASPART 2 UNITS: 100 INJECTION, SOLUTION INTRAVENOUS; SUBCUTANEOUS at 06:17

## 2018-01-01 RX ADMIN — TAZOBACTAM SODIUM AND PIPERACILLIN SODIUM 4.5 G: 500; 4 INJECTION, SOLUTION INTRAVENOUS at 02:34

## 2018-01-01 RX ADMIN — INSULIN ASPART 10 UNITS: 100 INJECTION, SOLUTION INTRAVENOUS; SUBCUTANEOUS at 10:14

## 2018-01-01 RX ADMIN — Medication 10 ML: at 23:47

## 2018-01-01 RX ADMIN — WARFARIN SODIUM 2.5 MG: 2.5 TABLET ORAL at 17:50

## 2018-01-01 RX ADMIN — BUDESONIDE AND FORMOTEROL FUMARATE DIHYDRATE 2 PUFF: 160; 4.5 AEROSOL RESPIRATORY (INHALATION) at 19:42

## 2018-01-01 RX ADMIN — NOREPINEPHRINE BITARTRATE 0.02 MCG/KG/MIN: 1 INJECTION, SOLUTION, CONCENTRATE INTRAVENOUS at 08:26

## 2018-01-01 RX ADMIN — DOCUSATE SODIUM -SENNOSIDES 2 TABLET: 50; 8.6 TABLET, COATED ORAL at 21:39

## 2018-01-01 RX ADMIN — MIDODRINE HYDROCHLORIDE 5 MG: 5 TABLET ORAL at 06:24

## 2018-01-01 RX ADMIN — ZOLPIDEM TARTRATE 10 MG: 5 TABLET ORAL at 00:37

## 2018-01-01 RX ADMIN — GUAIFENESIN 1200 MG: 600 TABLET, EXTENDED RELEASE ORAL at 13:38

## 2018-01-01 RX ADMIN — BUDESONIDE AND FORMOTEROL FUMARATE DIHYDRATE 2 PUFF: 160; 4.5 AEROSOL RESPIRATORY (INHALATION) at 20:04

## 2018-01-01 RX ADMIN — MIDODRINE HYDROCHLORIDE 5 MG: 5 TABLET ORAL at 07:00

## 2018-01-01 RX ADMIN — NYSTATIN 500000 UNITS: 100000 SUSPENSION ORAL at 08:43

## 2018-01-01 RX ADMIN — GUAIFENESIN 1200 MG: 600 TABLET, EXTENDED RELEASE ORAL at 21:00

## 2018-01-01 RX ADMIN — GUAIFENESIN 1200 MG: 600 TABLET, EXTENDED RELEASE ORAL at 08:39

## 2018-01-01 RX ADMIN — BUDESONIDE AND FORMOTEROL FUMARATE DIHYDRATE 2 PUFF: 160; 4.5 AEROSOL RESPIRATORY (INHALATION) at 21:05

## 2018-01-01 RX ADMIN — BUDESONIDE AND FORMOTEROL FUMARATE DIHYDRATE 2 PUFF: 160; 4.5 AEROSOL RESPIRATORY (INHALATION) at 06:44

## 2018-01-01 RX ADMIN — MONTELUKAST 10 MG: 10 TABLET, FILM COATED ORAL at 08:06

## 2018-01-01 RX ADMIN — MIDODRINE HYDROCHLORIDE 5 MG: 5 TABLET ORAL at 17:28

## 2018-01-01 RX ADMIN — ZOLPIDEM TARTRATE 10 MG: 5 TABLET ORAL at 23:36

## 2018-01-01 RX ADMIN — ZOLPIDEM TARTRATE 10 MG: 5 TABLET ORAL at 01:09

## 2018-01-01 RX ADMIN — NYSTATIN 500000 UNITS: 100000 SUSPENSION ORAL at 11:26

## 2018-01-01 RX ADMIN — ONDANSETRON HYDROCHLORIDE 4 MG: 2 INJECTION, SOLUTION INTRAMUSCULAR; INTRAVENOUS at 06:43

## 2018-01-01 RX ADMIN — INSULIN ASPART 2 UNITS: 100 INJECTION, SOLUTION INTRAVENOUS; SUBCUTANEOUS at 06:50

## 2018-01-01 RX ADMIN — MUPIROCIN 1 APPLICATION: 20 OINTMENT TOPICAL at 22:21

## 2018-01-01 RX ADMIN — OXYCODONE HYDROCHLORIDE 10 MG: 5 TABLET ORAL at 17:49

## 2018-01-01 RX ADMIN — BUDESONIDE AND FORMOTEROL FUMARATE DIHYDRATE 2 PUFF: 160; 4.5 AEROSOL RESPIRATORY (INHALATION) at 21:55

## 2018-01-01 RX ADMIN — MONTELUKAST 10 MG: 10 TABLET, FILM COATED ORAL at 09:45

## 2018-01-01 RX ADMIN — BUDESONIDE AND FORMOTEROL FUMARATE DIHYDRATE 2 PUFF: 160; 4.5 AEROSOL RESPIRATORY (INHALATION) at 21:34

## 2018-01-01 RX ADMIN — POTASSIUM CHLORIDE 40 MEQ: 750 CAPSULE, EXTENDED RELEASE ORAL at 08:43

## 2018-01-01 RX ADMIN — INSULIN DETEMIR 20 UNITS: 100 INJECTION, SOLUTION SUBCUTANEOUS at 21:20

## 2018-01-01 RX ADMIN — WARFARIN SODIUM 1 MG: 1 TABLET ORAL at 17:28

## 2018-01-01 RX ADMIN — GUAIFENESIN 600 MG: 600 TABLET, EXTENDED RELEASE ORAL at 22:21

## 2018-01-01 RX ADMIN — INSULIN ASPART 8 UNITS: 100 INJECTION, SOLUTION INTRAVENOUS; SUBCUTANEOUS at 11:49

## 2018-01-01 RX ADMIN — Medication 10 ML: at 21:15

## 2018-01-01 RX ADMIN — INSULIN ASPART 2 UNITS: 100 INJECTION, SOLUTION INTRAVENOUS; SUBCUTANEOUS at 09:00

## 2018-01-01 RX ADMIN — INSULIN ASPART 8 UNITS: 100 INJECTION, SOLUTION INTRAVENOUS; SUBCUTANEOUS at 18:31

## 2018-01-01 RX ADMIN — LEVOTHYROXINE SODIUM 50 MCG: 50 TABLET ORAL at 06:37

## 2018-01-01 RX ADMIN — Medication 10 ML: at 05:00

## 2018-01-01 RX ADMIN — TERAZOSIN HYDROCHLORIDE 2 MG: 2 CAPSULE ORAL at 21:24

## 2018-01-01 RX ADMIN — OXYCODONE HYDROCHLORIDE 10 MG: 5 TABLET ORAL at 21:31

## 2018-01-01 RX ADMIN — INSULIN ASPART 8 UNITS: 100 INJECTION, SOLUTION INTRAVENOUS; SUBCUTANEOUS at 07:40

## 2018-01-01 RX ADMIN — POLYETHYLENE GLYCOL 3350 17 G: 17 POWDER, FOR SOLUTION ORAL at 13:39

## 2018-01-01 RX ADMIN — HEPARIN SODIUM 5000 UNITS: 5000 INJECTION INTRAVENOUS; SUBCUTANEOUS at 16:03

## 2018-01-01 RX ADMIN — SODIUM CHLORIDE 125 ML/HR: 9 INJECTION, SOLUTION INTRAVENOUS at 11:17

## 2018-01-01 RX ADMIN — ALBUMIN HUMAN 25 G: 0.25 SOLUTION INTRAVENOUS at 08:47

## 2018-01-01 RX ADMIN — ACETAMINOPHEN 650 MG: 325 TABLET, FILM COATED ORAL at 14:35

## 2018-01-01 RX ADMIN — BUDESONIDE AND FORMOTEROL FUMARATE DIHYDRATE 2 PUFF: 160; 4.5 AEROSOL RESPIRATORY (INHALATION) at 09:30

## 2018-01-01 RX ADMIN — METOLAZONE 5 MG: 5 TABLET ORAL at 09:23

## 2018-01-01 RX ADMIN — PHENYLEPHRINE HYDROCHLORIDE 0.6 MCG/KG/MIN: 10 INJECTION, SOLUTION INTRAMUSCULAR; INTRAVENOUS; SUBCUTANEOUS at 07:04

## 2018-01-01 RX ADMIN — MAGNESIUM HYDROXIDE 10 ML: 2400 SUSPENSION ORAL at 18:33

## 2018-01-01 RX ADMIN — ZOLPIDEM TARTRATE 10 MG: 5 TABLET ORAL at 00:55

## 2018-01-01 RX ADMIN — TORSEMIDE 100 MG: 100 TABLET ORAL at 08:37

## 2018-01-01 RX ADMIN — NYSTATIN 500000 UNITS: 100000 SUSPENSION ORAL at 17:39

## 2018-01-01 RX ADMIN — NESIRITIDE 0.01 MCG/KG/MIN: 1.5 INJECTION, POWDER, LYOPHILIZED, FOR SOLUTION INTRAVENOUS at 16:00

## 2018-01-01 RX ADMIN — ONDANSETRON HYDROCHLORIDE 4 MG: 2 INJECTION, SOLUTION INTRAMUSCULAR; INTRAVENOUS at 22:28

## 2018-01-01 RX ADMIN — PANTOPRAZOLE SODIUM 40 MG: 40 TABLET, DELAYED RELEASE ORAL at 06:19

## 2018-01-01 RX ADMIN — MIDODRINE HYDROCHLORIDE 5 MG: 5 TABLET ORAL at 21:49

## 2018-01-01 RX ADMIN — BUDESONIDE AND FORMOTEROL FUMARATE DIHYDRATE 2 PUFF: 160; 4.5 AEROSOL RESPIRATORY (INHALATION) at 07:04

## 2018-01-01 RX ADMIN — ONDANSETRON 4 MG: 2 INJECTION INTRAMUSCULAR; INTRAVENOUS at 08:26

## 2018-01-01 RX ADMIN — MIDODRINE HYDROCHLORIDE 10 MG: 5 TABLET ORAL at 16:32

## 2018-01-01 RX ADMIN — SPIRONOLACTONE 50 MG: 50 TABLET ORAL at 08:20

## 2018-01-01 RX ADMIN — ACETAMINOPHEN 650 MG: 325 TABLET, FILM COATED ORAL at 14:05

## 2018-01-01 RX ADMIN — BUDESONIDE AND FORMOTEROL FUMARATE DIHYDRATE 2 PUFF: 160; 4.5 AEROSOL RESPIRATORY (INHALATION) at 06:55

## 2018-01-01 RX ADMIN — NYSTATIN 500000 UNITS: 100000 SUSPENSION ORAL at 18:37

## 2018-01-01 RX ADMIN — POTASSIUM CHLORIDE 40 MEQ: 750 CAPSULE, EXTENDED RELEASE ORAL at 11:58

## 2018-01-01 RX ADMIN — MONTELUKAST 10 MG: 10 TABLET, FILM COATED ORAL at 08:43

## 2018-01-01 RX ADMIN — OXYCODONE HYDROCHLORIDE 10 MG: 5 TABLET ORAL at 14:30

## 2018-01-01 RX ADMIN — LACTULOSE 20 G: 20 SOLUTION ORAL at 09:42

## 2018-01-01 RX ADMIN — ZOLPIDEM TARTRATE 10 MG: 5 TABLET ORAL at 23:29

## 2018-01-01 RX ADMIN — NYSTATIN 500000 UNITS: 100000 SUSPENSION ORAL at 08:21

## 2018-01-01 RX ADMIN — OXYCODONE HYDROCHLORIDE 10 MG: 5 TABLET ORAL at 08:09

## 2018-01-01 RX ADMIN — ZOLPIDEM TARTRATE 10 MG: 5 TABLET ORAL at 00:51

## 2018-01-01 RX ADMIN — METOLAZONE 5 MG: 5 TABLET ORAL at 10:13

## 2018-01-01 RX ADMIN — INSULIN ASPART 8 UNITS: 100 INJECTION, SOLUTION INTRAVENOUS; SUBCUTANEOUS at 07:15

## 2018-01-01 RX ADMIN — MONTELUKAST 10 MG: 10 TABLET, FILM COATED ORAL at 10:03

## 2018-01-01 RX ADMIN — NYSTATIN 500000 UNITS: 100000 SUSPENSION ORAL at 08:37

## 2018-01-01 RX ADMIN — MONTELUKAST 10 MG: 10 TABLET, FILM COATED ORAL at 09:19

## 2018-01-01 RX ADMIN — INSULIN ASPART 8 UNITS: 100 INJECTION, SOLUTION INTRAVENOUS; SUBCUTANEOUS at 06:45

## 2018-01-01 RX ADMIN — TERAZOSIN HYDROCHLORIDE 2 MG: 2 CAPSULE ORAL at 21:23

## 2018-01-01 RX ADMIN — INSULIN ASPART 4 UNITS: 100 INJECTION, SOLUTION INTRAVENOUS; SUBCUTANEOUS at 11:53

## 2018-01-01 RX ADMIN — GUAIFENESIN 1200 MG: 600 TABLET, EXTENDED RELEASE ORAL at 21:10

## 2018-01-01 RX ADMIN — HYDROCODONE BITARTRATE AND ACETAMINOPHEN 2 TABLET: 5; 325 TABLET ORAL at 15:49

## 2018-01-01 RX ADMIN — CARVEDILOL 6.25 MG: 6.25 TABLET, FILM COATED ORAL at 20:39

## 2018-01-01 RX ADMIN — FLUCONAZOLE 150 MG: 150 TABLET ORAL at 13:45

## 2018-01-01 RX ADMIN — LIDOCAINE HYDROCHLORIDE 9 ML: 10 INJECTION, SOLUTION INFILTRATION; PERINEURAL at 11:26

## 2018-01-01 RX ADMIN — ZOLPIDEM TARTRATE 10 MG: 5 TABLET ORAL at 23:19

## 2018-01-01 RX ADMIN — MONTELUKAST 10 MG: 10 TABLET, FILM COATED ORAL at 08:30

## 2018-01-01 RX ADMIN — Medication 10 ML: at 18:27

## 2018-01-01 RX ADMIN — CALCIUM CHLORIDE 1 G: 100 INJECTION INTRAVENOUS; INTRAVENTRICULAR at 09:53

## 2018-01-01 RX ADMIN — CARVEDILOL 6.25 MG: 6.25 TABLET, FILM COATED ORAL at 10:03

## 2018-01-01 RX ADMIN — MUPIROCIN 10 APPLICATION: 20 OINTMENT TOPICAL at 22:13

## 2018-01-01 RX ADMIN — INSULIN ASPART 8 UNITS: 100 INJECTION, SOLUTION INTRAVENOUS; SUBCUTANEOUS at 12:08

## 2018-01-01 RX ADMIN — MORPHINE SULFATE 4 MG: 4 INJECTION, SOLUTION INTRAMUSCULAR; INTRAVENOUS at 19:53

## 2018-01-01 RX ADMIN — PANTOPRAZOLE SODIUM 40 MG: 40 TABLET, DELAYED RELEASE ORAL at 08:00

## 2018-01-01 RX ADMIN — SUCRALFATE 1 G: 1 SUSPENSION ORAL at 23:47

## 2018-01-01 RX ADMIN — NYSTATIN 500000 UNITS: 100000 SUSPENSION ORAL at 08:51

## 2018-01-01 RX ADMIN — OXYCODONE HYDROCHLORIDE 10 MG: 5 TABLET ORAL at 17:33

## 2018-01-01 RX ADMIN — INSULIN ASPART 8 UNITS: 100 INJECTION, SOLUTION INTRAVENOUS; SUBCUTANEOUS at 17:46

## 2018-01-01 RX ADMIN — IPRATROPIUM BROMIDE AND ALBUTEROL SULFATE 3 ML: .5; 3 SOLUTION RESPIRATORY (INHALATION) at 15:21

## 2018-01-01 RX ADMIN — FLUTICASONE PROPIONATE 2 SPRAY: 50 SPRAY, METERED NASAL at 09:18

## 2018-01-01 RX ADMIN — TORSEMIDE 100 MG: 100 TABLET ORAL at 09:23

## 2018-01-01 RX ADMIN — CYCLOBENZAPRINE 10 MG: 10 TABLET, FILM COATED ORAL at 20:15

## 2018-01-01 RX ADMIN — CHLORHEXIDINE GLUCONATE 1 APPLICATION: 500 CLOTH TOPICAL at 22:25

## 2018-01-01 RX ADMIN — LEVOTHYROXINE SODIUM 50 MCG: 50 TABLET ORAL at 06:57

## 2018-01-01 RX ADMIN — DOCUSATE SODIUM -SENNOSIDES 2 TABLET: 50; 8.6 TABLET, COATED ORAL at 20:13

## 2018-01-01 RX ADMIN — CEFDINIR 300 MG: 300 CAPSULE ORAL at 09:24

## 2018-01-01 RX ADMIN — CARVEDILOL 3.12 MG: 3.12 TABLET, FILM COATED ORAL at 06:11

## 2018-01-01 RX ADMIN — PANTOPRAZOLE SODIUM 40 MG: 40 TABLET, DELAYED RELEASE ORAL at 09:41

## 2018-01-01 RX ADMIN — LEVOTHYROXINE SODIUM 50 MCG: 50 TABLET ORAL at 05:00

## 2018-01-01 RX ADMIN — WARFARIN SODIUM 1 MG: 1 TABLET ORAL at 17:26

## 2018-01-01 RX ADMIN — MUPIROCIN 10 APPLICATION: 20 OINTMENT TOPICAL at 08:01

## 2018-01-01 RX ADMIN — PANTOPRAZOLE SODIUM 40 MG: 40 TABLET, DELAYED RELEASE ORAL at 08:48

## 2018-01-01 RX ADMIN — MUPIROCIN 10 APPLICATION: 20 OINTMENT TOPICAL at 09:36

## 2018-01-01 RX ADMIN — INSULIN ASPART 2 UNITS: 100 INJECTION, SOLUTION INTRAVENOUS; SUBCUTANEOUS at 11:37

## 2018-01-01 RX ADMIN — MIDODRINE HYDROCHLORIDE 5 MG: 5 TABLET ORAL at 18:28

## 2018-01-01 RX ADMIN — MORPHINE SULFATE 2 MG: 4 INJECTION INTRAVENOUS at 09:52

## 2018-01-01 RX ADMIN — MUPIROCIN 10 APPLICATION: 20 OINTMENT TOPICAL at 20:34

## 2018-01-01 RX ADMIN — INSULIN ASPART 8 UNITS: 100 INJECTION, SOLUTION INTRAVENOUS; SUBCUTANEOUS at 17:24

## 2018-01-01 RX ADMIN — POTASSIUM CHLORIDE 40 MEQ: 750 CAPSULE, EXTENDED RELEASE ORAL at 06:24

## 2018-01-01 RX ADMIN — SPIRONOLACTONE 100 MG: 100 TABLET ORAL at 08:37

## 2018-01-01 RX ADMIN — WARFARIN SODIUM 5 MG: 5 TABLET ORAL at 18:28

## 2018-01-01 RX ADMIN — GUAIFENESIN 1200 MG: 600 TABLET, EXTENDED RELEASE ORAL at 20:17

## 2018-01-01 RX ADMIN — OXYCODONE HYDROCHLORIDE 10 MG: 5 TABLET ORAL at 03:33

## 2018-01-01 RX ADMIN — INSULIN ASPART 2 UNITS: 100 INJECTION, SOLUTION INTRAVENOUS; SUBCUTANEOUS at 21:09

## 2018-01-01 RX ADMIN — INSULIN ASPART 5 UNITS: 100 INJECTION, SOLUTION INTRAVENOUS; SUBCUTANEOUS at 09:04

## 2018-01-01 RX ADMIN — CARVEDILOL 6.25 MG: 6.25 TABLET, FILM COATED ORAL at 20:03

## 2018-01-01 RX ADMIN — MUPIROCIN 10 APPLICATION: 20 OINTMENT TOPICAL at 21:39

## 2018-01-01 RX ADMIN — INSULIN DETEMIR 22 UNITS: 100 INJECTION, SOLUTION SUBCUTANEOUS at 21:18

## 2018-01-01 RX ADMIN — SCOPALAMINE 1 PATCH: 1 PATCH, EXTENDED RELEASE TRANSDERMAL at 09:50

## 2018-01-01 RX ADMIN — DIPHENHYDRAMINE HYDROCHLORIDE 25 MG: 25 CAPSULE ORAL at 12:21

## 2018-01-01 RX ADMIN — MUPIROCIN 10 APPLICATION: 20 OINTMENT TOPICAL at 09:45

## 2018-01-01 RX ADMIN — MONTELUKAST 10 MG: 10 TABLET, FILM COATED ORAL at 09:09

## 2018-01-01 RX ADMIN — BUMETANIDE 1 MG/HR: 0.25 INJECTION INTRAMUSCULAR; INTRAVENOUS at 08:37

## 2018-01-01 RX ADMIN — INSULIN ASPART 3 UNITS: 100 INJECTION, SOLUTION INTRAVENOUS; SUBCUTANEOUS at 11:57

## 2018-01-01 RX ADMIN — INSULIN ASPART 2 UNITS: 100 INJECTION, SOLUTION INTRAVENOUS; SUBCUTANEOUS at 06:39

## 2018-01-01 RX ADMIN — POTASSIUM CHLORIDE 40 MEQ: 750 CAPSULE, EXTENDED RELEASE ORAL at 18:36

## 2018-01-01 RX ADMIN — INSULIN ASPART 3 UNITS: 100 INJECTION, SOLUTION INTRAVENOUS; SUBCUTANEOUS at 21:30

## 2018-01-01 RX ADMIN — NYSTATIN 500000 UNITS: 100000 SUSPENSION ORAL at 12:50

## 2018-01-01 RX ADMIN — SUCRALFATE 1 G: 1 SUSPENSION ORAL at 18:20

## 2018-01-01 RX ADMIN — MORPHINE SULFATE 2 MG: 2 INJECTION, SOLUTION INTRAMUSCULAR; INTRAVENOUS at 15:32

## 2018-01-01 RX ADMIN — BISACODYL 10 MG: 5 TABLET ORAL at 11:58

## 2018-01-01 RX ADMIN — CARVEDILOL 12.5 MG: 25 TABLET, FILM COATED ORAL at 16:55

## 2018-01-01 RX ADMIN — INSULIN ASPART 2 UNITS: 100 INJECTION, SOLUTION INTRAVENOUS; SUBCUTANEOUS at 06:25

## 2018-01-01 RX ADMIN — INSULIN ASPART 4 UNITS: 100 INJECTION, SOLUTION INTRAVENOUS; SUBCUTANEOUS at 20:40

## 2018-01-01 RX ADMIN — INSULIN ASPART 3 UNITS: 100 INJECTION, SOLUTION INTRAVENOUS; SUBCUTANEOUS at 21:41

## 2018-01-01 RX ADMIN — BUDESONIDE AND FORMOTEROL FUMARATE DIHYDRATE 2 PUFF: 160; 4.5 AEROSOL RESPIRATORY (INHALATION) at 07:48

## 2018-01-01 RX ADMIN — IPRATROPIUM BROMIDE AND ALBUTEROL SULFATE 3 ML: .5; 3 SOLUTION RESPIRATORY (INHALATION) at 12:07

## 2018-01-01 RX ADMIN — HYDROCORTISONE: 1 CREAM TOPICAL at 08:07

## 2018-01-01 RX ADMIN — VANCOMYCIN HYDROCHLORIDE 2000 MG: 10 INJECTION, POWDER, LYOPHILIZED, FOR SOLUTION INTRAVENOUS at 18:24

## 2018-01-01 RX ADMIN — LACTULOSE 20 G: 20 SOLUTION ORAL at 08:39

## 2018-01-01 RX ADMIN — MIDODRINE HYDROCHLORIDE 5 MG: 5 TABLET ORAL at 18:06

## 2018-01-01 RX ADMIN — OXYCODONE HYDROCHLORIDE 10 MG: 5 TABLET ORAL at 08:46

## 2018-01-01 RX ADMIN — SPIRONOLACTONE 25 MG: 25 TABLET, FILM COATED ORAL at 08:43

## 2018-01-01 RX ADMIN — HYDROCORTISONE: 1 CREAM TOPICAL at 20:05

## 2018-01-01 RX ADMIN — CARVEDILOL 6.25 MG: 6.25 TABLET, FILM COATED ORAL at 21:00

## 2018-01-01 RX ADMIN — HEPARIN SODIUM 30000 UNITS: 1000 INJECTION, SOLUTION INTRAVENOUS; SUBCUTANEOUS at 08:01

## 2018-01-01 RX ADMIN — PANTOPRAZOLE SODIUM 40 MG: 40 TABLET, DELAYED RELEASE ORAL at 08:30

## 2018-01-01 RX ADMIN — OXYCODONE HYDROCHLORIDE 10 MG: 5 TABLET ORAL at 13:14

## 2018-01-01 RX ADMIN — CYCLOBENZAPRINE 10 MG: 10 TABLET, FILM COATED ORAL at 07:16

## 2018-01-01 RX ADMIN — FERROUS SULFATE TAB 325 MG (65 MG ELEMENTAL FE) 325 MG: 325 (65 FE) TAB at 09:48

## 2018-01-01 RX ADMIN — PANTOPRAZOLE SODIUM 40 MG: 40 TABLET, DELAYED RELEASE ORAL at 09:29

## 2018-01-01 RX ADMIN — Medication 10 ML: at 18:12

## 2018-01-01 RX ADMIN — ZOLPIDEM TARTRATE 10 MG: 5 TABLET ORAL at 23:28

## 2018-01-01 RX ADMIN — INSULIN ASPART 4 UNITS: 100 INJECTION, SOLUTION INTRAVENOUS; SUBCUTANEOUS at 12:47

## 2018-01-01 RX ADMIN — TORSEMIDE 100 MG: 100 TABLET ORAL at 09:03

## 2018-01-01 RX ADMIN — FLUTICASONE PROPIONATE 2 SPRAY: 50 SPRAY, METERED NASAL at 13:39

## 2018-01-01 RX ADMIN — INSULIN DETEMIR 18 UNITS: 100 INJECTION, SOLUTION SUBCUTANEOUS at 21:32

## 2018-01-01 RX ADMIN — NYSTATIN 500000 UNITS: 100000 SUSPENSION ORAL at 19:04

## 2018-01-01 RX ADMIN — MORPHINE SULFATE 2 MG: 2 INJECTION, SOLUTION INTRAMUSCULAR; INTRAVENOUS at 15:51

## 2018-01-01 RX ADMIN — LEVOTHYROXINE SODIUM 50 MCG: 50 TABLET ORAL at 06:24

## 2018-01-01 RX ADMIN — ASPIRIN 81 MG: 81 TABLET, FILM COATED ORAL at 10:03

## 2018-01-01 RX ADMIN — MONTELUKAST 10 MG: 10 TABLET, FILM COATED ORAL at 09:07

## 2018-01-01 RX ADMIN — Medication 10 ML: at 04:58

## 2018-01-01 RX ADMIN — BUDESONIDE AND FORMOTEROL FUMARATE DIHYDRATE 2 PUFF: 160; 4.5 AEROSOL RESPIRATORY (INHALATION) at 20:03

## 2018-01-01 RX ADMIN — ASPIRIN 81 MG: 81 TABLET, FILM COATED ORAL at 09:48

## 2018-01-01 RX ADMIN — ZOLPIDEM TARTRATE 10 MG: 5 TABLET ORAL at 00:17

## 2018-01-01 RX ADMIN — OXYCODONE HYDROCHLORIDE 10 MG: 5 TABLET ORAL at 21:29

## 2018-01-01 RX ADMIN — PANTOPRAZOLE SODIUM 40 MG: 40 TABLET, DELAYED RELEASE ORAL at 08:59

## 2018-01-01 RX ADMIN — MIDODRINE HYDROCHLORIDE 5 MG: 5 TABLET ORAL at 07:53

## 2018-01-01 RX ADMIN — ONDANSETRON HYDROCHLORIDE 4 MG: 2 INJECTION, SOLUTION INTRAMUSCULAR; INTRAVENOUS at 11:11

## 2018-01-01 RX ADMIN — TORSEMIDE 100 MG: 100 TABLET ORAL at 08:22

## 2018-01-01 RX ADMIN — NYSTATIN 500000 UNITS: 100000 SUSPENSION ORAL at 09:45

## 2018-01-01 RX ADMIN — BISACODYL 10 MG: 5 TABLET ORAL at 08:46

## 2018-01-01 RX ADMIN — POTASSIUM CHLORIDE 40 MEQ: 750 CAPSULE, EXTENDED RELEASE ORAL at 08:52

## 2018-01-01 RX ADMIN — OXYCODONE HYDROCHLORIDE 10 MG: 5 TABLET ORAL at 21:13

## 2018-01-01 RX ADMIN — MIDODRINE HYDROCHLORIDE 5 MG: 5 TABLET ORAL at 11:48

## 2018-01-01 RX ADMIN — CYCLOBENZAPRINE 10 MG: 10 TABLET, FILM COATED ORAL at 17:15

## 2018-01-01 RX ADMIN — NYSTATIN 500000 UNITS: 100000 SUSPENSION ORAL at 11:29

## 2018-01-01 RX ADMIN — FAMOTIDINE 20 MG: 10 INJECTION INTRAVENOUS at 12:20

## 2018-01-01 RX ADMIN — LIDOCAINE HYDROCHLORIDE 60 MG: 20 INJECTION, SOLUTION INFILTRATION; PERINEURAL at 14:04

## 2018-01-01 RX ADMIN — ZOLPIDEM TARTRATE 10 MG: 5 TABLET ORAL at 22:24

## 2018-01-01 RX ADMIN — POLYETHYLENE GLYCOL 3350 17 G: 17 POWDER, FOR SOLUTION ORAL at 09:51

## 2018-01-01 RX ADMIN — ALPRAZOLAM 0.25 MG: 0.25 TABLET ORAL at 03:04

## 2018-01-01 RX ADMIN — POLYETHYLENE GLYCOL 3350 17 G: 17 POWDER, FOR SOLUTION ORAL at 09:18

## 2018-01-01 RX ADMIN — ZOLPIDEM TARTRATE 10 MG: 5 TABLET ORAL at 22:40

## 2018-01-01 RX ADMIN — ZOLPIDEM TARTRATE 10 MG: 5 TABLET ORAL at 23:53

## 2018-01-01 RX ADMIN — MONTELUKAST 10 MG: 10 TABLET, FILM COATED ORAL at 09:36

## 2018-01-01 RX ADMIN — CEFDINIR 300 MG: 300 CAPSULE ORAL at 08:44

## 2018-01-01 RX ADMIN — INSULIN DETEMIR 25 UNITS: 100 INJECTION, SOLUTION SUBCUTANEOUS at 20:57

## 2018-01-01 RX ADMIN — INSULIN ASPART 6 UNITS: 100 INJECTION, SOLUTION INTRAVENOUS; SUBCUTANEOUS at 12:26

## 2018-01-01 RX ADMIN — CARVEDILOL 6.25 MG: 6.25 TABLET, FILM COATED ORAL at 10:13

## 2018-01-01 RX ADMIN — POTASSIUM CHLORIDE 20 MEQ: 750 CAPSULE, EXTENDED RELEASE ORAL at 12:37

## 2018-01-01 RX ADMIN — SUCRALFATE 1 G: 1 SUSPENSION ORAL at 11:48

## 2018-01-01 RX ADMIN — INSULIN ASPART 4 UNITS: 100 INJECTION, SOLUTION INTRAVENOUS; SUBCUTANEOUS at 17:50

## 2018-01-01 RX ADMIN — INSULIN ASPART 3 UNITS: 100 INJECTION, SOLUTION INTRAVENOUS; SUBCUTANEOUS at 21:37

## 2018-01-01 RX ADMIN — Medication 10 ML: at 09:04

## 2018-01-01 RX ADMIN — POTASSIUM CHLORIDE 20 MEQ: 400 INJECTION, SOLUTION INTRAVENOUS at 12:32

## 2018-01-01 RX ADMIN — ALPRAZOLAM 0.25 MG: 0.25 TABLET ORAL at 14:03

## 2018-01-01 RX ADMIN — FLUTICASONE PROPIONATE 2 SPRAY: 50 SPRAY, METERED NASAL at 08:02

## 2018-01-01 RX ADMIN — Medication 10 ML: at 09:27

## 2018-01-01 RX ADMIN — FLUCONAZOLE 150 MG: 150 TABLET ORAL at 08:52

## 2018-01-01 RX ADMIN — MUPIROCIN 10 APPLICATION: 20 OINTMENT TOPICAL at 21:59

## 2018-01-01 RX ADMIN — BUMETANIDE 1 MG/HR: 0.25 INJECTION INTRAMUSCULAR; INTRAVENOUS at 00:34

## 2018-01-01 RX ADMIN — Medication 3 ML: at 20:42

## 2018-01-01 RX ADMIN — NESIRITIDE 0.01 MCG/KG/MIN: 1.5 INJECTION, POWDER, LYOPHILIZED, FOR SOLUTION INTRAVENOUS at 09:53

## 2018-01-01 RX ADMIN — TORSEMIDE 20 MG: 20 TABLET ORAL at 09:44

## 2018-01-01 RX ADMIN — MIDODRINE HYDROCHLORIDE 5 MG: 5 TABLET ORAL at 12:18

## 2018-01-01 RX ADMIN — MONTELUKAST 10 MG: 10 TABLET, FILM COATED ORAL at 08:29

## 2018-01-01 RX ADMIN — OXYCODONE HYDROCHLORIDE 10 MG: 5 TABLET ORAL at 17:39

## 2018-01-01 RX ADMIN — INSULIN DETEMIR 22 UNITS: 100 INJECTION, SOLUTION SUBCUTANEOUS at 20:20

## 2018-01-01 RX ADMIN — ASPIRIN 81 MG: 81 TABLET, FILM COATED ORAL at 10:08

## 2018-01-01 RX ADMIN — MONTELUKAST 10 MG: 10 TABLET, FILM COATED ORAL at 09:22

## 2018-01-01 RX ADMIN — FUROSEMIDE 40 MG: 10 INJECTION, SOLUTION INTRAMUSCULAR; INTRAVENOUS at 13:02

## 2018-01-01 RX ADMIN — INSULIN ASPART 2 UNITS: 100 INJECTION, SOLUTION INTRAVENOUS; SUBCUTANEOUS at 20:54

## 2018-01-01 RX ADMIN — OXYCODONE HYDROCHLORIDE 10 MG: 5 TABLET ORAL at 13:38

## 2018-01-01 RX ADMIN — GUAIFENESIN 600 MG: 600 TABLET, EXTENDED RELEASE ORAL at 21:37

## 2018-01-01 RX ADMIN — OXYCODONE HYDROCHLORIDE 10 MG: 5 TABLET ORAL at 13:53

## 2018-01-01 RX ADMIN — MIDODRINE HYDROCHLORIDE 5 MG: 5 TABLET ORAL at 11:37

## 2018-01-01 RX ADMIN — GUAIFENESIN 1200 MG: 600 TABLET, EXTENDED RELEASE ORAL at 20:20

## 2018-01-01 RX ADMIN — ZOLPIDEM TARTRATE 10 MG: 5 TABLET ORAL at 00:22

## 2018-01-01 RX ADMIN — GUAIFENESIN 1200 MG: 600 TABLET, EXTENDED RELEASE ORAL at 08:21

## 2018-01-01 RX ADMIN — SPIRONOLACTONE 25 MG: 25 TABLET ORAL at 08:01

## 2018-01-01 RX ADMIN — GUAIFENESIN 600 MG: 600 TABLET, EXTENDED RELEASE ORAL at 22:15

## 2018-01-01 RX ADMIN — GUAIFENESIN 1200 MG: 600 TABLET, EXTENDED RELEASE ORAL at 20:58

## 2018-01-01 RX ADMIN — POTASSIUM CHLORIDE 40 MEQ: 750 CAPSULE, EXTENDED RELEASE ORAL at 08:37

## 2018-01-01 RX ADMIN — INSULIN ASPART 10 UNITS: 100 INJECTION, SOLUTION INTRAVENOUS; SUBCUTANEOUS at 16:41

## 2018-01-01 RX ADMIN — TORSEMIDE 20 MG: 20 TABLET ORAL at 08:30

## 2018-01-01 RX ADMIN — MIDODRINE HYDROCHLORIDE 5 MG: 5 TABLET ORAL at 06:35

## 2018-01-01 RX ADMIN — ZOLPIDEM TARTRATE 10 MG: 5 TABLET ORAL at 23:25

## 2018-01-01 RX ADMIN — IPRATROPIUM BROMIDE AND ALBUTEROL SULFATE 3 ML: .5; 3 SOLUTION RESPIRATORY (INHALATION) at 15:31

## 2018-01-01 RX ADMIN — MUPIROCIN: 20 OINTMENT TOPICAL at 20:46

## 2018-01-01 RX ADMIN — AZITHROMYCIN 500 MG: 250 TABLET, FILM COATED ORAL at 13:56

## 2018-01-01 RX ADMIN — Medication 10 ML: at 21:29

## 2018-01-01 RX ADMIN — SPIRONOLACTONE 25 MG: 25 TABLET ORAL at 14:34

## 2018-01-01 RX ADMIN — FLUCONAZOLE 150 MG: 150 TABLET ORAL at 16:33

## 2018-01-01 RX ADMIN — TORSEMIDE 100 MG: 100 TABLET ORAL at 08:29

## 2018-01-01 RX ADMIN — CARVEDILOL 6.25 MG: 6.25 TABLET, FILM COATED ORAL at 21:23

## 2018-01-01 RX ADMIN — ASPIRIN 81 MG: 81 TABLET, FILM COATED ORAL at 09:52

## 2018-01-01 RX ADMIN — GUAIFENESIN 1200 MG: 600 TABLET, EXTENDED RELEASE ORAL at 09:07

## 2018-01-01 RX ADMIN — BUDESONIDE AND FORMOTEROL FUMARATE DIHYDRATE 2 PUFF: 160; 4.5 AEROSOL RESPIRATORY (INHALATION) at 21:43

## 2018-01-01 RX ADMIN — METOLAZONE 5 MG: 5 TABLET ORAL at 09:41

## 2018-01-01 RX ADMIN — GUAIFENESIN 1200 MG: 600 TABLET, EXTENDED RELEASE ORAL at 08:38

## 2018-01-01 RX ADMIN — INSULIN ASPART 2 UNITS: 100 INJECTION, SOLUTION INTRAVENOUS; SUBCUTANEOUS at 21:13

## 2018-01-01 RX ADMIN — OXYCODONE HYDROCHLORIDE 10 MG: 5 TABLET ORAL at 09:13

## 2018-01-01 RX ADMIN — INSULIN ASPART 8 UNITS: 100 INJECTION, SOLUTION INTRAVENOUS; SUBCUTANEOUS at 12:10

## 2018-01-01 RX ADMIN — Medication 10 ML: at 09:48

## 2018-01-01 RX ADMIN — BUDESONIDE AND FORMOTEROL FUMARATE DIHYDRATE 2 PUFF: 160; 4.5 AEROSOL RESPIRATORY (INHALATION) at 20:13

## 2018-01-01 RX ADMIN — SUCRALFATE 1 G: 1 SUSPENSION ORAL at 17:29

## 2018-01-01 RX ADMIN — FERROUS SULFATE TAB 325 MG (65 MG ELEMENTAL FE) 325 MG: 325 (65 FE) TAB at 08:32

## 2018-01-01 RX ADMIN — PANTOPRAZOLE SODIUM 40 MG: 40 TABLET, DELAYED RELEASE ORAL at 09:43

## 2018-01-01 RX ADMIN — SPIRONOLACTONE 25 MG: 25 TABLET ORAL at 09:46

## 2018-01-01 RX ADMIN — INSULIN ASPART 4 UNITS: 100 INJECTION, SOLUTION INTRAVENOUS; SUBCUTANEOUS at 11:54

## 2018-01-01 RX ADMIN — TORSEMIDE 100 MG: 100 TABLET ORAL at 08:00

## 2018-01-01 RX ADMIN — PROPOFOL 50 MCG/KG/MIN: 10 INJECTION, EMULSION INTRAVENOUS at 14:04

## 2018-01-01 RX ADMIN — BUDESONIDE AND FORMOTEROL FUMARATE DIHYDRATE 2 PUFF: 160; 4.5 AEROSOL RESPIRATORY (INHALATION) at 20:16

## 2018-01-01 RX ADMIN — OXYCODONE HYDROCHLORIDE 10 MG: 5 TABLET ORAL at 22:07

## 2018-01-01 RX ADMIN — ALBUMIN HUMAN 250 ML: 0.05 INJECTION, SOLUTION INTRAVENOUS at 18:24

## 2018-01-01 RX ADMIN — OXYCODONE HYDROCHLORIDE 10 MG: 5 TABLET ORAL at 14:08

## 2018-01-01 RX ADMIN — MIDODRINE HYDROCHLORIDE 5 MG: 5 TABLET ORAL at 13:38

## 2018-01-01 RX ADMIN — SODIUM CHLORIDE: 9 INJECTION, SOLUTION INTRAVENOUS at 07:26

## 2018-01-01 RX ADMIN — PROPOFOL 150 MG: 10 INJECTION, EMULSION INTRAVENOUS at 07:04

## 2018-01-01 RX ADMIN — POTASSIUM CHLORIDE 40 MEQ: 750 CAPSULE, EXTENDED RELEASE ORAL at 08:20

## 2018-01-01 RX ADMIN — BUDESONIDE AND FORMOTEROL FUMARATE DIHYDRATE 2 PUFF: 160; 4.5 AEROSOL RESPIRATORY (INHALATION) at 07:51

## 2018-01-01 RX ADMIN — GUAIFENESIN 600 MG: 600 TABLET, EXTENDED RELEASE ORAL at 08:29

## 2018-01-01 RX ADMIN — OXYCODONE HYDROCHLORIDE 10 MG: 5 TABLET ORAL at 21:17

## 2018-01-01 RX ADMIN — METOLAZONE 5 MG: 5 TABLET ORAL at 08:52

## 2018-01-01 RX ADMIN — LORAZEPAM 0.5 MG: 2 INJECTION INTRAMUSCULAR; INTRAVENOUS at 18:15

## 2018-01-01 RX ADMIN — APIXABAN 5 MG: 5 TABLET, FILM COATED ORAL at 21:26

## 2018-01-01 RX ADMIN — MUPIROCIN: 20 OINTMENT TOPICAL at 20:48

## 2018-01-01 RX ADMIN — TORSEMIDE 100 MG: 100 TABLET ORAL at 09:07

## 2018-01-01 RX ADMIN — INSULIN ASPART 2 UNITS: 100 INJECTION, SOLUTION INTRAVENOUS; SUBCUTANEOUS at 18:53

## 2018-01-01 RX ADMIN — OXYCODONE HYDROCHLORIDE 10 MG: 5 TABLET ORAL at 02:04

## 2018-01-01 RX ADMIN — INSULIN DETEMIR 22 UNITS: 100 INJECTION, SOLUTION SUBCUTANEOUS at 21:52

## 2018-01-01 RX ADMIN — SUCRALFATE 1 G: 1 SUSPENSION ORAL at 23:10

## 2018-01-01 RX ADMIN — POTASSIUM CHLORIDE 40 MEQ: 750 CAPSULE, EXTENDED RELEASE ORAL at 09:25

## 2018-01-01 RX ADMIN — INSULIN ASPART 8 UNITS: 100 INJECTION, SOLUTION INTRAVENOUS; SUBCUTANEOUS at 11:48

## 2018-01-01 RX ADMIN — Medication 10 ML: at 09:17

## 2018-01-01 RX ADMIN — INSULIN ASPART 2 UNITS: 100 INJECTION, SOLUTION INTRAVENOUS; SUBCUTANEOUS at 21:33

## 2018-01-01 RX ADMIN — MUPIROCIN 10 APPLICATION: 20 OINTMENT TOPICAL at 10:30

## 2018-01-01 RX ADMIN — BUDESONIDE AND FORMOTEROL FUMARATE DIHYDRATE 2 PUFF: 160; 4.5 AEROSOL RESPIRATORY (INHALATION) at 07:44

## 2018-01-01 RX ADMIN — CEFTRIAXONE SODIUM 2 G: 2 INJECTION, SOLUTION INTRAVENOUS at 17:49

## 2018-01-01 RX ADMIN — Medication 10 ML: at 21:13

## 2018-01-01 RX ADMIN — METOLAZONE 5 MG: 5 TABLET ORAL at 09:44

## 2018-01-01 RX ADMIN — Medication 10 ML: at 23:28

## 2018-01-01 RX ADMIN — NYSTATIN: 100000 POWDER TOPICAL at 09:46

## 2018-01-01 RX ADMIN — CYCLOBENZAPRINE 10 MG: 10 TABLET, FILM COATED ORAL at 08:37

## 2018-01-01 RX ADMIN — IPRATROPIUM BROMIDE AND ALBUTEROL SULFATE 3 ML: .5; 3 SOLUTION RESPIRATORY (INHALATION) at 23:05

## 2018-01-01 RX ADMIN — MUPIROCIN 10 APPLICATION: 20 OINTMENT TOPICAL at 21:32

## 2018-01-01 RX ADMIN — VANCOMYCIN HYDROCHLORIDE 2000 MG: 10 INJECTION, POWDER, LYOPHILIZED, FOR SOLUTION INTRAVENOUS at 05:20

## 2018-01-01 RX ADMIN — BUMETANIDE 1 MG/HR: 0.25 INJECTION INTRAMUSCULAR; INTRAVENOUS at 19:24

## 2018-01-01 RX ADMIN — Medication 10 ML: at 17:23

## 2018-01-01 RX ADMIN — TORSEMIDE 100 MG: 100 TABLET ORAL at 08:43

## 2018-01-01 RX ADMIN — MUPIROCIN 10 APPLICATION: 20 OINTMENT TOPICAL at 20:35

## 2018-01-01 RX ADMIN — Medication 10 ML: at 16:06

## 2018-01-01 RX ADMIN — MIDODRINE HYDROCHLORIDE 5 MG: 5 TABLET ORAL at 08:21

## 2018-01-01 RX ADMIN — PROPOFOL 100 MCG/KG/MIN: 10 INJECTION, EMULSION INTRAVENOUS at 13:17

## 2018-01-01 RX ADMIN — BUMETANIDE 1 MG/HR: 0.25 INJECTION INTRAMUSCULAR; INTRAVENOUS at 02:28

## 2018-01-01 RX ADMIN — WARFARIN SODIUM 2 MG: 2 TABLET ORAL at 16:26

## 2018-01-01 RX ADMIN — LEVOTHYROXINE SODIUM 50 MCG: 50 TABLET ORAL at 07:00

## 2018-01-01 RX ADMIN — HYDROCORTISONE: 1 CREAM TOPICAL at 09:46

## 2018-01-01 RX ADMIN — PANTOPRAZOLE SODIUM 40 MG: 40 TABLET, DELAYED RELEASE ORAL at 10:08

## 2018-01-01 RX ADMIN — OXYCODONE HYDROCHLORIDE 10 MG: 5 TABLET ORAL at 18:20

## 2018-01-01 RX ADMIN — METOLAZONE 5 MG: 5 TABLET ORAL at 16:55

## 2018-01-01 RX ADMIN — Medication 10 ML: at 04:09

## 2018-01-01 RX ADMIN — OXYCODONE HYDROCHLORIDE 10 MG: 5 TABLET ORAL at 21:23

## 2018-01-01 RX ADMIN — BUDESONIDE AND FORMOTEROL FUMARATE DIHYDRATE 2 PUFF: 160; 4.5 AEROSOL RESPIRATORY (INHALATION) at 08:16

## 2018-01-01 RX ADMIN — POTASSIUM CHLORIDE 20 MEQ: 400 INJECTION, SOLUTION INTRAVENOUS at 13:34

## 2018-01-01 RX ADMIN — ERYTHROPOIETIN 10000 UNITS: 10000 INJECTION, SOLUTION INTRAVENOUS; SUBCUTANEOUS at 11:11

## 2018-01-01 RX ADMIN — MONTELUKAST 10 MG: 10 TABLET, FILM COATED ORAL at 08:59

## 2018-01-01 RX ADMIN — CARVEDILOL 12.5 MG: 25 TABLET, FILM COATED ORAL at 18:27

## 2018-01-01 RX ADMIN — ZOLPIDEM TARTRATE 10 MG: 5 TABLET ORAL at 00:06

## 2018-01-01 RX ADMIN — Medication 10 ML: at 12:42

## 2018-01-01 RX ADMIN — ZOLPIDEM TARTRATE 10 MG: 5 TABLET ORAL at 23:38

## 2018-01-01 RX ADMIN — SUCRALFATE 1 G: 1 SUSPENSION ORAL at 18:28

## 2018-01-01 RX ADMIN — POTASSIUM CHLORIDE 10 MEQ: 7.46 INJECTION, SOLUTION INTRAVENOUS at 15:45

## 2018-01-01 RX ADMIN — WARFARIN SODIUM 5 MG: 5 TABLET ORAL at 19:42

## 2018-01-01 RX ADMIN — PANTOPRAZOLE SODIUM 40 MG: 40 TABLET, DELAYED RELEASE ORAL at 09:36

## 2018-01-01 RX ADMIN — SODIUM CHLORIDE 9 ML/HR: 9 INJECTION, SOLUTION INTRAVENOUS at 12:20

## 2018-01-01 RX ADMIN — INSULIN ASPART 10 UNITS: 100 INJECTION, SOLUTION INTRAVENOUS; SUBCUTANEOUS at 11:45

## 2018-01-01 RX ADMIN — POLYETHYLENE GLYCOL 3350 17 G: 17 POWDER, FOR SOLUTION ORAL at 08:52

## 2018-01-01 RX ADMIN — TORSEMIDE 20 MG: 20 TABLET ORAL at 18:57

## 2018-01-01 RX ADMIN — SILVER SULFADIAZINE: 10 CREAM TOPICAL at 09:05

## 2018-01-01 RX ADMIN — SODIUM CHLORIDE: 9 INJECTION, SOLUTION INTRAVENOUS at 13:07

## 2018-01-01 RX ADMIN — OXYCODONE HYDROCHLORIDE 10 MG: 5 TABLET ORAL at 05:25

## 2018-01-01 RX ADMIN — INSULIN ASPART 2 UNITS: 100 INJECTION, SOLUTION INTRAVENOUS; SUBCUTANEOUS at 18:13

## 2018-01-01 RX ADMIN — GUAIFENESIN 600 MG: 600 TABLET, EXTENDED RELEASE ORAL at 20:42

## 2018-01-01 RX ADMIN — OXYCODONE HYDROCHLORIDE 5 MG: 5 TABLET ORAL at 12:07

## 2018-01-01 RX ADMIN — WARFARIN SODIUM 5 MG: 5 TABLET ORAL at 17:01

## 2018-01-01 RX ADMIN — GUAIFENESIN 1200 MG: 600 TABLET, EXTENDED RELEASE ORAL at 10:03

## 2018-01-01 RX ADMIN — DOCUSATE SODIUM -SENNOSIDES 2 TABLET: 50; 8.6 TABLET, COATED ORAL at 20:19

## 2018-01-01 RX ADMIN — INSULIN ASPART 8 UNITS: 100 INJECTION, SOLUTION INTRAVENOUS; SUBCUTANEOUS at 12:42

## 2018-01-01 RX ADMIN — INSULIN ASPART 8 UNITS: 100 INJECTION, SOLUTION INTRAVENOUS; SUBCUTANEOUS at 11:03

## 2018-01-01 RX ADMIN — CEFTRIAXONE SODIUM 1 G: 1 INJECTION, SOLUTION INTRAVENOUS at 18:24

## 2018-01-01 RX ADMIN — CARVEDILOL 25 MG: 25 TABLET, FILM COATED ORAL at 08:29

## 2018-01-01 RX ADMIN — ASPIRIN 81 MG: 81 TABLET, FILM COATED ORAL at 08:56

## 2018-01-01 RX ADMIN — INSULIN DETEMIR 50 UNITS: 100 INJECTION, SOLUTION SUBCUTANEOUS at 21:27

## 2018-01-01 RX ADMIN — PANTOPRAZOLE SODIUM 40 MG: 40 TABLET, DELAYED RELEASE ORAL at 09:08

## 2018-01-01 RX ADMIN — LACTULOSE 20 G: 20 SOLUTION ORAL at 09:40

## 2018-01-01 RX ADMIN — CARVEDILOL 6.25 MG: 6.25 TABLET, FILM COATED ORAL at 21:18

## 2018-01-01 RX ADMIN — MORPHINE SULFATE 2 MG: 2 INJECTION, SOLUTION INTRAMUSCULAR; INTRAVENOUS at 18:49

## 2018-01-01 RX ADMIN — INSULIN ASPART 2 UNITS: 100 INJECTION, SOLUTION INTRAVENOUS; SUBCUTANEOUS at 06:51

## 2018-01-01 RX ADMIN — MIDODRINE HYDROCHLORIDE 5 MG: 5 TABLET ORAL at 21:12

## 2018-01-01 RX ADMIN — LACTULOSE 20 G: 20 SOLUTION ORAL at 09:22

## 2018-01-01 RX ADMIN — INSULIN ASPART 2 UNITS: 100 INJECTION, SOLUTION INTRAVENOUS; SUBCUTANEOUS at 21:04

## 2018-01-01 RX ADMIN — BUDESONIDE AND FORMOTEROL FUMARATE DIHYDRATE 2 PUFF: 160; 4.5 AEROSOL RESPIRATORY (INHALATION) at 20:11

## 2018-01-01 RX ADMIN — GUAIFENESIN 600 MG: 600 TABLET, EXTENDED RELEASE ORAL at 08:21

## 2018-01-01 RX ADMIN — POTASSIUM CHLORIDE 40 MEQ: 750 CAPSULE, EXTENDED RELEASE ORAL at 18:13

## 2018-01-01 RX ADMIN — MONTELUKAST 10 MG: 10 TABLET, FILM COATED ORAL at 14:27

## 2018-01-01 RX ADMIN — POTASSIUM CHLORIDE 40 MEQ: 750 CAPSULE, EXTENDED RELEASE ORAL at 12:14

## 2018-01-01 RX ADMIN — FLUTICASONE PROPIONATE 2 SPRAY: 50 SPRAY, METERED NASAL at 08:22

## 2018-01-01 RX ADMIN — ASPIRIN 81 MG: 81 TABLET, FILM COATED ORAL at 09:19

## 2018-01-01 RX ADMIN — POLYETHYLENE GLYCOL 3350 17 G: 17 POWDER, FOR SOLUTION ORAL at 10:29

## 2018-01-01 RX ADMIN — NYSTATIN 500000 UNITS: 100000 SUSPENSION ORAL at 21:04

## 2018-01-01 RX ADMIN — BUMETANIDE 2 MG: 0.25 INJECTION INTRAMUSCULAR; INTRAVENOUS at 09:00

## 2018-01-01 RX ADMIN — POTASSIUM CHLORIDE 40 MEQ: 750 CAPSULE, EXTENDED RELEASE ORAL at 09:41

## 2018-01-01 RX ADMIN — MUPIROCIN 10 APPLICATION: 20 OINTMENT TOPICAL at 21:15

## 2018-01-01 RX ADMIN — HEPARIN SODIUM 3000 UNITS: 1000 INJECTION INTRAVENOUS; SUBCUTANEOUS at 18:36

## 2018-01-01 RX ADMIN — TERAZOSIN HYDROCHLORIDE 2 MG: 2 CAPSULE ORAL at 20:01

## 2018-01-01 RX ADMIN — OXYCODONE HYDROCHLORIDE 10 MG: 5 TABLET ORAL at 14:05

## 2018-01-01 RX ADMIN — OXYCODONE HYDROCHLORIDE 10 MG: 5 TABLET ORAL at 19:42

## 2018-01-01 RX ADMIN — GUAIFENESIN 1200 MG: 600 TABLET, EXTENDED RELEASE ORAL at 21:03

## 2018-01-01 RX ADMIN — GLYCOPYRROLATE 0.2 MG: 0.2 INJECTION, SOLUTION INTRAMUSCULAR; INTRAVENOUS at 20:25

## 2018-01-01 RX ADMIN — LORAZEPAM 0.5 MG: 2 INJECTION INTRAMUSCULAR; INTRAVENOUS at 14:48

## 2018-01-01 RX ADMIN — TERAZOSIN HYDROCHLORIDE 2 MG: 2 CAPSULE ORAL at 21:18

## 2018-01-01 RX ADMIN — MUPIROCIN 1 APPLICATION: 20 OINTMENT TOPICAL at 20:18

## 2018-01-01 RX ADMIN — METOLAZONE 5 MG: 5 TABLET ORAL at 09:03

## 2018-01-01 RX ADMIN — OXYCODONE HYDROCHLORIDE 10 MG: 5 TABLET ORAL at 04:09

## 2018-01-01 RX ADMIN — FERROUS SULFATE TAB 325 MG (65 MG ELEMENTAL FE) 325 MG: 325 (65 FE) TAB at 09:07

## 2018-01-01 RX ADMIN — MONTELUKAST 10 MG: 10 TABLET, FILM COATED ORAL at 08:21

## 2018-01-01 RX ADMIN — INSULIN DETEMIR 50 UNITS: 100 INJECTION, SOLUTION SUBCUTANEOUS at 22:24

## 2018-01-01 RX ADMIN — OXYCODONE HYDROCHLORIDE 10 MG: 5 TABLET ORAL at 14:35

## 2018-01-01 RX ADMIN — BUMETANIDE 1 MG/HR: 0.25 INJECTION INTRAMUSCULAR; INTRAVENOUS at 06:26

## 2018-01-01 RX ADMIN — MUPIROCIN 10 APPLICATION: 20 OINTMENT TOPICAL at 20:57

## 2018-01-01 RX ADMIN — CYCLOBENZAPRINE 10 MG: 10 TABLET, FILM COATED ORAL at 21:10

## 2018-01-01 RX ADMIN — BUDESONIDE AND FORMOTEROL FUMARATE DIHYDRATE 2 PUFF: 160; 4.5 AEROSOL RESPIRATORY (INHALATION) at 08:12

## 2018-01-01 RX ADMIN — MORPHINE SULFATE 4 MG: 4 INJECTION INTRAVENOUS at 02:28

## 2018-01-01 RX ADMIN — POTASSIUM CHLORIDE 40 MEQ: 750 CAPSULE, EXTENDED RELEASE ORAL at 06:13

## 2018-01-01 RX ADMIN — Medication 10 ML: at 11:30

## 2018-01-01 RX ADMIN — INSULIN DETEMIR 18 UNITS: 100 INJECTION, SOLUTION SUBCUTANEOUS at 20:24

## 2018-01-01 RX ADMIN — HYDROCORTISONE: 1 CREAM TOPICAL at 08:43

## 2018-01-01 RX ADMIN — TRANEXAMIC ACID 1000 MG: 100 INJECTION, SOLUTION INTRAVENOUS at 07:38

## 2018-01-01 RX ADMIN — GUAIFENESIN 1200 MG: 600 TABLET, EXTENDED RELEASE ORAL at 09:19

## 2018-01-01 RX ADMIN — TERAZOSIN HYDROCHLORIDE 2 MG: 2 CAPSULE ORAL at 21:03

## 2018-01-01 RX ADMIN — IPRATROPIUM BROMIDE AND ALBUTEROL SULFATE 3 ML: .5; 3 SOLUTION RESPIRATORY (INHALATION) at 15:58

## 2018-01-01 RX ADMIN — SUCRALFATE 1 G: 1 SUSPENSION ORAL at 12:13

## 2018-01-01 RX ADMIN — MUPIROCIN 10 APPLICATION: 20 OINTMENT TOPICAL at 09:53

## 2018-01-01 RX ADMIN — MIDODRINE HYDROCHLORIDE 5 MG: 5 TABLET ORAL at 22:54

## 2018-01-01 RX ADMIN — SPIRONOLACTONE 25 MG: 25 TABLET ORAL at 12:37

## 2018-01-01 RX ADMIN — INSULIN ASPART 8 UNITS: 100 INJECTION, SOLUTION INTRAVENOUS; SUBCUTANEOUS at 12:18

## 2018-01-01 RX ADMIN — INSULIN DETEMIR 18 UNITS: 100 INJECTION, SOLUTION SUBCUTANEOUS at 20:47

## 2018-01-01 RX ADMIN — INSULIN ASPART 12 UNITS: 100 INJECTION, SOLUTION INTRAVENOUS; SUBCUTANEOUS at 11:46

## 2018-01-01 RX ADMIN — TERAZOSIN HYDROCHLORIDE 2 MG: 2 CAPSULE ORAL at 20:41

## 2018-01-01 RX ADMIN — INSULIN ASPART 2 UNITS: 100 INJECTION, SOLUTION INTRAVENOUS; SUBCUTANEOUS at 21:27

## 2018-01-01 RX ADMIN — NYSTATIN 500000 UNITS: 100000 SUSPENSION ORAL at 21:15

## 2018-01-01 RX ADMIN — ONDANSETRON HYDROCHLORIDE 4 MG: 2 INJECTION, SOLUTION INTRAMUSCULAR; INTRAVENOUS at 23:26

## 2018-01-01 RX ADMIN — INSULIN ASPART 8 UNITS: 100 INJECTION, SOLUTION INTRAVENOUS; SUBCUTANEOUS at 06:57

## 2018-01-01 RX ADMIN — TORSEMIDE 20 MG: 20 TABLET ORAL at 17:00

## 2018-01-01 RX ADMIN — LEVOTHYROXINE SODIUM 50 MCG: 50 TABLET ORAL at 07:34

## 2018-01-01 RX ADMIN — INSULIN ASPART 4 UNITS: 100 INJECTION, SOLUTION INTRAVENOUS; SUBCUTANEOUS at 12:09

## 2018-01-01 RX ADMIN — BUDESONIDE AND FORMOTEROL FUMARATE DIHYDRATE 2 PUFF: 160; 4.5 AEROSOL RESPIRATORY (INHALATION) at 19:33

## 2018-01-01 RX ADMIN — WARFARIN SODIUM 4 MG: 4 TABLET ORAL at 17:29

## 2018-01-01 RX ADMIN — OXYCODONE HYDROCHLORIDE 10 MG: 5 TABLET ORAL at 10:14

## 2018-01-01 RX ADMIN — GUAIFENESIN 1200 MG: 600 TABLET, EXTENDED RELEASE ORAL at 08:52

## 2018-01-01 RX ADMIN — FERROUS SULFATE TAB 325 MG (65 MG ELEMENTAL FE) 325 MG: 325 (65 FE) TAB at 08:43

## 2018-01-01 RX ADMIN — TERAZOSIN HYDROCHLORIDE 2 MG: 2 CAPSULE ORAL at 20:53

## 2018-01-01 RX ADMIN — GUAIFENESIN 600 MG: 600 TABLET, EXTENDED RELEASE ORAL at 08:43

## 2018-01-01 RX ADMIN — ASPIRIN 81 MG: 81 TABLET, FILM COATED ORAL at 08:32

## 2018-01-01 RX ADMIN — SUFENTANIL CITRATE 25 MCG: 50 INJECTION, SOLUTION EPIDURAL; INTRAVENOUS at 10:34

## 2018-01-01 RX ADMIN — PANTOPRAZOLE SODIUM 40 MG: 40 TABLET, DELAYED RELEASE ORAL at 09:48

## 2018-01-01 RX ADMIN — HYDROCORTISONE 1 APPLICATION: 1 CREAM TOPICAL at 21:51

## 2018-01-01 RX ADMIN — CARVEDILOL 6.25 MG: 6.25 TABLET, FILM COATED ORAL at 08:39

## 2018-01-01 RX ADMIN — ERYTHROPOIETIN 10000 UNITS: 10000 INJECTION, SOLUTION INTRAVENOUS; SUBCUTANEOUS at 11:15

## 2018-01-01 RX ADMIN — CARVEDILOL 12.5 MG: 25 TABLET, FILM COATED ORAL at 17:03

## 2018-01-01 RX ADMIN — SODIUM CHLORIDE 9 ML/HR: 9 INJECTION, SOLUTION INTRAVENOUS at 19:04

## 2018-01-01 RX ADMIN — Medication 10 ML: at 16:45

## 2018-01-01 RX ADMIN — APIXABAN 5 MG: 5 TABLET, FILM COATED ORAL at 09:00

## 2018-01-01 RX ADMIN — BUDESONIDE AND FORMOTEROL FUMARATE DIHYDRATE 2 PUFF: 160; 4.5 AEROSOL RESPIRATORY (INHALATION) at 20:35

## 2018-01-01 RX ADMIN — SPIRONOLACTONE 25 MG: 25 TABLET, FILM COATED ORAL at 11:51

## 2018-01-01 RX ADMIN — NYSTATIN 500000 UNITS: 100000 SUSPENSION ORAL at 09:19

## 2018-01-01 RX ADMIN — CARVEDILOL 6.25 MG: 6.25 TABLET, FILM COATED ORAL at 20:16

## 2018-01-01 RX ADMIN — SODIUM CHLORIDE 2.4 UNITS/HR: 900 INJECTION, SOLUTION INTRAVENOUS at 08:01

## 2018-01-01 RX ADMIN — PANTOPRAZOLE SODIUM 40 MG: 40 TABLET, DELAYED RELEASE ORAL at 10:29

## 2018-01-01 RX ADMIN — BUDESONIDE AND FORMOTEROL FUMARATE DIHYDRATE 2 PUFF: 160; 4.5 AEROSOL RESPIRATORY (INHALATION) at 11:32

## 2018-01-01 RX ADMIN — INSULIN ASPART 3 UNITS: 100 INJECTION, SOLUTION INTRAVENOUS; SUBCUTANEOUS at 21:16

## 2018-01-01 RX ADMIN — NYSTATIN 500000 UNITS: 100000 SUSPENSION ORAL at 08:06

## 2018-01-01 RX ADMIN — SUCRALFATE 1 G: 1 SUSPENSION ORAL at 17:24

## 2018-01-01 RX ADMIN — MONTELUKAST 10 MG: 10 TABLET, FILM COATED ORAL at 09:49

## 2018-01-01 RX ADMIN — POTASSIUM CHLORIDE 40 MEQ: 750 CAPSULE, EXTENDED RELEASE ORAL at 11:29

## 2018-01-01 RX ADMIN — LEVOTHYROXINE SODIUM 50 MCG: 50 TABLET ORAL at 05:13

## 2018-01-01 RX ADMIN — INSULIN ASPART 8 UNITS: 100 INJECTION, SOLUTION INTRAVENOUS; SUBCUTANEOUS at 19:05

## 2018-01-01 RX ADMIN — SPIRONOLACTONE 25 MG: 25 TABLET ORAL at 08:29

## 2018-01-01 RX ADMIN — OXYCODONE HYDROCHLORIDE 10 MG: 5 TABLET ORAL at 16:44

## 2018-01-01 RX ADMIN — ASPIRIN 81 MG: 81 TABLET, FILM COATED ORAL at 09:44

## 2018-01-01 RX ADMIN — CARVEDILOL 6.25 MG: 6.25 TABLET, FILM COATED ORAL at 09:44

## 2018-01-01 RX ADMIN — OXYCODONE HYDROCHLORIDE 10 MG: 5 TABLET ORAL at 07:19

## 2018-01-01 RX ADMIN — CEPHALEXIN 250 MG: 250 POWDER, FOR SUSPENSION ORAL at 16:45

## 2018-01-01 RX ADMIN — OXYCODONE HYDROCHLORIDE 10 MG: 5 TABLET ORAL at 02:28

## 2018-01-01 RX ADMIN — BUDESONIDE AND FORMOTEROL FUMARATE DIHYDRATE 2 PUFF: 160; 4.5 AEROSOL RESPIRATORY (INHALATION) at 10:12

## 2018-01-01 RX ADMIN — LACTULOSE 20 G: 20 SOLUTION ORAL at 10:15

## 2018-01-01 RX ADMIN — MONTELUKAST 10 MG: 10 TABLET, FILM COATED ORAL at 09:28

## 2018-01-01 RX ADMIN — DOCUSATE SODIUM -SENNOSIDES 2 TABLET: 50; 8.6 TABLET, COATED ORAL at 20:45

## 2018-01-01 RX ADMIN — OXYCODONE HYDROCHLORIDE 10 MG: 5 TABLET ORAL at 09:50

## 2018-01-01 RX ADMIN — GUAIFENESIN 1200 MG: 600 TABLET, EXTENDED RELEASE ORAL at 09:43

## 2018-01-01 RX ADMIN — GUAIFENESIN 1200 MG: 600 TABLET, EXTENDED RELEASE ORAL at 21:12

## 2018-01-01 RX ADMIN — Medication 10 ML: at 10:01

## 2018-01-01 RX ADMIN — ALPRAZOLAM 0.25 MG: 0.25 TABLET ORAL at 13:11

## 2018-01-01 RX ADMIN — INSULIN ASPART 12 UNITS: 100 INJECTION, SOLUTION INTRAVENOUS; SUBCUTANEOUS at 12:14

## 2018-01-01 RX ADMIN — MIDODRINE HYDROCHLORIDE 5 MG: 5 TABLET ORAL at 08:43

## 2018-01-01 RX ADMIN — CARVEDILOL 6.25 MG: 6.25 TABLET, FILM COATED ORAL at 20:54

## 2018-01-01 RX ADMIN — ONDANSETRON 4 MG: 2 INJECTION INTRAMUSCULAR; INTRAVENOUS at 00:06

## 2018-01-01 RX ADMIN — BUMETANIDE 2 MG: 0.25 INJECTION INTRAMUSCULAR; INTRAVENOUS at 17:42

## 2018-01-01 RX ADMIN — CEFDINIR 300 MG: 300 CAPSULE ORAL at 21:15

## 2018-01-01 RX ADMIN — GUAIFENESIN 1200 MG: 600 TABLET, EXTENDED RELEASE ORAL at 08:27

## 2018-01-01 RX ADMIN — TAZOBACTAM SODIUM AND PIPERACILLIN SODIUM 4.5 G: 500; 4 INJECTION, SOLUTION INTRAVENOUS at 13:29

## 2018-01-01 RX ADMIN — Medication 1 MG: at 13:19

## 2018-01-01 RX ADMIN — MIDODRINE HYDROCHLORIDE 5 MG: 5 TABLET ORAL at 16:33

## 2018-01-01 RX ADMIN — MIDODRINE HYDROCHLORIDE 5 MG: 5 TABLET ORAL at 17:29

## 2018-01-01 RX ADMIN — MUPIROCIN 10 APPLICATION: 20 OINTMENT TOPICAL at 21:25

## 2018-01-01 RX ADMIN — MUPIROCIN 10 APPLICATION: 20 OINTMENT TOPICAL at 20:20

## 2018-01-01 RX ADMIN — BUMETANIDE 1 MG/HR: 0.25 INJECTION INTRAMUSCULAR; INTRAVENOUS at 17:57

## 2018-01-01 RX ADMIN — IRON SUCROSE 300 MG: 20 INJECTION, SOLUTION INTRAVENOUS at 01:45

## 2018-01-01 RX ADMIN — INSULIN ASPART 8 UNITS: 100 INJECTION, SOLUTION INTRAVENOUS; SUBCUTANEOUS at 11:23

## 2018-01-01 RX ADMIN — NYSTATIN 500000 UNITS: 100000 SUSPENSION ORAL at 16:54

## 2018-01-01 RX ADMIN — CARVEDILOL 6.25 MG: 6.25 TABLET, FILM COATED ORAL at 20:22

## 2018-01-01 RX ADMIN — INSULIN ASPART 4 UNITS: 100 INJECTION, SOLUTION INTRAVENOUS; SUBCUTANEOUS at 12:08

## 2018-01-01 RX ADMIN — INSULIN ASPART 10 UNITS: 100 INJECTION, SOLUTION INTRAVENOUS; SUBCUTANEOUS at 12:23

## 2018-01-01 RX ADMIN — Medication 10 ML: at 03:54

## 2018-01-01 RX ADMIN — OXYCODONE HYDROCHLORIDE 10 MG: 5 TABLET ORAL at 16:16

## 2018-01-01 RX ADMIN — POLYETHYLENE GLYCOL 3350 17 G: 17 POWDER, FOR SOLUTION ORAL at 09:50

## 2018-01-01 RX ADMIN — Medication 1 MG: at 07:53

## 2018-01-01 RX ADMIN — TOLVAPTAN 15 MG: 15 TABLET ORAL at 11:54

## 2018-01-01 RX ADMIN — INSULIN ASPART 8 UNITS: 100 INJECTION, SOLUTION INTRAVENOUS; SUBCUTANEOUS at 06:18

## 2018-01-01 RX ADMIN — Medication 10 ML: at 18:06

## 2018-01-01 RX ADMIN — ONDANSETRON 4 MG: 2 INJECTION INTRAMUSCULAR; INTRAVENOUS at 18:23

## 2018-01-01 RX ADMIN — POTASSIUM CHLORIDE 40 MEQ: 750 CAPSULE, EXTENDED RELEASE ORAL at 18:19

## 2018-01-01 RX ADMIN — NYSTATIN 500000 UNITS: 100000 SUSPENSION ORAL at 09:27

## 2018-01-01 RX ADMIN — INSULIN ASPART 8 UNITS: 100 INJECTION, SOLUTION INTRAVENOUS; SUBCUTANEOUS at 18:12

## 2018-01-01 RX ADMIN — SUFENTANIL CITRATE 25 MCG: 50 INJECTION, SOLUTION EPIDURAL; INTRAVENOUS at 08:45

## 2018-01-01 RX ADMIN — MIDODRINE HYDROCHLORIDE 5 MG: 5 TABLET ORAL at 06:50

## 2018-01-01 RX ADMIN — INSULIN ASPART 2 UNITS: 100 INJECTION, SOLUTION INTRAVENOUS; SUBCUTANEOUS at 06:28

## 2018-01-01 RX ADMIN — CARVEDILOL 12.5 MG: 25 TABLET, FILM COATED ORAL at 09:25

## 2018-01-01 RX ADMIN — MONTELUKAST 10 MG: 10 TABLET, FILM COATED ORAL at 10:08

## 2018-01-01 RX ADMIN — GUAIFENESIN 1200 MG: 600 TABLET, EXTENDED RELEASE ORAL at 21:55

## 2018-01-01 RX ADMIN — HYDROCORTISONE: 1 CREAM TOPICAL at 20:45

## 2018-01-01 RX ADMIN — NYSTATIN 500000 UNITS: 100000 SUSPENSION ORAL at 09:09

## 2018-01-01 RX ADMIN — NYSTATIN: 100000 POWDER TOPICAL at 20:36

## 2018-01-01 RX ADMIN — NYSTATIN 500000 UNITS: 100000 SUSPENSION ORAL at 12:41

## 2018-01-01 RX ADMIN — SCOPALAMINE 1 PATCH: 1 PATCH, EXTENDED RELEASE TRANSDERMAL at 16:07

## 2018-01-01 RX ADMIN — MONTELUKAST 10 MG: 10 TABLET, FILM COATED ORAL at 08:37

## 2018-01-01 RX ADMIN — ASPIRIN 81 MG: 81 TABLET, FILM COATED ORAL at 08:43

## 2018-01-01 RX ADMIN — INSULIN ASPART 2 UNITS: 100 INJECTION, SOLUTION INTRAVENOUS; SUBCUTANEOUS at 20:35

## 2018-01-01 RX ADMIN — MIDODRINE HYDROCHLORIDE 5 MG: 5 TABLET ORAL at 07:40

## 2018-01-01 RX ADMIN — MORPHINE SULFATE 2 MG: 2 INJECTION, SOLUTION INTRAMUSCULAR; INTRAVENOUS at 13:29

## 2018-01-01 RX ADMIN — PANTOPRAZOLE SODIUM 40 MG: 40 TABLET, DELAYED RELEASE ORAL at 06:00

## 2018-01-01 RX ADMIN — CEPHALEXIN 250 MG: 250 POWDER, FOR SUSPENSION ORAL at 21:13

## 2018-01-01 RX ADMIN — BISACODYL 10 MG: 5 TABLET, COATED ORAL at 09:17

## 2018-01-01 RX ADMIN — INSULIN ASPART 12 UNITS: 100 INJECTION, SOLUTION INTRAVENOUS; SUBCUTANEOUS at 07:46

## 2018-01-01 RX ADMIN — OXYCODONE HYDROCHLORIDE 10 MG: 5 TABLET ORAL at 09:03

## 2018-01-01 RX ADMIN — GUAIFENESIN 1200 MG: 600 TABLET, EXTENDED RELEASE ORAL at 21:20

## 2018-01-01 RX ADMIN — MIDODRINE HYDROCHLORIDE 5 MG: 5 TABLET ORAL at 11:27

## 2018-01-01 RX ADMIN — INSULIN ASPART 2 UNITS: 100 INJECTION, SOLUTION INTRAVENOUS; SUBCUTANEOUS at 20:57

## 2018-01-01 RX ADMIN — LEVOTHYROXINE SODIUM 50 MCG: 50 TABLET ORAL at 05:24

## 2018-01-01 RX ADMIN — ZOLPIDEM TARTRATE 10 MG: 5 TABLET ORAL at 00:27

## 2018-01-01 RX ADMIN — MUPIROCIN: 20 OINTMENT TOPICAL at 09:30

## 2018-01-01 RX ADMIN — BUDESONIDE AND FORMOTEROL FUMARATE DIHYDRATE 2 PUFF: 160; 4.5 AEROSOL RESPIRATORY (INHALATION) at 07:45

## 2018-01-01 RX ADMIN — SUCRALFATE 1 G: 1 SUSPENSION ORAL at 05:13

## 2018-01-01 RX ADMIN — INSULIN ASPART 2 UNITS: 100 INJECTION, SOLUTION INTRAVENOUS; SUBCUTANEOUS at 07:18

## 2018-01-01 RX ADMIN — ASPIRIN 81 MG: 81 TABLET, FILM COATED ORAL at 08:27

## 2018-01-01 RX ADMIN — DOCUSATE SODIUM -SENNOSIDES 2 TABLET: 50; 8.6 TABLET, COATED ORAL at 21:32

## 2018-01-01 RX ADMIN — NYSTATIN 500000 UNITS: 100000 SUSPENSION ORAL at 18:06

## 2018-01-01 RX ADMIN — INSULIN ASPART 8 UNITS: 100 INJECTION, SOLUTION INTRAVENOUS; SUBCUTANEOUS at 06:26

## 2018-01-01 RX ADMIN — MUPIROCIN 10 APPLICATION: 20 OINTMENT TOPICAL at 21:17

## 2018-01-01 RX ADMIN — CARVEDILOL 25 MG: 25 TABLET, FILM COATED ORAL at 21:26

## 2018-01-01 RX ADMIN — FENTANYL CITRATE 50 MCG: 50 INJECTION INTRAMUSCULAR; INTRAVENOUS at 14:04

## 2018-01-01 RX ADMIN — TORSEMIDE 100 MG: 100 TABLET ORAL at 09:09

## 2018-01-01 RX ADMIN — Medication 1 MG: at 14:22

## 2018-01-01 RX ADMIN — LEVOTHYROXINE SODIUM 50 MCG: 50 TABLET ORAL at 05:03

## 2018-01-01 RX ADMIN — MIDODRINE HYDROCHLORIDE 5 MG: 5 TABLET ORAL at 10:18

## 2018-01-01 RX ADMIN — APIXABAN 5 MG: 5 TABLET, FILM COATED ORAL at 08:29

## 2018-01-01 RX ADMIN — OXYCODONE HYDROCHLORIDE 10 MG: 5 TABLET ORAL at 16:20

## 2018-01-01 RX ADMIN — MUPIROCIN 10 APPLICATION: 20 OINTMENT TOPICAL at 10:08

## 2018-01-01 RX ADMIN — ASPIRIN 81 MG: 81 TABLET, FILM COATED ORAL at 09:36

## 2018-01-01 RX ADMIN — OXYCODONE HYDROCHLORIDE 10 MG: 5 TABLET ORAL at 21:50

## 2018-01-01 RX ADMIN — INSULIN ASPART 8 UNITS: 100 INJECTION, SOLUTION INTRAVENOUS; SUBCUTANEOUS at 06:51

## 2018-01-01 RX ADMIN — MIDODRINE HYDROCHLORIDE 5 MG: 5 TABLET ORAL at 14:35

## 2018-01-01 RX ADMIN — MIDODRINE HYDROCHLORIDE 5 MG: 5 TABLET ORAL at 12:34

## 2018-01-01 RX ADMIN — NYSTATIN 500000 UNITS: 100000 SUSPENSION ORAL at 20:40

## 2018-01-01 RX ADMIN — MIDODRINE HYDROCHLORIDE 5 MG: 5 TABLET ORAL at 06:43

## 2018-01-01 RX ADMIN — GUAIFENESIN 1200 MG: 600 TABLET, EXTENDED RELEASE ORAL at 08:56

## 2018-01-01 RX ADMIN — SODIUM CHLORIDE 125 MG: 9 INJECTION, SOLUTION INTRAVENOUS at 18:37

## 2018-01-01 RX ADMIN — MIDODRINE HYDROCHLORIDE 5 MG: 5 TABLET ORAL at 06:37

## 2018-01-01 RX ADMIN — FERROUS SULFATE TAB 325 MG (65 MG ELEMENTAL FE) 325 MG: 325 (65 FE) TAB at 08:07

## 2018-01-01 RX ADMIN — SUCRALFATE 1 G: 1 SUSPENSION ORAL at 18:27

## 2018-01-01 RX ADMIN — MORPHINE SULFATE 1 MG: 2 INJECTION, SOLUTION INTRAMUSCULAR; INTRAVENOUS at 16:27

## 2018-01-01 RX ADMIN — ZOLPIDEM TARTRATE 10 MG: 5 TABLET ORAL at 00:38

## 2018-01-01 RX ADMIN — OXYCODONE HYDROCHLORIDE 10 MG: 5 TABLET ORAL at 13:32

## 2018-01-01 RX ADMIN — INSULIN ASPART 12 UNITS: 100 INJECTION, SOLUTION INTRAVENOUS; SUBCUTANEOUS at 17:30

## 2018-01-01 RX ADMIN — SUCRALFATE 1 G: 1 SUSPENSION ORAL at 05:58

## 2018-01-01 RX ADMIN — SCOPALAMINE 1 PATCH: 1 PATCH, EXTENDED RELEASE TRANSDERMAL at 14:18

## 2018-01-01 RX ADMIN — OXYCODONE HYDROCHLORIDE 10 MG: 5 TABLET ORAL at 13:42

## 2018-01-01 RX ADMIN — SUCRALFATE 1 G: 1 SUSPENSION ORAL at 07:02

## 2018-01-01 RX ADMIN — INSULIN ASPART 8 UNITS: 100 INJECTION, SOLUTION INTRAVENOUS; SUBCUTANEOUS at 08:08

## 2018-01-01 RX ADMIN — SPIRONOLACTONE 50 MG: 50 TABLET ORAL at 12:14

## 2018-01-01 RX ADMIN — OXYCODONE HYDROCHLORIDE 10 MG: 5 TABLET ORAL at 18:03

## 2018-01-01 RX ADMIN — MUPIROCIN: 20 OINTMENT TOPICAL at 09:04

## 2018-01-01 RX ADMIN — FLUTICASONE PROPIONATE 2 SPRAY: 50 SPRAY, METERED NASAL at 09:10

## 2018-01-01 RX ADMIN — HEPARIN SODIUM 3000 UNITS: 1000 INJECTION INTRAVENOUS; SUBCUTANEOUS at 17:51

## 2018-01-01 RX ADMIN — CARVEDILOL 12.5 MG: 25 TABLET, FILM COATED ORAL at 18:07

## 2018-01-01 RX ADMIN — POTASSIUM CHLORIDE 40 MEQ: 750 CAPSULE, EXTENDED RELEASE ORAL at 05:22

## 2018-01-01 RX ADMIN — LEVOTHYROXINE SODIUM 50 MCG: 50 TABLET ORAL at 05:43

## 2018-01-01 RX ADMIN — BUDESONIDE AND FORMOTEROL FUMARATE DIHYDRATE 2 PUFF: 160; 4.5 AEROSOL RESPIRATORY (INHALATION) at 07:35

## 2018-01-01 RX ADMIN — FLUCONAZOLE 150 MG: 150 TABLET ORAL at 08:31

## 2018-01-01 RX ADMIN — ACETAMINOPHEN 650 MG: 325 TABLET, FILM COATED ORAL at 16:32

## 2018-01-01 RX ADMIN — INSULIN ASPART 6 UNITS: 100 INJECTION, SOLUTION INTRAVENOUS; SUBCUTANEOUS at 09:00

## 2018-01-01 RX ADMIN — HYDROCORTISONE: 1 CREAM TOPICAL at 21:24

## 2018-01-01 RX ADMIN — MUPIROCIN 10 APPLICATION: 20 OINTMENT TOPICAL at 20:28

## 2018-01-01 RX ADMIN — BUMETANIDE 2 MG: 2 TABLET ORAL at 09:52

## 2018-01-01 RX ADMIN — FLUTICASONE PROPIONATE 2 SPRAY: 50 SPRAY, METERED NASAL at 09:23

## 2018-01-01 RX ADMIN — LORAZEPAM 0.5 MG: 2 INJECTION INTRAMUSCULAR; INTRAVENOUS at 05:33

## 2018-01-01 RX ADMIN — TOLVAPTAN 15 MG: 30 TABLET ORAL at 12:14

## 2018-01-01 RX ADMIN — GUAIFENESIN 600 MG: 600 TABLET, EXTENDED RELEASE ORAL at 21:23

## 2018-01-01 RX ADMIN — PANTOPRAZOLE SODIUM 40 MG: 40 TABLET, DELAYED RELEASE ORAL at 10:09

## 2018-01-01 RX ADMIN — CYCLOBENZAPRINE 10 MG: 10 TABLET, FILM COATED ORAL at 00:03

## 2018-01-01 RX ADMIN — POTASSIUM CHLORIDE 40 MEQ: 750 CAPSULE, EXTENDED RELEASE ORAL at 13:12

## 2018-01-01 RX ADMIN — APIXABAN 5 MG: 5 TABLET, FILM COATED ORAL at 20:41

## 2018-01-01 RX ADMIN — MIDODRINE HYDROCHLORIDE 5 MG: 5 TABLET ORAL at 12:09

## 2018-01-01 RX ADMIN — BUDESONIDE AND FORMOTEROL FUMARATE DIHYDRATE 2 PUFF: 160; 4.5 AEROSOL RESPIRATORY (INHALATION) at 19:59

## 2018-01-01 RX ADMIN — DOCUSATE SODIUM -SENNOSIDES 2 TABLET: 50; 8.6 TABLET, COATED ORAL at 20:34

## 2018-01-01 RX ADMIN — GUAIFENESIN 1200 MG: 600 TABLET, EXTENDED RELEASE ORAL at 09:49

## 2018-01-01 RX ADMIN — POTASSIUM CHLORIDE 40 MEQ: 750 CAPSULE, EXTENDED RELEASE ORAL at 00:55

## 2018-01-01 RX ADMIN — FLUTICASONE PROPIONATE 2 SPRAY: 50 SPRAY, METERED NASAL at 09:21

## 2018-01-01 RX ADMIN — DOCUSATE SODIUM -SENNOSIDES 2 TABLET: 50; 8.6 TABLET, COATED ORAL at 21:08

## 2018-01-01 RX ADMIN — POTASSIUM CHLORIDE 10 MEQ: 7.46 INJECTION, SOLUTION INTRAVENOUS at 10:46

## 2018-01-01 RX ADMIN — INSULIN ASPART 2 UNITS: 100 INJECTION, SOLUTION INTRAVENOUS; SUBCUTANEOUS at 11:24

## 2018-01-01 RX ADMIN — POTASSIUM CHLORIDE 40 MEQ: 750 CAPSULE, EXTENDED RELEASE ORAL at 15:13

## 2018-01-01 RX ADMIN — GUAIFENESIN 1200 MG: 600 TABLET, EXTENDED RELEASE ORAL at 09:52

## 2018-01-01 RX ADMIN — ATORVASTATIN CALCIUM 10 MG: 10 TABLET, FILM COATED ORAL at 20:20

## 2018-01-01 RX ADMIN — BUDESONIDE AND FORMOTEROL FUMARATE DIHYDRATE 2 PUFF: 160; 4.5 AEROSOL RESPIRATORY (INHALATION) at 08:05

## 2018-01-01 RX ADMIN — CARVEDILOL 6.25 MG: 6.25 TABLET, FILM COATED ORAL at 08:21

## 2018-01-01 RX ADMIN — GUAIFENESIN 1200 MG: 600 TABLET, EXTENDED RELEASE ORAL at 20:46

## 2018-01-01 RX ADMIN — VANCOMYCIN HYDROCHLORIDE 2000 MG: 10 INJECTION, POWDER, LYOPHILIZED, FOR SOLUTION INTRAVENOUS at 17:33

## 2018-01-01 RX ADMIN — ASPIRIN 81 MG: 81 TABLET, FILM COATED ORAL at 08:00

## 2018-01-01 RX ADMIN — MIDODRINE HYDROCHLORIDE 5 MG: 5 TABLET ORAL at 07:30

## 2018-01-01 RX ADMIN — GUAIFENESIN 1200 MG: 600 TABLET, EXTENDED RELEASE ORAL at 09:45

## 2018-01-01 RX ADMIN — INSULIN ASPART 8 UNITS: 100 INJECTION, SOLUTION INTRAVENOUS; SUBCUTANEOUS at 06:52

## 2018-01-01 RX ADMIN — EPINEPHRINE 0.02 MCG/KG/MIN: 1 INJECTION, SOLUTION, CONCENTRATE INTRAVENOUS at 09:36

## 2018-01-01 RX ADMIN — HYDROCORTISONE: 1 CREAM TOPICAL at 09:04

## 2018-01-01 RX ADMIN — INSULIN ASPART 8 UNITS: 100 INJECTION, SOLUTION INTRAVENOUS; SUBCUTANEOUS at 17:08

## 2018-01-01 RX ADMIN — HEPARIN SODIUM 5000 UNITS: 5000 INJECTION INTRAVENOUS; SUBCUTANEOUS at 22:00

## 2018-01-01 RX ADMIN — ALBUMIN HUMAN 50 G: 0.25 SOLUTION INTRAVENOUS at 13:49

## 2018-01-01 RX ADMIN — POTASSIUM CHLORIDE 20 MEQ: 750 CAPSULE, EXTENDED RELEASE ORAL at 18:24

## 2018-01-01 RX ADMIN — ASPIRIN 81 MG: 81 TABLET, FILM COATED ORAL at 08:52

## 2018-01-01 RX ADMIN — TAZOBACTAM SODIUM AND PIPERACILLIN SODIUM 4.5 G: 500; 4 INJECTION, SOLUTION INTRAVENOUS at 01:02

## 2018-01-01 RX ADMIN — INSULIN DETEMIR 18 UNITS: 100 INJECTION, SOLUTION SUBCUTANEOUS at 21:14

## 2018-01-01 RX ADMIN — MANNITOL 50 G: 12.5 INJECTION, SOLUTION INTRAVENOUS at 09:53

## 2018-01-01 RX ADMIN — ZOLPIDEM TARTRATE 10 MG: 5 TABLET ORAL at 23:30

## 2018-01-01 RX ADMIN — ASPIRIN 81 MG: 81 TABLET, FILM COATED ORAL at 09:00

## 2018-01-01 RX ADMIN — TORSEMIDE 100 MG: 100 TABLET ORAL at 13:38

## 2018-01-01 RX ADMIN — MONTELUKAST 10 MG: 10 TABLET, FILM COATED ORAL at 09:52

## 2018-01-01 RX ADMIN — DOCUSATE SODIUM -SENNOSIDES 2 TABLET: 50; 8.6 TABLET, COATED ORAL at 21:15

## 2018-01-01 RX ADMIN — TERAZOSIN HYDROCHLORIDE 2 MG: 2 CAPSULE ORAL at 21:10

## 2018-01-01 RX ADMIN — DOCUSATE SODIUM -SENNOSIDES 2 TABLET: 50; 8.6 TABLET, COATED ORAL at 20:52

## 2018-01-01 RX ADMIN — Medication 10 ML: at 08:21

## 2018-01-01 RX ADMIN — TERAZOSIN HYDROCHLORIDE 2 MG: 2 CAPSULE ORAL at 20:34

## 2018-01-01 RX ADMIN — OXYCODONE HYDROCHLORIDE 10 MG: 5 TABLET ORAL at 09:22

## 2018-01-01 RX ADMIN — NYSTATIN 500000 UNITS: 100000 SUSPENSION ORAL at 09:03

## 2018-01-01 RX ADMIN — CARVEDILOL 6.25 MG: 6.25 TABLET, FILM COATED ORAL at 20:35

## 2018-01-01 RX ADMIN — OXYCODONE HYDROCHLORIDE 10 MG: 5 TABLET ORAL at 20:54

## 2018-01-01 RX ADMIN — SUCRALFATE 1 G: 1 SUSPENSION ORAL at 14:35

## 2018-01-01 RX ADMIN — PANTOPRAZOLE SODIUM 40 MG: 40 TABLET, DELAYED RELEASE ORAL at 08:38

## 2018-01-01 RX ADMIN — FERROUS SULFATE TAB 325 MG (65 MG ELEMENTAL FE) 325 MG: 325 (65 FE) TAB at 09:19

## 2018-01-01 RX ADMIN — SUCRALFATE 1 G: 1 SUSPENSION ORAL at 23:02

## 2018-01-01 RX ADMIN — SODIUM CHLORIDE 125 MG: 9 INJECTION, SOLUTION INTRAVENOUS at 20:07

## 2018-01-01 RX ADMIN — ZOLPIDEM TARTRATE 10 MG: 5 TABLET ORAL at 23:08

## 2018-01-01 RX ADMIN — INSULIN ASPART 12 UNITS: 100 INJECTION, SOLUTION INTRAVENOUS; SUBCUTANEOUS at 09:41

## 2018-01-01 RX ADMIN — FLUTICASONE PROPIONATE 2 SPRAY: 50 SPRAY, METERED NASAL at 09:03

## 2018-01-01 RX ADMIN — OXYCODONE HYDROCHLORIDE 10 MG: 5 TABLET ORAL at 17:54

## 2018-01-01 RX ADMIN — CEFTRIAXONE SODIUM 1 G: 1 INJECTION, SOLUTION INTRAVENOUS at 17:22

## 2018-01-01 RX ADMIN — MORPHINE SULFATE 2 MG: 2 INJECTION, SOLUTION INTRAMUSCULAR; INTRAVENOUS at 01:02

## 2018-01-01 RX ADMIN — SUCRALFATE 1 G: 1 SUSPENSION ORAL at 05:03

## 2018-01-01 RX ADMIN — DOCUSATE SODIUM -SENNOSIDES 2 TABLET: 50; 8.6 TABLET, COATED ORAL at 20:03

## 2018-01-01 RX ADMIN — MIDODRINE HYDROCHLORIDE 5 MG: 5 TABLET ORAL at 11:54

## 2018-01-01 RX ADMIN — OXYCODONE HYDROCHLORIDE 10 MG: 5 TABLET ORAL at 22:53

## 2018-01-01 RX ADMIN — MONTELUKAST 10 MG: 10 TABLET, FILM COATED ORAL at 09:23

## 2018-01-01 RX ADMIN — INSULIN ASPART 4 UNITS: 100 INJECTION, SOLUTION INTRAVENOUS; SUBCUTANEOUS at 17:28

## 2018-01-01 RX ADMIN — BUMETANIDE 4 MG: 0.25 INJECTION INTRAMUSCULAR; INTRAVENOUS at 11:33

## 2018-01-01 RX ADMIN — HYDROCORTISONE: 1 CREAM TOPICAL at 09:07

## 2018-01-01 RX ADMIN — ASPIRIN 81 MG: 81 TABLET, FILM COATED ORAL at 10:07

## 2018-01-01 RX ADMIN — OXYCODONE HYDROCHLORIDE 10 MG: 5 TABLET ORAL at 20:35

## 2018-01-01 RX ADMIN — FLUTICASONE PROPIONATE 2 SPRAY: 50 SPRAY, METERED NASAL at 09:05

## 2018-01-01 RX ADMIN — MUPIROCIN 10 APPLICATION: 20 OINTMENT TOPICAL at 21:26

## 2018-01-01 RX ADMIN — GUAIFENESIN 1200 MG: 600 TABLET, EXTENDED RELEASE ORAL at 20:39

## 2018-01-01 RX ADMIN — INSULIN ASPART 3 UNITS: 100 INJECTION, SOLUTION INTRAVENOUS; SUBCUTANEOUS at 17:31

## 2018-01-01 RX ADMIN — CEFAZOLIN SODIUM 2 G: 2 INJECTION, SOLUTION INTRAVENOUS at 13:59

## 2018-01-01 RX ADMIN — INSULIN ASPART 2 UNITS: 100 INJECTION, SOLUTION INTRAVENOUS; SUBCUTANEOUS at 21:16

## 2018-01-01 RX ADMIN — NYSTATIN 500000 UNITS: 100000 SUSPENSION ORAL at 09:07

## 2018-01-01 RX ADMIN — POTASSIUM CHLORIDE 40 MEQ: 750 CAPSULE, EXTENDED RELEASE ORAL at 21:23

## 2018-01-01 RX ADMIN — GUAIFENESIN 1200 MG: 600 TABLET, EXTENDED RELEASE ORAL at 21:29

## 2018-01-01 RX ADMIN — SODIUM CHLORIDE: 9 INJECTION, SOLUTION INTRAVENOUS at 10:39

## 2018-01-01 RX ADMIN — APIXABAN 5 MG: 5 TABLET, FILM COATED ORAL at 21:37

## 2018-01-01 RX ADMIN — ZOLPIDEM TARTRATE 10 MG: 5 TABLET ORAL at 23:58

## 2018-01-01 RX ADMIN — DEXTROSE MONOHYDRATE 25 ML: 25 INJECTION, SOLUTION INTRAVENOUS at 17:04

## 2018-01-01 RX ADMIN — POTASSIUM CHLORIDE 40 MEQ: 750 CAPSULE, EXTENDED RELEASE ORAL at 23:21

## 2018-01-01 RX ADMIN — BUDESONIDE AND FORMOTEROL FUMARATE DIHYDRATE 2 PUFF: 160; 4.5 AEROSOL RESPIRATORY (INHALATION) at 19:40

## 2018-01-01 RX ADMIN — NYSTATIN 500000 UNITS: 100000 SUSPENSION ORAL at 18:03

## 2018-01-01 RX ADMIN — CARVEDILOL 12.5 MG: 25 TABLET, FILM COATED ORAL at 08:29

## 2018-01-01 RX ADMIN — POLYETHYLENE GLYCOL 3350 17 G: 17 POWDER, FOR SOLUTION ORAL at 04:10

## 2018-01-01 RX ADMIN — INSULIN ASPART 8 UNITS: 100 INJECTION, SOLUTION INTRAVENOUS; SUBCUTANEOUS at 08:48

## 2018-01-01 RX ADMIN — INSULIN ASPART 2 UNITS: 100 INJECTION, SOLUTION INTRAVENOUS; SUBCUTANEOUS at 21:08

## 2018-01-01 RX ADMIN — Medication 15 UNITS: at 20:35

## 2018-01-01 RX ADMIN — POTASSIUM CHLORIDE 40 MEQ: 750 CAPSULE, EXTENDED RELEASE ORAL at 11:53

## 2018-01-01 RX ADMIN — MUPIROCIN 10 APPLICATION: 20 OINTMENT TOPICAL at 21:13

## 2018-01-01 RX ADMIN — BUDESONIDE AND FORMOTEROL FUMARATE DIHYDRATE 2 PUFF: 160; 4.5 AEROSOL RESPIRATORY (INHALATION) at 19:43

## 2018-01-01 RX ADMIN — INSULIN DETEMIR 22 UNITS: 100 INJECTION, SOLUTION SUBCUTANEOUS at 21:22

## 2018-01-01 RX ADMIN — INSULIN ASPART 2 UNITS: 100 INJECTION, SOLUTION INTRAVENOUS; SUBCUTANEOUS at 18:07

## 2018-01-01 RX ADMIN — TERAZOSIN HYDROCHLORIDE 2 MG: 2 CAPSULE ORAL at 21:26

## 2018-01-01 RX ADMIN — MIDODRINE HYDROCHLORIDE 5 MG: 5 TABLET ORAL at 17:38

## 2018-01-01 RX ADMIN — ASPIRIN 81 MG: 81 TABLET, FILM COATED ORAL at 09:23

## 2018-01-01 RX ADMIN — MONTELUKAST 10 MG: 10 TABLET, FILM COATED ORAL at 10:13

## 2018-01-01 RX ADMIN — BUDESONIDE AND FORMOTEROL FUMARATE DIHYDRATE 2 PUFF: 160; 4.5 AEROSOL RESPIRATORY (INHALATION) at 09:52

## 2018-01-01 RX ADMIN — POTASSIUM CHLORIDE 40 MEQ: 750 CAPSULE, EXTENDED RELEASE ORAL at 23:52

## 2018-01-01 RX ADMIN — CEFDINIR 300 MG: 300 CAPSULE ORAL at 08:37

## 2018-01-01 RX ADMIN — MONTELUKAST 10 MG: 10 TABLET, FILM COATED ORAL at 08:01

## 2018-01-01 RX ADMIN — SPIRONOLACTONE 25 MG: 25 TABLET ORAL at 08:27

## 2018-01-01 RX ADMIN — POLYETHYLENE GLYCOL 3350 17 G: 17 POWDER, FOR SOLUTION ORAL at 08:56

## 2018-01-01 RX ADMIN — OXYCODONE HYDROCHLORIDE 10 MG: 5 TABLET ORAL at 12:06

## 2018-01-01 RX ADMIN — MONTELUKAST 10 MG: 10 TABLET, FILM COATED ORAL at 21:26

## 2018-01-01 RX ADMIN — ONDANSETRON 4 MG: 2 INJECTION INTRAMUSCULAR; INTRAVENOUS at 08:06

## 2018-01-01 RX ADMIN — HEPARIN SODIUM 3600 UNITS: 1000 INJECTION INTRAVENOUS; SUBCUTANEOUS at 14:06

## 2018-01-01 RX ADMIN — ZOLPIDEM TARTRATE 10 MG: 5 TABLET ORAL at 23:20

## 2018-01-01 RX ADMIN — OXYCODONE HYDROCHLORIDE 10 MG: 5 TABLET ORAL at 10:45

## 2018-01-01 RX ADMIN — Medication 10 ML: at 20:40

## 2018-01-01 RX ADMIN — BUDESONIDE AND FORMOTEROL FUMARATE DIHYDRATE 2 PUFF: 160; 4.5 AEROSOL RESPIRATORY (INHALATION) at 08:18

## 2018-01-01 RX ADMIN — GUAIFENESIN 1200 MG: 600 TABLET, EXTENDED RELEASE ORAL at 09:28

## 2018-01-01 RX ADMIN — HYDROCORTISONE: 1 CREAM TOPICAL at 21:12

## 2018-01-01 RX ADMIN — INSULIN ASPART 4 UNITS: 100 INJECTION, SOLUTION INTRAVENOUS; SUBCUTANEOUS at 11:04

## 2018-01-01 RX ADMIN — HEPARIN SODIUM 3000 UNITS: 1000 INJECTION INTRAVENOUS; SUBCUTANEOUS at 19:20

## 2018-01-01 RX ADMIN — INSULIN ASPART 5 UNITS: 100 INJECTION, SOLUTION INTRAVENOUS; SUBCUTANEOUS at 17:39

## 2018-01-01 RX ADMIN — MIDODRINE HYDROCHLORIDE 5 MG: 5 TABLET ORAL at 18:40

## 2018-01-01 RX ADMIN — INSULIN ASPART 8 UNITS: 100 INJECTION, SOLUTION INTRAVENOUS; SUBCUTANEOUS at 17:49

## 2018-01-01 RX ADMIN — LEVOTHYROXINE SODIUM 50 MCG: 50 TABLET ORAL at 06:25

## 2018-01-01 RX ADMIN — CEFDINIR 300 MG: 300 CAPSULE ORAL at 08:21

## 2018-01-01 RX ADMIN — SPIRONOLACTONE 50 MG: 50 TABLET ORAL at 08:39

## 2018-01-01 RX ADMIN — ALBUMIN HUMAN 50 G: 0.25 SOLUTION INTRAVENOUS at 01:14

## 2018-01-01 RX ADMIN — OXYCODONE HYDROCHLORIDE 10 MG: 5 TABLET ORAL at 03:59

## 2018-01-01 RX ADMIN — ALBUMIN HUMAN 25 G: 0.25 SOLUTION INTRAVENOUS at 09:24

## 2018-01-01 RX ADMIN — WARFARIN SODIUM 5 MG: 5 TABLET ORAL at 18:31

## 2018-01-01 RX ADMIN — OXYCODONE HYDROCHLORIDE 10 MG: 5 TABLET ORAL at 21:45

## 2018-01-01 RX ADMIN — INSULIN ASPART 2 UNITS: 100 INJECTION, SOLUTION INTRAVENOUS; SUBCUTANEOUS at 07:45

## 2018-01-01 RX ADMIN — SUCRALFATE 1 G: 1 SUSPENSION ORAL at 06:43

## 2018-01-01 RX ADMIN — Medication 3 ML: at 08:15

## 2018-01-01 RX ADMIN — CARVEDILOL 12.5 MG: 25 TABLET, FILM COATED ORAL at 18:03

## 2018-01-01 RX ADMIN — GUAIFENESIN 1200 MG: 600 TABLET, EXTENDED RELEASE ORAL at 09:23

## 2018-01-01 RX ADMIN — ASPIRIN 81 MG: 81 TABLET, FILM COATED ORAL at 08:22

## 2018-01-01 RX ADMIN — GUAIFENESIN 1200 MG: 600 TABLET, EXTENDED RELEASE ORAL at 20:52

## 2018-01-01 RX ADMIN — PANTOPRAZOLE SODIUM 40 MG: 40 TABLET, DELAYED RELEASE ORAL at 05:55

## 2018-01-01 RX ADMIN — HYDROCORTISONE: 1 CREAM TOPICAL at 09:13

## 2018-01-01 RX ADMIN — MIDODRINE HYDROCHLORIDE 10 MG: 5 TABLET ORAL at 08:14

## 2018-01-01 RX ADMIN — PANTOPRAZOLE SODIUM 40 MG: 40 TABLET, DELAYED RELEASE ORAL at 05:58

## 2018-01-01 RX ADMIN — BUDESONIDE AND FORMOTEROL FUMARATE DIHYDRATE 2 PUFF: 160; 4.5 AEROSOL RESPIRATORY (INHALATION) at 19:06

## 2018-01-01 RX ADMIN — FLUTICASONE PROPIONATE 2 SPRAY: 50 SPRAY, METERED NASAL at 10:03

## 2018-01-01 RX ADMIN — GUAIFENESIN 1200 MG: 600 TABLET, EXTENDED RELEASE ORAL at 20:03

## 2018-01-01 RX ADMIN — IPRATROPIUM BROMIDE AND ALBUTEROL SULFATE 3 ML: .5; 3 SOLUTION RESPIRATORY (INHALATION) at 06:51

## 2018-01-01 RX ADMIN — INSULIN ASPART 8 UNITS: 100 INJECTION, SOLUTION INTRAVENOUS; SUBCUTANEOUS at 12:06

## 2018-01-01 RX ADMIN — Medication 10 ML: at 08:42

## 2018-01-01 RX ADMIN — INSULIN ASPART 8 UNITS: 100 INJECTION, SOLUTION INTRAVENOUS; SUBCUTANEOUS at 08:56

## 2018-01-01 RX ADMIN — Medication 10 ML: at 09:46

## 2018-01-01 RX ADMIN — MONTELUKAST 10 MG: 10 TABLET, FILM COATED ORAL at 08:27

## 2018-01-01 RX ADMIN — POTASSIUM CHLORIDE 40 MEQ: 750 CAPSULE, EXTENDED RELEASE ORAL at 08:39

## 2018-01-01 RX ADMIN — GUAIFENESIN 1200 MG: 600 TABLET, EXTENDED RELEASE ORAL at 10:20

## 2018-01-01 RX ADMIN — LACTULOSE 20 G: 20 SOLUTION ORAL at 08:38

## 2018-01-01 RX ADMIN — CARVEDILOL 12.5 MG: 25 TABLET, FILM COATED ORAL at 17:31

## 2018-01-01 RX ADMIN — MONTELUKAST 10 MG: 10 TABLET, FILM COATED ORAL at 08:52

## 2018-01-01 RX ADMIN — SPIRONOLACTONE 50 MG: 50 TABLET ORAL at 18:03

## 2018-01-01 RX ADMIN — INSULIN ASPART 8 UNITS: 100 INJECTION, SOLUTION INTRAVENOUS; SUBCUTANEOUS at 14:40

## 2018-01-01 RX ADMIN — SUCRALFATE 1 G: 1 SUSPENSION ORAL at 11:18

## 2018-01-01 RX ADMIN — NYSTATIN 500000 UNITS: 100000 SUSPENSION ORAL at 21:29

## 2018-01-01 RX ADMIN — INSULIN ASPART 12 UNITS: 100 INJECTION, SOLUTION INTRAVENOUS; SUBCUTANEOUS at 11:20

## 2018-01-01 RX ADMIN — APIXABAN 5 MG: 5 TABLET, FILM COATED ORAL at 08:37

## 2018-01-01 RX ADMIN — GUAIFENESIN 1200 MG: 600 TABLET, EXTENDED RELEASE ORAL at 09:36

## 2018-01-01 RX ADMIN — CEPHALEXIN 500 MG: 250 POWDER, FOR SUSPENSION ORAL at 21:22

## 2018-01-01 RX ADMIN — LEVOTHYROXINE SODIUM 50 MCG: 50 TABLET ORAL at 06:43

## 2018-01-01 RX ADMIN — NYSTATIN 500000 UNITS: 100000 SUSPENSION ORAL at 11:18

## 2018-01-01 RX ADMIN — NYSTATIN 500000 UNITS: 100000 SUSPENSION ORAL at 20:54

## 2018-01-01 RX ADMIN — BUDESONIDE AND FORMOTEROL FUMARATE DIHYDRATE 2 PUFF: 160; 4.5 AEROSOL RESPIRATORY (INHALATION) at 08:30

## 2018-01-01 RX ADMIN — MIDODRINE HYDROCHLORIDE 5 MG: 5 TABLET ORAL at 12:42

## 2018-01-01 RX ADMIN — INSULIN ASPART 8 UNITS: 100 INJECTION, SOLUTION INTRAVENOUS; SUBCUTANEOUS at 11:06

## 2018-01-01 RX ADMIN — Medication 10 ML: at 21:22

## 2018-01-01 RX ADMIN — MUPIROCIN 10 APPLICATION: 20 OINTMENT TOPICAL at 09:44

## 2018-01-01 RX ADMIN — LEVOTHYROXINE SODIUM 50 MCG: 50 TABLET ORAL at 06:27

## 2018-01-01 RX ADMIN — NYSTATIN 500000 UNITS: 100000 SUSPENSION ORAL at 09:50

## 2018-01-01 RX ADMIN — MUPIROCIN 10 APPLICATION: 20 OINTMENT TOPICAL at 10:29

## 2018-01-01 RX ADMIN — INSULIN ASPART 2 UNITS: 100 INJECTION, SOLUTION INTRAVENOUS; SUBCUTANEOUS at 21:24

## 2018-01-01 RX ADMIN — INSULIN ASPART 3 UNITS: 100 INJECTION, SOLUTION INTRAVENOUS; SUBCUTANEOUS at 11:36

## 2018-01-01 RX ADMIN — BUDESONIDE AND FORMOTEROL FUMARATE DIHYDRATE 2 PUFF: 160; 4.5 AEROSOL RESPIRATORY (INHALATION) at 07:59

## 2018-01-01 RX ADMIN — SUCRALFATE 1 G: 1 SUSPENSION ORAL at 11:43

## 2018-01-01 RX ADMIN — Medication 10 ML: at 20:59

## 2018-01-01 RX ADMIN — GUAIFENESIN 1200 MG: 600 TABLET, EXTENDED RELEASE ORAL at 21:32

## 2018-01-01 RX ADMIN — ALPRAZOLAM 0.25 MG: 0.25 TABLET ORAL at 22:24

## 2018-01-01 RX ADMIN — MUPIROCIN 10 APPLICATION: 20 OINTMENT TOPICAL at 20:53

## 2018-01-01 RX ADMIN — LEVOTHYROXINE SODIUM 50 MCG: 50 TABLET ORAL at 06:35

## 2018-01-01 RX ADMIN — OXYCODONE HYDROCHLORIDE 10 MG: 5 TABLET ORAL at 21:32

## 2018-01-01 RX ADMIN — ZOLPIDEM TARTRATE 10 MG: 5 TABLET ORAL at 01:26

## 2018-01-01 RX ADMIN — INSULIN ASPART 5 UNITS: 100 INJECTION, SOLUTION INTRAVENOUS; SUBCUTANEOUS at 07:44

## 2018-01-01 RX ADMIN — MIDODRINE HYDROCHLORIDE 5 MG: 5 TABLET ORAL at 09:48

## 2018-01-01 RX ADMIN — POTASSIUM CHLORIDE 10 MEQ: 7.46 INJECTION, SOLUTION INTRAVENOUS at 16:54

## 2018-01-01 RX ADMIN — NYSTATIN 500000 UNITS: 100000 SUSPENSION ORAL at 11:49

## 2018-01-01 RX ADMIN — BUMETANIDE 2 MG: 0.25 INJECTION INTRAMUSCULAR; INTRAVENOUS at 20:54

## 2018-01-01 RX ADMIN — HYDROCORTISONE: 1 CREAM TOPICAL at 09:20

## 2018-01-01 RX ADMIN — ASPIRIN 81 MG: 81 TABLET, FILM COATED ORAL at 09:05

## 2018-01-01 RX ADMIN — MIDODRINE HYDROCHLORIDE 5 MG: 5 TABLET ORAL at 06:46

## 2018-01-01 RX ADMIN — NYSTATIN 500000 UNITS: 100000 SUSPENSION ORAL at 11:54

## 2018-01-01 RX ADMIN — OXYCODONE HYDROCHLORIDE 10 MG: 5 TABLET ORAL at 11:15

## 2018-01-01 RX ADMIN — INSULIN ASPART 2 UNITS: 100 INJECTION, SOLUTION INTRAVENOUS; SUBCUTANEOUS at 11:41

## 2018-01-01 RX ADMIN — BUMETANIDE 2 MG: 2 TABLET ORAL at 08:56

## 2018-01-01 RX ADMIN — LACTULOSE 20 G: 20 SOLUTION ORAL at 11:49

## 2018-01-01 RX ADMIN — FERROUS SULFATE TAB 325 MG (65 MG ELEMENTAL FE) 325 MG: 325 (65 FE) TAB at 07:58

## 2018-01-01 RX ADMIN — SUCRALFATE 1 G: 1 SUSPENSION ORAL at 06:34

## 2018-01-01 RX ADMIN — HEPARIN SODIUM 3000 UNITS: 1000 INJECTION, SOLUTION INTRAVENOUS; SUBCUTANEOUS at 17:11

## 2018-01-01 RX ADMIN — MILRINONE LACTATE IN DEXTROSE 0.12 MCG/KG/MIN: 200 INJECTION, SOLUTION INTRAVENOUS at 16:44

## 2018-01-01 RX ADMIN — GUAIFENESIN 1200 MG: 600 TABLET, EXTENDED RELEASE ORAL at 09:41

## 2018-01-01 RX ADMIN — FERROUS SULFATE TAB 325 MG (65 MG ELEMENTAL FE) 325 MG: 325 (65 FE) TAB at 14:27

## 2018-01-01 RX ADMIN — TORSEMIDE 100 MG: 100 TABLET ORAL at 09:25

## 2018-01-01 RX ADMIN — GUAIFENESIN 600 MG: 600 TABLET, EXTENDED RELEASE ORAL at 22:22

## 2018-01-01 RX ADMIN — FLUCONAZOLE 150 MG: 150 TABLET ORAL at 16:58

## 2018-01-01 RX ADMIN — INSULIN ASPART 8 UNITS: 100 INJECTION, SOLUTION INTRAVENOUS; SUBCUTANEOUS at 08:02

## 2018-01-01 RX ADMIN — MORPHINE SULFATE 2 MG: 4 INJECTION INTRAVENOUS at 05:32

## 2018-01-01 RX ADMIN — TORSEMIDE 20 MG: 20 TABLET ORAL at 08:01

## 2018-01-01 RX ADMIN — METOLAZONE 5 MG: 5 TABLET ORAL at 11:58

## 2018-01-01 RX ADMIN — GUAIFENESIN 1200 MG: 600 TABLET, EXTENDED RELEASE ORAL at 09:42

## 2018-01-01 RX ADMIN — NYSTATIN 500000 UNITS: 100000 SUSPENSION ORAL at 13:54

## 2018-01-01 RX ADMIN — WARFARIN SODIUM 5 MG: 5 TABLET ORAL at 18:09

## 2018-01-01 RX ADMIN — SUCRALFATE 1 G: 1 SUSPENSION ORAL at 12:23

## 2018-01-01 RX ADMIN — INSULIN ASPART 2 UNITS: 100 INJECTION, SOLUTION INTRAVENOUS; SUBCUTANEOUS at 12:06

## 2018-01-01 RX ADMIN — POTASSIUM CHLORIDE 40 MEQ: 750 CAPSULE, EXTENDED RELEASE ORAL at 11:46

## 2018-01-01 RX ADMIN — INSULIN ASPART 4 UNITS: 100 INJECTION, SOLUTION INTRAVENOUS; SUBCUTANEOUS at 20:18

## 2018-01-01 RX ADMIN — POTASSIUM CHLORIDE 30 MEQ: 750 CAPSULE, EXTENDED RELEASE ORAL at 09:09

## 2018-01-01 RX ADMIN — MIDODRINE HYDROCHLORIDE 5 MG: 5 TABLET ORAL at 17:50

## 2018-01-01 RX ADMIN — Medication 10 ML: at 14:35

## 2018-01-01 RX ADMIN — POTASSIUM CHLORIDE 40 MEQ: 750 CAPSULE, EXTENDED RELEASE ORAL at 06:51

## 2018-01-01 RX ADMIN — GUAIFENESIN 600 MG: 600 TABLET, EXTENDED RELEASE ORAL at 08:37

## 2018-01-01 RX ADMIN — BUDESONIDE AND FORMOTEROL FUMARATE DIHYDRATE 2 PUFF: 160; 4.5 AEROSOL RESPIRATORY (INHALATION) at 21:02

## 2018-01-01 RX ADMIN — INSULIN ASPART 2 UNITS: 100 INJECTION, SOLUTION INTRAVENOUS; SUBCUTANEOUS at 12:12

## 2018-01-01 RX ADMIN — POTASSIUM CHLORIDE 40 MEQ: 750 CAPSULE, EXTENDED RELEASE ORAL at 11:55

## 2018-01-01 RX ADMIN — INSULIN ASPART 2 UNITS: 100 INJECTION, SOLUTION INTRAVENOUS; SUBCUTANEOUS at 07:01

## 2018-01-01 RX ADMIN — CYCLOBENZAPRINE 10 MG: 10 TABLET, FILM COATED ORAL at 23:03

## 2018-01-01 RX ADMIN — PANTOPRAZOLE SODIUM 40 MG: 40 TABLET, DELAYED RELEASE ORAL at 09:23

## 2018-01-01 RX ADMIN — BUDESONIDE AND FORMOTEROL FUMARATE DIHYDRATE 2 PUFF: 160; 4.5 AEROSOL RESPIRATORY (INHALATION) at 19:57

## 2018-01-01 RX ADMIN — INSULIN ASPART 4 UNITS: 100 INJECTION, SOLUTION INTRAVENOUS; SUBCUTANEOUS at 12:23

## 2018-01-01 RX ADMIN — INSULIN ASPART 2 UNITS: 100 INJECTION, SOLUTION INTRAVENOUS; SUBCUTANEOUS at 18:09

## 2018-01-01 RX ADMIN — INSULIN ASPART 8 UNITS: 100 INJECTION, SOLUTION INTRAVENOUS; SUBCUTANEOUS at 18:20

## 2018-01-01 RX ADMIN — INSULIN ASPART 8 UNITS: 100 INJECTION, SOLUTION INTRAVENOUS; SUBCUTANEOUS at 12:21

## 2018-01-01 RX ADMIN — BUDESONIDE AND FORMOTEROL FUMARATE DIHYDRATE 2 PUFF: 160; 4.5 AEROSOL RESPIRATORY (INHALATION) at 07:03

## 2018-01-01 RX ADMIN — INSULIN ASPART 2 UNITS: 100 INJECTION, SOLUTION INTRAVENOUS; SUBCUTANEOUS at 10:37

## 2018-01-01 RX ADMIN — ASPIRIN 81 MG: 81 TABLET, FILM COATED ORAL at 08:01

## 2018-01-01 RX ADMIN — POTASSIUM CHLORIDE 20 MEQ: 750 CAPSULE, EXTENDED RELEASE ORAL at 08:30

## 2018-01-01 RX ADMIN — BUDESONIDE AND FORMOTEROL FUMARATE DIHYDRATE 2 PUFF: 160; 4.5 AEROSOL RESPIRATORY (INHALATION) at 06:58

## 2018-01-01 RX ADMIN — INSULIN ASPART 8 UNITS: 100 INJECTION, SOLUTION INTRAVENOUS; SUBCUTANEOUS at 17:28

## 2018-01-01 RX ADMIN — WARFARIN SODIUM 3 MG: 3 TABLET ORAL at 17:49

## 2018-01-01 RX ADMIN — CYCLOBENZAPRINE 10 MG: 10 TABLET, FILM COATED ORAL at 13:46

## 2018-01-01 RX ADMIN — ONDANSETRON HYDROCHLORIDE 4 MG: 2 INJECTION, SOLUTION INTRAMUSCULAR; INTRAVENOUS at 08:14

## 2018-01-01 RX ADMIN — HYDROCORTISONE: 1 CREAM TOPICAL at 09:58

## 2018-01-01 RX ADMIN — INSULIN ASPART 4 UNITS: 100 INJECTION, SOLUTION INTRAVENOUS; SUBCUTANEOUS at 20:21

## 2018-01-01 RX ADMIN — Medication 10 ML: at 00:25

## 2018-01-01 RX ADMIN — Medication 10 ML: at 05:49

## 2018-01-01 RX ADMIN — CARVEDILOL 12.5 MG: 25 TABLET, FILM COATED ORAL at 08:21

## 2018-01-01 RX ADMIN — POTASSIUM CHLORIDE 10 MEQ: 7.46 INJECTION, SOLUTION INTRAVENOUS at 11:24

## 2018-01-01 RX ADMIN — POTASSIUM CHLORIDE 40 MEQ: 750 CAPSULE, EXTENDED RELEASE ORAL at 11:27

## 2018-01-01 RX ADMIN — POTASSIUM CHLORIDE 40 MEQ: 750 CAPSULE, EXTENDED RELEASE ORAL at 11:54

## 2018-01-01 RX ADMIN — CARVEDILOL 6.25 MG: 6.25 TABLET, FILM COATED ORAL at 09:38

## 2018-01-01 RX ADMIN — Medication 10 ML: at 00:15

## 2018-01-01 RX ADMIN — MUPIROCIN 10 APPLICATION: 20 OINTMENT TOPICAL at 21:01

## 2018-01-01 RX ADMIN — TERAZOSIN HYDROCHLORIDE 2 MG: 2 CAPSULE ORAL at 20:23

## 2018-01-01 RX ADMIN — MIDODRINE HYDROCHLORIDE 5 MG: 5 TABLET ORAL at 05:43

## 2018-01-01 RX ADMIN — VANCOMYCIN HYDROCHLORIDE 2000 MG: 1 INJECTION, POWDER, LYOPHILIZED, FOR SOLUTION INTRAVENOUS at 06:55

## 2018-01-01 RX ADMIN — MONTELUKAST 10 MG: 10 TABLET, FILM COATED ORAL at 08:39

## 2018-01-01 RX ADMIN — NYSTATIN 500000 UNITS: 100000 SUSPENSION ORAL at 09:24

## 2018-01-01 RX ADMIN — INSULIN ASPART 8 UNITS: 100 INJECTION, SOLUTION INTRAVENOUS; SUBCUTANEOUS at 11:41

## 2018-01-01 RX ADMIN — MUPIROCIN 10 APPLICATION: 20 OINTMENT TOPICAL at 09:38

## 2018-01-01 RX ADMIN — HYDROCORTISONE: 1 CREAM TOPICAL at 20:50

## 2018-01-01 RX ADMIN — BUDESONIDE AND FORMOTEROL FUMARATE DIHYDRATE 2 PUFF: 160; 4.5 AEROSOL RESPIRATORY (INHALATION) at 07:52

## 2018-01-01 RX ADMIN — WARFARIN SODIUM 10 MG: 10 TABLET ORAL at 17:30

## 2018-01-01 RX ADMIN — DOCUSATE SODIUM -SENNOSIDES 2 TABLET: 50; 8.6 TABLET, COATED ORAL at 20:54

## 2018-01-01 RX ADMIN — FERROUS SULFATE TAB 325 MG (65 MG ELEMENTAL FE) 325 MG: 325 (65 FE) TAB at 08:50

## 2018-01-01 RX ADMIN — INSULIN DETEMIR 18 UNITS: 100 INJECTION, SOLUTION SUBCUTANEOUS at 21:23

## 2018-01-01 RX ADMIN — MONTELUKAST 10 MG: 10 TABLET, FILM COATED ORAL at 20:49

## 2018-01-01 RX ADMIN — INSULIN DETEMIR 20 UNITS: 100 INJECTION, SOLUTION SUBCUTANEOUS at 22:41

## 2018-01-01 RX ADMIN — MONTELUKAST 10 MG: 10 TABLET, FILM COATED ORAL at 09:03

## 2018-01-01 RX ADMIN — SPIRONOLACTONE 100 MG: 100 TABLET ORAL at 09:24

## 2018-01-01 RX ADMIN — INSULIN ASPART 2 UNITS: 100 INJECTION, SOLUTION INTRAVENOUS; SUBCUTANEOUS at 16:41

## 2018-01-01 RX ADMIN — HYDROCORTISONE: 1 CREAM TOPICAL at 20:41

## 2018-01-01 RX ADMIN — POTASSIUM CHLORIDE 40 MEQ: 750 CAPSULE, EXTENDED RELEASE ORAL at 05:03

## 2018-01-01 RX ADMIN — BUDESONIDE AND FORMOTEROL FUMARATE DIHYDRATE 2 PUFF: 160; 4.5 AEROSOL RESPIRATORY (INHALATION) at 09:22

## 2018-01-01 RX ADMIN — BUDESONIDE AND FORMOTEROL FUMARATE DIHYDRATE 2 PUFF: 160; 4.5 AEROSOL RESPIRATORY (INHALATION) at 20:19

## 2018-01-01 RX ADMIN — CARVEDILOL 6.25 MG: 6.25 TABLET, FILM COATED ORAL at 09:42

## 2018-01-01 RX ADMIN — PANTOPRAZOLE SODIUM 40 MG: 40 TABLET, DELAYED RELEASE ORAL at 09:04

## 2018-01-01 RX ADMIN — INSULIN ASPART 2 UNITS: 100 INJECTION, SOLUTION INTRAVENOUS; SUBCUTANEOUS at 11:48

## 2018-01-01 RX ADMIN — INSULIN ASPART 4 UNITS: 100 INJECTION, SOLUTION INTRAVENOUS; SUBCUTANEOUS at 11:26

## 2018-01-01 RX ADMIN — MUPIROCIN 10 APPLICATION: 20 OINTMENT TOPICAL at 10:09

## 2018-01-01 RX ADMIN — TORSEMIDE 100 MG: 100 TABLET ORAL at 09:49

## 2018-01-01 RX ADMIN — PROPOFOL 50 MCG/KG/MIN: 10 INJECTION, EMULSION INTRAVENOUS at 08:03

## 2018-01-01 RX ADMIN — BUDESONIDE AND FORMOTEROL FUMARATE DIHYDRATE 2 PUFF: 160; 4.5 AEROSOL RESPIRATORY (INHALATION) at 08:32

## 2018-01-01 RX ADMIN — MIDODRINE HYDROCHLORIDE 5 MG: 5 TABLET ORAL at 18:31

## 2018-01-01 RX ADMIN — LACTULOSE 20 G: 20 SOLUTION ORAL at 09:44

## 2018-01-01 RX ADMIN — PANTOPRAZOLE SODIUM 40 MG: 40 TABLET, DELAYED RELEASE ORAL at 09:51

## 2018-01-01 RX ADMIN — ZOLPIDEM TARTRATE 10 MG: 5 TABLET ORAL at 00:09

## 2018-01-01 RX ADMIN — BUMETANIDE 0.5 MG/HR: 0.25 INJECTION INTRAMUSCULAR; INTRAVENOUS at 18:52

## 2018-01-01 RX ADMIN — MIDODRINE HYDROCHLORIDE 5 MG: 5 TABLET ORAL at 18:29

## 2018-01-01 RX ADMIN — BUDESONIDE AND FORMOTEROL FUMARATE DIHYDRATE 2 PUFF: 160; 4.5 AEROSOL RESPIRATORY (INHALATION) at 20:29

## 2018-01-01 RX ADMIN — INSULIN ASPART 6 UNITS: 100 INJECTION, SOLUTION INTRAVENOUS; SUBCUTANEOUS at 16:47

## 2018-01-01 RX ADMIN — GUAIFENESIN 600 MG: 600 TABLET, EXTENDED RELEASE ORAL at 18:24

## 2018-01-01 RX ADMIN — INSULIN DETEMIR 100 UNITS: 100 INJECTION, SOLUTION SUBCUTANEOUS at 21:33

## 2018-01-01 RX ADMIN — SUCRALFATE 1 G: 1 SUSPENSION ORAL at 23:30

## 2018-01-01 RX ADMIN — APIXABAN 5 MG: 5 TABLET, FILM COATED ORAL at 08:43

## 2018-01-01 RX ADMIN — LEVOTHYROXINE SODIUM 50 MCG: 50 TABLET ORAL at 05:58

## 2018-01-01 RX ADMIN — FERROUS SULFATE TAB 325 MG (65 MG ELEMENTAL FE) 325 MG: 325 (65 FE) TAB at 09:05

## 2018-01-01 RX ADMIN — SUCRALFATE 1 G: 1 SUSPENSION ORAL at 13:39

## 2018-01-01 RX ADMIN — MIDODRINE HYDROCHLORIDE 5 MG: 5 TABLET ORAL at 19:03

## 2018-01-01 RX ADMIN — MIDODRINE HYDROCHLORIDE 5 MG: 5 TABLET ORAL at 06:31

## 2018-01-01 RX ADMIN — TORSEMIDE 20 MG: 20 TABLET ORAL at 14:34

## 2018-01-01 RX ADMIN — POLYETHYLENE GLYCOL 3350 17 G: 17 POWDER, FOR SOLUTION ORAL at 09:49

## 2018-01-01 RX ADMIN — ZOLPIDEM TARTRATE 10 MG: 5 TABLET ORAL at 22:59

## 2018-01-01 RX ADMIN — NYSTATIN: 100000 POWDER TOPICAL at 08:01

## 2018-01-01 RX ADMIN — Medication 10 ML: at 09:18

## 2018-01-01 RX ADMIN — POTASSIUM CHLORIDE AND SODIUM CHLORIDE 75 ML/HR: 900; 150 INJECTION, SOLUTION INTRAVENOUS at 21:29

## 2018-01-01 RX ADMIN — OXYCODONE HYDROCHLORIDE 10 MG: 5 TABLET ORAL at 23:01

## 2018-01-01 RX ADMIN — FLUTICASONE PROPIONATE 2 SPRAY: 50 SPRAY, METERED NASAL at 08:43

## 2018-01-01 RX ADMIN — MONTELUKAST 10 MG: 10 TABLET, FILM COATED ORAL at 09:31

## 2018-01-01 RX ADMIN — Medication 15 UNITS: at 20:12

## 2018-01-01 RX ADMIN — ASPIRIN 81 MG: 81 TABLET, FILM COATED ORAL at 08:30

## 2018-01-01 RX ADMIN — BUMETANIDE 2 MG: 2 TABLET ORAL at 08:39

## 2018-01-01 RX ADMIN — LORAZEPAM 0.5 MG: 2 INJECTION INTRAMUSCULAR; INTRAVENOUS at 09:53

## 2018-01-01 RX ADMIN — OXYCODONE HYDROCHLORIDE 10 MG: 5 TABLET ORAL at 19:03

## 2018-01-01 RX ADMIN — ONDANSETRON 4 MG: 2 INJECTION INTRAMUSCULAR; INTRAVENOUS at 00:27

## 2018-01-01 RX ADMIN — TERAZOSIN HYDROCHLORIDE 2 MG: 2 CAPSULE ORAL at 21:15

## 2018-01-01 RX ADMIN — MORPHINE SULFATE 2 MG: 2 INJECTION, SOLUTION INTRAMUSCULAR; INTRAVENOUS at 20:09

## 2018-01-01 RX ADMIN — LEVOTHYROXINE SODIUM 50 MCG: 50 TABLET ORAL at 07:02

## 2018-01-01 RX ADMIN — MUPIROCIN 10 APPLICATION: 20 OINTMENT TOPICAL at 21:04

## 2018-01-01 RX ADMIN — OXYCODONE HYDROCHLORIDE 10 MG: 5 TABLET ORAL at 15:55

## 2018-01-01 RX ADMIN — OXYCODONE HYDROCHLORIDE 10 MG: 5 TABLET ORAL at 07:46

## 2018-01-01 RX ADMIN — MIDODRINE HYDROCHLORIDE 5 MG: 5 TABLET ORAL at 19:42

## 2018-01-01 RX ADMIN — INSULIN ASPART 4 UNITS: 100 INJECTION, SOLUTION INTRAVENOUS; SUBCUTANEOUS at 17:48

## 2018-01-01 RX ADMIN — SPIRONOLACTONE 50 MG: 50 TABLET ORAL at 09:43

## 2018-01-01 RX ADMIN — GUAIFENESIN 1200 MG: 600 TABLET, EXTENDED RELEASE ORAL at 21:24

## 2018-01-01 RX ADMIN — SCOPALAMINE 1 PATCH: 1 PATCH, EXTENDED RELEASE TRANSDERMAL at 16:56

## 2018-01-01 RX ADMIN — NYSTATIN 500000 UNITS: 100000 SUSPENSION ORAL at 16:43

## 2018-01-01 RX ADMIN — INSULIN ASPART 2 UNITS: 100 INJECTION, SOLUTION INTRAVENOUS; SUBCUTANEOUS at 06:43

## 2018-01-01 RX ADMIN — DOCUSATE SODIUM -SENNOSIDES 2 TABLET: 50; 8.6 TABLET, COATED ORAL at 22:57

## 2018-01-01 RX ADMIN — MUPIROCIN 10 APPLICATION: 20 OINTMENT TOPICAL at 09:00

## 2018-01-01 RX ADMIN — BUMETANIDE 1 MG/HR: 0.25 INJECTION INTRAMUSCULAR; INTRAVENOUS at 06:57

## 2018-01-01 RX ADMIN — MIDODRINE HYDROCHLORIDE 10 MG: 5 TABLET ORAL at 17:43

## 2018-01-01 RX ADMIN — OXYCODONE HYDROCHLORIDE 10 MG: 5 TABLET ORAL at 09:47

## 2018-01-01 RX ADMIN — PANTOPRAZOLE SODIUM 40 MG: 40 TABLET, DELAYED RELEASE ORAL at 08:51

## 2018-01-01 RX ADMIN — MONTELUKAST 10 MG: 10 TABLET, FILM COATED ORAL at 09:48

## 2018-01-01 RX ADMIN — OXYCODONE HYDROCHLORIDE 10 MG: 5 TABLET ORAL at 16:32

## 2018-01-01 RX ADMIN — NYSTATIN 500000 UNITS: 100000 SUSPENSION ORAL at 09:05

## 2018-01-01 RX ADMIN — GUAIFENESIN 1200 MG: 600 TABLET, EXTENDED RELEASE ORAL at 09:04

## 2018-01-01 RX ADMIN — MONTELUKAST 10 MG: 10 TABLET, FILM COATED ORAL at 09:25

## 2018-01-01 RX ADMIN — ERYTHROPOIETIN 10000 UNITS: 10000 INJECTION, SOLUTION INTRAVENOUS; SUBCUTANEOUS at 11:43

## 2018-01-01 RX ADMIN — ASPIRIN 81 MG: 81 TABLET, FILM COATED ORAL at 09:51

## 2018-01-01 RX ADMIN — ZOLPIDEM TARTRATE 10 MG: 5 TABLET ORAL at 23:10

## 2018-01-01 RX ADMIN — MORPHINE SULFATE 2 MG: 4 INJECTION, SOLUTION INTRAMUSCULAR; INTRAVENOUS at 14:49

## 2018-01-01 RX ADMIN — PROPOFOL 50 MCG/KG/MIN: 10 INJECTION, EMULSION INTRAVENOUS at 13:32

## 2018-01-01 RX ADMIN — GUAIFENESIN 1200 MG: 600 TABLET, EXTENDED RELEASE ORAL at 09:53

## 2018-01-01 RX ADMIN — ONDANSETRON 4 MG: 2 INJECTION INTRAMUSCULAR; INTRAVENOUS at 10:27

## 2018-01-01 RX ADMIN — SUCRALFATE 1 G: 1 SUSPENSION ORAL at 07:34

## 2018-01-01 RX ADMIN — CHLORHEXIDINE GLUCONATE 15 ML: 1.2 RINSE ORAL at 18:24

## 2018-01-01 RX ADMIN — INSULIN ASPART 6 UNITS: 100 INJECTION, SOLUTION INTRAVENOUS; SUBCUTANEOUS at 12:40

## 2018-01-01 RX ADMIN — NYSTATIN 500000 UNITS: 100000 SUSPENSION ORAL at 22:22

## 2018-01-01 RX ADMIN — NYSTATIN 500000 UNITS: 100000 SUSPENSION ORAL at 18:07

## 2018-01-01 RX ADMIN — SUFENTANIL CITRATE 25 MCG: 50 INJECTION, SOLUTION EPIDURAL; INTRAVENOUS at 09:58

## 2018-01-01 RX ADMIN — OXYCODONE HYDROCHLORIDE 10 MG: 5 TABLET ORAL at 07:57

## 2018-01-01 RX ADMIN — HYDROCORTISONE 1 APPLICATION: 1 CREAM TOPICAL at 08:33

## 2018-01-01 RX ADMIN — NYSTATIN 500000 UNITS: 100000 SUSPENSION ORAL at 08:32

## 2018-01-01 RX ADMIN — ERYTHROPOIETIN 10000 UNITS: 10000 INJECTION, SOLUTION INTRAVENOUS; SUBCUTANEOUS at 11:00

## 2018-01-01 RX ADMIN — MONTELUKAST 10 MG: 10 TABLET, FILM COATED ORAL at 09:37

## 2018-01-01 RX ADMIN — INSULIN DETEMIR 22 UNITS: 100 INJECTION, SOLUTION SUBCUTANEOUS at 21:26

## 2018-01-01 RX ADMIN — BUDESONIDE AND FORMOTEROL FUMARATE DIHYDRATE 2 PUFF: 160; 4.5 AEROSOL RESPIRATORY (INHALATION) at 19:45

## 2018-01-01 RX ADMIN — LORAZEPAM 0.5 MG: 2 INJECTION INTRAMUSCULAR; INTRAVENOUS at 19:51

## 2018-01-01 RX ADMIN — INSULIN ASPART 8 UNITS: 100 INJECTION, SOLUTION INTRAVENOUS; SUBCUTANEOUS at 18:09

## 2018-01-01 RX ADMIN — INSULIN ASPART 6 UNITS: 100 INJECTION, SOLUTION INTRAVENOUS; SUBCUTANEOUS at 21:02

## 2018-01-01 RX ADMIN — POTASSIUM CHLORIDE 20 MEQ: 750 CAPSULE, EXTENDED RELEASE ORAL at 17:31

## 2018-01-01 RX ADMIN — MORPHINE SULFATE 2 MG: 4 INJECTION, SOLUTION INTRAMUSCULAR; INTRAVENOUS at 18:16

## 2018-01-01 RX ADMIN — NYSTATIN 1 APPLICATION: 100000 POWDER TOPICAL at 12:17

## 2018-01-01 RX ADMIN — INSULIN DETEMIR 18 UNITS: 100 INJECTION, SOLUTION SUBCUTANEOUS at 21:04

## 2018-01-01 RX ADMIN — GUAIFENESIN 1200 MG: 600 TABLET, EXTENDED RELEASE ORAL at 20:13

## 2018-01-01 RX ADMIN — IPRATROPIUM BROMIDE AND ALBUTEROL SULFATE 3 ML: .5; 3 SOLUTION RESPIRATORY (INHALATION) at 08:34

## 2018-01-01 RX ADMIN — MIDODRINE HYDROCHLORIDE 5 MG: 5 TABLET ORAL at 18:19

## 2018-01-01 RX ADMIN — POTASSIUM CHLORIDE 40 MEQ: 750 CAPSULE, EXTENDED RELEASE ORAL at 16:55

## 2018-01-01 RX ADMIN — OXYCODONE HYDROCHLORIDE 10 MG: 5 TABLET ORAL at 18:29

## 2018-01-01 RX ADMIN — INSULIN ASPART 2 UNITS: 100 INJECTION, SOLUTION INTRAVENOUS; SUBCUTANEOUS at 12:42

## 2018-01-01 RX ADMIN — POTASSIUM CHLORIDE 40 MEQ: 750 CAPSULE, EXTENDED RELEASE ORAL at 21:00

## 2018-01-01 RX ADMIN — OXYCODONE HYDROCHLORIDE 10 MG: 5 TABLET ORAL at 04:51

## 2018-01-01 RX ADMIN — FLUTICASONE PROPIONATE 2 SPRAY: 50 SPRAY, METERED NASAL at 08:37

## 2018-01-01 RX ADMIN — SUCRALFATE 1 G: 1 SUSPENSION ORAL at 05:49

## 2018-01-01 RX ADMIN — FERROUS SULFATE TAB 325 MG (65 MG ELEMENTAL FE) 325 MG: 325 (65 FE) TAB at 13:38

## 2018-01-01 RX ADMIN — MUPIROCIN 10 APPLICATION: 20 OINTMENT TOPICAL at 09:18

## 2018-01-01 RX ADMIN — GUAIFENESIN 1200 MG: 600 TABLET, EXTENDED RELEASE ORAL at 08:22

## 2018-01-01 RX ADMIN — INSULIN ASPART 2 UNITS: 100 INJECTION, SOLUTION INTRAVENOUS; SUBCUTANEOUS at 20:13

## 2018-01-01 RX ADMIN — POTASSIUM CHLORIDE 40 MEQ: 750 CAPSULE, EXTENDED RELEASE ORAL at 18:03

## 2018-01-01 RX ADMIN — OXYCODONE HYDROCHLORIDE 10 MG: 5 TABLET ORAL at 21:25

## 2018-01-01 RX ADMIN — METOLAZONE 5 MG: 5 TABLET ORAL at 13:56

## 2018-01-01 RX ADMIN — GUAIFENESIN 1200 MG: 600 TABLET, EXTENDED RELEASE ORAL at 20:27

## 2018-01-01 RX ADMIN — DEXMEDETOMIDINE HYDROCHLORIDE 0.2 MCG/KG/HR: 100 INJECTION, SOLUTION, CONCENTRATE INTRAVENOUS at 12:01

## 2018-01-01 RX ADMIN — OXYCODONE HYDROCHLORIDE 10 MG: 5 TABLET ORAL at 20:45

## 2018-01-01 RX ADMIN — SILVER SULFADIAZINE: 10 CREAM TOPICAL at 10:15

## 2018-01-01 RX ADMIN — INSULIN ASPART 4 UNITS: 100 INJECTION, SOLUTION INTRAVENOUS; SUBCUTANEOUS at 21:45

## 2018-01-01 RX ADMIN — ALBUMIN HUMAN 50 G: 0.25 SOLUTION INTRAVENOUS at 18:09

## 2018-01-01 RX ADMIN — ASPIRIN 81 MG: 81 TABLET, FILM COATED ORAL at 08:21

## 2018-01-01 RX ADMIN — CYCLOBENZAPRINE 10 MG: 10 TABLET, FILM COATED ORAL at 10:18

## 2018-01-01 RX ADMIN — MIDODRINE HYDROCHLORIDE 5 MG: 5 TABLET ORAL at 14:50

## 2018-01-01 RX ADMIN — Medication 10 ML: at 11:49

## 2018-01-01 RX ADMIN — POTASSIUM CHLORIDE 40 MEQ: 750 CAPSULE, EXTENDED RELEASE ORAL at 20:34

## 2018-01-01 RX ADMIN — INSULIN ASPART 8 UNITS: 100 INJECTION, SOLUTION INTRAVENOUS; SUBCUTANEOUS at 06:17

## 2018-01-01 RX ADMIN — NYSTATIN 500000 UNITS: 100000 SUSPENSION ORAL at 14:35

## 2018-01-01 RX ADMIN — HYDROCORTISONE: 1 CREAM TOPICAL at 21:10

## 2018-01-01 RX ADMIN — HYDROCORTISONE: 1 CREAM TOPICAL at 20:52

## 2018-01-01 RX ADMIN — GUAIFENESIN 1200 MG: 600 TABLET, EXTENDED RELEASE ORAL at 20:23

## 2018-01-01 RX ADMIN — Medication 10 ML: at 17:08

## 2018-01-01 RX ADMIN — BUMETANIDE 1 MG/HR: 0.25 INJECTION INTRAMUSCULAR; INTRAVENOUS at 20:19

## 2018-01-01 RX ADMIN — CEFTRIAXONE SODIUM 2 G: 2 INJECTION, SOLUTION INTRAVENOUS at 17:46

## 2018-01-01 RX ADMIN — GUAIFENESIN 1200 MG: 600 TABLET, EXTENDED RELEASE ORAL at 20:15

## 2018-01-01 RX ADMIN — BUDESONIDE AND FORMOTEROL FUMARATE DIHYDRATE 2 PUFF: 160; 4.5 AEROSOL RESPIRATORY (INHALATION) at 09:40

## 2018-01-01 RX ADMIN — SCOPALAMINE 1 PATCH: 1 PATCH, EXTENDED RELEASE TRANSDERMAL at 16:12

## 2018-01-01 RX ADMIN — NYSTATIN 500000 UNITS: 100000 SUSPENSION ORAL at 08:07

## 2018-01-01 RX ADMIN — MIDODRINE HYDROCHLORIDE 5 MG: 5 TABLET ORAL at 17:48

## 2018-01-01 RX ADMIN — TORSEMIDE 100 MG: 100 TABLET ORAL at 09:22

## 2018-01-01 RX ADMIN — BUDESONIDE AND FORMOTEROL FUMARATE DIHYDRATE 2 PUFF: 160; 4.5 AEROSOL RESPIRATORY (INHALATION) at 19:48

## 2018-01-01 RX ADMIN — MIDODRINE HYDROCHLORIDE 5 MG: 5 TABLET ORAL at 11:43

## 2018-01-01 RX ADMIN — BUMETANIDE 4 MG: 0.25 INJECTION INTRAMUSCULAR; INTRAVENOUS at 12:14

## 2018-01-01 RX ADMIN — INSULIN ASPART 4 UNITS: 100 INJECTION, SOLUTION INTRAVENOUS; SUBCUTANEOUS at 12:14

## 2018-01-01 RX ADMIN — INSULIN ASPART 2 UNITS: 100 INJECTION, SOLUTION INTRAVENOUS; SUBCUTANEOUS at 09:24

## 2018-01-01 RX ADMIN — OXYCODONE HYDROCHLORIDE 10 MG: 5 TABLET ORAL at 07:45

## 2018-01-01 RX ADMIN — PANTOPRAZOLE SODIUM 40 MG: 40 TABLET, DELAYED RELEASE ORAL at 08:52

## 2018-01-01 RX ADMIN — LORAZEPAM 0.5 MG: 2 INJECTION INTRAMUSCULAR; INTRAVENOUS at 01:25

## 2018-01-01 RX ADMIN — ASPIRIN 81 MG: 81 TABLET, FILM COATED ORAL at 08:48

## 2018-01-01 RX ADMIN — OXYCODONE HYDROCHLORIDE 10 MG: 5 TABLET ORAL at 20:57

## 2018-01-01 RX ADMIN — Medication 10 ML: at 16:33

## 2018-01-01 RX ADMIN — POTASSIUM CHLORIDE 40 MEQ: 750 CAPSULE, EXTENDED RELEASE ORAL at 17:14

## 2018-01-01 RX ADMIN — LACTULOSE 20 G: 20 SOLUTION ORAL at 14:27

## 2018-01-01 RX ADMIN — INSULIN ASPART 8 UNITS: 100 INJECTION, SOLUTION INTRAVENOUS; SUBCUTANEOUS at 18:57

## 2018-01-01 RX ADMIN — MUPIROCIN 10 APPLICATION: 20 OINTMENT TOPICAL at 08:39

## 2018-01-01 RX ADMIN — NYSTATIN 500000 UNITS: 100000 SUSPENSION ORAL at 21:41

## 2018-01-01 RX ADMIN — VECURONIUM BROMIDE 10 MG: 1 INJECTION, POWDER, LYOPHILIZED, FOR SOLUTION INTRAVENOUS at 07:04

## 2018-01-01 RX ADMIN — WARFARIN SODIUM 10 MG: 10 TABLET ORAL at 18:13

## 2018-01-01 RX ADMIN — INSULIN ASPART 8 UNITS: 100 INJECTION, SOLUTION INTRAVENOUS; SUBCUTANEOUS at 17:30

## 2018-01-01 RX ADMIN — NYSTATIN 500000 UNITS: 100000 SUSPENSION ORAL at 17:24

## 2018-01-01 RX ADMIN — BUDESONIDE AND FORMOTEROL FUMARATE DIHYDRATE 2 PUFF: 160; 4.5 AEROSOL RESPIRATORY (INHALATION) at 07:17

## 2018-01-01 RX ADMIN — PANTOPRAZOLE SODIUM 40 MG: 40 TABLET, DELAYED RELEASE ORAL at 06:47

## 2018-01-01 RX ADMIN — MIDODRINE HYDROCHLORIDE 5 MG: 5 TABLET ORAL at 18:12

## 2018-01-01 RX ADMIN — ZOLPIDEM TARTRATE 10 MG: 5 TABLET ORAL at 00:15

## 2018-01-01 RX ADMIN — ASPIRIN 81 MG: 81 TABLET, FILM COATED ORAL at 09:04

## 2018-01-01 RX ADMIN — HYDROCORTISONE 1 APPLICATION: 1 CREAM TOPICAL at 09:05

## 2018-01-01 RX ADMIN — IPRATROPIUM BROMIDE AND ALBUTEROL SULFATE 3 ML: .5; 3 SOLUTION RESPIRATORY (INHALATION) at 19:38

## 2018-01-01 RX ADMIN — IPRATROPIUM BROMIDE AND ALBUTEROL SULFATE 3 ML: .5; 3 SOLUTION RESPIRATORY (INHALATION) at 13:15

## 2018-01-01 RX ADMIN — MUPIROCIN 10 APPLICATION: 20 OINTMENT TOPICAL at 08:52

## 2018-01-01 RX ADMIN — BUDESONIDE AND FORMOTEROL FUMARATE DIHYDRATE 2 PUFF: 160; 4.5 AEROSOL RESPIRATORY (INHALATION) at 20:22

## 2018-01-01 RX ADMIN — OXYCODONE HYDROCHLORIDE 10 MG: 5 TABLET ORAL at 13:13

## 2018-01-01 RX ADMIN — POTASSIUM CHLORIDE 40 MEQ: 750 CAPSULE, EXTENDED RELEASE ORAL at 22:00

## 2018-01-01 RX ADMIN — OXYCODONE HYDROCHLORIDE 10 MG: 5 TABLET ORAL at 16:03

## 2018-01-01 RX ADMIN — MUPIROCIN 10 APPLICATION: 20 OINTMENT TOPICAL at 08:19

## 2018-01-01 RX ADMIN — TORSEMIDE 20 MG: 20 TABLET ORAL at 19:42

## 2018-01-01 RX ADMIN — ONDANSETRON HYDROCHLORIDE 4 MG: 2 INJECTION, SOLUTION INTRAMUSCULAR; INTRAVENOUS at 00:51

## 2018-01-01 RX ADMIN — DEXTROSE MONOHYDRATE 25 G: 25 INJECTION, SOLUTION INTRAVENOUS at 14:41

## 2018-01-01 RX ADMIN — GUAIFENESIN 600 MG: 600 TABLET, EXTENDED RELEASE ORAL at 09:09

## 2018-01-01 RX ADMIN — POLYETHYLENE GLYCOL 3350 17 G: 17 POWDER, FOR SOLUTION ORAL at 08:27

## 2018-01-01 RX ADMIN — INSULIN ASPART 2 UNITS: 100 INJECTION, SOLUTION INTRAVENOUS; SUBCUTANEOUS at 20:21

## 2018-01-01 RX ADMIN — NYSTATIN 500000 UNITS: 100000 SUSPENSION ORAL at 21:22

## 2018-01-01 RX ADMIN — TERAZOSIN HYDROCHLORIDE 2 MG: 2 CAPSULE ORAL at 20:14

## 2018-01-01 RX ADMIN — CARVEDILOL 6.25 MG: 6.25 TABLET, FILM COATED ORAL at 08:59

## 2018-01-01 RX ADMIN — POTASSIUM CHLORIDE 10 MEQ: 7.46 INJECTION, SOLUTION INTRAVENOUS at 15:11

## 2018-01-01 RX ADMIN — OXYCODONE HYDROCHLORIDE 10 MG: 5 TABLET ORAL at 08:11

## 2018-01-01 RX ADMIN — NYSTATIN: 100000 POWDER TOPICAL at 09:58

## 2018-01-01 RX ADMIN — TERAZOSIN HYDROCHLORIDE 2 MG: 2 CAPSULE ORAL at 21:31

## 2018-01-01 RX ADMIN — Medication 2 MG: at 09:31

## 2018-01-01 RX ADMIN — FLUTICASONE PROPIONATE 2 SPRAY: 50 SPRAY, METERED NASAL at 18:33

## 2018-01-01 RX ADMIN — INSULIN ASPART 2 UNITS: 100 INJECTION, SOLUTION INTRAVENOUS; SUBCUTANEOUS at 11:49

## 2018-01-01 RX ADMIN — INSULIN ASPART 6 UNITS: 100 INJECTION, SOLUTION INTRAVENOUS; SUBCUTANEOUS at 09:38

## 2018-01-01 RX ADMIN — ASPIRIN 81 MG: 81 TABLET, FILM COATED ORAL at 14:27

## 2018-01-01 RX ADMIN — BUDESONIDE AND FORMOTEROL FUMARATE DIHYDRATE 2 PUFF: 160; 4.5 AEROSOL RESPIRATORY (INHALATION) at 21:00

## 2018-01-01 RX ADMIN — ZOLPIDEM TARTRATE 10 MG: 5 TABLET ORAL at 23:16

## 2018-01-01 RX ADMIN — SPIRONOLACTONE 25 MG: 25 TABLET ORAL at 10:45

## 2018-01-01 RX ADMIN — BUMETANIDE 1 MG/HR: 0.25 INJECTION INTRAMUSCULAR; INTRAVENOUS at 09:33

## 2018-01-01 RX ADMIN — AZITHROMYCIN MONOHYDRATE 500 MG: 500 INJECTION, POWDER, LYOPHILIZED, FOR SOLUTION INTRAVENOUS at 12:39

## 2018-01-01 RX ADMIN — INSULIN ASPART 2 UNITS: 100 INJECTION, SOLUTION INTRAVENOUS; SUBCUTANEOUS at 11:46

## 2018-01-01 RX ADMIN — INSULIN ASPART 3 UNITS: 100 INJECTION, SOLUTION INTRAVENOUS; SUBCUTANEOUS at 16:55

## 2018-01-01 RX ADMIN — OXYCODONE HYDROCHLORIDE 10 MG: 5 TABLET ORAL at 07:59

## 2018-01-01 RX ADMIN — IPRATROPIUM BROMIDE AND ALBUTEROL SULFATE 3 ML: .5; 3 SOLUTION RESPIRATORY (INHALATION) at 19:08

## 2018-01-01 RX ADMIN — TAZOBACTAM SODIUM AND PIPERACILLIN SODIUM 4.5 G: 500; 4 INJECTION, SOLUTION INTRAVENOUS at 17:43

## 2018-01-01 RX ADMIN — SUCRALFATE 1 G: 1 SUSPENSION ORAL at 07:01

## 2018-01-01 RX ADMIN — MAGNESIUM SULFATE HEPTAHYDRATE 4 G: 40 INJECTION, SOLUTION INTRAVENOUS at 12:39

## 2018-01-01 RX ADMIN — SUCRALFATE 1 G: 1 SUSPENSION ORAL at 18:12

## 2018-01-01 RX ADMIN — CARVEDILOL 12.5 MG: 25 TABLET, FILM COATED ORAL at 08:43

## 2018-01-01 RX ADMIN — MUPIROCIN 10 APPLICATION: 20 OINTMENT TOPICAL at 09:40

## 2018-01-01 RX ADMIN — Medication 10 ML: at 23:03

## 2018-01-01 RX ADMIN — BUMETANIDE 4 MG: 0.25 INJECTION INTRAMUSCULAR; INTRAVENOUS at 16:54

## 2018-01-01 RX ADMIN — INSULIN ASPART 4 UNITS: 100 INJECTION, SOLUTION INTRAVENOUS; SUBCUTANEOUS at 12:40

## 2018-01-01 RX ADMIN — CHLORHEXIDINE GLUCONATE 15 ML: 1.2 RINSE ORAL at 22:21

## 2018-01-01 RX ADMIN — NYSTATIN 500000 UNITS: 100000 SUSPENSION ORAL at 12:23

## 2018-01-01 RX ADMIN — PANTOPRAZOLE SODIUM 40 MG: 40 TABLET, DELAYED RELEASE ORAL at 10:13

## 2018-01-01 RX ADMIN — HYDROCORTISONE: 1 CREAM TOPICAL at 09:49

## 2018-01-01 RX ADMIN — Medication 10 ML: at 08:06

## 2018-01-01 RX ADMIN — INSULIN ASPART 8 UNITS: 100 INJECTION, SOLUTION INTRAVENOUS; SUBCUTANEOUS at 11:26

## 2018-01-01 RX ADMIN — SUCRALFATE 1 G: 1 SUSPENSION ORAL at 05:43

## 2018-01-01 RX ADMIN — LEVOTHYROXINE SODIUM 50 MCG: 50 TABLET ORAL at 06:00

## 2018-01-01 RX ADMIN — INSULIN ASPART 8 UNITS: 100 INJECTION, SOLUTION INTRAVENOUS; SUBCUTANEOUS at 11:19

## 2018-01-01 RX ADMIN — POLYETHYLENE GLYCOL 3350 17 G: 17 POWDER, FOR SOLUTION ORAL at 09:26

## 2018-01-01 RX ADMIN — DEXTROSE MONOHYDRATE 20 ML/HR: 100 INJECTION, SOLUTION INTRAVENOUS at 02:33

## 2018-01-01 RX ADMIN — BUMETANIDE 2 MG: 2 TABLET ORAL at 08:47

## 2018-01-01 RX ADMIN — METOLAZONE 5 MG: 5 TABLET ORAL at 08:21

## 2018-01-01 RX ADMIN — CEFAZOLIN SODIUM 2 G: 2 INJECTION, SOLUTION INTRAVENOUS at 13:14

## 2018-01-01 RX ADMIN — GUAIFENESIN 1200 MG: 600 TABLET, EXTENDED RELEASE ORAL at 08:32

## 2018-01-01 RX ADMIN — INSULIN ASPART 12 UNITS: 100 INJECTION, SOLUTION INTRAVENOUS; SUBCUTANEOUS at 18:13

## 2018-01-01 RX ADMIN — MIDODRINE HYDROCHLORIDE 5 MG: 5 TABLET ORAL at 08:06

## 2018-01-01 RX ADMIN — SUCRALFATE 1 G: 1 SUSPENSION ORAL at 23:27

## 2018-01-01 RX ADMIN — MUPIROCIN 10 APPLICATION: 20 OINTMENT TOPICAL at 08:48

## 2018-01-01 RX ADMIN — NESIRITIDE 274.2 MCG: 1.5 INJECTION, POWDER, LYOPHILIZED, FOR SOLUTION INTRAVENOUS at 09:54

## 2018-01-01 RX ADMIN — INSULIN DETEMIR 24 UNITS: 100 INJECTION, SOLUTION SUBCUTANEOUS at 20:18

## 2018-01-01 RX ADMIN — ASPIRIN 81 MG: 81 TABLET, FILM COATED ORAL at 09:45

## 2018-01-01 RX ADMIN — INSULIN ASPART 4 UNITS: 100 INJECTION, SOLUTION INTRAVENOUS; SUBCUTANEOUS at 09:24

## 2018-01-01 RX ADMIN — ACETAMINOPHEN 650 MG: 325 TABLET, FILM COATED ORAL at 13:41

## 2018-01-01 RX ADMIN — POTASSIUM CHLORIDE 40 MEQ: 750 CAPSULE, EXTENDED RELEASE ORAL at 08:38

## 2018-01-01 RX ADMIN — Medication 10 ML: at 09:52

## 2018-01-01 RX ADMIN — MUPIROCIN 10 APPLICATION: 20 OINTMENT TOPICAL at 20:23

## 2018-01-01 RX ADMIN — TORSEMIDE 100 MG: 100 TABLET ORAL at 18:24

## 2018-01-01 RX ADMIN — POTASSIUM CHLORIDE 40 MEQ: 750 CAPSULE, EXTENDED RELEASE ORAL at 12:21

## 2018-01-01 RX ADMIN — SUCRALFATE 1 G: 1 SUSPENSION ORAL at 18:37

## 2018-01-01 RX ADMIN — GUAIFENESIN 1200 MG: 600 TABLET, EXTENDED RELEASE ORAL at 09:03

## 2018-01-01 RX ADMIN — INSULIN ASPART 2 UNITS: 100 INJECTION, SOLUTION INTRAVENOUS; SUBCUTANEOUS at 12:18

## 2018-01-01 RX ADMIN — CHLORHEXIDINE GLUCONATE 15 ML: 1.2 RINSE ORAL at 05:20

## 2018-01-01 RX ADMIN — Medication 32 UNITS: at 21:00

## 2018-01-01 RX ADMIN — ACETAMINOPHEN 650 MG: 325 TABLET, FILM COATED ORAL at 13:54

## 2018-01-01 RX ADMIN — SODIUM CHLORIDE 125 MG: 9 INJECTION, SOLUTION INTRAVENOUS at 21:30

## 2018-01-01 RX ADMIN — SCOPALAMINE 1 PATCH: 1 PATCH, EXTENDED RELEASE TRANSDERMAL at 16:32

## 2018-01-01 RX ADMIN — HYDROCORTISONE: 1 CREAM TOPICAL at 21:06

## 2018-01-01 RX ADMIN — INSULIN ASPART 10 UNITS: 100 INJECTION, SOLUTION INTRAVENOUS; SUBCUTANEOUS at 12:04

## 2018-01-01 RX ADMIN — ALBUMIN HUMAN 50 G: 0.25 SOLUTION INTRAVENOUS at 11:58

## 2018-01-01 RX ADMIN — APIXABAN 5 MG: 5 TABLET, FILM COATED ORAL at 20:01

## 2018-01-01 RX ADMIN — APIXABAN 5 MG: 5 TABLET, FILM COATED ORAL at 20:33

## 2018-01-01 RX ADMIN — Medication 10 ML: at 11:27

## 2018-01-01 RX ADMIN — INSULIN ASPART 8 UNITS: 100 INJECTION, SOLUTION INTRAVENOUS; SUBCUTANEOUS at 07:00

## 2018-01-01 RX ADMIN — MIDODRINE HYDROCHLORIDE 5 MG: 5 TABLET ORAL at 16:45

## 2018-01-01 RX ADMIN — MONTELUKAST 10 MG: 10 TABLET, FILM COATED ORAL at 08:50

## 2018-01-01 RX ADMIN — ONDANSETRON HYDROCHLORIDE 4 MG: 2 INJECTION, SOLUTION INTRAMUSCULAR; INTRAVENOUS at 00:37

## 2018-01-01 RX ADMIN — SODIUM CHLORIDE: 9 INJECTION, SOLUTION INTRAVENOUS at 13:33

## 2018-01-01 RX ADMIN — HYDROCORTISONE: 1 CREAM TOPICAL at 09:22

## 2018-01-01 RX ADMIN — MIDODRINE HYDROCHLORIDE 5 MG: 5 TABLET ORAL at 12:06

## 2018-01-01 RX ADMIN — SUCRALFATE 1 G: 1 SUSPENSION ORAL at 11:23

## 2018-01-01 RX ADMIN — INSULIN DETEMIR 18 UNITS: 100 INJECTION, SOLUTION SUBCUTANEOUS at 20:21

## 2018-01-01 RX ADMIN — GUAIFENESIN 1200 MG: 600 TABLET, EXTENDED RELEASE ORAL at 09:38

## 2018-01-01 RX ADMIN — MIDODRINE HYDROCHLORIDE 10 MG: 5 TABLET ORAL at 20:41

## 2018-01-01 RX ADMIN — Medication 10 ML: at 20:05

## 2018-01-01 RX ADMIN — NYSTATIN 500000 UNITS: 100000 SUSPENSION ORAL at 11:27

## 2018-01-01 RX ADMIN — HYDROCORTISONE: 1 CREAM TOPICAL at 20:59

## 2018-01-01 RX ADMIN — OXYCODONE HYDROCHLORIDE 10 MG: 5 TABLET ORAL at 13:54

## 2018-01-01 RX ADMIN — INSULIN ASPART 2 UNITS: 100 INJECTION, SOLUTION INTRAVENOUS; SUBCUTANEOUS at 07:53

## 2018-01-01 RX ADMIN — POTASSIUM CHLORIDE 10 MEQ: 7.46 INJECTION, SOLUTION INTRAVENOUS at 13:30

## 2018-01-01 RX ADMIN — OXYCODONE HYDROCHLORIDE 10 MG: 5 TABLET ORAL at 05:57

## 2018-01-01 RX ADMIN — NYSTATIN 500000 UNITS: 100000 SUSPENSION ORAL at 21:08

## 2018-01-01 RX ADMIN — NYSTATIN 500000 UNITS: 100000 SUSPENSION ORAL at 21:37

## 2018-01-01 RX ADMIN — GUAIFENESIN 1200 MG: 600 TABLET, EXTENDED RELEASE ORAL at 21:18

## 2018-01-01 RX ADMIN — INSULIN ASPART 2 UNITS: 100 INJECTION, SOLUTION INTRAVENOUS; SUBCUTANEOUS at 06:44

## 2018-01-01 RX ADMIN — GUAIFENESIN 1200 MG: 600 TABLET, EXTENDED RELEASE ORAL at 21:05

## 2018-01-01 RX ADMIN — OXYCODONE HYDROCHLORIDE 10 MG: 5 TABLET ORAL at 09:15

## 2018-01-01 RX ADMIN — GUAIFENESIN 1200 MG: 600 TABLET, EXTENDED RELEASE ORAL at 08:47

## 2018-01-01 RX ADMIN — METOLAZONE 5 MG: 5 TABLET ORAL at 08:38

## 2018-01-01 RX ADMIN — OXYCODONE HYDROCHLORIDE 10 MG: 5 TABLET ORAL at 15:32

## 2018-01-01 RX ADMIN — INSULIN ASPART 6 UNITS: 100 INJECTION, SOLUTION INTRAVENOUS; SUBCUTANEOUS at 12:30

## 2018-01-01 RX ADMIN — OXYCODONE HYDROCHLORIDE 10 MG: 5 TABLET ORAL at 11:52

## 2018-01-01 RX ADMIN — FENTANYL CITRATE 50 MCG: 50 INJECTION INTRAMUSCULAR; INTRAVENOUS at 06:54

## 2018-01-01 RX ADMIN — LEVOTHYROXINE SODIUM 50 MCG: 50 TABLET ORAL at 06:34

## 2018-01-01 RX ADMIN — MIDODRINE HYDROCHLORIDE 5 MG: 5 TABLET ORAL at 07:59

## 2018-01-01 RX ADMIN — FENTANYL CITRATE 50 MCG: 50 INJECTION INTRAMUSCULAR; INTRAVENOUS at 07:02

## 2018-01-01 RX ADMIN — Medication 10 ML: at 15:57

## 2018-01-01 RX ADMIN — MUPIROCIN 10 APPLICATION: 20 OINTMENT TOPICAL at 10:14

## 2018-01-01 RX ADMIN — NYSTATIN 500000 UNITS: 100000 SUSPENSION ORAL at 12:13

## 2018-01-01 RX ADMIN — SPIRONOLACTONE 25 MG: 25 TABLET ORAL at 10:18

## 2018-01-01 RX ADMIN — ACETAMINOPHEN 650 MG: 325 TABLET, FILM COATED ORAL at 17:40

## 2018-01-01 RX ADMIN — OXYCODONE HYDROCHLORIDE 10 MG: 5 TABLET ORAL at 09:04

## 2018-01-01 RX ADMIN — PANTOPRAZOLE SODIUM 40 MG: 40 TABLET, DELAYED RELEASE ORAL at 08:20

## 2018-01-01 RX ADMIN — SUCRALFATE 1 G: 1 SUSPENSION ORAL at 20:50

## 2018-01-01 RX ADMIN — SUFENTANIL CITRATE 25 MCG: 50 INJECTION, SOLUTION EPIDURAL; INTRAVENOUS at 07:43

## 2018-01-01 RX ADMIN — DOCUSATE SODIUM -SENNOSIDES 2 TABLET: 50; 8.6 TABLET, COATED ORAL at 21:12

## 2018-01-01 RX ADMIN — GUAIFENESIN 1200 MG: 600 TABLET, EXTENDED RELEASE ORAL at 21:13

## 2018-01-01 RX ADMIN — PANTOPRAZOLE SODIUM 40 MG: 40 TABLET, DELAYED RELEASE ORAL at 09:03

## 2018-01-01 RX ADMIN — MUPIROCIN 10 APPLICATION: 20 OINTMENT TOPICAL at 21:10

## 2018-01-01 RX ADMIN — MONTELUKAST 10 MG: 10 TABLET, FILM COATED ORAL at 13:38

## 2018-01-01 RX ADMIN — ZOLPIDEM TARTRATE 10 MG: 5 TABLET ORAL at 23:14

## 2018-01-01 RX ADMIN — METOLAZONE 5 MG: 5 TABLET ORAL at 20:45

## 2018-01-01 RX ADMIN — POLYETHYLENE GLYCOL 3350 17 G: 17 POWDER, FOR SOLUTION ORAL at 10:08

## 2018-01-01 RX ADMIN — PANTOPRAZOLE SODIUM 40 MG: 40 TABLET, DELAYED RELEASE ORAL at 09:53

## 2018-01-01 RX ADMIN — OXYCODONE HYDROCHLORIDE 10 MG: 5 TABLET ORAL at 09:40

## 2018-01-01 RX ADMIN — INSULIN DETEMIR 18 UNITS: 100 INJECTION, SOLUTION SUBCUTANEOUS at 21:45

## 2018-01-01 RX ADMIN — TERAZOSIN HYDROCHLORIDE 2 MG: 2 CAPSULE ORAL at 21:37

## 2018-01-01 RX ADMIN — LEVOTHYROXINE SODIUM 50 MCG: 50 TABLET ORAL at 06:46

## 2018-01-01 RX ADMIN — CARVEDILOL 25 MG: 25 TABLET, FILM COATED ORAL at 18:24

## 2018-01-01 RX ADMIN — APIXABAN 5 MG: 5 TABLET, FILM COATED ORAL at 08:21

## 2018-01-01 RX ADMIN — CARVEDILOL 6.25 MG: 6.25 TABLET, FILM COATED ORAL at 21:03

## 2018-01-01 RX ADMIN — MUPIROCIN 10 APPLICATION: 20 OINTMENT TOPICAL at 08:51

## 2018-01-01 RX ADMIN — LIDOCAINE HYDROCHLORIDE AND EPINEPHRINE 10 ML: 10; 10 INJECTION, SOLUTION INFILTRATION; PERINEURAL at 18:38

## 2018-01-01 RX ADMIN — CYCLOBENZAPRINE 10 MG: 10 TABLET, FILM COATED ORAL at 08:39

## 2018-01-01 RX ADMIN — ASPIRIN 81 MG: 81 TABLET, FILM COATED ORAL at 10:18

## 2018-01-01 RX ADMIN — DOCUSATE SODIUM -SENNOSIDES 2 TABLET: 50; 8.6 TABLET, COATED ORAL at 21:49

## 2018-01-01 RX ADMIN — BUDESONIDE AND FORMOTEROL FUMARATE DIHYDRATE 2 PUFF: 160; 4.5 AEROSOL RESPIRATORY (INHALATION) at 07:05

## 2018-01-01 RX ADMIN — OXYCODONE HYDROCHLORIDE 10 MG: 5 TABLET ORAL at 14:50

## 2018-01-01 RX ADMIN — HYDROCORTISONE: 1 CREAM TOPICAL at 13:39

## 2018-01-01 RX ADMIN — GUAIFENESIN 1200 MG: 600 TABLET, EXTENDED RELEASE ORAL at 08:43

## 2018-01-01 RX ADMIN — SUCRALFATE 1 G: 1 SUSPENSION ORAL at 05:00

## 2018-01-01 RX ADMIN — GUAIFENESIN 1200 MG: 600 TABLET, EXTENDED RELEASE ORAL at 20:04

## 2018-01-01 RX ADMIN — NYSTATIN: 100000 POWDER TOPICAL at 09:05

## 2018-01-01 RX ADMIN — NYSTATIN 500000 UNITS: 100000 SUSPENSION ORAL at 20:46

## 2018-01-01 RX ADMIN — POTASSIUM CHLORIDE 40 MEQ: 1.5 POWDER, FOR SOLUTION ORAL at 16:31

## 2018-01-01 RX ADMIN — Medication 10 ML: at 21:06

## 2018-01-01 RX ADMIN — INSULIN ASPART 3 UNITS: 100 INJECTION, SOLUTION INTRAVENOUS; SUBCUTANEOUS at 11:29

## 2018-01-01 RX ADMIN — CEFTRIAXONE SODIUM 2 G: 2 INJECTION, SOLUTION INTRAVENOUS at 17:26

## 2018-01-01 RX ADMIN — BUDESONIDE AND FORMOTEROL FUMARATE DIHYDRATE 2 PUFF: 160; 4.5 AEROSOL RESPIRATORY (INHALATION) at 15:58

## 2018-01-01 RX ADMIN — OXYCODONE HYDROCHLORIDE 10 MG: 5 TABLET ORAL at 12:09

## 2018-01-01 RX ADMIN — FLUTICASONE PROPIONATE 2 SPRAY: 50 SPRAY, METERED NASAL at 08:44

## 2018-01-01 RX ADMIN — TERAZOSIN HYDROCHLORIDE 2 MG: 2 CAPSULE ORAL at 20:19

## 2018-01-01 RX ADMIN — BUDESONIDE AND FORMOTEROL FUMARATE DIHYDRATE 2 PUFF: 160; 4.5 AEROSOL RESPIRATORY (INHALATION) at 19:37

## 2018-01-01 RX ADMIN — NYSTATIN 500000 UNITS: 100000 SUSPENSION ORAL at 18:20

## 2018-01-01 RX ADMIN — INSULIN ASPART 2 UNITS: 100 INJECTION, SOLUTION INTRAVENOUS; SUBCUTANEOUS at 11:59

## 2018-01-01 RX ADMIN — SUCRALFATE 1 G: 1 SUSPENSION ORAL at 00:37

## 2018-01-01 RX ADMIN — HYDROCORTISONE: 1 CREAM TOPICAL at 08:24

## 2018-01-01 RX ADMIN — POTASSIUM CHLORIDE 20 MEQ: 750 CAPSULE, EXTENDED RELEASE ORAL at 12:47

## 2018-01-01 RX ADMIN — OXYCODONE HYDROCHLORIDE 10 MG: 5 TABLET ORAL at 08:51

## 2018-01-01 RX ADMIN — BUDESONIDE AND FORMOTEROL FUMARATE DIHYDRATE 2 PUFF: 160; 4.5 AEROSOL RESPIRATORY (INHALATION) at 21:35

## 2018-01-01 RX ADMIN — PANTOPRAZOLE SODIUM 40 MG: 40 TABLET, DELAYED RELEASE ORAL at 10:02

## 2018-01-01 RX ADMIN — NYSTATIN 500000 UNITS: 100000 SUSPENSION ORAL at 20:44

## 2018-01-01 RX ADMIN — POTASSIUM CHLORIDE 40 MEQ: 750 CAPSULE, EXTENDED RELEASE ORAL at 18:27

## 2018-01-01 RX ADMIN — PANTOPRAZOLE SODIUM 40 MG: 40 TABLET, DELAYED RELEASE ORAL at 08:43

## 2018-01-01 RX ADMIN — GUAIFENESIN 1200 MG: 600 TABLET, EXTENDED RELEASE ORAL at 09:22

## 2018-01-01 RX ADMIN — Medication 10 ML: at 20:45

## 2018-01-01 RX ADMIN — ASPIRIN 81 MG: 81 TABLET, FILM COATED ORAL at 09:07

## 2018-01-01 RX ADMIN — OXYCODONE HYDROCHLORIDE 10 MG: 5 TABLET ORAL at 08:52

## 2018-01-01 RX ADMIN — OXYCODONE HYDROCHLORIDE 10 MG: 5 TABLET ORAL at 20:02

## 2018-01-01 RX ADMIN — MIDODRINE HYDROCHLORIDE 5 MG: 5 TABLET ORAL at 12:23

## 2018-01-01 RX ADMIN — BUMETANIDE 2 MG: 2 TABLET ORAL at 10:07

## 2018-01-01 RX ADMIN — GUAIFENESIN 1200 MG: 600 TABLET, EXTENDED RELEASE ORAL at 20:42

## 2018-01-01 RX ADMIN — INSULIN ASPART 8 UNITS: 100 INJECTION, SOLUTION INTRAVENOUS; SUBCUTANEOUS at 18:40

## 2018-01-01 RX ADMIN — MIDODRINE HYDROCHLORIDE 5 MG: 5 TABLET ORAL at 17:24

## 2018-01-01 RX ADMIN — LACTULOSE 20 G: 20 SOLUTION ORAL at 09:26

## 2018-01-01 RX ADMIN — BUDESONIDE AND FORMOTEROL FUMARATE DIHYDRATE 2 PUFF: 160; 4.5 AEROSOL RESPIRATORY (INHALATION) at 07:47

## 2018-01-01 RX ADMIN — HYDROCORTISONE: 1 CREAM TOPICAL at 22:15

## 2018-01-01 RX ADMIN — INSULIN ASPART 6 UNITS: 100 INJECTION, SOLUTION INTRAVENOUS; SUBCUTANEOUS at 11:36

## 2018-01-01 RX ADMIN — GUAIFENESIN 1200 MG: 600 TABLET, EXTENDED RELEASE ORAL at 21:39

## 2018-01-01 RX ADMIN — ALBUMIN HUMAN 250 ML: 0.05 INJECTION, SOLUTION INTRAVENOUS at 16:36

## 2018-01-01 RX ADMIN — INSULIN ASPART 4 UNITS: 100 INJECTION, SOLUTION INTRAVENOUS; SUBCUTANEOUS at 18:12

## 2018-01-01 RX ADMIN — CHLORHEXIDINE GLUCONATE 1 APPLICATION: 500 CLOTH TOPICAL at 06:31

## 2018-01-01 RX ADMIN — POLYETHYLENE GLYCOL 3350 17 G: 17 POWDER, FOR SOLUTION ORAL at 09:24

## 2018-01-01 RX ADMIN — SUCRALFATE 1 G: 1 SUSPENSION ORAL at 00:15

## 2018-01-01 RX ADMIN — ZOLPIDEM TARTRATE 10 MG: 5 TABLET ORAL at 23:09

## 2018-01-01 RX ADMIN — GUAIFENESIN 1200 MG: 600 TABLET, EXTENDED RELEASE ORAL at 20:50

## 2018-01-01 RX ADMIN — OXYCODONE HYDROCHLORIDE 10 MG: 5 TABLET ORAL at 21:27

## 2018-01-01 RX ADMIN — NYSTATIN: 100000 POWDER TOPICAL at 08:02

## 2018-01-01 RX ADMIN — DOCUSATE SODIUM -SENNOSIDES 2 TABLET: 50; 8.6 TABLET, COATED ORAL at 21:29

## 2018-01-01 RX ADMIN — TERAZOSIN HYDROCHLORIDE 2 MG: 2 CAPSULE ORAL at 20:33

## 2018-01-01 RX ADMIN — INSULIN ASPART 3 UNITS: 100 INJECTION, SOLUTION INTRAVENOUS; SUBCUTANEOUS at 18:07

## 2018-01-01 RX ADMIN — SODIUM CHLORIDE 100 ML/HR: 9 INJECTION, SOLUTION INTRAVENOUS at 18:32

## 2018-01-01 RX ADMIN — MUPIROCIN 10 APPLICATION: 20 OINTMENT TOPICAL at 09:24

## 2018-01-01 RX ADMIN — ASPIRIN 81 MG: 81 TABLET, FILM COATED ORAL at 09:53

## 2018-01-01 RX ADMIN — INSULIN ASPART 8 UNITS: 100 INJECTION, SOLUTION INTRAVENOUS; SUBCUTANEOUS at 11:53

## 2018-01-01 RX ADMIN — OXYCODONE HYDROCHLORIDE 10 MG: 5 TABLET ORAL at 04:03

## 2018-01-01 RX ADMIN — INSULIN ASPART 2 UNITS: 100 INJECTION, SOLUTION INTRAVENOUS; SUBCUTANEOUS at 16:56

## 2018-01-01 RX ADMIN — PANTOPRAZOLE SODIUM 40 MG: 40 TABLET, DELAYED RELEASE ORAL at 08:27

## 2018-01-01 RX ADMIN — INSULIN ASPART 4 UNITS: 100 INJECTION, SOLUTION INTRAVENOUS; SUBCUTANEOUS at 13:20

## 2018-01-01 RX ADMIN — Medication 3 ML: at 21:00

## 2018-01-01 RX ADMIN — Medication 3 ML: at 15:51

## 2018-01-01 RX ADMIN — INSULIN ASPART 2 UNITS: 100 INJECTION, SOLUTION INTRAVENOUS; SUBCUTANEOUS at 16:31

## 2018-01-01 RX ADMIN — BUDESONIDE AND FORMOTEROL FUMARATE DIHYDRATE 2 PUFF: 160; 4.5 AEROSOL RESPIRATORY (INHALATION) at 11:49

## 2018-01-01 RX ADMIN — DEXTROSE MONOHYDRATE 25 G: 25 INJECTION, SOLUTION INTRAVENOUS at 11:32

## 2018-01-01 RX ADMIN — FLUTICASONE PROPIONATE 2 SPRAY: 50 SPRAY, METERED NASAL at 08:23

## 2018-01-01 RX ADMIN — ASPIRIN 81 MG: 81 TABLET, FILM COATED ORAL at 10:13

## 2018-01-01 RX ADMIN — FLUCONAZOLE 150 MG: 150 TABLET ORAL at 09:51

## 2018-01-01 RX ADMIN — OXYCODONE HYDROCHLORIDE 10 MG: 5 TABLET ORAL at 08:38

## 2018-01-01 RX ADMIN — OXYCODONE HYDROCHLORIDE 10 MG: 5 TABLET ORAL at 14:43

## 2018-01-01 RX ADMIN — SPIRONOLACTONE 25 MG: 25 TABLET ORAL at 08:30

## 2018-01-01 RX ADMIN — NYSTATIN 500000 UNITS: 100000 SUSPENSION ORAL at 20:52

## 2018-01-01 RX ADMIN — MONTELUKAST 10 MG: 10 TABLET, FILM COATED ORAL at 08:47

## 2018-01-01 RX ADMIN — OXYCODONE HYDROCHLORIDE 10 MG: 5 TABLET ORAL at 15:24

## 2018-01-01 RX ADMIN — POTASSIUM CHLORIDE 40 MEQ: 750 CAPSULE, EXTENDED RELEASE ORAL at 22:21

## 2018-01-01 RX ADMIN — OXYCODONE HYDROCHLORIDE 10 MG: 5 TABLET ORAL at 14:49

## 2018-01-01 RX ADMIN — WARFARIN SODIUM 1 MG: 1 TABLET ORAL at 17:46

## 2018-01-01 RX ADMIN — ERYTHROPOIETIN 10000 UNITS: 10000 INJECTION, SOLUTION INTRAVENOUS; SUBCUTANEOUS at 12:21

## 2018-01-01 RX ADMIN — BUDESONIDE AND FORMOTEROL FUMARATE DIHYDRATE 2 PUFF: 160; 4.5 AEROSOL RESPIRATORY (INHALATION) at 21:54

## 2018-01-01 RX ADMIN — PANTOPRAZOLE SODIUM 40 MG: 40 TABLET, DELAYED RELEASE ORAL at 13:39

## 2018-01-01 RX ADMIN — INSULIN ASPART 8 UNITS: 100 INJECTION, SOLUTION INTRAVENOUS; SUBCUTANEOUS at 16:06

## 2018-01-01 RX ADMIN — PANTOPRAZOLE SODIUM 40 MG: 40 TABLET, DELAYED RELEASE ORAL at 09:22

## 2018-01-01 RX ADMIN — NYSTATIN: 100000 POWDER TOPICAL at 09:20

## 2018-01-01 RX ADMIN — INSULIN DETEMIR 30 UNITS: 100 INJECTION, SOLUTION SUBCUTANEOUS at 21:10

## 2018-01-01 RX ADMIN — NESIRITIDE 0.01 MCG/KG/MIN: 1.5 INJECTION, POWDER, LYOPHILIZED, FOR SOLUTION INTRAVENOUS at 01:39

## 2018-01-01 RX ADMIN — BUMETANIDE 1 MG/HR: 0.25 INJECTION INTRAMUSCULAR; INTRAVENOUS at 09:43

## 2018-01-01 RX ADMIN — DEXTROSE MONOHYDRATE 25 G: 25 INJECTION, SOLUTION INTRAVENOUS at 04:31

## 2018-01-01 RX ADMIN — BUMETANIDE 2 MG: 2 TABLET ORAL at 20:20

## 2018-01-01 RX ADMIN — DOCUSATE SODIUM -SENNOSIDES 2 TABLET: 50; 8.6 TABLET, COATED ORAL at 20:58

## 2018-01-01 RX ADMIN — PANTOPRAZOLE SODIUM 40 MG: 40 TABLET, DELAYED RELEASE ORAL at 14:37

## 2018-01-01 RX ADMIN — BUDESONIDE AND FORMOTEROL FUMARATE DIHYDRATE 2 PUFF: 160; 4.5 AEROSOL RESPIRATORY (INHALATION) at 20:37

## 2018-01-01 RX ADMIN — BUMETANIDE 2 MG: 2 TABLET ORAL at 20:03

## 2018-01-01 RX ADMIN — DOCUSATE SODIUM -SENNOSIDES 2 TABLET: 50; 8.6 TABLET, COATED ORAL at 20:27

## 2018-01-01 RX ADMIN — BUDESONIDE AND FORMOTEROL FUMARATE DIHYDRATE 2 PUFF: 160; 4.5 AEROSOL RESPIRATORY (INHALATION) at 08:02

## 2018-01-01 RX ADMIN — ASPIRIN 81 MG: 81 TABLET, FILM COATED ORAL at 09:31

## 2018-01-01 RX ADMIN — INSULIN DETEMIR 80 UNITS: 100 INJECTION, SOLUTION SUBCUTANEOUS at 20:33

## 2018-01-01 RX ADMIN — CARVEDILOL 6.25 MG: 6.25 TABLET, FILM COATED ORAL at 08:48

## 2018-01-01 RX ADMIN — INSULIN ASPART 8 UNITS: 100 INJECTION, SOLUTION INTRAVENOUS; SUBCUTANEOUS at 08:42

## 2018-01-01 RX ADMIN — PANTOPRAZOLE SODIUM 40 MG: 40 TABLET, DELAYED RELEASE ORAL at 09:26

## 2018-01-01 RX ADMIN — MONTELUKAST 10 MG: 10 TABLET, FILM COATED ORAL at 09:01

## 2018-01-01 RX ADMIN — MIDODRINE HYDROCHLORIDE 5 MG: 5 TABLET ORAL at 11:41

## 2018-01-01 RX ADMIN — INSULIN ASPART 2 UNITS: 100 INJECTION, SOLUTION INTRAVENOUS; SUBCUTANEOUS at 21:18

## 2018-01-01 RX ADMIN — FERROUS SULFATE TAB 325 MG (65 MG ELEMENTAL FE) 325 MG: 325 (65 FE) TAB at 09:03

## 2018-01-01 RX ADMIN — TORSEMIDE 100 MG: 100 TABLET ORAL at 14:27

## 2018-01-01 RX ADMIN — INSULIN ASPART 10 UNITS: 100 INJECTION, SOLUTION INTRAVENOUS; SUBCUTANEOUS at 12:25

## 2018-01-01 RX ADMIN — Medication 10 ML: at 16:56

## 2018-01-01 RX ADMIN — Medication 10 ML: at 11:22

## 2018-01-01 RX ADMIN — CEFTRIAXONE SODIUM 1 G: 1 INJECTION, SOLUTION INTRAVENOUS at 17:32

## 2018-01-01 RX ADMIN — MIDODRINE HYDROCHLORIDE 5 MG: 5 TABLET ORAL at 12:14

## 2018-01-01 RX ADMIN — INSULIN ASPART 8 UNITS: 100 INJECTION, SOLUTION INTRAVENOUS; SUBCUTANEOUS at 11:55

## 2018-01-01 RX ADMIN — BUDESONIDE AND FORMOTEROL FUMARATE DIHYDRATE 2 PUFF: 160; 4.5 AEROSOL RESPIRATORY (INHALATION) at 21:42

## 2018-01-01 RX ADMIN — POTASSIUM CHLORIDE 30 MEQ: 750 CAPSULE, EXTENDED RELEASE ORAL at 17:04

## 2018-01-01 RX ADMIN — GUAIFENESIN 1200 MG: 600 TABLET, EXTENDED RELEASE ORAL at 21:08

## 2018-01-01 RX ADMIN — FLUCONAZOLE 150 MG: 150 TABLET ORAL at 09:46

## 2018-01-01 RX ADMIN — SUCRALFATE 1 G: 1 SUSPENSION ORAL at 05:25

## 2018-01-01 RX ADMIN — INSULIN ASPART 2 UNITS: 100 INJECTION, SOLUTION INTRAVENOUS; SUBCUTANEOUS at 11:51

## 2018-01-01 RX ADMIN — METOLAZONE 5 MG: 5 TABLET ORAL at 13:34

## 2018-01-01 RX ADMIN — GUAIFENESIN 600 MG: 600 TABLET, EXTENDED RELEASE ORAL at 09:25

## 2018-01-01 RX ADMIN — DOCUSATE SODIUM -SENNOSIDES 2 TABLET: 50; 8.6 TABLET, COATED ORAL at 21:10

## 2018-01-01 RX ADMIN — PANTOPRAZOLE SODIUM 40 MG: 40 TABLET, DELAYED RELEASE ORAL at 08:55

## 2018-01-01 RX ADMIN — POLYETHYLENE GLYCOL 3350 17 G: 17 POWDER, FOR SOLUTION ORAL at 09:53

## 2018-01-01 RX ADMIN — Medication 10 ML: at 05:25

## 2018-01-01 RX ADMIN — GUAIFENESIN 1200 MG: 600 TABLET, EXTENDED RELEASE ORAL at 09:51

## 2018-01-01 RX ADMIN — ASPIRIN 81 MG: 81 TABLET, FILM COATED ORAL at 09:42

## 2018-01-01 RX ADMIN — INSULIN ASPART 2 UNITS: 100 INJECTION, SOLUTION INTRAVENOUS; SUBCUTANEOUS at 20:32

## 2018-01-01 RX ADMIN — SODIUM CHLORIDE 125 MG: 9 INJECTION, SOLUTION INTRAVENOUS at 19:05

## 2018-01-01 RX ADMIN — OXYCODONE HYDROCHLORIDE 10 MG: 5 TABLET ORAL at 12:08

## 2018-01-01 RX ADMIN — TORSEMIDE 100 MG: 100 TABLET ORAL at 18:26

## 2018-01-01 RX ADMIN — Medication 32 UNITS: at 22:25

## 2018-01-01 RX ADMIN — POTASSIUM CHLORIDE 20 MEQ: 750 CAPSULE, EXTENDED RELEASE ORAL at 17:42

## 2018-01-01 RX ADMIN — INSULIN DETEMIR 18 UNITS: 100 INJECTION, SOLUTION SUBCUTANEOUS at 20:54

## 2018-01-01 RX ADMIN — INSULIN ASPART 2 UNITS: 100 INJECTION, SOLUTION INTRAVENOUS; SUBCUTANEOUS at 06:57

## 2018-01-01 RX ADMIN — ASPIRIN 81 MG: 81 TABLET, FILM COATED ORAL at 09:22

## 2018-01-01 RX ADMIN — GUAIFENESIN 1200 MG: 600 TABLET, EXTENDED RELEASE ORAL at 22:13

## 2018-01-01 RX ADMIN — CEFTRIAXONE SODIUM 2 G: 2 INJECTION, SOLUTION INTRAVENOUS at 16:59

## 2018-01-01 RX ADMIN — MIDODRINE HYDROCHLORIDE 5 MG: 5 TABLET ORAL at 17:46

## 2018-01-01 RX ADMIN — SUFENTANIL CITRATE 25 MCG: 50 INJECTION, SOLUTION EPIDURAL; INTRAVENOUS at 09:15

## 2018-01-01 RX ADMIN — OXYCODONE HYDROCHLORIDE 10 MG: 5 TABLET ORAL at 08:01

## 2018-01-01 RX ADMIN — MIDODRINE HYDROCHLORIDE 5 MG: 5 TABLET ORAL at 09:20

## 2018-01-01 RX ADMIN — INSULIN ASPART 2 UNITS: 100 INJECTION, SOLUTION INTRAVENOUS; SUBCUTANEOUS at 11:58

## 2018-01-01 RX ADMIN — APIXABAN 5 MG: 5 TABLET, FILM COATED ORAL at 09:09

## 2018-01-01 RX ADMIN — WARFARIN SODIUM 2 MG: 2 TABLET ORAL at 18:53

## 2018-01-01 RX ADMIN — CARVEDILOL 12.5 MG: 25 TABLET, FILM COATED ORAL at 08:37

## 2018-01-01 RX ADMIN — FLUCONAZOLE 150 MG: 150 TABLET ORAL at 11:23

## 2018-01-01 RX ADMIN — MUPIROCIN 10 APPLICATION: 20 OINTMENT TOPICAL at 10:02

## 2018-01-01 RX ADMIN — TORSEMIDE 20 MG: 20 TABLET ORAL at 08:27

## 2018-01-01 RX ADMIN — OXYCODONE HYDROCHLORIDE 10 MG: 5 TABLET ORAL at 14:46

## 2018-01-01 RX ADMIN — NYSTATIN 500000 UNITS: 100000 SUSPENSION ORAL at 20:59

## 2018-01-01 RX ADMIN — INSULIN ASPART 2 UNITS: 100 INJECTION, SOLUTION INTRAVENOUS; SUBCUTANEOUS at 14:40

## 2018-01-01 RX ADMIN — TORSEMIDE 20 MG: 20 TABLET ORAL at 10:22

## 2018-01-01 RX ADMIN — OXYCODONE HYDROCHLORIDE 10 MG: 5 TABLET ORAL at 11:03

## 2018-01-01 RX ADMIN — Medication 10 ML: at 20:44

## 2018-01-01 RX ADMIN — BUDESONIDE AND FORMOTEROL FUMARATE DIHYDRATE 2 PUFF: 160; 4.5 AEROSOL RESPIRATORY (INHALATION) at 20:41

## 2018-01-01 RX ADMIN — INSULIN DETEMIR 18 UNITS: 100 INJECTION, SOLUTION SUBCUTANEOUS at 20:46

## 2018-01-01 RX ADMIN — ACETAMINOPHEN 650 MG: 325 TABLET, FILM COATED ORAL at 19:03

## 2018-01-01 RX ADMIN — MONTELUKAST 10 MG: 10 TABLET, FILM COATED ORAL at 09:44

## 2018-01-01 RX ADMIN — MONTELUKAST 10 MG: 10 TABLET, FILM COATED ORAL at 09:51

## 2018-01-01 RX ADMIN — HEPARIN SODIUM 3000 UNITS: 1000 INJECTION INTRAVENOUS; SUBCUTANEOUS at 12:45

## 2018-01-01 RX ADMIN — BUMETANIDE 2 MG: 2 TABLET ORAL at 12:19

## 2018-01-01 RX ADMIN — OXYCODONE HYDROCHLORIDE 10 MG: 5 TABLET ORAL at 19:36

## 2018-01-01 RX ADMIN — MONTELUKAST 10 MG: 10 TABLET, FILM COATED ORAL at 09:20

## 2018-01-01 RX ADMIN — HYDROCORTISONE: 1 CREAM TOPICAL at 20:54

## 2018-01-01 RX ADMIN — HYDROCORTISONE 1 APPLICATION: 1 CREAM TOPICAL at 21:33

## 2018-01-01 RX ADMIN — INSULIN ASPART 8 UNITS: 100 INJECTION, SOLUTION INTRAVENOUS; SUBCUTANEOUS at 06:56

## 2018-01-01 RX ADMIN — ENOXAPARIN SODIUM 30 MG: 30 INJECTION SUBCUTANEOUS at 08:59

## 2018-01-01 RX ADMIN — INSULIN ASPART 2 UNITS: 100 INJECTION, SOLUTION INTRAVENOUS; SUBCUTANEOUS at 18:32

## 2018-01-01 RX ADMIN — SODIUM CHLORIDE 0.25 MCG/KG/MIN: 0.9 INJECTION, SOLUTION INTRAVENOUS at 09:18

## 2018-01-01 RX ADMIN — GUAIFENESIN 1200 MG: 600 TABLET, EXTENDED RELEASE ORAL at 21:15

## 2018-01-01 RX ADMIN — ONDANSETRON HYDROCHLORIDE 4 MG: 2 INJECTION, SOLUTION INTRAMUSCULAR; INTRAVENOUS at 20:58

## 2018-01-01 RX ADMIN — OXYCODONE HYDROCHLORIDE 10 MG: 5 TABLET ORAL at 20:24

## 2018-01-01 RX ADMIN — MUPIROCIN 10 APPLICATION: 20 OINTMENT TOPICAL at 09:22

## 2018-01-01 RX ADMIN — BUMETANIDE 2 MG: 0.25 INJECTION INTRAMUSCULAR; INTRAVENOUS at 15:31

## 2018-01-01 RX ADMIN — INSULIN DETEMIR 18 UNITS: 100 INJECTION, SOLUTION SUBCUTANEOUS at 21:09

## 2018-01-01 RX ADMIN — ASPIRIN 81 MG: 81 TABLET, FILM COATED ORAL at 18:26

## 2018-01-01 RX ADMIN — OXYCODONE HYDROCHLORIDE 10 MG: 5 TABLET ORAL at 20:59

## 2018-01-01 RX ADMIN — GUAIFENESIN 1200 MG: 600 TABLET, EXTENDED RELEASE ORAL at 08:01

## 2018-01-01 RX ADMIN — Medication 10 ML: at 12:19

## 2018-01-01 RX ADMIN — LEVOTHYROXINE SODIUM 50 MCG: 50 TABLET ORAL at 06:30

## 2018-01-01 RX ADMIN — POTASSIUM CHLORIDE 40 MEQ: 750 CAPSULE, EXTENDED RELEASE ORAL at 18:08

## 2018-01-01 RX ADMIN — MIDODRINE HYDROCHLORIDE 5 MG: 5 TABLET ORAL at 11:03

## 2018-01-01 RX ADMIN — CYCLOBENZAPRINE 10 MG: 10 TABLET, FILM COATED ORAL at 21:23

## 2018-01-01 RX ADMIN — BUMETANIDE 2 MG: 2 TABLET ORAL at 09:23

## 2018-01-01 RX ADMIN — BUDESONIDE AND FORMOTEROL FUMARATE DIHYDRATE 2 PUFF: 160; 4.5 AEROSOL RESPIRATORY (INHALATION) at 21:06

## 2018-01-01 RX ADMIN — Medication 10 ML: at 21:49

## 2018-01-01 RX ADMIN — OXYCODONE HYDROCHLORIDE 10 MG: 5 TABLET ORAL at 17:26

## 2018-01-01 RX ADMIN — BUMETANIDE 1 MG: 0.25 INJECTION INTRAMUSCULAR; INTRAVENOUS at 21:49

## 2018-01-01 RX ADMIN — SODIUM CHLORIDE 125 MG: 9 INJECTION, SOLUTION INTRAVENOUS at 17:56

## 2018-01-01 RX ADMIN — SUCRALFATE 1 G: 1 SUSPENSION ORAL at 23:59

## 2018-01-01 RX ADMIN — HYDROCORTISONE: 1 CREAM TOPICAL at 09:50

## 2018-01-01 RX ADMIN — OXYCODONE HYDROCHLORIDE 10 MG: 5 TABLET ORAL at 19:12

## 2018-01-01 RX ADMIN — CARVEDILOL 6.25 MG: 6.25 TABLET, FILM COATED ORAL at 22:20

## 2018-01-01 RX ADMIN — FLUTICASONE PROPIONATE 2 SPRAY: 50 SPRAY, METERED NASAL at 08:30

## 2018-01-01 RX ADMIN — ONDANSETRON HYDROCHLORIDE 4 MG: 2 INJECTION, SOLUTION INTRAMUSCULAR; INTRAVENOUS at 08:58

## 2018-01-01 RX ADMIN — MUPIROCIN 10 APPLICATION: 20 OINTMENT TOPICAL at 20:06

## 2018-01-01 RX ADMIN — INSULIN ASPART 8 UNITS: 100 INJECTION, SOLUTION INTRAVENOUS; SUBCUTANEOUS at 21:44

## 2018-01-01 RX ADMIN — INSULIN ASPART 2 UNITS: 100 INJECTION, SOLUTION INTRAVENOUS; SUBCUTANEOUS at 19:03

## 2018-01-01 RX ADMIN — MONTELUKAST SODIUM 10 MG: 10 TABLET, FILM COATED ORAL at 22:21

## 2018-01-01 RX ADMIN — LEVOTHYROXINE SODIUM 50 MCG: 50 TABLET ORAL at 05:49

## 2018-01-01 RX ADMIN — CEFDINIR 300 MG: 300 CAPSULE ORAL at 20:42

## 2018-01-01 RX ADMIN — BUDESONIDE AND FORMOTEROL FUMARATE DIHYDRATE 2 PUFF: 160; 4.5 AEROSOL RESPIRATORY (INHALATION) at 20:49

## 2018-01-01 RX ADMIN — INSULIN ASPART 8 UNITS: 100 INJECTION, SOLUTION INTRAVENOUS; SUBCUTANEOUS at 07:53

## 2018-01-01 RX ADMIN — POLYETHYLENE GLYCOL 3350 17 G: 17 POWDER, FOR SOLUTION ORAL at 10:14

## 2018-01-01 RX ADMIN — OXYCODONE HYDROCHLORIDE 10 MG: 5 TABLET ORAL at 18:12

## 2018-01-01 RX ADMIN — HEPARIN SODIUM 5000 UNITS: 5000 INJECTION INTRAVENOUS; SUBCUTANEOUS at 21:06

## 2018-01-01 RX ADMIN — HYDROCORTISONE: 1 CREAM TOPICAL at 20:43

## 2018-01-01 RX ADMIN — SPIRONOLACTONE 25 MG: 25 TABLET ORAL at 18:08

## 2018-01-01 RX ADMIN — BUDESONIDE AND FORMOTEROL FUMARATE DIHYDRATE 2 PUFF: 160; 4.5 AEROSOL RESPIRATORY (INHALATION) at 19:32

## 2018-01-01 RX ADMIN — PANTOPRAZOLE SODIUM 40 MG: 40 TABLET, DELAYED RELEASE ORAL at 09:49

## 2018-01-01 RX ADMIN — TAZOBACTAM SODIUM AND PIPERACILLIN SODIUM 4.5 G: 500; 4 INJECTION, SOLUTION INTRAVENOUS at 11:38

## 2018-01-01 RX ADMIN — INSULIN ASPART 2 UNITS: 100 INJECTION, SOLUTION INTRAVENOUS; SUBCUTANEOUS at 18:24

## 2018-01-01 RX ADMIN — SPIRONOLACTONE 50 MG: 50 TABLET ORAL at 10:13

## 2018-01-01 RX ADMIN — NYSTATIN 500000 UNITS: 100000 SUSPENSION ORAL at 21:50

## 2018-01-01 RX ADMIN — Medication 32 UNITS: at 21:25

## 2018-01-01 RX ADMIN — MIDODRINE HYDROCHLORIDE 5 MG: 5 TABLET ORAL at 12:12

## 2018-01-01 RX ADMIN — ZOLPIDEM TARTRATE 10 MG: 5 TABLET ORAL at 23:47

## 2018-01-01 RX ADMIN — INSULIN ASPART 2 UNITS: 100 INJECTION, SOLUTION INTRAVENOUS; SUBCUTANEOUS at 20:18

## 2018-01-01 RX ADMIN — SODIUM CHLORIDE 100 ML/HR: 9 INJECTION, SOLUTION INTRAVENOUS at 07:01

## 2018-01-01 RX ADMIN — INSULIN DETEMIR 25 UNITS: 100 INJECTION, SOLUTION SUBCUTANEOUS at 21:33

## 2018-01-01 RX ADMIN — PANTOPRAZOLE SODIUM 40 MG: 40 TABLET, DELAYED RELEASE ORAL at 09:31

## 2018-01-01 RX ADMIN — Medication 10 ML: at 09:51

## 2018-01-01 RX ADMIN — SUCRALFATE 1 G: 1 SUSPENSION ORAL at 16:32

## 2018-01-01 RX ADMIN — INSULIN ASPART 8 UNITS: 100 INJECTION, SOLUTION INTRAVENOUS; SUBCUTANEOUS at 18:06

## 2018-01-01 RX ADMIN — INSULIN ASPART 2 UNITS: 100 INJECTION, SOLUTION INTRAVENOUS; SUBCUTANEOUS at 05:51

## 2018-01-01 RX ADMIN — MORPHINE SULFATE 2 MG: 2 INJECTION, SOLUTION INTRAMUSCULAR; INTRAVENOUS at 00:05

## 2018-01-01 RX ADMIN — FAMOTIDINE 20 MG: 10 INJECTION, SOLUTION INTRAVENOUS at 13:43

## 2018-01-01 RX ADMIN — POTASSIUM CHLORIDE 20 MEQ: 750 CAPSULE, EXTENDED RELEASE ORAL at 09:35

## 2018-01-01 RX ADMIN — CARVEDILOL 12.5 MG: 25 TABLET, FILM COATED ORAL at 09:09

## 2018-01-01 RX ADMIN — INSULIN ASPART 3 UNITS: 100 INJECTION, SOLUTION INTRAVENOUS; SUBCUTANEOUS at 21:59

## 2018-01-01 RX ADMIN — GUAIFENESIN 1200 MG: 600 TABLET, EXTENDED RELEASE ORAL at 10:13

## 2018-01-01 RX ADMIN — CYCLOBENZAPRINE 10 MG: 10 TABLET, FILM COATED ORAL at 16:52

## 2018-01-01 RX ADMIN — DOCUSATE SODIUM -SENNOSIDES 2 TABLET: 50; 8.6 TABLET, COATED ORAL at 21:14

## 2018-01-01 RX ADMIN — GUAIFENESIN 600 MG: 600 TABLET, EXTENDED RELEASE ORAL at 20:01

## 2018-01-01 RX ADMIN — ZOLPIDEM TARTRATE 10 MG: 5 TABLET ORAL at 23:26

## 2018-01-01 RX ADMIN — BUDESONIDE AND FORMOTEROL FUMARATE DIHYDRATE 2 PUFF: 160; 4.5 AEROSOL RESPIRATORY (INHALATION) at 21:45

## 2018-01-01 RX ADMIN — GUAIFENESIN 1200 MG: 600 TABLET, EXTENDED RELEASE ORAL at 20:19

## 2018-01-01 RX ADMIN — INSULIN ASPART 2 UNITS: 100 INJECTION, SOLUTION INTRAVENOUS; SUBCUTANEOUS at 11:27

## 2018-01-01 RX ADMIN — CEFDINIR 300 MG: 300 CAPSULE ORAL at 20:01

## 2018-01-01 RX ADMIN — GUAIFENESIN 1200 MG: 600 TABLET, EXTENDED RELEASE ORAL at 08:00

## 2018-01-01 RX ADMIN — ENOXAPARIN SODIUM 30 MG: 30 INJECTION SUBCUTANEOUS at 09:37

## 2018-01-01 RX ADMIN — NYSTATIN 500000 UNITS: 100000 SUSPENSION ORAL at 12:21

## 2018-01-01 RX ADMIN — MIDODRINE HYDROCHLORIDE 5 MG: 5 TABLET ORAL at 09:22

## 2018-01-01 RX ADMIN — BUDESONIDE AND FORMOTEROL FUMARATE DIHYDRATE 2 PUFF: 160; 4.5 AEROSOL RESPIRATORY (INHALATION) at 10:41

## 2018-01-01 RX ADMIN — NYSTATIN: 100000 POWDER TOPICAL at 20:14

## 2018-01-01 RX ADMIN — GUAIFENESIN 1200 MG: 600 TABLET, EXTENDED RELEASE ORAL at 20:56

## 2018-01-01 RX ADMIN — SUCRALFATE 1 G: 1 SUSPENSION ORAL at 06:35

## 2018-01-01 RX ADMIN — CEPHALEXIN 250 MG: 250 POWDER, FOR SUSPENSION ORAL at 13:34

## 2018-01-01 RX ADMIN — BUDESONIDE AND FORMOTEROL FUMARATE DIHYDRATE 2 PUFF: 160; 4.5 AEROSOL RESPIRATORY (INHALATION) at 08:34

## 2018-01-01 RX ADMIN — INSULIN ASPART 2 UNITS: 100 INJECTION, SOLUTION INTRAVENOUS; SUBCUTANEOUS at 16:07

## 2018-01-01 RX ADMIN — NYSTATIN: 100000 POWDER TOPICAL at 13:39

## 2018-01-01 RX ADMIN — MIDODRINE HYDROCHLORIDE 5 MG: 5 TABLET ORAL at 06:57

## 2018-01-01 RX ADMIN — ASPIRIN 81 MG: 81 TABLET, FILM COATED ORAL at 13:38

## 2018-01-01 RX ADMIN — INSULIN ASPART 6 UNITS: 100 INJECTION, SOLUTION INTRAVENOUS; SUBCUTANEOUS at 17:30

## 2018-01-01 RX ADMIN — TORSEMIDE 100 MG: 100 TABLET ORAL at 09:19

## 2018-01-01 RX ADMIN — Medication 1 MG: at 07:02

## 2018-01-01 RX ADMIN — BUMETANIDE 2 MG: 2 TABLET ORAL at 10:30

## 2018-01-01 RX ADMIN — INSULIN ASPART 3 UNITS: 100 INJECTION, SOLUTION INTRAVENOUS; SUBCUTANEOUS at 12:37

## 2018-01-01 RX ADMIN — INSULIN DETEMIR 18 UNITS: 100 INJECTION, SOLUTION SUBCUTANEOUS at 20:50

## 2018-01-01 RX ADMIN — CARVEDILOL 25 MG: 25 TABLET, FILM COATED ORAL at 09:01

## 2018-01-01 RX ADMIN — NYSTATIN 500000 UNITS: 100000 SUSPENSION ORAL at 18:28

## 2018-01-01 RX ADMIN — CYCLOBENZAPRINE 10 MG: 10 TABLET, FILM COATED ORAL at 00:50

## 2018-01-01 RX ADMIN — LEVOTHYROXINE SODIUM 50 MCG: 50 TABLET ORAL at 10:17

## 2018-01-01 RX ADMIN — DOCUSATE SODIUM -SENNOSIDES 2 TABLET: 50; 8.6 TABLET, COATED ORAL at 21:23

## 2018-01-01 RX ADMIN — TERAZOSIN HYDROCHLORIDE 2 MG: 2 CAPSULE ORAL at 22:57

## 2018-01-01 RX ADMIN — CEPHALEXIN 250 MG: 250 POWDER, FOR SUSPENSION ORAL at 07:34

## 2018-01-01 RX ADMIN — BUMETANIDE 2 MG: 0.25 INJECTION INTRAMUSCULAR; INTRAVENOUS at 09:26

## 2018-01-01 RX ADMIN — Medication 32 UNITS: at 21:22

## 2018-01-01 RX ADMIN — OXYCODONE HYDROCHLORIDE 10 MG: 5 TABLET ORAL at 14:53

## 2018-01-01 RX ADMIN — PANTOPRAZOLE SODIUM 40 MG: 40 TABLET, DELAYED RELEASE ORAL at 09:45

## 2018-01-01 RX ADMIN — FERROUS SULFATE TAB 325 MG (65 MG ELEMENTAL FE) 325 MG: 325 (65 FE) TAB at 09:44

## 2018-01-01 RX ADMIN — MIDODRINE HYDROCHLORIDE 5 MG: 5 TABLET ORAL at 18:26

## 2018-01-01 RX ADMIN — TORSEMIDE 100 MG: 100 TABLET ORAL at 08:21

## 2018-01-01 RX ADMIN — METOLAZONE 10 MG: 5 TABLET ORAL at 15:20

## 2018-01-01 RX ADMIN — NYSTATIN 500000 UNITS: 100000 SUSPENSION ORAL at 17:29

## 2018-01-01 RX ADMIN — SILDENAFIL 20 MG: 20 TABLET ORAL at 22:00

## 2018-01-01 RX ADMIN — NYSTATIN 500000 UNITS: 100000 SUSPENSION ORAL at 07:59

## 2018-01-01 RX ADMIN — GUAIFENESIN 600 MG: 600 TABLET, EXTENDED RELEASE ORAL at 21:15

## 2018-01-01 RX ADMIN — NESIRITIDE 0.01 MCG/KG/MIN: 1.5 INJECTION, POWDER, LYOPHILIZED, FOR SOLUTION INTRAVENOUS at 21:18

## 2018-01-01 RX ADMIN — APIXABAN 5 MG: 5 TABLET, FILM COATED ORAL at 21:15

## 2018-01-01 RX ADMIN — FERROUS SULFATE TAB 325 MG (65 MG ELEMENTAL FE) 325 MG: 325 (65 FE) TAB at 09:51

## 2018-01-01 RX ADMIN — MONTELUKAST 10 MG: 10 TABLET, FILM COATED ORAL at 08:56

## 2018-01-01 RX ADMIN — HYDROCORTISONE: 1 CREAM TOPICAL at 21:15

## 2018-01-01 RX ADMIN — FLUTICASONE PROPIONATE 2 SPRAY: 50 SPRAY, METERED NASAL at 09:07

## 2018-01-01 RX ADMIN — IPRATROPIUM BROMIDE AND ALBUTEROL SULFATE 3 ML: .5; 3 SOLUTION RESPIRATORY (INHALATION) at 08:32

## 2018-01-01 RX ADMIN — OXYCODONE HYDROCHLORIDE 10 MG: 5 TABLET ORAL at 10:19

## 2018-01-01 RX ADMIN — WARFARIN SODIUM 5 MG: 5 TABLET ORAL at 18:57

## 2018-01-01 RX ADMIN — TEMAZEPAM 15 MG: 15 CAPSULE ORAL at 22:46

## 2018-01-01 RX ADMIN — FERROUS SULFATE TAB 325 MG (65 MG ELEMENTAL FE) 325 MG: 325 (65 FE) TAB at 08:21

## 2018-01-01 RX ADMIN — INSULIN ASPART 8 UNITS: 100 INJECTION, SOLUTION INTRAVENOUS; SUBCUTANEOUS at 12:23

## 2018-01-01 RX ADMIN — OXYCODONE HYDROCHLORIDE 10 MG: 5 TABLET ORAL at 11:56

## 2018-01-01 RX ADMIN — Medication 10 ML: at 06:35

## 2018-01-01 RX ADMIN — INSULIN ASPART 8 UNITS: 100 INJECTION, SOLUTION INTRAVENOUS; SUBCUTANEOUS at 07:19

## 2018-01-01 RX ADMIN — ERYTHROPOIETIN 10000 UNITS: 10000 INJECTION, SOLUTION INTRAVENOUS; SUBCUTANEOUS at 18:00

## 2018-01-01 RX ADMIN — HEPARIN SODIUM 5000 UNITS: 5000 INJECTION INTRAVENOUS; SUBCUTANEOUS at 13:29

## 2018-01-01 RX ADMIN — CYCLOBENZAPRINE 10 MG: 10 TABLET, FILM COATED ORAL at 12:29

## 2018-01-01 RX ADMIN — HEPARIN SODIUM 4000 UNITS: 1000 INJECTION, SOLUTION INTRAVENOUS; SUBCUTANEOUS at 12:37

## 2018-01-01 RX ADMIN — OXYCODONE HYDROCHLORIDE 10 MG: 5 TABLET ORAL at 04:00

## 2018-01-01 RX ADMIN — HEPARIN SODIUM 5000 UNITS: 1000 INJECTION, SOLUTION INTRAVENOUS; SUBCUTANEOUS at 08:12

## 2018-01-01 RX ADMIN — MIDODRINE HYDROCHLORIDE 5 MG: 5 TABLET ORAL at 17:56

## 2018-01-01 RX ADMIN — HYDROCORTISONE: 1 CREAM TOPICAL at 08:27

## 2018-01-01 RX ADMIN — MIDODRINE HYDROCHLORIDE 5 MG: 5 TABLET ORAL at 17:26

## 2018-01-01 RX ADMIN — PANTOPRAZOLE SODIUM 40 MG: 40 TABLET, DELAYED RELEASE ORAL at 08:39

## 2018-01-01 RX ADMIN — MIDODRINE HYDROCHLORIDE 5 MG: 5 TABLET ORAL at 11:19

## 2018-01-01 RX ADMIN — NYSTATIN: 100000 POWDER TOPICAL at 08:24

## 2018-01-01 RX ADMIN — POTASSIUM CHLORIDE 40 MEQ: 750 CAPSULE, EXTENDED RELEASE ORAL at 11:56

## 2018-01-01 RX ADMIN — ZOLPIDEM TARTRATE 10 MG: 5 TABLET ORAL at 00:33

## 2018-01-01 RX ADMIN — INSULIN ASPART 6 UNITS: 100 INJECTION, SOLUTION INTRAVENOUS; SUBCUTANEOUS at 18:53

## 2018-01-01 RX ADMIN — NYSTATIN 500000 UNITS: 100000 SUSPENSION ORAL at 17:48

## 2018-01-01 RX ADMIN — OXYCODONE HYDROCHLORIDE 10 MG: 5 TABLET ORAL at 11:54

## 2018-01-01 RX ADMIN — SODIUM CHLORIDE 125 ML/HR: 9 INJECTION, SOLUTION INTRAVENOUS at 13:05

## 2018-01-01 RX ADMIN — GUAIFENESIN 1200 MG: 600 TABLET, EXTENDED RELEASE ORAL at 10:08

## 2018-01-01 RX ADMIN — POTASSIUM CHLORIDE 40 MEQ: 750 CAPSULE, EXTENDED RELEASE ORAL at 08:21

## 2018-01-01 RX ADMIN — POTASSIUM CHLORIDE 40 MEQ: 750 CAPSULE, EXTENDED RELEASE ORAL at 16:37

## 2018-01-01 RX ADMIN — INSULIN ASPART 8 UNITS: 100 INJECTION, SOLUTION INTRAVENOUS; SUBCUTANEOUS at 09:18

## 2018-01-01 RX ADMIN — POTASSIUM CHLORIDE 40 MEQ: 750 CAPSULE, EXTENDED RELEASE ORAL at 08:30

## 2018-01-01 RX ADMIN — MIDODRINE HYDROCHLORIDE 5 MG: 5 TABLET ORAL at 09:04

## 2018-01-01 RX ADMIN — BUMETANIDE 1 MG/HR: 0.25 INJECTION INTRAMUSCULAR; INTRAVENOUS at 13:48

## 2018-01-01 RX ADMIN — FENTANYL CITRATE 25 MCG: 50 INJECTION INTRAMUSCULAR; INTRAVENOUS at 13:19

## 2018-01-01 RX ADMIN — SODIUM CHLORIDE: 9 INJECTION, SOLUTION INTRAVENOUS at 06:48

## 2018-01-01 RX ADMIN — INSULIN ASPART 5 UNITS: 100 INJECTION, SOLUTION INTRAVENOUS; SUBCUTANEOUS at 12:21

## 2018-01-01 RX ADMIN — GUAIFENESIN 1200 MG: 600 TABLET, EXTENDED RELEASE ORAL at 08:59

## 2018-01-01 RX ADMIN — INSULIN ASPART 6 UNITS: 100 INJECTION, SOLUTION INTRAVENOUS; SUBCUTANEOUS at 11:21

## 2018-01-01 RX ADMIN — ASPIRIN 81 MG: 81 TABLET, FILM COATED ORAL at 08:50

## 2018-01-01 RX ADMIN — MIDODRINE HYDROCHLORIDE 5 MG: 5 TABLET ORAL at 06:51

## 2018-01-01 RX ADMIN — WARFARIN SODIUM 3 MG: 3 TABLET ORAL at 17:24

## 2018-01-01 RX ADMIN — MUPIROCIN 10 APPLICATION: 20 OINTMENT TOPICAL at 21:06

## 2018-01-01 RX ADMIN — NYSTATIN 500000 UNITS: 100000 SUSPENSION ORAL at 21:32

## 2018-01-01 RX ADMIN — BUDESONIDE AND FORMOTEROL FUMARATE DIHYDRATE 2 PUFF: 160; 4.5 AEROSOL RESPIRATORY (INHALATION) at 09:57

## 2018-01-01 RX ADMIN — INSULIN DETEMIR 18 UNITS: 100 INJECTION, SOLUTION SUBCUTANEOUS at 21:16

## 2018-01-01 RX ADMIN — BUDESONIDE AND FORMOTEROL FUMARATE DIHYDRATE 2 PUFF: 160; 4.5 AEROSOL RESPIRATORY (INHALATION) at 08:08

## 2018-01-01 RX ADMIN — SUCRALFATE 1 G: 1 SUSPENSION ORAL at 17:39

## 2018-01-01 RX ADMIN — LIDOCAINE HYDROCHLORIDE 20 ML: 10 INJECTION, SOLUTION INFILTRATION; PERINEURAL at 15:17

## 2018-01-01 RX ADMIN — MIDODRINE HYDROCHLORIDE 5 MG: 5 TABLET ORAL at 08:07

## 2018-01-01 RX ADMIN — SUCRALFATE 1 G: 1 SUSPENSION ORAL at 06:30

## 2018-01-01 RX ADMIN — SUCRALFATE 1 G: 1 SUSPENSION ORAL at 18:06

## 2018-01-01 RX ADMIN — INSULIN DETEMIR 50 UNITS: 100 INJECTION, SOLUTION SUBCUTANEOUS at 21:26

## 2018-01-01 RX ADMIN — CEFDINIR 300 MG: 300 CAPSULE ORAL at 21:37

## 2018-01-01 RX ADMIN — MUPIROCIN 10 APPLICATION: 20 OINTMENT TOPICAL at 09:26

## 2018-01-01 RX ADMIN — POLYETHYLENE GLYCOL 3350 17 G: 17 POWDER, FOR SOLUTION ORAL at 09:05

## 2018-01-01 RX ADMIN — INSULIN ASPART 2 UNITS: 100 INJECTION, SOLUTION INTRAVENOUS; SUBCUTANEOUS at 21:23

## 2018-01-01 RX ADMIN — HYDROCORTISONE 1 APPLICATION: 1 CREAM TOPICAL at 09:03

## 2018-01-01 RX ADMIN — ZOLPIDEM TARTRATE 10 MG: 5 TABLET ORAL at 23:54

## 2018-01-01 RX ADMIN — PANTOPRAZOLE SODIUM 40 MG: 40 TABLET, DELAYED RELEASE ORAL at 09:38

## 2018-01-01 RX ADMIN — HEPARIN SODIUM 5000 UNITS: 5000 INJECTION INTRAVENOUS; SUBCUTANEOUS at 05:43

## 2018-01-01 RX ADMIN — GUAIFENESIN 1200 MG: 600 TABLET, EXTENDED RELEASE ORAL at 21:49

## 2018-01-01 RX ADMIN — OXYCODONE HYDROCHLORIDE 10 MG: 5 TABLET ORAL at 12:47

## 2018-01-01 RX ADMIN — Medication 10 ML: at 23:27

## 2018-01-01 RX ADMIN — SUFENTANIL CITRATE 25 MCG: 50 INJECTION, SOLUTION EPIDURAL; INTRAVENOUS at 09:38

## 2018-01-01 RX ADMIN — MUPIROCIN 10 APPLICATION: 20 OINTMENT TOPICAL at 09:42

## 2018-01-01 RX ADMIN — MUPIROCIN 1 APPLICATION: 20 OINTMENT TOPICAL at 21:23

## 2018-01-01 RX ADMIN — INSULIN ASPART 2 UNITS: 100 INJECTION, SOLUTION INTRAVENOUS; SUBCUTANEOUS at 16:26

## 2018-01-01 RX ADMIN — INSULIN ASPART 8 UNITS: 100 INJECTION, SOLUTION INTRAVENOUS; SUBCUTANEOUS at 11:38

## 2018-01-01 RX ADMIN — INSULIN ASPART 3 UNITS: 100 INJECTION, SOLUTION INTRAVENOUS; SUBCUTANEOUS at 09:00

## 2018-01-01 RX ADMIN — SODIUM CHLORIDE 125 MG: 9 INJECTION, SOLUTION INTRAVENOUS at 16:32

## 2018-01-01 RX ADMIN — BISACODYL 10 MG: 5 TABLET ORAL at 09:43

## 2018-01-01 RX ADMIN — IPRATROPIUM BROMIDE AND ALBUTEROL SULFATE 3 ML: 2.5; .5 SOLUTION RESPIRATORY (INHALATION) at 13:15

## 2018-01-01 RX ADMIN — DEXTROSE MONOHYDRATE 20 ML/HR: 100 INJECTION, SOLUTION INTRAVENOUS at 21:32

## 2018-01-01 RX ADMIN — WARFARIN SODIUM 6 MG: 6 TABLET ORAL at 17:56

## 2018-01-01 RX ADMIN — MIDODRINE HYDROCHLORIDE 5 MG: 5 TABLET ORAL at 09:19

## 2018-01-01 RX ADMIN — POLYETHYLENE GLYCOL 3350 17 G: 17 POWDER, FOR SOLUTION ORAL at 08:01

## 2018-01-01 RX ADMIN — ACETAMINOPHEN 650 MG: 325 TABLET, FILM COATED ORAL at 14:03

## 2018-01-01 RX ADMIN — BUDESONIDE AND FORMOTEROL FUMARATE DIHYDRATE 2 PUFF: 160; 4.5 AEROSOL RESPIRATORY (INHALATION) at 06:50

## 2018-01-01 RX ADMIN — GUAIFENESIN 1200 MG: 600 TABLET, EXTENDED RELEASE ORAL at 09:48

## 2018-01-01 RX ADMIN — LEVOTHYROXINE SODIUM 50 MCG: 50 TABLET ORAL at 05:30

## 2018-01-01 RX ADMIN — FLUTICASONE PROPIONATE 2 SPRAY: 50 SPRAY, METERED NASAL at 08:07

## 2018-01-01 RX ADMIN — TORSEMIDE 100 MG: 100 TABLET ORAL at 08:50

## 2018-01-01 RX ADMIN — INSULIN DETEMIR 18 UNITS: 100 INJECTION, SOLUTION SUBCUTANEOUS at 20:41

## 2018-01-01 RX ADMIN — INSULIN ASPART 6 UNITS: 100 INJECTION, SOLUTION INTRAVENOUS; SUBCUTANEOUS at 06:25

## 2018-01-01 RX ADMIN — OXYCODONE HYDROCHLORIDE 10 MG: 5 TABLET ORAL at 22:08

## 2018-01-01 RX ADMIN — POTASSIUM CHLORIDE 10 MEQ: 7.46 INJECTION, SOLUTION INTRAVENOUS at 11:58

## 2018-01-01 RX ADMIN — POTASSIUM CHLORIDE 20 MEQ: 400 INJECTION, SOLUTION INTRAVENOUS at 16:54

## 2018-01-01 RX ADMIN — MIDODRINE HYDROCHLORIDE 5 MG: 5 TABLET ORAL at 12:08

## 2018-01-01 RX ADMIN — SUFENTANIL CITRATE 25 MCG: 50 INJECTION, SOLUTION EPIDURAL; INTRAVENOUS at 10:13

## 2018-01-01 RX ADMIN — ZOLPIDEM TARTRATE 10 MG: 5 TABLET ORAL at 00:36

## 2018-01-01 RX ADMIN — BUDESONIDE AND FORMOTEROL FUMARATE DIHYDRATE 2 PUFF: 160; 4.5 AEROSOL RESPIRATORY (INHALATION) at 20:40

## 2018-01-01 RX ADMIN — INSULIN ASPART 2 UNITS: 100 INJECTION, SOLUTION INTRAVENOUS; SUBCUTANEOUS at 08:21

## 2018-01-01 RX ADMIN — MUPIROCIN 1 APPLICATION: 20 OINTMENT TOPICAL at 06:11

## 2018-01-01 RX ADMIN — Medication 10 ML: at 09:03

## 2018-01-01 RX ADMIN — IPRATROPIUM BROMIDE AND ALBUTEROL SULFATE 3 ML: .5; 3 SOLUTION RESPIRATORY (INHALATION) at 12:01

## 2018-01-01 RX ADMIN — INSULIN ASPART 5 UNITS: 100 INJECTION, SOLUTION INTRAVENOUS; SUBCUTANEOUS at 11:49

## 2018-01-01 RX ADMIN — INSULIN ASPART 4 UNITS: 100 INJECTION, SOLUTION INTRAVENOUS; SUBCUTANEOUS at 12:34

## 2018-01-01 RX ADMIN — MORPHINE SULFATE 2 MG: 2 INJECTION, SOLUTION INTRAMUSCULAR; INTRAVENOUS at 21:07

## 2018-01-01 RX ADMIN — TERAZOSIN HYDROCHLORIDE 2 MG: 2 CAPSULE ORAL at 21:39

## 2018-01-01 RX ADMIN — NYSTATIN 500000 UNITS: 100000 SUSPENSION ORAL at 12:05

## 2018-01-01 RX ADMIN — INSULIN ASPART 8 UNITS: 100 INJECTION, SOLUTION INTRAVENOUS; SUBCUTANEOUS at 11:58

## 2018-01-01 RX ADMIN — OXYCODONE HYDROCHLORIDE 10 MG: 5 TABLET ORAL at 20:23

## 2018-01-01 RX ADMIN — POTASSIUM CHLORIDE 40 MEQ: 750 CAPSULE, EXTENDED RELEASE ORAL at 10:12

## 2018-01-01 RX ADMIN — HEPARIN SODIUM 3000 UNITS: 1000 INJECTION INTRAVENOUS; SUBCUTANEOUS at 11:10

## 2018-01-01 RX ADMIN — MIDODRINE HYDROCHLORIDE 10 MG: 5 TABLET ORAL at 20:12

## 2018-01-01 RX ADMIN — GUAIFENESIN 1200 MG: 600 TABLET, EXTENDED RELEASE ORAL at 20:35

## 2018-01-01 RX ADMIN — POTASSIUM CHLORIDE 10 MEQ: 7.46 INJECTION, SOLUTION INTRAVENOUS at 09:43

## 2018-01-01 RX ADMIN — MUPIROCIN 10 APPLICATION: 20 OINTMENT TOPICAL at 08:32

## 2018-01-24 NOTE — PROGRESS NOTES
PATIENTINFORMATION    Date of Office Visit: 2018  Encounter Provider: Oriana Steinberg MD  Place of Service: [facility] CARDIOLOGY  Patient Name: Bj Duarte  : 1955    Subjective:     Encounter Date:2018      Patient ID: Bj Duarte is a 62 y.o. male.      History of Present Illness    The patient was hospitalized at [facility].  He had a foot wound which ended up having some osteomyelitis.  His biggest complaint though was that he was retaining fluid.  He had weeping of his lower extremities.  He reports that when he was admitted he weighed 282 pounds and seven days later he weighed 295 pounds.  He found out that instead of giving him diltiazem at 120 mg per day, he was being given 120 mg four times a day.  When his dose was decreased to just 240 mg per day he rapidly started losing fluid weight.  He was discharged on diltiazem but he ended up discontinuing this himself and his weight in my office today is 269 pounds.  He has a history of diabetes.  He has no known coronary disease.  He does have chronic kidney disease and asthma.  He has obstructive sleep apnea but cannot use CPAP.  He also reports a weight loss of 70 pounds since his sleep study.  He does not have any symptoms of sleep apnea at this time.  His echocardiogram at [facility] revealed normal left ventricular systolic function with an ejection fraction of 60-65%.  Diastolic dysfunction was not commented on due to atrial fibrillation.  He had mild-to-moderate left atrial enlargement.  There was mild right atrial enlargement.  He had moderate aortic stenosis.  He had mild-to-moderate tricuspid regurgitation with a right ventricular systolic pressure of 73 mmHg.      I saw him for the first time in 2017.  At that time he was still complaining of shortness of breath with exertion, and while his edema had improved it was still present.  I set him up for a repeat  echocardiogram which revealed normal LV systolic function, grade III diastolic dysfunction, and severe biatrial enlargement.  The right ventricle was sysyhh-eq-zzvwyejujc dilated.  There was moderate aortic valve stenosis, mild-to-moderate tricuspid regurgitation with severe pulmonary hypertension and a right ventricular systolic pressure of 77 mmHg.  He underwent a PET stress study which did not show any evidence of ischemia or old infarction.  He came in on 12/12/2017 and had a right heart catheterization.  He had elevated biventricular filling pressures.  His right ventricular pressure was 71/15.  Pulmonary pressure 71/21.  Wedge pressure 23.  Adenosine was given and there was no change in the PA pressure but the PVR fell 35% due to increased cardiac output.  I was a little reluctant to adjust his diuretics because of his chronic kidney disease.  She went to see Alexandria Benitez but she did not make any adjustments to his diuretic.      He comes in today for followup.  He says he just does not feel well and has not felt well since before his admission to [facility].  He has some palpitations, which are worse with exertion.  He feels short of breath with exertion.  He has some lower extremity edema.  He had a blister come up on his leg which burst but he says there is no weeping though.  He is going to have his wound care doctor on Monday.  He denies any lightheadedness or chest pain.  He says he if fatigued and he just does not have any energy.        Review of Systems   Constitution: Positive for malaise/fatigue. Negative for fever, weight gain and weight loss.   HENT: Negative for ear pain, hearing loss, nosebleeds and sore throat.    Eyes: Negative for double vision, pain, vision loss in left eye and vision loss in right eye.   Cardiovascular:        See history of present illness.   Respiratory: Positive for shortness of breath and wheezing. Negative for cough, sleep disturbances due to breathing and snoring.   "  Endocrine: Positive for cold intolerance. Negative for heat intolerance and polyuria.   Skin: Negative for itching, poor wound healing and rash.   Musculoskeletal: Positive for joint swelling and myalgias. Negative for joint pain.   Gastrointestinal: Negative for abdominal pain, diarrhea, hematochezia, nausea and vomiting.   Genitourinary: Negative for hematuria and hesitancy.   Neurological: Negative for numbness, paresthesias and seizures.   Psychiatric/Behavioral: Negative for depression. The patient is not nervous/anxious.            ECG 12 Lead  Date/Time: 1/24/2018 2:56 PM  Performed by: DARWIN ENRIQUEZ  Authorized by: DARWIN ENRIQUEZ   Rhythm: atrial fibrillation  BPM: 77  Other findings: PRWP  Clinical impression: normal ECG               Objective:     /70 (BP Location: Right arm, Patient Position: Sitting, Cuff Size: Adult)  Pulse 70  Resp 16  Ht 185.4 cm (73\")  Wt 117 kg (257 lb)  BMI 33.91 kg/m2 Body mass index is 33.91 kg/(m^2).     Physical Exam   Constitutional: He appears well-developed.   HENT:   Head: Normocephalic and atraumatic.   Eyes: Conjunctivae and lids are normal. Pupils are equal, round, and reactive to light. Lids are everted and swept, no foreign bodies found.   Neck: Normal range of motion. JVD present. Carotid bruit is not present. No tracheal deviation present. No thyroid mass present.   Cardiovascular: Normal rate, regular rhythm and normal heart sounds.    Pulses:       Dorsalis pedis pulses are 2+ on the right side, and 2+ on the left side.   Pulmonary/Chest: Effort normal and breath sounds normal.   Abdominal: Normal appearance and bowel sounds are normal.   Musculoskeletal: Normal range of motion.   Neurological: He is alert. He has normal strength.   Skin: Skin is warm, dry and intact.   Psychiatric: He has a normal mood and affect. His behavior is normal.   Vitals reviewed.          Assessment/Plan:       1. Acute on Chronic diastolic heart failure.  This has " improved significantly with coming off calcium channel blockers.  He is currently on torsemide 100 mg daily and metolazone 2.5 mg twice a week.  He is also on Aldactone 25 mg twice a day.  His right heart catheterization would indicate that he is still volume overloaded.  I am going to increase his metolazone to 2.5 mg every other day.  He will follow up with Melinda in one week with a basic metabolic panel prior.    Heart Failure  Assessment  • NYHA class III-B - There is significant limitation of physical activity. The patient is comfortable at rest, but minimal activity causes fatigue, palpitations or shortness of breath.  • Beta blocker prescribed  • Diuretics prescribed  • The most recent ejection fraction is 55%  • The left ventricle was last assessed on 12/6/2017    Plan  • The patient has received heart failure education on the following topics: dietary sodium restriction and physical activity  • The heart failure care plan was discussed with the patient today including: up-titrating HF medications    Subjective/Objective  • The patient reports dyspnea  • Physical exam findings positive for peripheral edema and elevated JVP.       2. Shortness of breath with exertion.  He needs to diurese more I think.  He also has severe pulmonary hypertension.  He did not have any ischemia on his nuclear stress test.       3. Permanent atrial fibrillation.  He is anticoagulated with Eliquis and rate controlled with Coreg.    Atrial Fibrillation and Atrial Flutter  Assessment  • The patient has permanent atrial fibrillation  • The patient's CHADS2-VASc score is 3  • A [id number] score of 2 or more is considered a high risk for a thromboembolic event    Plan  • Continue in atrial fibrillation with rate control  • Continue apixaban for antithrombotic therapy, bleeding issues discussed  • Continue beta blocker for rate control    3. Chronic kidney disease.   he has seen Dr. Alexandria Benitez    4. Obstructive sleep apnea.  He could  not tolerate the CPAP.  He has lost a significant amount of weight since his sleep study and no longer complains of symptoms consistent with sleep apnea.   Dr. Charlton wants him to have a repeat sleep study and I agree.    5. Diabetes.     6. Foot wound.   he completed IV antibiotics.  He is still following with wound care.      7.  Moderate aortic stenosis.      8.  Mild to moderate tricuspid regurgitation with severe pulmonary hypertension.       He has a complicated minnie with multiple reasons for shortness of breath and heart failure symptoms.  He is already on a lot of diuretic that I'm going to try to bump it up and see if we can further diurese him.  Consider a repeat right heart catheterization.  I'm going to refer him to see Dr. Johnson for his pulmonary hypertension which I don't believe is all related to just volume overload.    He is going to follow-up with Melinda in one week with a basic metabolic panel.  I'm also going to check a magnesium level sounds like he's taking quite a bit of magnesium supplements.     Orders Placed This Encounter   Procedures   • Basic Metabolic Panel     Standing Status:   Future     Standing Expiration Date:   1/24/2019   • Magnesium     Standing Status:   Future     Standing Expiration Date:   1/24/2019   • Ambulatory Referral to Pulmonology     Referral Priority:   Routine     Referral Type:   Consultation     Referral Reason:   Second Opinion     Referred to Provider:   Mike Johnson MD     Requested Specialty:   Pulmonary Disease     Number of Visits Requested:   1   • ECG 12 Lead     This order was created via procedure documentation      Bj Duarte   [place] Medication Instructions SARATH:    Printed on:01/24/18 3324   Medication Information                      budesonide-formoterol (SYMBICORT) 160-4.5 MCG/ACT inhaler  Inhale 2 puffs 2 (Two) Times a Day.             carvedilol (COREG) 25 MG tablet  Take 25 mg by mouth 2 (Two) Times a Day With Meals.              ELIQUIS 5 MG tablet tablet  Take 5 mg by mouth Every 12 (Twelve) Hours.             eszopiclone (LUNESTA) 3 MG tablet  Take 3 mg by mouth Every Night. Take immediately before bedtime             insulin aspart (novoLOG) 100 UNIT/ML injection  Inject 10 Units under the skin 3 (Three) Times a Day Before Meals.             insulin detemir (LEVEMIR) 100 UNIT/ML injection  Inject 80 Units under the skin Daily.             metOLazone (ZAROXOLYN) 2.5 MG tablet  1 tablet Every Other Day. 2.5mg po two or three times weekly prn             montelukast (SINGULAIR) 10 MG tablet  Take 10 mg by mouth Every Night.             potassium chloride (K-TAB) 20 MEQ tablet controlled-release ER tablet  Take 1 tablet by mouth Take As Directed. 2 pills QAM and 1 in the afternoon             spironolactone (ALDACTONE) 50 MG tablet  Take 25 mg by mouth 2 (Two) Times a Day.             tiZANidine (ZANAFLEX) 4 MG tablet  Take 4 mg by mouth Daily.             torsemide (DEMADEX) 100 MG tablet  100mg QAM and 1/2 pill every afternoon                        Oriana Steinberg MD  01/24/18  3:10 PM

## 2018-02-01 NOTE — PROGRESS NOTES
Date of Office Visit: 2018  Encounter Provider: IRENE Burnett  Place of Service: [facility] CARDIOLOGY  Patient Name: Bj Duarte  :1955    Chief Complaint   Patient presents with   • Congestive Heart Failure   :     HPI     Bj Duarte is a 62 y.o. male comes in today for 1 week hospital follow-up for diastolic heart failure.  He is a patient of Dr. Steinberg.  I'm seeing him for the first time today and have reviewed his records.  He has a past medical history significant for atrial fibrillation, chronic kidney disease, diabetes, hyperlipidemia, hypertension, pulmonary HTN, and PAD.    He was recently admitted to [facility].  He was admitted at a weight of 282 pounds 7 days later he weighed 295 pounds.  He was being given diltiazem 120 mg 4 times a day and instead of 120 mg twice daily.  Once the dose was adjusted to 240 mg per day he lost weight rapidly. During his hospitalization he had an echocardiogram performed which revealed a normal left ventricular systolic function with EF of 60-65%, mild to moderate left atrial enlargement, moderate aortic stenosis, mild to moderate tricuspid regurgitation, and RVSP of 73 mmHg. His diastolic dysfunction was not commented on due to his atrial fibrillation.     In 2017 he underwent a PET stress study which did not show any evidence of ischemia or infarction.  On 2017 had a right heart catheterization which showed elevated biventricular filling pressures. His right ventricular pressure was 71/15, pulmonary pressure 71/21, wedge pressure 23.  Adenosine was given and he had no change in the PA pressure but the PVR fell 35% due to increased cardiac output.  He was referred to Dr. Jerald Johnson for pulmonary HTN management.    Overall today he says that he feels significantly improved since discharge from the hospital.  He feels fatigued and is only sleeping 3 hours per night.  He reports that his  "swelling and shortness of air are back to baseline from preadmission to the hospital. His daily weights have been stable at home. He is having his legs wrapped by Dr. Chaka Yoon with podiatry once every 2 weeks for the edema.  Denies palpitations or tachycardia, dizziness, chest pain, fatigue, orthopnea or PND.     Past Medical History:   Diagnosis Date   • Anemia    • Anticoagulation goal of INR 2 to 3    • Arthritis    • Asthma    • BPH (benign prostatic hyperplasia)    • Chronic atrial fibrillation    • Chronic insomnia    • Chronic kidney disease    • CKD (chronic kidney disease)    • Diabetes    • Diabetic peripheral neuropathy    • Edema of both legs    • Hyperlipidemia    • Hypertension    • Lumbar disc disease    • PAD (peripheral artery disease)        Past Surgical History:   Procedure Laterality Date   • BACK SURGERY     • CARDIAC CATHETERIZATION N/A 12/12/2017    Procedure: Right Heart Cath with adenosine challenge if indicated;  Surgeon: Delvin Roberts MD;  Location: [facility] CATH INVASIVE LOC[facility];  Service:    • FOOT SURGERY     • TONSILLECTOMY             Review of Systems   Constitution: Positive for malaise/fatigue.   Cardiovascular: Positive for dyspnea on exertion and leg swelling.     All other systems reviewed and are negative    No Known Allergies    All aspects of family and social history reviewed.          Objective:     Vitals:    02/01/18 1304   BP: 108/62   BP Location: Left arm   Pulse: 71   Weight: 117 kg (258 lb)   Height: 185.4 cm (73\")     Body mass index is 34.04 kg/(m^2).    PHYSICAL EXAM:  Physical Exam   Constitutional: He is oriented to person, place, and time. He appears well-developed and well-nourished.   HENT:   Head: Normocephalic and atraumatic.   Neck: Neck supple. No JVD present. No thyromegaly present.   Cardiovascular: Normal rate, regular rhythm, normal heart sounds and intact distal pulses.    Pulses:       Carotid pulses are 2+ on the right side, and 2+ on the left " side.       Radial pulses are 2+ on the right side, and 2+ on the left side.        Dorsalis pedis pulses are 2+ on the right side, and 2+ on the left side.   Pulmonary/Chest: Effort normal and breath sounds normal. No accessory muscle usage. No respiratory distress. He has no rales.   Abdominal: Soft. Normal appearance and bowel sounds are normal. There is no hepatosplenomegaly. There is no tenderness.   Musculoskeletal: Normal range of motion. He exhibits edema.   +2 edema in bilateral lower extremities   Neurological: He is alert and oriented to person, place, and time.   Skin: Skin is warm, dry and intact. No rash noted. He is not diaphoretic.   Psychiatric: He has a normal mood and affect. His speech is normal and behavior is normal. Judgment and thought content normal. Cognition and memory are normal.         ECG 12 Lead  Date/Time: 2/1/2018 2:11 PM  Performed by: MARGO PAGAN  Authorized by: MARGO PAGAN   Comparison: compared with previous ECG from 1/24/2018  Rhythm: atrial fibrillation  Ectopy: PVCs and bigeminy  Rate: normal  BPM: 71  Conduction: LAFB  ST Segments: ST segments normal  T Waves: T waves normal  Clinical impression: abnormal ECG            Assessment:       Diagnosis Plan   1. Right heart failure  Basic Metabolic Panel   2. CKD (chronic kidney disease) stage 3, GFR 30-59 ml/min  Basic Metabolic Panel   3. Permanent atrial fibrillation     4. Pulmonary HTN          Orders Placed This Encounter   Procedures   • Basic Metabolic Panel     Standing Status:   Future     Standing Expiration Date:   2/1/2019   • ECG 12 Lead     This order was created via procedure documentation       Current Outpatient Prescriptions   Medication Sig Dispense Refill   • carvedilol (COREG) 25 MG tablet Take 25 mg by mouth 2 (Two) Times a Day With Meals.     • ELIQUIS 5 MG tablet tablet Take 5 mg by mouth Every 12 (Twelve) Hours.     • eszopiclone (LUNESTA) 3 MG tablet Take 3 mg by mouth Every Night. Take  immediately before bedtime     • insulin aspart (novoLOG) 100 UNIT/ML injection Inject 10 Units under the skin 3 (Three) Times a Day Before Meals.     • insulin detemir (LEVEMIR) 100 UNIT/ML injection Inject 80 Units under the skin Daily.     • metOLazone (ZAROXOLYN) 2.5 MG tablet Take 1 tablet by mouth 1 (One) Time Per Week. 2.5mg po two or three times weekly prn 30 tablet 11   • montelukast (SINGULAIR) 10 MG tablet Take 10 mg by mouth Every Night.     • potassium chloride (K-TAB) 20 MEQ tablet controlled-release ER tablet Take 1 tablet by mouth Take As Directed. 2 pills QAM and 1 in the afternoon 90 tablet 11   • spironolactone (ALDACTONE) 50 MG tablet Take 25 mg by mouth 2 (Two) Times a Day.     • tiZANidine (ZANAFLEX) 4 MG tablet Take 4 mg by mouth Daily.     • budesonide-formoterol (SYMBICORT) 160-4.5 MCG/ACT inhaler Inhale 2 puffs Daily.     • torsemide (DEMADEX) 100 MG tablet 100mg QAM and 1/2 pill every afternoon (Patient taking differently: Take 100 mg by mouth Daily. 100mg QAM and 1/2 pill every afternoon  -  Currently 100mg in morning) 60 tablet 11     No current facility-administered medications for this visit.             Plan:       1.  Diastolic heart failure: Overall he appears to be doing better.  His daily weights are stable and lower extremity swelling is improved. On 12/12/17 his creatinine was 1.33 and BUN 38.  Today his creatinine is 1.73 and BUN  38.  I will decrease his metolazone from 2.5 mg every other day to once weekly and repeat BMP in 2 weeks.    Heart Failure  Assessment  • NYHA class III-B - There is significant limitation of physical activity. The patient is comfortable at rest, but minimal activity causes fatigue, palpitations or shortness of breath.  • Beta blocker prescribed  • Diuretics prescribed  • The most recent ejection fraction is 60%  • The left ventricle was last assessed on 1/24/2017    Plan  • The patient has received heart failure education on the following topics:  dietary sodium restriction, medication instructions, symptom management, physical activity and weight monitoring  • The heart failure care plan was discussed with the patient today including: up-titrating HF medications    Subjective/Objective    • Physical exam findings negative for rales and elevated JVP.      2. CKD: He last saw his nephrologist and 4/2017.  Will reassess a BMP in 2 weeks.     3. Atrial Fibrillation:  Atrial Fibrillation and Atrial Flutter  Assessment  • The patient has permanent atrial fibrillation  • The patient's CHADS2-VASc score is 3  • A [id number] score of 2 or more is considered a high risk for a thromboembolic event  • Apixaban prescribed    Plan  • Continue in atrial fibrillation with rate control  • Continue apixaban for antithrombotic therapy, bleeding issues discussed  • Continue beta blocker for rate control    4.. Pulmonary HTN: He has appointment scheduled with Dr. Jerald Johnson on 3/12/18.      Follow up in office in 3 weeks with Dr. Steinberg.     As always, it has been a pleasure to participate in this patient's care.      Sincerely,      IRENE Burnett

## 2018-02-20 NOTE — TELEPHONE ENCOUNTER
Creatinine elevated on metolazone, torsemide and spironolactone. Also taking Eliquis. I would advise stopping metolazone this week. Decrease spirinolactone to once daily instead of BID. Pt states he is 6-7lbs up with fluid in his legs. Advised to wrap legs. He is also drinking 80 oz of fluid per day. Decrease to 60. If continues to gain weight, will call this week. Sees nephrology in April.

## 2018-02-27 NOTE — PROGRESS NOTES
PATIENTINFORMATION    Date of Office Visit: 2018  Encounter Provider: Oriana Steinberg MD  Place of Service: [facility] CARDIOLOGY  Patient Name: Bj Duarte  : 1955    Subjective:     Encounter Date:2018      Patient ID: Bj Duarte is a 62 y.o. male.      History of Present Illness    The patient was hospitalized at [facility].  He had a foot wound which ended up having some osteomyelitis.  His biggest complaint though was that he was retaining fluid.  He had weeping of his lower extremities.  He reports that when he was admitted he weighed 282 pounds and seven days later he weighed 295 pounds.  He found out that instead of giving him diltiazem at 120 mg per day, he was being given 120 mg four times a day.  When his dose was decreased to just 240 mg per day he rapidly started losing fluid weight.  He was discharged on diltiazem but he ended up discontinuing this himself and his weight in my office today is 269 pounds.  He has a history of diabetes.  He has no known coronary disease.  He does have chronic kidney disease and asthma.  He has obstructive sleep apnea but cannot use CPAP.  He also reports a weight loss of 70 pounds since his sleep study.  He does not have any symptoms of sleep apnea at this time.  His echocardiogram at [facility] revealed normal left ventricular systolic function with an ejection fraction of 60-65%.  Diastolic dysfunction was not commented on due to atrial fibrillation.  He had mild-to-moderate left atrial enlargement.  There was mild right atrial enlargement.  He had moderate aortic stenosis.  He had mild-to-moderate tricuspid regurgitation with a right ventricular systolic pressure of 73 mmHg.       I saw him for the first time in 2017.  At that time he was still complaining of shortness of breath with exertion, and while his edema had improved it was still present.  I set him up for a repeat  echocardiogram which revealed normal LV systolic function, grade III diastolic dysfunction, and severe biatrial enlargement.  The right ventricle was rdjuvi-dw-kdgffbfakx dilated.  There was moderate aortic valve stenosis, mild-to-moderate tricuspid regurgitation with severe pulmonary hypertension and a right ventricular systolic pressure of 77 mmHg.  He underwent a PET stress study which did not show any evidence of ischemia or old infarction.  He came in on 12/12/2017 and had a right heart catheterization.  He had elevated biventricular filling pressures.  His right ventricular pressure was 71/15.  Pulmonary pressure 71/21.  Wedge pressure 23.  Adenosine was given and there was no change in the PA pressure but the PVR fell 35% due to increased cardiac output.  I was a little reluctant to adjust his diuretics because of his chronic kidney disease.  She went to see Alexandria Emmanuel but she did not make any adjustments to his diuretic.         Review of Systems   Constitution: Negative for fever, malaise/fatigue, weight gain and weight loss.   HENT: Negative for ear pain, hearing loss, nosebleeds and sore throat.    Eyes: Negative for double vision, pain, vision loss in left eye and vision loss in right eye.   Cardiovascular: Positive for leg swelling.        See history of present illness.   Respiratory: Negative for cough, shortness of breath, sleep disturbances due to breathing, snoring and wheezing.    Endocrine: Negative for cold intolerance, heat intolerance and polyuria.   Skin: Negative for itching, poor wound healing and rash.   Musculoskeletal: Negative for joint pain, joint swelling and myalgias.   Gastrointestinal: Negative for abdominal pain, diarrhea, hematochezia, nausea and vomiting.   Genitourinary: Negative for hematuria and hesitancy.   Neurological: Negative for numbness, paresthesias and seizures.   Psychiatric/Behavioral: Negative for depression. The patient is not nervous/anxious.            ECG 12  "Lead  Date/Time: 2/27/2018 12:43 PM  Performed by: DARWIN ENRQIUEZ  Authorized by: DARWIN ENRIQUEZ   Comparison: compared with previous ECG from 2/1/2018  Similar to previous ECG  Rhythm: atrial fibrillation  Ectopy: unifocal PVCs  BPM: 63  Conduction: LAFB  ST Segments: ST segments normal  T Waves: T waves normal  Clinical impression: abnormal ECG               Objective:     /78 (BP Location: Right arm, Patient Position: Sitting, Cuff Size: Adult)  Pulse 63  Resp 16  Ht 185.4 cm (73\")  Wt 120 kg (264 lb 6.4 oz)  BMI 34.88 kg/m2 Body mass index is 34.88 kg/(m^2).     Physical Exam   Constitutional: He appears well-developed.   HENT:   Head: Normocephalic and atraumatic.   Eyes: Conjunctivae and lids are normal. Pupils are equal, round, and reactive to light. Lids are everted and swept, no foreign bodies found.   Neck: Normal range of motion. No JVD present. Carotid bruit is not present. No tracheal deviation present. No thyroid mass present.   Cardiovascular: Normal rate and normal heart sounds.  An irregularly irregular rhythm present.   Pulses:       Dorsalis pedis pulses are 2+ on the right side, and 2+ on the left side.   Pulmonary/Chest: Effort normal and breath sounds normal.   Abdominal: Normal appearance and bowel sounds are normal.   Musculoskeletal: Normal range of motion.   Neurological: He is alert. He has normal strength.   Skin: Skin is warm, dry and intact.   Psychiatric: He has a normal mood and affect. His behavior is normal.   Vitals reviewed.      Lab Review:  Reviewed BMP recently and CMP from [facility] 10/17      Assessment/Plan:         1. Heart Failure  Assessment  • NYHA class III-B - There is significant limitation of physical activity. The patient is comfortable at rest, but minimal activity causes fatigue, palpitations or shortness of breath.  • Beta blocker prescribed  • Diuretics prescribed  • The most recent ejection fraction is 60%  • The left ventricle was last assessed " on 1/24/2017    Plan  • The patient has received heart failure education on the following topics: dietary sodium restriction, medication instructions, symptom management, physical activity and weight monitoring  • The heart failure care plan was discussed with the patient today including: up-titrating HF medications    Subjective/Objective    • Physical exam findings negative for elevated JVP.      Orders Placed This Encounter   Procedures   • ECG 12 Lead     This order was created via procedure documentation      Gerald Bj   Dre Medication Instructions SARATH:    Printed on:02/27/18 4548   Medication Information                      budesonide-formoterol (SYMBICORT) 160-4.5 MCG/ACT inhaler  Inhale 2 puffs Daily.             carvedilol (COREG) 25 MG tablet  Take 25 mg by mouth 2 (Two) Times a Day With Meals.             ELIQUIS 5 MG tablet tablet  Take 5 mg by mouth Every 12 (Twelve) Hours.             insulin aspart (novoLOG) 100 UNIT/ML injection  Inject 10 Units under the skin 3 (Three) Times a Day Before Meals.             insulin detemir (LEVEMIR) 100 UNIT/ML injection  Inject 80 Units under the skin Daily.             potassium chloride (K-TAB) 20 MEQ tablet controlled-release ER tablet  Take 1 tablet by mouth Take As Directed. 2 pills QAM and 1 in the afternoon             spironolactone (ALDACTONE) 50 MG tablet  Take 50 mg by mouth Daily.             torsemide (DEMADEX) 100 MG tablet  Take 1 tablet by mouth Daily.             zolpidem (AMBIEN) 10 MG tablet  TK 1 T PO QD HS                        Oriana Steinberg MD  02/27/18  3:19 PM

## 2018-03-27 NOTE — TELEPHONE ENCOUNTER
[phone]    Pt called stating his weight is up 10# in 2 weeks time.  He is c/o edema in LE and abd.  He denies SOA or CP but states his A-fib is worse.    He states he has been seeing Dr. Johnson for vascular HTN and he is not sure where to direct this call.    There is an opening on your schedule for this afternoon.    His BP was 100/75-80.    Can you advise?    [facility] CALIN Roland

## 2018-03-27 NOTE — TELEPHONE ENCOUNTER
I reviewed his labs and most recent echo.  I think he should talk to his kidney doctor about his diuretics.  He is already on a lot of them.

## 2018-03-27 NOTE — TELEPHONE ENCOUNTER
Pt informed of info below and voiced understanding.  He will call his kidney specialist.    [facility] CALIN Roland

## 2018-05-29 NOTE — PROGRESS NOTES
PATIENTINFORMATION    Date of Office Visit: 2018  Encounter Provider: Oriana Steinberg MD  Place of Service: [facility] CARDIOLOGY  Patient Name: Bj Duarte  : 1955    Subjective:     Encounter Date:2018      Patient ID: Bj Duarte is a 62 y.o. male.      History of Present Illness    The patient was hospitalized at [facility].  He had a foot wound which ended up having some osteomyelitis.  His biggest complaint though was that he was retaining fluid.  He had weeping of his lower extremities.  He reports that when he was admitted he weighed 282 pounds and seven days later he weighed 295 pounds.  He found out that instead of giving him diltiazem at 120 mg per day, he was being given 120 mg four times a day.  When his dose was decreased to just 240 mg per day he rapidly started losing fluid weight.  He was discharged on diltiazem but he ended up discontinuing this himself and his weight in my office today is 269 pounds.  He has a history of diabetes.  He has no known coronary disease.  He does have chronic kidney disease and asthma.  He has obstructive sleep apnea but cannot use CPAP.  He also reports a weight loss of 70 pounds since his sleep study.  He does not have any symptoms of sleep apnea at this time.  His echocardiogram at [facility] revealed normal left ventricular systolic function with an ejection fraction of 60-65%.  Diastolic dysfunction was not commented on due to atrial fibrillation.  He had mild-to-moderate left atrial enlargement.  There was mild right atrial enlargement.  He had moderate aortic stenosis.  He had mild-to-moderate tricuspid regurgitation with a right ventricular systolic pressure of 73 mmHg.       I saw him for the first time in 2017.  At that time he was still complaining of shortness of breath with exertion, and while his edema had improved it was still present.  I set him up for a repeat  echocardiogram which revealed normal LV systolic function, grade III diastolic dysfunction, and severe biatrial enlargement.  The right ventricle was ngbezp-ci-abcwrsustt dilated.  There was moderate aortic valve stenosis, mild-to-moderate tricuspid regurgitation with severe pulmonary hypertension and a right ventricular systolic pressure of 77 mmHg.  He underwent a PET stress study which did not show any evidence of ischemia or old infarction.  He came in on 12/12/2017 and had a right heart catheterization.  He had elevated biventricular filling pressures.  His right ventricular pressure was 71/15.  Pulmonary pressure 71/21.  Wedge pressure 23.  Adenosine was given and there was no change in the PA pressure but the PVR fell 35% due to increased cardiac output.     He comes in today for follow-up.  He says he received his CPAP machine last week but is not comfortable on the mask and is changing it out.  He uses oxygen with exertion.  He saw Dr. Johnson for his pulmonary hypertension but doesn't think anything was changed.  He denies palpitations or chest pain.  He short of breath with exertion.  He has chronic lower extremity edema and is currently getting his legs wrapped.    Review of Systems   Constitution: Negative for fever, malaise/fatigue, weight gain and weight loss.   HENT: Negative for ear pain, hearing loss, nosebleeds and sore throat.    Eyes: Negative for double vision, pain, vision loss in left eye and vision loss in right eye.   Cardiovascular: Positive for leg swelling.        See history of present illness.   Respiratory: Negative for cough, shortness of breath, sleep disturbances due to breathing, snoring and wheezing.    Endocrine: Negative for cold intolerance, heat intolerance and polyuria.   Skin: Negative for itching, poor wound healing and rash.   Musculoskeletal: Negative for joint pain, joint swelling and myalgias.   Gastrointestinal: Negative for abdominal pain, diarrhea, hematochezia,  "nausea and vomiting.   Genitourinary: Negative for hematuria and hesitancy.   Neurological: Negative for numbness, paresthesias and seizures.   Psychiatric/Behavioral: Negative for depression. The patient is not nervous/anxious.            ECG 12 Lead  Date/Time: 5/29/2018 1:08 PM  Performed by: DARWIN ENRIQUEZ  Authorized by: DARWIN ENRIQUEZ   Comparison: compared with previous ECG from 2/27/2018  Similar to previous ECG  Rhythm: atrial fibrillation  BPM: 61  Conduction: conduction normal  ST Segments: ST segments normal  Clinical impression: abnormal ECG               Objective:     /78 (BP Location: Right arm, Patient Position: Sitting, Cuff Size: Adult)   Pulse 61   Resp 16   Ht 185.4 cm (73\")   Wt 121 kg (266 lb 12.8 oz)   BMI 35.20 kg/m²  Body mass index is 35.2 kg/m².     Physical Exam   Constitutional: He appears well-developed.   HENT:   Head: Normocephalic and atraumatic.   Eyes: Conjunctivae and lids are normal. Pupils are equal, round, and reactive to light. Lids are everted and swept, no foreign bodies found.   Neck: Normal range of motion. JVD present. Carotid bruit is not present. No tracheal deviation present. No thyroid mass present.   Cardiovascular: Normal rate and normal heart sounds.  An irregularly irregular rhythm present.   Pulses:       Dorsalis pedis pulses are 2+ on the right side, and 2+ on the left side.   Pulmonary/Chest: Effort normal and breath sounds normal.   Abdominal: Normal appearance and bowel sounds are normal.   Musculoskeletal: Normal range of motion.   Neurological: He is alert. He has normal strength.   Skin: Skin is warm, dry and intact.   Psychiatric: He has a normal mood and affect. His behavior is normal.   Vitals reviewed.          Assessment/Plan:         1. Heart Failure  Assessment  • NYHA class III-A - There is limitation of physical activity. The patient is comfortable at rest, but ordinary activity causes fatigue, palpitations or shortness of " breath.  • Beta blocker prescribed  • Diuretics prescribed  • The most recent ejection fraction is 55%  • The left ventricle was last assessed on 12/6/2017    Plan  • The heart failure care plan was discussed with the patient today including: continuing the current program and lifestyle modifications    Subjective/Objective  • Physical exam findings positive for peripheral edema and elevated JVP.     2. Atrial Fibrillation and Atrial Flutter  Assessment  • The patient has permanent atrial fibrillation  • This is non-valvular in etiology  • The patient's CHADS2-VASc score is 3  • A [id number] score of 2 or more is considered a high risk for a thromboembolic event    Plan  • Continue in atrial fibrillation with rate control  • Continue apixaban for antithrombotic therapy, bleeding issues discussed  • Continue beta blocker for rate control    3. Chronic kidney disease.  He sees Dr. Alexandria Benitez     4. Obstructive sleep apnea.  I encouraged him to try to be compliant with CPAP     5. Diabetes.      6. Foot wound.   he completed IV antibiotics.  He is still following with wound care.       7.  Moderate aortic stenosis.       8.  Mild to moderate tricuspid regurgitation with severe pulmonary hypertension.        I will see him back in 6 months.    Orders Placed This Encounter   Procedures   • ECG 12 Lead     This order was created via procedure documentation      Bj Duarte Medication Instructions SARATH:    Printed on:05/29/18 4824   Medication Information                      budesonide-formoterol (SYMBICORT) 160-4.5 MCG/ACT inhaler  Inhale 2 puffs Daily.             carvedilol (COREG) 25 MG tablet  Take 25 mg by mouth 2 (Two) Times a Day With Meals.             ELIQUIS 5 MG tablet tablet  Take 5 mg by mouth Every 12 (Twelve) Hours.             insulin aspart (novoLOG) 100 UNIT/ML injection  Inject 10 Units under the skin 3 (Three) Times a Day Before Meals.             insulin detemir (LEVEMIR) 100 UNIT/ML  injection  Inject 80 Units under the skin Daily.             metOLazone (ZAROXOLYN) 5 MG tablet  Take 5 mg by mouth Daily.             potassium chloride (K-TAB) 20 MEQ tablet controlled-release ER tablet  Take 1 tablet by mouth Take As Directed. 2 pills QAM and 1 in the afternoon             torsemide (DEMADEX) 100 MG tablet  Take 1 tablet by mouth Daily.             zolpidem (AMBIEN) 10 MG tablet  TK 1 T PO QD HS                        Oriana Steinberg MD  05/29/18  2:34 PM

## 2018-06-22 PROBLEM — E87.6 HYPOKALEMIA: Status: ACTIVE | Noted: 2018-01-01

## 2018-06-23 ENCOUNTER — INPATIENT HOSPITAL (OUTPATIENT)
Dept: URBAN - METROPOLITAN AREA HOSPITAL 113 | Facility: HOSPITAL | Age: 63
End: 2018-06-23
Payer: COMMERCIAL

## 2018-06-23 DIAGNOSIS — R18.8 OTHER ASCITES: ICD-10-CM

## 2018-06-23 PROCEDURE — 99222 1ST HOSP IP/OBS MODERATE 55: CPT | Performed by: INTERNAL MEDICINE

## 2018-07-03 NOTE — TELEPHONE ENCOUNTER
Pt calls to report that he is down 3 lbs and has not current complaints, I have scheduled his 4 week follow up but Bonnie has no availability in one week. Would you like to add him on your schedule? Please advise

## 2018-07-09 PROBLEM — E87.6 HYPOKALEMIA: Status: RESOLVED | Noted: 2018-01-01 | Resolved: 2018-01-01

## 2018-07-09 PROBLEM — R60.1 GENERALIZED EDEMA: Status: ACTIVE | Noted: 2018-01-01

## 2018-07-09 PROBLEM — E87.5 HYPERKALEMIA: Status: ACTIVE | Noted: 2018-01-01

## 2018-07-09 NOTE — PATIENT INSTRUCTIONS
Stop taking potassium supplements.  Take metalizone 30 minutes prior to torsemide for next 3 days.  Repeat labs this Thursday 7/12/18 in afternoon.  Notify office if swelling not starting to improve by Thursday afternoon.    Return to clinic in 1 week, or return to clinic sooner if needed  (Kathleen Keller, IRENE).

## 2018-07-09 NOTE — PROGRESS NOTES
Date of Office Visit: 2018  Encounter Provider: Courtney Keller, JAZ, APRN  Place of Service: [facility] CARDIOLOGY  Patient Name: Bj Duarte  :1955      Subjective:     Chief Complaint:  Hospital follow-up,  Edema and history heart failure.     History of Present Illness:  Bj Duarte is a 62 y.o. male who is an established patient of Dr. Steinberg.  This is my first time evaluating this patient in our office. Patient has a follow-up appointment scheduled with Dr. Steinberg 18.     Patient was [facility] to [facility] 18-18 with severe hypokalemia (corrected), chronic atrial fibrillation, chronic diastolic congestive heart failure, CKD stage 3, right lower lobe pneumonia (resolved), hypertension, PVD, and cirrhosis with ascites.  Dr. Steinberg (cardiology) was consulted during patient's hospital visit.     Patient presents to office today for follow-up appointment.  Patient reports worsening edema with a 5 lb weight gain since hospital discharge when diuretic was held. Patient reports some SOA on exertion, but denies chest pain, palpitations, or syncope. Patient awaiting his follow-up appointment with his pulmonologist. Patient was discharged on K+ supplements.  His potassium level is slightly high on today's labs, so we will stop the potassium supplements at this time.        Past Medical History:   Diagnosis Date   • Anemia    • Anticoagulation goal of INR 2 to 3    • Arthritis    • Asthma    • BPH (benign prostatic hyperplasia)    • Chronic atrial fibrillation (CMS/HCC)    • Chronic diastolic congestive heart failure (CMS/HCC)    • Chronic insomnia    • Cirrhosis (CMS/HCC)     with ascites   • CKD (chronic kidney disease)     Stage III   • Diabetes (CMS/HCC)    • Diabetic peripheral neuropathy (CMS/HCC)    • Edema of both legs    • Hyperlipidemia    • Hypertension    • Hypokalemia     severe   • Lumbar disc disease    • PAD (peripheral artery disease) (CMS/HCC)     • Peripheral vascular disease (CMS/HCC)    • Right lower lobe pneumonia (CMS/HCC)    • Splenomegaly      Past Surgical History:   Procedure Laterality Date   • BACK SURGERY     • CARDIAC CATHETERIZATION N/A 12/12/2017    Procedure: Right Heart Cath with adenosine challenge if indicated;  Surgeon: Delvin Roberts MD;  Location: [facility] CATH INVASIVE LOCATION;  Service:    • FOOT SURGERY     • TONSILLECTOMY       Outpatient Medications Prior to Visit   Medication Sig Dispense Refill   • budesonide-formoterol (SYMBICORT) 160-4.5 MCG/ACT inhaler Inhale 2 puffs 2 (Two) Times a Day.     • carvedilol (COREG) 12.5 MG tablet Take 1 tablet by mouth 2 (Two) Times a Day With Meals for 30 days. 60 tablet 0   • doxazosin (CARDURA) 2 MG tablet Take 2 mg by mouth Every Night.     • ELIQUIS 5 MG tablet tablet Take 5 mg by mouth Every 12 (Twelve) Hours.     • guaiFENesin (MUCINEX) 600 MG 12 hr tablet Take 1 tablet by mouth 2 (Two) Times a Day for 30 days. 60 tablet 0   • insulin aspart (novoLOG) 100 UNIT/ML injection Inject 5 Units under the skin 3 (Three) Times a Day Before Meals.     • insulin detemir (LEVEMIR) 100 UNIT/ML injection Inject 50 Units under the skin Daily.     • montelukast (SINGULAIR) 10 MG tablet Take 10 mg by mouth Daily.     • pantoprazole (PROTONIX) 40 MG EC tablet Take 1 tablet by mouth Daily for 30 days. 30 tablet 0   • spironolactone (ALDACTONE) 100 MG tablet Take 1 tablet by mouth Daily for 30 days. 30 tablet 0   • torsemide (DEMADEX) 100 MG tablet Take 1 tablet by mouth Daily. 60 tablet 11   • zolpidem (AMBIEN) 10 MG tablet TK 1 T PO QD HS  2   • potassium chloride (MICRO-K) 10 MEQ CR capsule Take 4 capsules by mouth 3 (Three) Times a Day With Meals for 30 days. 360 capsule 0     No facility-administered medications prior to visit.        Allergies as of 07/09/2018   • (No Known Allergies)     Social History     Social History   • Marital status:      Spouse name: N/A   • Number of children: N/A   •  Years of education: N/A     Occupational History   • Not on file.     Social History Main Topics   • Smoking status: Never Smoker   • Smokeless tobacco: Never Used      Comment: 3 to 4 times weekly caffiene   • Alcohol use 4.2 oz/week     7 Standard drinks or equivalent per week      Comment: occasinally   • Drug use: No   • Sexual activity: Defer     Other Topics Concern   • Not on file     Social History Narrative   • No narrative on file     Family History   Problem Relation Age of Onset   • Arthritis Mother    • Depression Mother    • Hyperlipidemia Mother    • Hypertension Mother    • Diabetes Mother    • Arthritis Father    • Depression Father    • Hypertension Father    • Diabetes type II Father      Review of Systems   Constitution: Positive for malaise/fatigue. Negative for chills, fever, night sweats, weight gain and weight loss.   HENT: Negative for ear pain, hearing loss, nosebleeds and sore throat.    Eyes: Negative for blurred vision, double vision, redness, vision loss in left eye, vision loss in right eye and visual disturbance.   Cardiovascular: Positive for leg swelling. Negative for chest pain, irregular heartbeat and palpitations.   Respiratory: Positive for shortness of breath (on exertion) and wheezing (followed by pulmonology ). Negative for cough, hemoptysis and snoring.    Endocrine: Positive for cold intolerance. Negative for heat intolerance.   Skin: Negative for itching, rash and suspicious lesions.   Musculoskeletal: Positive for myalgias. Negative for joint pain and joint swelling.   Gastrointestinal: Negative for abdominal pain, diarrhea, hematemesis, melena, nausea and vomiting.   Genitourinary: Negative for dysuria, frequency and hematuria.   Neurological: Positive for paresthesias. Negative for dizziness, headaches, numbness and seizures.   Psychiatric/Behavioral: Negative for altered mental status and depression. The patient is not nervous/anxious.           Objective:     Vitals:  "   07/09/18 1400   BP: 102/68   BP Location: Right arm   Pulse: 68   Weight: 129 kg (284 lb 9.6 oz)   Height: 185.4 cm (73\")     Body mass index is 37.55 kg/m².    PHYSICAL EXAM:  Physical Exam   Constitutional: He is oriented to person, place, and time. He appears well-developed and well-nourished. No distress.   HENT:   Head: Normocephalic and atraumatic.   Eyes: Pupils are equal, round, and reactive to light.   Neck: Neck supple. No JVD present. No tracheal deviation present.   Cardiovascular: Normal rate and regular rhythm.  Exam reveals no gallop and no friction rub.    Murmur (systolic,  grade II ) heard.  Pulmonary/Chest: Effort normal and breath sounds normal. No respiratory distress. He has no wheezes. He has no rales. He exhibits no tenderness.   Abdominal: Soft. Bowel sounds are normal. He exhibits no distension. There is no tenderness. There is no rebound and no guarding.   Musculoskeletal: Normal range of motion. He exhibits edema. He exhibits no tenderness.   Neurological: He is alert and oriented to person, place, and time.   Skin: Skin is warm and dry. No rash noted. He is not diaphoretic. No erythema.   Bilateral lower extremities wrapped, per podiatry.    Psychiatric: He has a normal mood and affect. His behavior is normal.         ECG 12 Lead  Date/Time: 7/9/2018 3:43 PM  Performed by: CHAY BRAXTON  Authorized by: CHAY BRAXTON   Comparison: compared with previous ECG from 5/29/2018  Similar to previous ECG  Rhythm: atrial fibrillation  Rate: normal  BPM: 68  Clinical impression: abnormal ECG  Comments: nonspecific t-wave abnormalities             Assessment:       Diagnosis Plan   1. Generalized edema     2. Chronic diastolic heart failure (CMS/HCC)     3. Permanent atrial fibrillation (CMS/HCC)  ECG 12 Lead   4. Hyperkalemia  Basic Metabolic Panel   5. Hypermagnesemia  Magnesium   6. Pulmonary HTN         Plan:     1. Edema:  Patient with worsening swelling since hospital " discharge.  Up 5 lb since hospital discharge, per patient report.  Patient instructed per Dr. Steinberg to start taking 5mg metolazone approximately 30 minutes prior to torsemide for the next 3 days.  Patient will stop by office Thursday afternoon for repeat BMP & magnesium level.   Will consider an IV dose of diuretic in the office on Thursday if needed if edema not improving and if kidney function can tolerate it, per Dr. Steinberg's instructions.   2. Hyperkalemia:  K+ elevated today. Patient instructed to d/c potassium supplements. Will recheck BMP Thursday.   3. Hx elevated magnesium: mg level WNL today on labs.  Will recheck Thursday along with BMP.  4. Pulmonary Hypertension:  Follow-up with pulmonologist.       RETURN TO CLINIC 1 WEEK, OR RETURN TO CLINIC SOONER IF NEEDED.  REPEAT LABS THIS Thursday (7/12/18).       Plan of care discussed with Dr. Steinberg, and Dr. Steinberg verbalized agreements of plan & follow-up.          Your medication list          Accurate as of 7/9/18 11:59 PM. If you have any questions, ask your nurse or doctor.               CONTINUE taking these medications      Instructions Last Dose Given Next Dose Due   budesonide-formoterol 160-4.5 MCG/ACT inhaler  Commonly known as:  SYMBICORT      Inhale 2 puffs 2 (Two) Times a Day.       carvedilol 12.5 MG tablet  Commonly known as:  COREG      Take 1 tablet by mouth 2 (Two) Times a Day With Meals for 30 days.       doxazosin 2 MG tablet  Commonly known as:  CARDURA      Take 2 mg by mouth Every Night.       ELIQUIS 5 MG tablet tablet  Generic drug:  apixaban      Take 5 mg by mouth Every 12 (Twelve) Hours.       guaiFENesin 600 MG 12 hr tablet  Commonly known as:  MUCINEX      Take 1 tablet by mouth 2 (Two) Times a Day for 30 days.       insulin aspart 100 UNIT/ML injection  Commonly known as:  novoLOG      Inject 5 Units under the skin 3 (Three) Times a Day Before Meals.       insulin detemir 100 UNIT/ML injection  Commonly known as:  LEVEMIR      Inject  50 Units under the skin Daily.       montelukast 10 MG tablet  Commonly known as:  SINGULAIR      Take 10 mg by mouth Daily.       O2  Commonly known as:  OXYGEN      Inhale 2 L/min Daily As Needed.       pantoprazole 40 MG EC tablet  Commonly known as:  PROTONIX      Take 1 tablet by mouth Daily for 30 days.       spironolactone 100 MG tablet  Commonly known as:  ALDACTONE      Take 1 tablet by mouth Daily for 30 days.       torsemide 100 MG tablet  Commonly known as:  DEMADEX      Take 1 tablet by mouth Daily.       zolpidem 10 MG tablet  Commonly known as:  AMBIEN      TK 1 T PO QD HS          STOP taking these medications    potassium chloride 10 MEQ CR capsule  Commonly known as:  MICRO-K  Stopped by:  Courtney Keller DNP, APRN                    Courtney Keller DNP, IRENE  07/09/2018       Dictated utilizing Dragon dictation

## 2018-07-12 NOTE — PROGRESS NOTES
Discussed results with patient.  He is still having swelling and reports it is not improved.  He will present to [facility] today for IV lasix and keep his follow-up appointment for 7/17.  He will return to clinic sooner if needed, notify office if he is having any problems or concerns, or go to ER for any new or worsening symptoms.

## 2018-07-13 NOTE — TELEPHONE ENCOUNTER
Denisha, I will not be in the office on Monday.  The schedulers have already left for the day.  Will you please watch to be certain we get him on the schedule for Tuesday.      Maranda,  Please see Courtney's message below and schedule patient.  Thank you/ SUNI

## 2018-07-13 NOTE — TELEPHONE ENCOUNTER
I called patient and spoke with him.  He will continue the 5mg metolazone ~ 30 minutes before the torsemide as directed by Dr. Steinberg.  He will also continue the spironolactone.      I spoke with Dr. Steinberg and she suggested we order a repeat echo as well.  **Can you please see if we can schedule this for Tuesday the 17th since he already has an appointment with us that day?**    He will also get repeat labs done on the 17th.    He states he is feeling ok at this time.  He denies any worsening symptoms or any chest pain, shortness of breath, etc.     He will keep his 7/17/18 appointment, or call us sooner if he is having any new or worsening symptoms.   If he develops any new or worsening symptoms over the weekend he will go to the ER.  Patient will also call Dr. Huang's office again to schedule a follow-up on his pulmonary hypertension.    Patient verbalized understanding of the above plan.         Thanks,  IRENE Ta

## 2018-07-13 NOTE — TELEPHONE ENCOUNTER
Patient called to report he is not feeling any worse but not any better either.  His weight today is 279 down from 284 on 7/9/18.  Patient came in to the office for IV Lasix yesterday.  He is not having and SOA.     He is currently taking Spironolactone 100 mg daily, Torsemide 100 mg daily and Metolazone 2.5 mg daily which I did not see on his medication list.     Please call patient to advise.  He has an appt scheduled with you on 7/17/18,  Thank you/ SUNI     Patient's phone number is [phone].

## 2018-07-17 NOTE — PROGRESS NOTES
Date of Office Visit: 2018  Encounter Provider: Courtney Keller, JAZ, APRN  Place of Service: [facility] CARDIOLOGY  Patient Name: Bj Duarte  :1955      Subjective:     Chief Complaint:  Follow-up edema and CHF    History of Present Illness:  Bj Duarte is a 62 y.o. male of Dr. Steinberg. He has a history of chronic atrial fibrillation, right heart failure, pulmonary hypertension, aortic valve stenosis, tricuspid valve insufficiency, and chronic kidney disease.    Patient was admitted to [facility] [place], operated by [facility] 18-18 with severe hypokalemia (corrected), chronic atrial fibrillation, chronic diastolic congestive heart failure, CKD stage 3, right lower lobe pneumonia (resolved), hypertension, PVD, and cirrhosis with ascites.  Dr. Steinberg (cardiology) was consulted during patient's hospital visit.      Patient presents to office 18 for follow-up appointment.   At that time patient reported worsening edema with a 5 lb weight gain since his hospital discharge when his diuretic was held. Patient reported some SOA on exertion, but denied chest pain, palpitations, or syncope. Patient was awaiting his follow-up appointment with his pulmonologist. Patient was discharged on K+ supplements.  His potassium level was slightly high on 18 labs, so the potassium supplements were discontinued.  Patient asked to see Dr. Steinberg and she was available, so she went in to speak with the patient.  Dr. Steinberg instructed patient to start taking 5mg metolazone approximately 30 minutes prior to his torsemide, continue his spironolactone, and stop the potassium supplements.  She was told to come back in the office on 18 for repeat labs and to notify us at that time if he was not improving.     On 18 patient reported that his swelling was not improved.  He went to [facility] for IV Lasix per Dr. Steinberg's instructions.  An echo was scheduled for the next week and patient was told to follow-up with  his pulmonary hypertension doctor.  He was told to go to the ER for any new or worsening symptoms or other problems or concerns.    Patient presents to office today for 1 week office follow-up.  He reports he has lost 9 pounds since last office visit.  He is scheduled for an echo today and he will have repeat labs done today.  He has a follow-up with Dr. mckeon scheduled for 7/30/18. He still denies any chest pain, shortness of breath, palpitations, syncope, or near-syncope.  He reports that even though he is down 9 lb he still feels swollen in his bilateral legs.  Both lower legs are wrapped at this time.       Past Medical History:   Diagnosis Date   • Anemia    • Anticoagulation goal of INR 2 to 3    • Arthritis    • Asthma    • BPH (benign prostatic hyperplasia)    • Chronic atrial fibrillation (CMS/HCC)    • Chronic diastolic congestive heart failure (CMS/HCC)    • Chronic insomnia    • Cirrhosis (CMS/HCC)     with ascites   • CKD (chronic kidney disease)     Stage III   • Diabetes (CMS/HCC)    • Diabetic peripheral neuropathy (CMS/HCC)    • Edema of both legs    • Hyperkalemia    • Hyperlipidemia    • Hypermagnesemia    • Hypertension    • Hypokalemia     severe   • Lumbar disc disease    • PAD (peripheral artery disease) (CMS/HCC)    • Peripheral vascular disease (CMS/HCC)    • Pulmonary HTN    • Right lower lobe pneumonia (CMS/HCC)    • Splenomegaly      Past Surgical History:   Procedure Laterality Date   • BACK SURGERY     • CARDIAC CATHETERIZATION N/A 12/12/2017    Procedure: Right Heart Cath with adenosine challenge if indicated;  Surgeon: Delvin Roberts MD;  Location: [facility] INVASIVE LOCATION;  Service:    • FOOT SURGERY     • TONSILLECTOMY       Outpatient Medications Prior to Visit   Medication Sig Dispense Refill   • budesonide-formoterol (SYMBICORT) 160-4.5 MCG/ACT inhaler Inhale 2 puffs 2 (Two) Times a Day.     • carvedilol (COREG) 12.5 MG tablet Take 1 tablet by mouth 2 (Two) Times a Day With Meals  for 30 days. 60 tablet 0   • doxazosin (CARDURA) 2 MG tablet Take 2 mg by mouth Every Night.     • ELIQUIS 5 MG tablet tablet Take 5 mg by mouth Every 12 (Twelve) Hours.     • guaiFENesin (MUCINEX) 600 MG 12 hr tablet Take 1 tablet by mouth 2 (Two) Times a Day for 30 days. 60 tablet 0   • insulin aspart (novoLOG) 100 UNIT/ML injection Inject 5 Units under the skin 3 (Three) Times a Day Before Meals.     • insulin detemir (LEVEMIR) 100 UNIT/ML injection Inject 50 Units under the skin Daily.     • montelukast (SINGULAIR) 10 MG tablet Take 10 mg by mouth Daily.     • O2 (OXYGEN) Inhale 2 L/min Daily As Needed.     • pantoprazole (PROTONIX) 40 MG EC tablet Take 1 tablet by mouth Daily for 30 days. 30 tablet 0   • spironolactone (ALDACTONE) 100 MG tablet Take 1 tablet by mouth Daily for 30 days. 30 tablet 0   • torsemide (DEMADEX) 100 MG tablet Take 1 tablet by mouth Daily. 60 tablet 11   • zolpidem (AMBIEN) 10 MG tablet TK 1 T PO QD HS  2     No facility-administered medications prior to visit.        Allergies as of 07/17/2018   • (No Known Allergies)     Social History     Social History   • Marital status:      Spouse name: N/A   • Number of children: N/A   • Years of education: N/A     Occupational History   • Not on file.     Social History Main Topics   • Smoking status: Never Smoker   • Smokeless tobacco: Never Used      Comment: 3 to 4 times weekly caffiene   • Alcohol use 4.2 oz/week     7 Standard drinks or equivalent per week      Comment: occasinally   • Drug use: No   • Sexual activity: Defer     Other Topics Concern   • Not on file     Social History Narrative   • No narrative on file     Family History   Problem Relation Age of Onset   • Arthritis Mother    • Depression Mother    • Hyperlipidemia Mother    • Hypertension Mother    • Diabetes Mother    • Arthritis Father    • Depression Father    • Hypertension Father    • Diabetes type II Father      Review of Systems   Constitution: Positive for  "malaise/fatigue. Negative for chills, fever, night sweats, weight gain and weight loss.   HENT: Negative for ear pain, hearing loss, nosebleeds and sore throat.    Eyes: Negative for blurred vision, double vision, redness, vision loss in left eye, vision loss in right eye and visual disturbance.   Cardiovascular: Positive for leg swelling. Negative for chest pain, dyspnea on exertion, near-syncope and syncope.   Respiratory: Negative for cough, hemoptysis, snoring and wheezing.    Endocrine: Negative for cold intolerance and heat intolerance.   Skin: Negative for itching, rash and suspicious lesions.   Musculoskeletal: Negative for joint pain, joint swelling and myalgias.   Gastrointestinal: Negative for abdominal pain, diarrhea, hematemesis, melena, nausea and vomiting.   Genitourinary: Negative for dysuria, frequency and hematuria.   Neurological: Negative for dizziness, headaches, numbness, paresthesias and seizures.   Psychiatric/Behavioral: Negative for altered mental status and depression. The patient is not nervous/anxious.           Objective:     Vitals:    07/17/18 1019   BP: 120/68   Pulse: 60   Weight: 125 kg (275 lb)   Height: 185.4 cm (73\")     Body mass index is 36.28 kg/m².    PHYSICAL EXAM:  Physical Exam   Constitutional: He is oriented to person, place, and time. He appears well-developed and well-nourished. No distress.   HENT:   Head: Normocephalic and atraumatic.   Eyes: Pupils are equal, round, and reactive to light.   Neck: Neck supple. No JVD present. No tracheal deviation present.   Cardiovascular: Normal rate and regular rhythm.  Exam reveals no gallop and no friction rub.    Murmur (systolic,  grade II ) heard.  Pulmonary/Chest: Effort normal and breath sounds normal. No respiratory distress. He has no wheezes. He has no rales. He exhibits no tenderness.   Abdominal: Soft. Bowel sounds are normal. He exhibits no distension. There is no tenderness. There is no rebound and no guarding. "   Musculoskeletal: Normal range of motion. He exhibits edema. He exhibits no tenderness.   Neurological: He is alert and oriented to person, place, and time.   Skin: Skin is warm and dry. No rash noted. He is not diaphoretic. No erythema.   Bilateral lower extremities wrapped, per podiatry.    Psychiatric: He has a normal mood and affect. His behavior is normal.         ECG 12 Lead  Date/Time: 7/17/2018 8:19 AM  Performed by: CHAY BRAXTON  Authorized by: CHAY BRAXTON   Comparison: compared with previous ECG from 7/9/2018  Similar to previous ECG  Rhythm: atrial fibrillation  Ectopy: infrequent PVCs  Rate: normal  BPM: 60  Clinical impression: abnormal ECG          Assessment:       Diagnosis Plan   1. Generalized edema     2. Right heart failure  Magnesium    Ambulatory Referral to Cardiothoracic Surgery   3. Pulmonary HTN     4. Nonrheumatic aortic valve stenosis  Ambulatory Referral to Cardiothoracic Surgery   5. Non-rheumatic tricuspid valve insufficiency  Ambulatory Referral to Cardiothoracic Surgery   6. Permanent atrial fibrillation (CMS/[facility])  Magnesium   7. CKD (chronic kidney disease) stage 3, GFR 30-59 ml/min  Magnesium       Plan:     1. Edema: Patient reports 9 lb weight loss since last visit.  He reports he is still having some bilateral lower extremity edema.  Will recheck BMP and magnesium level today.   2. Pulmonary Hypertension: patient reports he has a follow-up with Dr. Huang within the next couple weeks.  Patient to keep that appointment as scheduled.  3. Aortic Valve Stenosis:  Repeat echo today to reassess degree of valvular stenosis.  4. Tricuspid valve insufficiency: repeat echo today.  5. Permanent atrial fibrillation: HR controlled at this time.  Continue current medications.     Atrial Fibrillation and Atrial Flutter  Assessment  • The patient has permanent atrial fibrillation  • This is non-valvular in etiology  • The patient's CHADS2-VASc score is 3  • A [id number] score of  2 or more is considered a high risk for a thromboembolic event    Plan  • Continue in atrial fibrillation with rate control  • Continue apixaban for antithrombotic therapy, bleeding issues discussed  • Continue beta blocker for rate control    6. CKD: repeat BMP today.  Keep follow-up with nephrology for next week as scheduled.     Keep 7/30/18 follow-up with Dr. Steinberg or return to clinic sooner if needed.  Plan of care reviewed with Dr. Steinberg.       Addendum:  Spoke with Dr. Steinberg afternoon of 7/17/18 after patient's labs and echo resulted.  Dr. Steinberg advised changing patient's 5mg metolazone to every other day at this time due to increased creatinine.  She also advised referral to cardiothoracic surgery due to worsening valvular disease seen on echo to discuss possible left heart cath and possible valve replacement. Patient was contacted and informed of the above.  His potassium level was also low so he was informed to restart daily potassium.  Will recheck labs at 7/30/18 office visit. Patient to notify office of any new or worsening symptoms or other problems/ concerns.  Patient verbalized understanding of treatment plan.        Your medication list          Accurate as of 7/17/18 11:59 PM. If you have any questions, ask your nurse or doctor.               START taking these medications      Instructions Last Dose Given Next Dose Due   potassium chloride 10 MEQ CR tablet  Commonly known as:  K-DUR  Started by:  Courtney Keller, DNP, APRN      Take 1 tablet by mouth Daily.          CONTINUE taking these medications      Instructions Last Dose Given Next Dose Due   budesonide-formoterol 160-4.5 MCG/ACT inhaler  Commonly known as:  SYMBICORT      Inhale 2 puffs 2 (Two) Times a Day.       carvedilol 12.5 MG tablet  Commonly known as:  COREG      Take 1 tablet by mouth 2 (Two) Times a Day With Meals for 30 days.       doxazosin 2 MG tablet  Commonly known as:  CARDURA      Take 2 mg by mouth Every Night.       ELIQUIS 5  MG tablet tablet  Generic drug:  apixaban      Take 5 mg by mouth Every 12 (Twelve) Hours.       guaiFENesin 600 MG 12 hr tablet  Commonly known as:  MUCINEX      Take 1 tablet by mouth 2 (Two) Times a Day for 30 days.       insulin aspart 100 UNIT/ML injection  Commonly known as:  novoLOG      Inject 5 Units under the skin 3 (Three) Times a Day Before Meals.       insulin detemir 100 UNIT/ML injection  Commonly known as:  LEVEMIR      Inject 50 Units under the skin Daily.       montelukast 10 MG tablet  Commonly known as:  SINGULAIR      Take 10 mg by mouth Daily.       O2  Commonly known as:  OXYGEN      Inhale 2 L/min Daily As Needed.       pantoprazole 40 MG EC tablet  Commonly known as:  PROTONIX      Take 1 tablet by mouth Daily for 30 days.       spironolactone 100 MG tablet  Commonly known as:  ALDACTONE      Take 1 tablet by mouth Daily for 30 days.       torsemide 100 MG tablet  Commonly known as:  DEMADEX      Take 1 tablet by mouth Daily.       zolpidem 10 MG tablet  Commonly known as:  AMBIEN      TK 1 T PO QD HS             Where to Get Your Medications      These medications were sent to [facility] [address] [facility] [address] AT [facility] - [phone]  - [phone] fx [phone], [facility] [address]    Phone:  [phone]   · potassium chloride 10 MEQ CR tablet              Courtney Keller DNP, APRN  07/17/2018       Dictated utilizing Dragon dictation

## 2018-07-18 NOTE — TELEPHONE ENCOUNTER
Pt wanted to know if he needed to get any lab work performed prior to his appointment with you on 7/30

## 2018-07-18 NOTE — TELEPHONE ENCOUNTER
I put in orders for a basic metabolic panel and magnesium.  He can have this drawn the morning of.  He should be getting a heart catheterization.  See if they can get that scheduled for next week so I have the results when I see him on the 30th

## 2018-07-19 NOTE — TELEPHONE ENCOUNTER
Dr. Steinberg,   Do you need for me to try and schedule a heart cath for the patient for next week? Or is it just he needs the requested labs you want him to have? If cath needed, could you please place order so that I can schedule cath?  Thanks,   Georgina

## 2018-07-19 NOTE — TELEPHONE ENCOUNTER
Sorry.  I thought Kathleen was going to put in the order for the cath.  I went ahead and put in a right and left and I would like it done next week.Thanks

## 2018-07-20 PROBLEM — I50.23 ACUTE ON CHRONIC SYSTOLIC CONGESTIVE HEART FAILURE (HCC): Status: ACTIVE | Noted: 2018-01-01

## 2018-07-20 PROBLEM — I27.20 PULMONARY HYPERTENSION (HCC): Status: ACTIVE | Noted: 2018-01-01

## 2018-07-20 NOTE — TELEPHONE ENCOUNTER
Dr Huitron    Mr Head is scheduled with you on 7/25 for a R&L heart cath.  He currently is taking Eliquis.  What instructions should I give him for this med prior to surgery?    Thank you  Anastasia RENTERIA

## 2018-07-24 NOTE — PROGRESS NOTES
I was asked to see this patient by Dr. Coates.  I had operated on his wife's mother and they request to see me.  He has severe aortic stenosis chronic atrial fibrillation tricuspid incompetence and right heart failure.  He has been evaluated for pulmonary hypertension in the past.  He had a right heart catheterization in the past with adenosine challenge with no change in his pulmonary artery pressure.  I'm not surprised.  I reviewed his echocardiogram and he has critical aortic stenosis.  His LV function is good at about 40-45%.  He has severe right heart failure with an enlarged pulsatile liver.  He will have a cardiac catheterization tomorrow by Dr. Huitron.  I don't see where he is had his coronaries looked at and I asked Dr. Huitron to check that.  He is been off his blood thinner for 2 days and is possible he could stay for surgery after his cardiac catheterization.  I discussed this in detail with the patient and his wife.

## 2018-07-26 NOTE — ANESTHESIA PROCEDURE NOTES
Central Line    Patient location during procedure: OR  Start time: 7/26/2018 7:15 AM  Stop Time:7/26/2018 7:25 AM  Indications: vascular access, MD/Surgeon request and central pressure monitoring  Staff  Anesthesiologist: EVE MARTÍNEZ  Preanesthetic Checklist  Completed: patient identified, surgical consent, pre-op evaluation, timeout performed, IV checked, risks and benefits discussed and monitors and equipment checked  Central Line Prep  Sterile Tech:cap, gloves, gown, mask and sterile barriers  Prep: chloraprep  Patient monitoring: blood pressure monitoring, continuous pulse oximetry and EKG  Central Line Procedure  Laterality:right  Location:internal jugular  Catheter Type:Cordis  Catheter Size:9 Fr  Guidance:landmark technique  Assessment  Post procedure:biopatch applied, line sutured and occlusive dressing applied  Assessement:blood return through all ports, free fluid flow and James Test  Complications:no  Patient Tolerance:patient tolerated the procedure well with no apparent complications

## 2018-07-26 NOTE — ANESTHESIA PROCEDURE NOTES
Arterial Line    Patient location during procedure: OR  Start time: 7/26/2018 6:53 AM  Stop Time:7/26/2018 6:57 AM       Line placed for hemodynamic monitoring.  Performed By   Anesthesiologist: EVE MARTÍNEZ  Preanesthetic Checklist  Completed: patient identified, surgical consent, pre-op evaluation, timeout performed, IV checked, risks and benefits discussed and monitors and equipment checked  Arterial Line Prep   Sterile Tech: gloves and cap  Prep: ChloraPrep  Patient monitoring: blood pressure monitoring, continuous pulse oximetry and EKG  Arterial Line Procedure   Laterality:left  Location:  radial artery  Catheter size: 20 G   Guidance: landmark technique  Number of attempts: 1  Successful placement: yes          Post Assessment   Dressing Type: occlusive dressing applied and wrist guard applied.   Complications no  Circ/Move/Sens Assessment: normal and unchanged.   Patient Tolerance: patient tolerated the procedure well with no apparent complications

## 2018-07-26 NOTE — ANESTHESIA PREPROCEDURE EVALUATION
Anesthesia Evaluation     Patient summary reviewed   no history of anesthetic complications:               Airway   Mallampati: II  TM distance: >3 FB  Neck ROM: full  Dental    (+) poor dentition    Pulmonary    (+) asthma, sleep apnea,     ROS comment: Pulmonary HTN  Cardiovascular     ECG reviewed  Rhythm: irregular  Rate: normal    (+) hypertension, valvular problems/murmurs AS, murmur and TI, dysrhythmias Atrial Fib, CHF (Acute on chronic), murmur, PVD, DVT, hyperlipidemia,     ROS comment: Echo partial report (full report on chart):   · Left ventricular systolic function is normal.   Estimated EF = 56%.  · Left ventricular wall thickness is consistent with moderate concentric   hypertrophy.  · Left atrial cavity size is severely dilated.  · Severe aortic valve stenosis is present.  Severe pulmonary hypertension is present.  EKG: Rhythm: atrial fibrillation  Ectopy: infrequent PVCs    Neuro/Psych  (+) numbness,     GI/Hepatic/Renal/Endo    (+)   liver disease, renal disease (Stage 3), diabetes mellitus type 2,     Musculoskeletal     (+) back pain,   Abdominal    Substance History      OB/GYN          Other   (+) arthritis                     Anesthesia Plan    ASA 4     general   (D/w pt. re: risks/benefits/alternatives. He agrees to proceed and understands that he will remain intubated post-operatively)  intravenous induction   Anesthetic plan and risks discussed with patient.

## 2018-07-26 NOTE — ANESTHESIA PROCEDURE NOTES
Airway  Urgency: elective      General Information and Staff    Patient location during procedure: OR  Anesthesiologist: EVE MARTÍNEZ    Indications and Patient Condition    Preoxygenated: yes      Final Airway Details  Final airway type: endotracheal airway      Successful airway: ETT  Cuffed: yes   Successful intubation technique: direct laryngoscopy  Endotracheal tube insertion site: oral  Blade: Catalina  Blade size: #4  ETT size: 8.0 mm  Cormack-Lehane Classification: grade I - full view of glottis  Placement verified by: chest auscultation   Number of attempts at approach: 1

## 2018-07-26 NOTE — ANESTHESIA PROCEDURE NOTES
Procedure Performed: Emergent/Open-Heart Anesthesia ASHA     Start Time:        End Time:      Preanesthesia Checklist:  Patient identified, IV assessed, risks and benefits discussed, monitors and equipment assessed, procedure being performed at surgeon's request and anesthesia consent obtained.    General Procedure Information  ASHA Placed for monitoring purposes only -- This is not a diagnostic ASHA          Anesthesia Information      Echocardiogram Comments:       ASHA placed easily x 1 attempt by Dr Brasher

## 2018-07-26 NOTE — ANESTHESIA POSTPROCEDURE EVALUATION
Patient: Bj Duarte    Procedure Summary     Date:  07/26/18 Room / Location:  [facility] OR [address] [place] [address] MAIN OR    Anesthesia Start:  0648 Anesthesia Stop:  1135    Procedure:  ASHA STERNOTOMY AORTIC VALVE REPLACEMENT, TRICUSPID VALVE REPAIR, ATRIAL APPENDAGE EXCISION LEFT, RIGHT ATRIAL APPENDAGE EXCISION, PRP (N/A Chest) Diagnosis:       Non-rheumatic tricuspid valve insufficiency      Nonrheumatic aortic valve stenosis      (Non-rheumatic tricuspid valve insufficiency [I36.1])      (Nonrheumatic aortic valve stenosis [I35.0])    Surgeon:  Glen Brasher MD Provider:  Sae Osorio MD    Anesthesia Type:  general ASA Status:  4          Anesthesia Type: general  Last vitals  BP   102/61 (07/26/18 0354)   Temp   36.7 °C (98 °F) (07/26/18 0354)   Pulse   80 (07/26/18 1130)   Resp   12 (07/26/18 1130)     SpO2   100 % (07/26/18 1130)     Post Anesthesia Care and Evaluation    Patient location during evaluation: ICU  Patient participation: waiting for patient participation  Pain management: adequate  Airway patency: patent  Anesthetic complications: No anesthetic complications  PONV Status: none  Cardiovascular status: acceptable  Respiratory status: acceptable, ETT, intubated and ventilator  Hydration status: acceptable

## 2018-07-26 NOTE — ANESTHESIA PROCEDURE NOTES
Central Line    Patient location during procedure: OR  Stop Time:7/26/2018 7:25 AM  Indications: vascular access, MD/Surgeon request and central pressure monitoring  Staff  Anesthesiologist: EVE MARTÍNEZ  Preanesthetic Checklist  Completed: patient identified, surgical consent, pre-op evaluation, timeout performed, IV checked, risks and benefits discussed and monitors and equipment checked  Central Line Prep  Sterile Tech:cap, gloves, gown, mask and sterile barriers  Prep: chloraprep  Patient monitoring: blood pressure monitoring, continuous pulse oximetry and EKG  Central Line Procedure  Laterality:right  Location:internal jugular  Catheter Type:Camden-Juan  Guidance:landmark technique  Assessment  Post procedure:biopatch applied, line sutured and occlusive dressing applied  Assessement:blood return through all ports, free fluid flow and James Test  Complications:no  Patient Tolerance:patient tolerated the procedure well with no apparent complications

## 2018-07-30 PROBLEM — E88.09 HYPOALBUMINEMIA: Status: ACTIVE | Noted: 2018-01-01

## 2018-08-01 PROBLEM — E46 PROTEIN MALNUTRITION (HCC): Status: ACTIVE | Noted: 2018-01-01

## 2018-08-04 PROBLEM — E11.8 TYPE 2 DIABETES MELLITUS WITH COMPLICATION, WITH LONG-TERM CURRENT USE OF INSULIN (HCC): Status: ACTIVE | Noted: 2018-01-01

## 2018-08-04 PROBLEM — Z79.4 TYPE 2 DIABETES MELLITUS WITH COMPLICATION, WITH LONG-TERM CURRENT USE OF INSULIN (HCC): Status: ACTIVE | Noted: 2018-01-01

## 2018-08-15 PROBLEM — E03.9 PRIMARY HYPOTHYROIDISM: Status: ACTIVE | Noted: 2018-01-01

## 2018-08-23 ENCOUNTER — INPATIENT HOSPITAL (OUTPATIENT)
Dept: URBAN - METROPOLITAN AREA HOSPITAL 113 | Facility: HOSPITAL | Age: 63
End: 2018-08-23
Payer: COMMERCIAL

## 2018-08-23 DIAGNOSIS — R11.0 NAUSEA: ICD-10-CM

## 2018-08-23 PROCEDURE — 99222 1ST HOSP IP/OBS MODERATE 55: CPT | Performed by: INTERNAL MEDICINE

## 2018-08-28 PROBLEM — N17.0 ACUTE RENAL FAILURE WITH TUBULAR NECROSIS (HCC): Status: ACTIVE | Noted: 2018-01-01

## 2018-08-28 NOTE — ANESTHESIA POSTPROCEDURE EVALUATION
Patient: Bj Head    Procedure Summary     Date:  08/28/18 Room / Location:  [facility] OR  / [facility] MAIN OR    Anesthesia Start:  1307 Anesthesia Stop:  1402    Procedure:  TUNNEL CATHETER PLACEMENT (Right ) Diagnosis:      Surgeon:  Nick Hargrove MD Provider:  Chris Bahena MD    Anesthesia Type:  MAC ASA Status:  4          Anesthesia Type: MAC  Last vitals  BP   112/83 (08/28/18 1505)   Temp   36.4 °C (97.6 °F) (08/28/18 1450)   Pulse   85 (08/28/18 1505)   Resp   20 (08/28/18 1505)     SpO2   100 % (08/28/18 1505)     Post Anesthesia Care and Evaluation    Patient location during evaluation: bedside  Patient participation: complete - patient participated  Level of consciousness: awake  Pain management: adequate  Airway patency: patent  Anesthetic complications: No anesthetic complications    Cardiovascular status: acceptable  Respiratory status: acceptable  Hydration status: acceptable

## 2018-08-28 NOTE — ANESTHESIA PREPROCEDURE EVALUATION
Anesthesia Evaluation     Patient summary reviewed   no history of anesthetic complications:               Airway   Mallampati: II  TM distance: >3 FB  Neck ROM: full  Dental    (+) poor dentition    Pulmonary    (+) asthma, sleep apnea,     ROS comment: Pulmonary HTN  Cardiovascular     ECG reviewed  Rhythm: irregular  Rate: normal    (+) hypertension, valvular problems/murmurs AS, murmur and TI, dysrhythmias Atrial Fib, CHF (Acute on chronic), murmur, PVD, DVT, hyperlipidemia,     ROS comment: Echo partial report (full report on chart):   · Left ventricular systolic function is normal.   Estimated EF = 56%.  · Left ventricular wall thickness is consistent with moderate concentric   hypertrophy.  · Left atrial cavity size is severely dilated.  · Severe aortic valve stenosis is present.  Severe pulmonary hypertension is present.  EKG: Rhythm: atrial fibrillation  Ectopy: infrequent PVCs    Neuro/Psych  (+) numbness,     GI/Hepatic/Renal/Endo    (+)   liver disease, renal disease (Stage 3) dialysis, diabetes mellitus type 2,     Musculoskeletal     (+) back pain,   Abdominal    Substance History      OB/GYN          Other   (+) arthritis                       Anesthesia Plan    ASA 4     MAC   (S/P 2 units plts, 08162 earlier.)  intravenous induction   Anesthetic plan and risks discussed with patient.

## 2018-08-31 PROBLEM — D69.6 THROMBOCYTOPENIA (HCC): Status: ACTIVE | Noted: 2018-01-01

## 2018-09-01 PROBLEM — N18.6 END STAGE RENAL DISEASE (HCC): Status: ACTIVE | Noted: 2018-01-01

## 2018-09-01 NOTE — PROGRESS NOTES
LOS: 38 days   Patient Care Team:  Gus Cordova MD as PCP - General (Internal Medicine)  Alexandria Benitez MD as Consulting Physician (Nephrology)  Mike Johnson MD as Consulting Physician (Pulmonary Disease)  Oriana Steinberg MD as Consulting Physician (Cardiology)    Chief Complaint: post op fu    Subjective      Vital Signs  Temp:  [97.6 °F (36.4 °C)-99.1 °F (37.3 °C)] 99.1 °F (37.3 °C)  Heart Rate:  [83-96] 96  Resp:  [16-18] 18  BP: ()/(57-87) 94/73  Body mass index is 39.37 kg/m².    Intake/Output Summary (Last 24 hours) at 09/01/18 0938  Last data filed at 09/01/18 0804   Gross per 24 hour   Intake              540 ml   Output              200 ml   Net              340 ml     I/O this shift:  In: 120 [P.O.:120]  Out: -       1    08/30/18  0913 08/31/18  0700 09/01/18  0700   Weight: 131 kg (288 lb) 132 kg (290 lb 12.8 oz) 135 kg (298 lb 6 oz)         Objective    Results Review:        WBC WBC   Date Value Ref Range Status   09/01/2018 13.79 (H) 4.50 - 10.70 10*3/mm3 Final   08/31/2018 15.28 (H) 4.50 - 10.70 10*3/mm3 Final   08/30/2018 9.48 4.50 - 10.70 10*3/mm3 Final      HGB Hemoglobin   Date Value Ref Range Status   09/01/2018 9.7 (L) 13.7 - 17.6 g/dL Final   08/31/2018 9.3 (L) 13.7 - 17.6 g/dL Final   08/30/2018 9.3 (L) 13.7 - 17.6 g/dL Final      HCT Hematocrit   Date Value Ref Range Status   09/01/2018 34.4 (L) 40.4 - 52.2 % Final   08/31/2018 32.5 (L) 40.4 - 52.2 % Final   08/30/2018 32.6 (L) 40.4 - 52.2 % Final      Platelets Platelets   Date Value Ref Range Status   09/01/2018 30 (C) 140 - 500 10*3/mm3 Final   08/31/2018 45 (L) 140 - 500 10*3/mm3 Final   08/30/2018 34 (C) 140 - 500 10*3/mm3 Final        PT/INR:    Protime   Date Value Ref Range Status   09/01/2018 16.6 (H) 11.7 - 14.2 Seconds Final   08/31/2018 14.9 (H) 11.7 - 14.2 Seconds Final   08/30/2018 15.2 (H) 11.7 - 14.2 Seconds Final   /  INR   Date Value Ref Range Status   09/01/2018 1.36 (H) 0.90 - 1.10 Final    08/31/2018 1.19 (H) 0.90 - 1.10 Final   08/30/2018 1.22 (H) 0.90 - 1.10 Final       Sodium Sodium   Date Value Ref Range Status   09/01/2018 133 (L) 136 - 145 mmol/L Final   08/31/2018 133 (L) 136 - 145 mmol/L Final   08/30/2018 132 (L) 136 - 145 mmol/L Final      Potassium Potassium   Date Value Ref Range Status   09/01/2018 5.1 3.5 - 5.2 mmol/L Final   08/31/2018 4.7 3.5 - 5.2 mmol/L Final   08/30/2018 4.6 3.5 - 5.2 mmol/L Final      Chloride Chloride   Date Value Ref Range Status   09/01/2018 101 98 - 107 mmol/L Final   08/31/2018 100 98 - 107 mmol/L Final   08/30/2018 100 98 - 107 mmol/L Final      Bicarbonate CO2   Date Value Ref Range Status   09/01/2018 21.1 (L) 22.0 - 29.0 mmol/L Final   08/31/2018 25.0 22.0 - 29.0 mmol/L Final   08/30/2018 25.3 22.0 - 29.0 mmol/L Final      BUN BUN   Date Value Ref Range Status   09/01/2018 26 (H) 8 - 23 mg/dL Final   08/31/2018 32 (H) 8 - 23 mg/dL Final   08/30/2018 27 (H) 8 - 23 mg/dL Final      Creatinine Creatinine   Date Value Ref Range Status   09/01/2018 1.98 (H) 0.76 - 1.27 mg/dL Final   08/31/2018 2.28 (H) 0.76 - 1.27 mg/dL Final   08/30/2018 1.79 (H) 0.76 - 1.27 mg/dL Final      Calcium Calcium   Date Value Ref Range Status   09/01/2018 7.5 (L) 8.6 - 10.5 mg/dL Final   08/31/2018 7.8 (L) 8.6 - 10.5 mg/dL Final   08/30/2018 7.7 (L) 8.6 - 10.5 mg/dL Final      Magnesium No results found for: MG         aspirin 81 mg Oral Daily   atorvastatin 10 mg Oral Nightly   bisacodyl 10 mg Rectal Once   budesonide-formoterol 2 puff Inhalation BID - RT   epoetin roxi 10,000 Units Intravenous Once per day on Mon Wed Fri   sodium ferric gluconate complex IVPB in 100 mL  mg Intravenous Once per day on Mon Wed Fri   ferrous sulfate 325 mg Oral Daily With Breakfast   fluconazole 150 mg Oral Q24H   fluticasone 2 spray Each Nare Daily   guaiFENesin 1,200 mg Oral Q12H   heparin (porcine) 5,000 Units Intravenous Take As Directed   hydrocortisone  Topical Q12H   insulin aspart  0-10 Units Subcutaneous 4x Daily With Meals & Nightly   insulin aspart 8 Units Subcutaneous TID With Meals   insulin detemir 18 Units Subcutaneous Nightly   lactulose 20 g Oral Daily   levothyroxine 50 mcg Oral Q AM   magic mouthwash 10 mL Swish & Spit Q4H   midodrine 5 mg Oral TID AC   montelukast 10 mg Oral Daily   nystatin 5 mL Swish & Swallow 4x Daily   nystatin  Topical Q12H   pantoprazole 40 mg Oral Daily   polyethylene glycol 17 g Oral Daily   Scopolamine 1 patch Transdermal Q72H   sennosides-docusate sodium 2 tablet Oral Nightly   sucralfate 1 g Oral Q6H   torsemide 100 mg Oral Daily       hold 1 each    sodium chloride 9 mL/hr Last Rate: 9 mL/hr (08/28/18 1220)           Patient Active Problem List   Diagnosis Code   • Permanent atrial fibrillation (CMS/[facility]) I48.2   • Right heart failure I50.810   • Pulmonary HTN I27.20   • Type 2 diabetes mellitus with diabetic peripheral angiopathy without gangrene, without long-term current use of insulin (CMS/HCC) E11.51   • Obstructive sleep apnea G47.33   • Nonrheumatic aortic valve stenosis I35.0   • Non-rheumatic tricuspid valve insufficiency I36.1   • CKD (chronic kidney disease) stage 3, GFR 30-59 ml/min N18.3   • Generalized edema R60.1   • Hyperkalemia E87.5   • Pulmonary hypertension I27.20   • Acute on chronic systolic congestive heart failure (CMS/[facility]) I50.23   • Hypoalbuminemia E88.09   • Protein malnutrition (CMS/[facility]) E46   • Type 2 diabetes mellitus with complication, with long-term current use of insulin (CMS/[facility]) E11.8, Z79.4   • Primary hypothyroidism E03.9   • Acute renal failure with tubular necrosis (CMS/[facility]) N17.0   • Thrombocytopenia (CMS/[facility]) D69.6       Assessment & Plan    -Severe aortic stenosis and tricuspid regurgitation-s/p AVR, TVr, CIERA, RAA----POD #37 ([place])      Sitting in chair, took off 4 liters with HD yesterday, weight overall still down.  Platelet count down to 30 today, bone marrow pending.     IRENE Lawson  09/01/18  9:38  AM     Above findings confirmed, agree with assessment.

## 2018-09-01 NOTE — PLAN OF CARE
Problem: Patient Care Overview  Goal: Plan of Care Review   08/31/18 2010   Coping/Psychosocial   Plan of Care Reviewed With patient   Plan of Care Review   Progress improving   OTHER   Outcome Summary VSS. Remains in afib with a rate in the 70s-90s. Assist x2. Complaints of back pain treated per MD order. Pt does not wish to be turned when asked. Denies SOA today. 4 L removed in Dialysis today. Bath completed upon return from dialysis. CTM closely.

## 2018-09-01 NOTE — PROGRESS NOTES
"CC: A fib/Post op follow up s/p AVR, TVr    Interval History: denies chest pain or shortness of breath.       Vital Signs  Temp:  [97.6 °F (36.4 °C)-99.1 °F (37.3 °C)] 98.5 °F (36.9 °C)  Heart Rate:  [83-96] 87  Resp:  [16-18] 16  BP: ()/(57-87) 105/73    Intake/Output Summary (Last 24 hours) at 09/01/18 1409  Last data filed at 09/01/18 1152   Gross per 24 hour   Intake              780 ml   Output              200 ml   Net              580 ml     Flowsheet Rows      First Filed Value   Admission Height  185.4 cm (73\") Documented at 07/25/2018 1111   Admission Weight  125 kg (274 lb 9 oz) Documented at 07/25/2018 1111          PHYSICAL EXAM:  General: No acute distress  Resp:NL Rate, unlabored, clear  CV:NL rate and irregular rhythm, NL PMI, Nl S1 and S2, LLSB 2/6 systolic Murmur, no gallop, no rub, No JVD. Normal pedal pulses  ABD:Nl sounds, no masses or tenderness, nondistended, no guarding or rebound  Neuro: alert,cooperative and oriented  Extr: ACE wrapped BLE,edema present moves all extremities      Results Review:      Results from last 7 days  Lab Units 09/01/18  0515   SODIUM mmol/L 133*   POTASSIUM mmol/L 5.1   CHLORIDE mmol/L 101   CO2 mmol/L 21.1*   BUN mg/dL 26*   CREATININE mg/dL 1.98*   GLUCOSE mg/dL 139*   CALCIUM mg/dL 7.5*           Results from last 7 days  Lab Units 09/01/18  0515   WBC 10*3/mm3 13.79*   HEMOGLOBIN g/dL 9.7*   HEMATOCRIT % 34.4*   PLATELETS 10*3/mm3 30*       Results from last 7 days  Lab Units 09/01/18  0515 08/31/18  0600 08/30/18  0822 08/29/18  0400   INR  1.36* 1.19* 1.22* 1.26*   APTT seconds  --   --   --  36.7*           Results from last 7 days  Lab Units 08/27/18  0521   MAGNESIUM mg/dL 2.3         I reviewed the patient's new clinical results.  I personally viewed and interpreted the patient's EKG/Telemetry data- NSR        Medication Review:   Meds reviewed      hold 1 each    sodium chloride 9 mL/hr Last Rate: 9 mL/hr (08/28/18 1220)       Assessment/Plan  1. " Severe aortic stenosis and tricuspid regurgitation status post AVR, TV repair, CIERA,RAA   2. Atrial fibrillation, warfarin held for thrombocytopenia  3.DM endocrinology following  4.MYCHAL  5. GEOVANNI/CKD-HD m,w,f yday removed 4L, nephrology following continuing torsemide. Eventually will need AV fistula.  6.R pleural effusion status post thoracenteses 8/29 1.5 L removed  7.Thrombocytopenia- bone marrow pending. HIT panel negative  8.Hypotension off antihypertensives on midodrine  9.Leukocytosis trending down  10.UTI- completed course    IRENE Mathews  09/01/18  2:09 PM

## 2018-09-01 NOTE — PLAN OF CARE
Problem: Cardiac: Heart Failure (Adult)  Goal: Signs and Symptoms of Listed Potential Problems Will be Absent, Minimized or Managed (Cardiac: Heart Failure)  Outcome: Ongoing (interventions implemented as appropriate)   09/01/18 6828   Goal/Outcome Evaluation   Problems Assessed (Heart Failure) all   Problems Present (Heart Failure) fluid/electrolyte imbalance;situational response       Problem: Skin Injury Risk (Adult)  Goal: Skin Health and Integrity  Outcome: Ongoing (interventions implemented as appropriate)   09/01/18 2328   Skin Injury Risk (Adult)   Skin Health and Integrity making progress toward outcome

## 2018-09-01 NOTE — SIGNIFICANT NOTE
09/01/18 0953   Rehab Treatment   Discipline physical therapist   Reason Treatment Not Performed unavailable for treatment  (Patient was getting a bath; nursing just started. Will check back on patient this afternoon if time allows. If not, we will check on patient tomorrow.)   Recommendation   PT - Next Appointment 09/02/18

## 2018-09-01 NOTE — PROGRESS NOTES
"  ENDOCRINE    Subjective   AND PLANS  Bj Duarte is a 63 y.o. male.     Follow-up diabetes.    Appetite good.  No nausea or vomiting.  Fasting glucose 139.  Random glucose 165-166.  Continue Levemir 18 units at bedtime and NovoLog 8 units with each meal plus sliding scale.    Objective   /73 (BP Location: Left arm, Patient Position: Sitting)   Pulse 87   Temp 98.5 °F (36.9 °C) (Oral)   Resp 16   Ht 185.4 cm (73\")   Wt 135 kg (298 lb 6 oz)   SpO2 97%   BMI 39.37 kg/m²   Physical Exam    Awake, alert, not in distress.  No rales or wheezes  Irregularly irregular heart rate and rhythm.  No gallop.  Abdomen soft, nontender.  Upper extremity edema improving  Scrotal and penile skin edema improving cyanosisorclubbing      Lab Results (last 24 hours)     Procedure Component Value Units Date/Time    POC Glucose Once [[id number]]  (Abnormal) Collected:  09/01/18 1055    Specimen:  Blood Updated:  09/01/18 1056     Glucose 166 (H) mg/dL     Narrative:       Meter: [id number] : [id number] Willis PHAN    CBC & Differential [[id number]] Collected:  09/01/18 0515    Specimen:  Blood Updated:  09/01/18 0803    Narrative:       The following orders were created for panel order CBC & Differential.  Procedure                               Abnormality         Status                     ---------                               -----------         ------                     Scan Slide[[id number]]                                       Final result               CBC Auto Differential[[id number]]        Abnormal            Final result                 Please view results for these tests on the individual orders.    CBC Auto Differential [[id number]]  (Abnormal) Collected:  09/01/18 0515    Specimen:  Blood Updated:  09/01/18 0803     WBC 13.79 (H) 10*3/mm3      RBC 4.21 (L) 10*6/mm3      Hemoglobin 9.7 (L) g/dL      Hematocrit 34.4 (L) %      MCV 81.7 fL      MCH 23.0 (L) pg      MCHC 28.2 (L) g/dL      RDW 21.4 (H) %      " RDW-SD 61.4 (H) fl      Platelets 30 (C) 10*3/mm3      Neutrophil % 83.0 (H) %      Lymphocyte % 6.2 (L) %      Monocyte % 8.5 %      Eosinophil % 1.8 %      Basophil % 0.1 %      Immature Grans % 0.4 %      Neutrophils, Absolute 11.44 (H) 10*3/mm3      Lymphocytes, Absolute 0.85 (L) 10*3/mm3      Monocytes, Absolute 1.17 10*3/mm3      Eosinophils, Absolute 0.25 10*3/mm3      Basophils, Absolute 0.02 10*3/mm3      Immature Grans, Absolute 0.06 (H) 10*3/mm3      nRBC 0.0 /100 WBC     Scan Slide [[id number]] Collected:  09/01/18 0515    Specimen:  Blood Updated:  09/01/18 0803     Anisocytosis Large/3+     Dacrocytes Slight/1+     Hypochromia Mod/2+     Ovalocytes Slight/1+     WBC Morphology Normal     Platelet Morphology Normal    Renal Function Panel [[id number]]  (Abnormal) Collected:  09/01/18 0515    Specimen:  Blood Updated:  09/01/18 0702     Glucose 139 (H) mg/dL      BUN 26 (H) mg/dL      Creatinine 1.98 (H) mg/dL      Sodium 133 (L) mmol/L      Potassium 5.1 mmol/L      Chloride 101 mmol/L      CO2 21.1 (L) mmol/L      Calcium 7.5 (L) mg/dL      Albumin 1.80 (L) g/dL      Phosphorus 2.9 mg/dL      Anion Gap 10.9 mmol/L      BUN/Creatinine Ratio 13.1     eGFR Non African Amer 35 (L) mL/min/1.73     Protime-INR [[id number]]  (Abnormal) Collected:  09/01/18 0515    Specimen:  Blood Updated:  09/01/18 0644     Protime 16.6 (H) Seconds      INR 1.36 (H)    POC Glucose Once [[id number]]  (Abnormal) Collected:  09/01/18 0552    Specimen:  Blood Updated:  09/01/18 0553     Glucose 165 (H) mg/dL     Narrative:       Meter: [id number] : [id number] Almas Shukri NA    POC Glucose Once [[id number]]  (Abnormal) Collected:  08/31/18 2129    Specimen:  Blood Updated:  08/31/18 2131     Glucose 241 (H) mg/dL     Narrative:       Meter: [id number] : [id number] Radha Patel RN    POC Glucose Once [[id number]]  (Abnormal) Collected:  08/31/18 1612    Specimen:  Blood Updated:  08/31/18 1613     Glucose 138 (H) mg/dL      Narrative:       Meter: [id number] : [id number] Cherelle Humphrey RN    Bone Marrow Comprehensive (Int Onc) [[id number]] Collected:  08/30/18 1530    Specimen:  Bone Marrow Updated:  08/31/18 1459            Active Problems:    Obstructive sleep apnea    Nonrheumatic aortic valve stenosis    Non-rheumatic tricuspid valve insufficiency    Pulmonary hypertension    Acute on chronic systolic congestive heart failure (CMS/HCC)    Hypoalbuminemia    Protein malnutrition (CMS/HCC)    Type 2 diabetes mellitus with complication, with long-term current use of insulin (CMS/HCC)    Primary hypothyroidism    Acute renal failure with tubular necrosis (CMS/HCC)    Thrombocytopenia (CMS/HCC)    Diabetes therapy as discussed above.  Continue levothyroxine

## 2018-09-01 NOTE — PLAN OF CARE
Problem: Patient Care Overview  Goal: Plan of Care Review  Outcome: Ongoing (interventions implemented as appropriate)   09/01/18 0539   Coping/Psychosocial   Plan of Care Reviewed With patient   Plan of Care Review   Progress improving   OTHER   Outcome Summary Complaints of lower back pain and nausea. Treated with prn medications. VSS throughout shift. Will continue to monitor closely.      Goal: Individualization and Mutuality  Outcome: Ongoing (interventions implemented as appropriate)    Goal: Discharge Needs Assessment  Outcome: Ongoing (interventions implemented as appropriate)    Goal: Interprofessional Rounds/Family Conf  Outcome: Ongoing (interventions implemented as appropriate)      Problem: Fall Risk (Adult)  Goal: Absence of Fall  Outcome: Ongoing (interventions implemented as appropriate)      Problem: Cardiac Surgery (Adult)  Goal: Signs and Symptoms of Listed Potential Problems Will be Absent, Minimized or Managed (Cardiac Surgery)  Outcome: Ongoing (interventions implemented as appropriate)      Problem: Cardiac: Heart Failure (Adult)  Goal: Signs and Symptoms of Listed Potential Problems Will be Absent, Minimized or Managed (Cardiac: Heart Failure)  Outcome: Ongoing (interventions implemented as appropriate)    Goal: Signs and Symptoms of Listed Potential Problems Will be Absent, Minimized or Managed (Cardiac: Heart Failure)  Outcome: Ongoing (interventions implemented as appropriate)      Problem: Skin Injury Risk (Adult)  Goal: Skin Health and Integrity  Outcome: Ongoing (interventions implemented as appropriate)

## 2018-09-01 NOTE — PROGRESS NOTES
LOS: 38 days   Patient Care Team:  Gus Cordova MD as PCP - General (Internal Medicine)  Alexandria Benitez MD as Consulting Physician (Nephrology)  Mike Johnson MD as Consulting Physician (Pulmonary Disease)  Oriana Steinberg MD as Consulting Physician (Cardiology)    Chief Complaint/ Reason for encounter: Anasarca, dialysis management      Subjective     Medical history reviewed:  History of Present Illness    Subjective:  Symptoms:  Improved.  He reports weakness.  No shortness of breath or chest pain.  (Weight and edema continue to slowly improve  No shortness of breath, weakness improved  Resting, no interval events noted  Tolerated dialysis well yesterday with 4 L ultrafiltration).    Diet:  Adequate intake.  No nausea.    Activity level: Returning to normal.    Pain:  He reports no pain.          History taken from: Patient and chart    Objective     Vital Signs  Temp:  [97.6 °F (36.4 °C)-99.1 °F (37.3 °C)] 98.5 °F (36.9 °C)  Heart Rate:  [83-96] 87  Resp:  [16-18] 16  BP: ()/(57-87) 105/73       Wt Readings from Last 1 Encounters:   09/01/18 0700 135 kg (298 lb 6 oz)   08/31/18 0700 132 kg (290 lb 12.8 oz)   08/30/18 0913 131 kg (288 lb)   08/29/18 0352 134 kg (294 lb 6.4 oz)   08/28/18 0635 133 kg (293 lb 3.2 oz)   08/27/18 1300 131 kg (288 lb 9.3 oz)   08/27/18 0659 135 kg (297 lb 9.6 oz)   08/26/18 0944 135 kg (297 lb)   08/25/18 0622 136 kg (299 lb)   08/23/18 0914 (!) 139 kg (307 lb)   08/21/18 0643 (!) 138 kg (304 lb 8 oz)   08/20/18 0629 (!) 140 kg (309 lb 1.6 oz)   08/19/18 0552 (!) 140 kg (307 lb 14.4 oz)   08/18/18 0524 (!) 140 kg (308 lb 14.4 oz)   08/17/18 0500 (!) 138 kg (305 lb 3.2 oz)   08/16/18 0500 (!) 137 kg (301 lb 14.4 oz)   08/15/18 0618 (!) 137 kg (303 lb)   08/14/18 1300 (!) 138 kg (303 lb 4.8 oz)   08/13/18 0458 135 kg (298 lb 3.2 oz)   08/12/18 1312 132 kg (291 lb)   08/11/18 0615 132 kg (291 lb 3.2 oz)   08/10/18 0554 130 kg (286 lb 14.4 oz)   08/09/18 0500 130  "kg (286 lb 6.4 oz)   08/08/18 0700 128 kg (282 lb)   08/07/18 0600 122 kg (270 lb)   08/06/18 0705 123 kg (272 lb)   08/05/18 0500 124 kg (273 lb)   08/04/18 0516 108 kg (238 lb 6.4 oz)   08/03/18 0637 129 kg (283 lb 6.4 oz)   08/02/18 0353 132 kg (292 lb 1.6 oz)   08/01/18 0545 134 kg (295 lb 4.8 oz)   07/31/18 0310 133 kg (294 lb 1.6 oz)   07/29/18 1649 131 kg (288 lb 12.8 oz)   07/29/18 0700 59.4 kg (131 lb)   07/28/18 0500 129 kg (284 lb 1.6 oz)   07/26/18 0520 127 kg (280 lb)   07/25/18 1111 125 kg (274 lb 9 oz)       Objective:  General Appearance:  Comfortable, in no acute distress and not in pain (Obese).    Vital signs: (most recent): Blood pressure 105/73, pulse 87, temperature 98.5 °F (36.9 °C), temperature source Oral, resp. rate 16, height 185.4 cm (73\"), weight 135 kg (298 lb 6 oz), SpO2 97 %.  Vital signs are normal.  No fever.    Output: Producing urine.    HEENT: Normal HEENT exam.    Lungs:  Normal effort and normal respiratory rate.  Breath sounds clear to auscultation.  He is not in respiratory distress.  No decreased breath sounds.    Heart: Normal rate.  Regular rhythm.  S1 normal.  No murmur.   Abdomen: Abdomen is soft and non-distended.  Bowel sounds are normal.   There is no abdominal tenderness.   (Significant scrotal edema).   Extremities: Normal range of motion.  There is dependent edema.  (Anasarca improved, decreased scrotal edema)  Pulses: Distal pulses are intact.    Neurological: Patient is alert and oriented to person, place and time.    Skin:  Warm and dry.  No rash or cyanosis.             Results Review:    Intake/Output:     Intake/Output Summary (Last 24 hours) at 09/01/18 1416  Last data filed at 09/01/18 1152   Gross per 24 hour   Intake              780 ml   Output              200 ml   Net              580 ml         DATA:  Radiology and Labs:  The following labs independently reviewed by me. Additional labs ordered for tomorrow a.m.  Interval notes, chart personally " reviewed by me.   Old records independently reviewed showing Stage III chronic kidney disease as described above   Discussed dialysis and ultrafiltration plans per Dr. Jameel Delaney  Discussed dialysis with abhay Quevedo RN      Labs:   Recent Results (from the past 24 hour(s))   POC Glucose Once    Collection Time: 08/31/18  4:12 PM   Result Value Ref Range    Glucose 138 (H) 70 - 130 mg/dL   POC Glucose Once    Collection Time: 08/31/18  9:29 PM   Result Value Ref Range    Glucose 241 (H) 70 - 130 mg/dL   Protime-INR    Collection Time: 09/01/18  5:15 AM   Result Value Ref Range    Protime 16.6 (H) 11.7 - 14.2 Seconds    INR 1.36 (H) 0.90 - 1.10   Renal Function Panel    Collection Time: 09/01/18  5:15 AM   Result Value Ref Range    Glucose 139 (H) 65 - 99 mg/dL    BUN 26 (H) 8 - 23 mg/dL    Creatinine 1.98 (H) 0.76 - 1.27 mg/dL    Sodium 133 (L) 136 - 145 mmol/L    Potassium 5.1 3.5 - 5.2 mmol/L    Chloride 101 98 - 107 mmol/L    CO2 21.1 (L) 22.0 - 29.0 mmol/L    Calcium 7.5 (L) 8.6 - 10.5 mg/dL    Albumin 1.80 (L) 3.50 - 5.20 g/dL    Phosphorus 2.9 2.5 - 4.5 mg/dL    Anion Gap 10.9 mmol/L    BUN/Creatinine Ratio 13.1 7.0 - 25.0    eGFR Non African Amer 35 (L) >60 mL/min/1.73   CBC Auto Differential    Collection Time: 09/01/18  5:15 AM   Result Value Ref Range    WBC 13.79 (H) 4.50 - 10.70 10*3/mm3    RBC 4.21 (L) 4.60 - 6.00 10*6/mm3    Hemoglobin 9.7 (L) 13.7 - 17.6 g/dL    Hematocrit 34.4 (L) 40.4 - 52.2 %    MCV 81.7 79.8 - 96.2 fL    MCH 23.0 (L) 27.0 - 32.7 pg    MCHC 28.2 (L) 32.6 - 36.4 g/dL    RDW 21.4 (H) 11.5 - 14.5 %    RDW-SD 61.4 (H) 37.0 - 54.0 fl    Platelets 30 (C) 140 - 500 10*3/mm3    Neutrophil % 83.0 (H) 42.7 - 76.0 %    Lymphocyte % 6.2 (L) 19.6 - 45.3 %    Monocyte % 8.5 5.0 - 12.0 %    Eosinophil % 1.8 0.3 - 6.2 %    Basophil % 0.1 0.0 - 1.5 %    Immature Grans % 0.4 0.0 - 0.5 %    Neutrophils, Absolute 11.44 (H) 1.90 - 8.10 10*3/mm3    Lymphocytes, Absolute 0.85 (L) 0.90 - 4.80 10*3/mm3     Monocytes, Absolute 1.17 0.20 - 1.20 10*3/mm3    Eosinophils, Absolute 0.25 0.00 - 0.70 10*3/mm3    Basophils, Absolute 0.02 0.00 - 0.20 10*3/mm3    Immature Grans, Absolute 0.06 (H) 0.00 - 0.03 10*3/mm3    nRBC 0.0 0.0 - 0.0 /100 WBC   Scan Slide    Collection Time: 09/01/18  5:15 AM   Result Value Ref Range    Anisocytosis Large/3+ None Seen    Dacrocytes Slight/1+ None Seen    Hypochromia Mod/2+ None Seen    Ovalocytes Slight/1+ None Seen    WBC Morphology Normal Normal    Platelet Morphology Normal Normal   POC Glucose Once    Collection Time: 09/01/18  5:52 AM   Result Value Ref Range    Glucose 165 (H) 70 - 130 mg/dL   POC Glucose Once    Collection Time: 09/01/18 10:55 AM   Result Value Ref Range    Glucose 166 (H) 70 - 130 mg/dL       Radiology:  Imaging Results (last 24 hours)     ** No results found for the last 24 hours. **             Medications have been reviewed:  Current Facility-Administered Medications   Medication Dose Route Frequency Provider Last Rate Last Dose   • acetaminophen (TYLENOL) suppository 650 mg  650 mg Rectal Q4H PRN Glen Brasher MD       • acetaminophen (TYLENOL) tablet 650 mg  650 mg Oral Q4H PRN Glen Brasher MD   650 mg at 08/31/18 1632   • albumin human 25 % IV SOLN 12.5 g  12.5 g Intravenous PRN Ronald Olivares MD       • aspirin EC tablet 81 mg  81 mg Oral Daily Glen Brasher MD   81 mg at 09/01/18 0948   • atorvastatin (LIPITOR) tablet 10 mg  10 mg Oral Nightly Terri Jane MD   10 mg at 08/17/18 2020   • bisacodyl (DULCOLAX) EC tablet 10 mg  10 mg Oral Daily PRN Glen Brasher MD   10 mg at 08/24/18 0917   • bisacodyl (DULCOLAX) suppository 10 mg  10 mg Rectal Daily PRN Glen Brasher MD       • bisacodyl (DULCOLAX) suppository 10 mg  10 mg Rectal Once Soraya Wu APRN       • budesonide-formoterol (SYMBICORT) 160-4.5 MCG/ACT inhaler 2 puff  2 puff Inhalation BID - RT Soraya Wu APRN   2 puff at 09/01/18 0816   •  cyclobenzaprine (FLEXERIL) tablet 10 mg  10 mg Oral Q8H PRN Glen Brasher MD   10 mg at 08/30/18 1229   • dextrose (D50W) solution 25 g  25 g Intravenous Q15 Min PRN Glen Brasher MD   25 g at 08/09/18 1441   • dextrose (GLUTOSE) oral gel 15 g  15 g Oral Q15 Min PRN Glen Brasher MD       • diphenhydrAMINE (BENADRYL) capsule 25 mg  25 mg Oral Q6H PRN Lina Patiño PA   25 mg at 08/20/18 1221   • epoetin roxi (EPOGEN,PROCRIT) injection 10,000 Units  10,000 Units Intravenous Once per day on Mon Wed Fri Marty Espitia MD   10,000 Units at 08/31/18 1115   • ferric gluconate (FERRLECIT) 125 mg in sodium chloride 0.9 % 100 mL IVPB  125 mg Intravenous Once per day on Mon Wed Fri Marty Espitia  mL/hr at 08/31/18 2130 125 mg at 08/31/18 2130   • ferrous sulfate tablet 325 mg  325 mg Oral Daily With Breakfast Lavon Berger MD   325 mg at 09/01/18 0948   • fluticasone (FLONASE) 50 MCG/ACT nasal spray 2 spray  2 spray Each Nare Daily Renetta Hancock APRN   2 spray at 09/01/18 0949   • glucagon (human recombinant) (GLUCAGEN DIAGNOSTIC) injection 1 mg  1 mg Subcutaneous PRN Glen Brasher MD       • guaiFENesin (MUCINEX) 12 hr tablet 1,200 mg  1,200 mg Oral Q12H Soraya Wu APRN   1,200 mg at 09/01/18 0948   • heparin (porcine) 5000 UNIT/ML injection 5,000 Units  5,000 Units Intravenous Take As Directed Sammy Barillas MD       • heparin (porcine) injection 3,000 Units  3,000 Units Intracatheter PRN Marty Espitia MD   3,000 Units at 08/29/18 1836   • Hold medication  1 each Does not apply Continuous PRN Renetta Hancock APRN       • hydrocortisone 1 % cream   Topical Q12H Lavon Berger MD       • insulin aspart (novoLOG) injection 0-10 Units  0-10 Units Subcutaneous 4x Daily With Meals & Nightly Terri Jane MD   2 Units at 09/01/18 1124   • insulin aspart (novoLOG) injection 8 Units  8 Units Subcutaneous TID With Meals Terri Jane MD   8 Units at  09/01/18 1123   • insulin detemir (LEVEMIR) injection 18 Units  18 Units Subcutaneous Nightly Ata Reza MD   18 Units at 08/31/18 2145   • ipratropium-albuterol (DUO-NEB) nebulizer solution 3 mL  3 mL Nebulization Q4H PRN Glen Brasher MD   3 mL at 08/29/18 1315   • lactulose (CHRONULAC) 10 GM/15ML solution 20 g  20 g Oral Daily Soraya Wu APRN   20 g at 08/27/18 1427   • levothyroxine (SYNTHROID, LEVOTHROID) tablet 50 mcg  50 mcg Oral Q AM Ata Reza MD   50 mcg at 09/01/18 0503   • Magnesium Sulfate 2 gram infusion - Mg less than or equal to 1.5 mg/dL  2 g Intravenous PRN Glen Brasher MD        Or   • Magesium Sulfate 1 gram infusion - Mg 1.6-1.9 mg/dL  1 g Intravenous PRN Glen Brasher MD       • magic mouthwash oral supsension 10 mL  10 mL Swish & Spit Q4H Glen Brasher MD   10 mL at 09/01/18 0948   • magnesium hydroxide (MILK OF MAGNESIA) suspension 2400 mg/10mL 10 mL  10 mL Oral Daily PRN Glen Brasher MD   10 mL at 08/17/18 1833   • midodrine (PROAMATINE) tablet 5 mg  5 mg Oral TID AC Sae Boothe MD   5 mg at 09/01/18 0948   • montelukast (SINGULAIR) tablet 10 mg  10 mg Oral Daily Soraya Wu APRN   10 mg at 09/01/18 0948   • naloxone (NARCAN) injection 0.4 mg  0.4 mg Intravenous Q5 Min PRN Glen Brasher MD       • nystatin (MYCOSTATIN) 602339 UNIT/ML suspension 500,000 Units  5 mL Swish & Swallow 4x Daily Glen Brasher MD   500,000 Units at 09/01/18 1354   • nystatin (MYCOSTATIN) powder   Topical Q12H Renetta Hancock APRN       • ondansetron (ZOFRAN) injection 4 mg  4 mg Intravenous Q6H PRN Glen Brasher MD   4 mg at 08/20/18 1823   • ondansetron (ZOFRAN) tablet 4 mg  4 mg Oral Q6H PRN Javed Huitron MD        Or   • ondansetron ODT (ZOFRAN-ODT) disintegrating tablet 4 mg  4 mg Oral Q6H PRN Javed Huitron MD        Or   • ondansetron (ZOFRAN) injection 4 mg  4 mg Intravenous Q6H PRN Javed Huitron MD   4 mg at  09/01/18 0051   • oxyCODONE (ROXICODONE) immediate release tablet 10 mg  10 mg Oral Q4H PRN Glen Brasher MD   10 mg at 09/01/18 0947   • pantoprazole (PROTONIX) EC tablet 40 mg  40 mg Oral Daily Glen Brasher MD   40 mg at 09/01/18 0948   • polyethylene glycol (MIRALAX) powder 17 g  17 g Oral Daily Soraya Wu APRN   17 g at 09/01/18 0950   • potassium chloride (MICRO-K) CR capsule 40 mEq  40 mEq Oral PRN Glen Brasher MD   40 mEq at 08/06/18 2352    Or   • potassium chloride (KLOR-CON) packet 40 mEq  40 mEq Oral PRN Glen Brasher MD       • potassium chloride 10 mEq in 100 mL IVPB  10 mEq Intravenous Q1H PRN Glen Brasher MD        Or   • potassium chloride 10 mEq in 100 mL IVPB  10 mEq Intravenous Q1H PRN Glen Brasher MD       • promethazine (PHENERGAN) tablet 12.5 mg  12.5 mg Oral Q6H PRN Glen Brasher MD        Or   • promethazine (PHENERGAN) injection 12.5 mg  12.5 mg Intravenous Q6H PRN Glen Brasher MD       • Scopolamine (TRANSDERM-SCOP) 1.5 MG/3DAYS patch 1 patch  1 patch Transdermal Q72H Renetta Hancock APRN   1 patch at 08/31/18 1632   • sennosides-docusate sodium (SENOKOT-S) 8.6-50 MG tablet 2 tablet  2 tablet Oral Nightly Glen Brasher MD   2 tablet at 08/31/18 2129   • sodium chloride (OCEAN) nasal spray 2 spray  2 spray Each Nare PRN Glen Brasher MD       • sodium chloride 0.9 % infusion  9 mL/hr Intravenous Continuous PRN Satnam Arguello MD 9 mL/hr at 08/28/18 1220     • sucralfate (CARAFATE) 1 GM/10ML suspension 1 g  1 g Oral Q6H Juan Sanz MD   1 g at 09/01/18 1123   • torsemide (DEMADEX) tablet 100 mg  100 mg Oral Daily Donnell Madison MD   100 mg at 09/01/18 0948       ASSESSMENT:  ESRD, continue Monday Wednesday Friday dialysis  volume overload/anasarca, slowly improving with dialysis  Hyponatremia, adjust sodium bath with dialysis today  Hypoalbuminemia, contributing to edema, no proteinuria   Severe aortic stenosis  status post replacement  Tricuspid insufficiency status post repair  Pulmonary hypertension  Acute on chronic CHF  hypotension related to heart failure, stable on midodrine  Anemia of chronic kidney disease: Epogen, IV iron  Thrombocytopenia, hematology following, no heparin with dialysis  pleural effusions      PLAN:   Continue dialysis, Monday Wednesday Friday schedule, ultrafiltration as tolerated with dialysis.  Bone marrow biopsy results pending, platelet count down to 30k  Continue midodrine, torsemide, BP support on dialysis with albumin and mannitol as needed  HIT panel negative x2 so restart heparin with HD   Eventual AVF    Ronald Olivares MD   [facility]   Office phone number: [phone]  Answering service phone number: [phone]    09/01/18  2:16 PM      Dictation performed using Dragon dictation software

## 2018-09-01 NOTE — PROGRESS NOTES
REASON FOR FOLLOWUP/CHIEF COMPLAINT:  Thrombocytopenia    HISTORY OF PRESENT ILLNESS:   Nothing new overnight.  Denies bleeding.    Past Medical History, Past Surgical History, Social History, Family History have been reviewed and are without significant changes except as mentioned.    Review of Systems   Review of Systems   Constitutional: Negative for activity change.   HENT: Negative for nosebleeds and trouble swallowing.    Respiratory: Negative for shortness of breath and wheezing.    Cardiovascular: Negative for chest pain and palpitations.   Gastrointestinal: Negative for constipation, diarrhea and nausea.   Genitourinary: Negative for dysuria and hematuria.   Musculoskeletal: Negative for arthralgias and myalgias.   Neurological: Negative for seizures and syncope.   Hematological: Negative for adenopathy. Does not bruise/bleed easily.   Psychiatric/Behavioral: Negative for confusion.       Medications:  The current medication list was reviewed in the EMR    ALLERGIES:  No Known Allergies           Vitals:    08/31/18 2032 08/31/18 2130 09/01/18 0402 09/01/18 0700   BP:  103/81 109/84    BP Location:   Right arm    Patient Position:   Lying    Pulse: 90 88 83    Resp: 18  16    Temp:   97.6 °F (36.4 °C)    TempSrc:   Oral    SpO2: 95%      Weight:    135 kg (298 lb 6 oz)   Height:         Physical Exam    CONSTITUTIONAL:  Vital signs reviewed.  No distress, looks comfortable.  EYES:  Conjunctiva and lids unremarkable.  PERRLA  EARS,NOSE,MOUTH,THROAT:  Ears and nose appear unremarkable.  Lips, teeth, gums appear unremarkable.  RESPIRATORY:  Normal respiratory effort.  Lungs clear to auscultation bilaterally.  CARDIOVASCULAR:  Normal S1, S2.  No murmurs rubs or gallops.  No significant lower extremity edema.  GASTROINTESTINAL: Abdomen appears unremarkable.  Nontender.  No hepatomegaly.  No splenomegaly.  NEURO: cranial nerves 2-12 grossly intact.  No focal deficits.  Appears to have equal strength all 4  extremities.  MUSCULOSKELETAL:  Unremarkable digits/nails.  No cyanosis or clubbing.  SKIN:  Warm.  No rashes.  PSYCHIATRIC:  Normal judgment and insight.  Normal mood and affect.        RECENT LABS:  WBC   Date Value Ref Range Status   08/31/2018 15.28 (H) 4.50 - 10.70 10*3/mm3 Final   08/30/2018 9.48 4.50 - 10.70 10*3/mm3 Final     Hemoglobin   Date Value Ref Range Status   08/31/2018 9.3 (L) 13.7 - 17.6 g/dL Final   08/30/2018 9.3 (L) 13.7 - 17.6 g/dL Final     Platelets   Date Value Ref Range Status   08/31/2018 45 (L) 140 - 500 10*3/mm3 Final   08/30/2018 34 (C) 140 - 500 10*3/mm3 Final       ASSESSMENT/PLAN:  *thrombocytopenia.  B12 and folate unremarkable.  IPF normal, suggesting a production problem.  PTT and INR minimally elevated.  Fibrinogen normal.  No significant schistocytes.  ·  On review of labs dating through 3/26/18, PLT normal, around 160 March 2018 and June 2018.  At the end of June, PLT drop 215.  · Mid to late July 2018, PLT dropped from 120-138, down to a low point of 69 on 7/27/18.  PLT improved up to 92 on 8/4/18.  · PLT around 170-195 8/10/18-8/16/18.  · PLT around 100 on 8/21/18-8/23/18, dropped to 64 on 8/25/18, to 41 on 8/26/18, down to 29 on 8/28/18.  After a 2 unit transfusion, PLT only improved to 40 on 8/28/18.  · HIT test sent 8/27/18, ×2.  Both were negative.  Since IPF is low, which is not consistent with ITP and PLT is not recovering on its own, bone marrow biopsy 8/30/18.  PLT has been fluctuating 30-45.    *Anemia.  Multifactorial.  Hb stable.    *End-stage renal disease.  On hemodialysis since 8/20/18.  Tunneled catheter placed 8/28/18.     *Atrial fibrillation.  He was on Coumadin.  This has been stopped due to thrombocytopenia.     *Family history of different autoimmune conditions including a daughter with ITP.  Usually, ITP does not run in families.  Also, usually ITP does not occur in the setting of other illness.  I suspect his low PLT count is due to his other  significant health issues and I suspect with time (perhaps one or 2 weeks), the PLT count will normalize or at least significantly improved.  Until then, I recommend supportive care with transfusions as needed.  I realize he did not get a significant improvement in PLT after a 2 unit transfusion, only improving from 29 up to 40.  This does support a destructive process.  However, I think high-dose steroids for ITP would only complicate other health issues such as worsening his anasarca.  Furthermore, I doubt he has ITP.  Therefore, I would prefer to not empirically treat for ITP.  Instead, I will follow the PLT count.  Await bone marrow biopsy.     Plan  Await bone marrow biopsy from 8/30/18  Follow CBC

## 2018-09-02 NOTE — PROGRESS NOTES
LOS: 39 days   Patient Care Team:  Gus Cordova MD as PCP - General (Internal Medicine)  Alexandria Benitez MD as Consulting Physician (Nephrology)  Mike Johnson MD as Consulting Physician (Pulmonary Disease)  Oriana Steinberg MD as Consulting Physician (Cardiology)    Chief Complaint/ Reason for encounter: Anasarca, dialysis management      Subjective     Medical history reviewed:  History of Present Illness    Subjective:  Symptoms:  Improved.  He reports weakness.  No shortness of breath or chest pain.  (Weight and edema continue to slowly improve  No shortness of breath, weakness improved  Resting, no interval events noted ).    Diet:  Adequate intake.  No nausea.    Activity level: Returning to normal.    Pain:  He reports no pain.          History taken from: Patient and chart    Objective     Vital Signs  Temp:  [97.4 °F (36.3 °C)-98.7 °F (37.1 °C)] 98.5 °F (36.9 °C)  Heart Rate:  [73-99] 93  Resp:  [16] 16  BP: (104-118)/(58-76) 114/76       Wt Readings from Last 1 Encounters:   09/02/18 0658 132 kg (290 lb 12.8 oz)   09/01/18 0700 135 kg (298 lb 6 oz)   08/31/18 0700 132 kg (290 lb 12.8 oz)   08/30/18 0913 131 kg (288 lb)   08/29/18 0352 134 kg (294 lb 6.4 oz)   08/28/18 0635 133 kg (293 lb 3.2 oz)   08/27/18 1300 131 kg (288 lb 9.3 oz)   08/27/18 0659 135 kg (297 lb 9.6 oz)   08/26/18 0944 135 kg (297 lb)   08/25/18 0622 136 kg (299 lb)   08/23/18 0914 (!) 139 kg (307 lb)   08/21/18 0643 (!) 138 kg (304 lb 8 oz)   08/20/18 0629 (!) 140 kg (309 lb 1.6 oz)   08/19/18 0552 (!) 140 kg (307 lb 14.4 oz)   08/18/18 0524 (!) 140 kg (308 lb 14.4 oz)   08/17/18 0500 (!) 138 kg (305 lb 3.2 oz)   08/16/18 0500 (!) 137 kg (301 lb 14.4 oz)   08/15/18 0618 (!) 137 kg (303 lb)   08/14/18 1300 (!) 138 kg (303 lb 4.8 oz)   08/13/18 0458 135 kg (298 lb 3.2 oz)   08/12/18 1312 132 kg (291 lb)   08/11/18 0615 132 kg (291 lb 3.2 oz)   08/10/18 0554 130 kg (286 lb 14.4 oz)   08/09/18 0500 130 kg (286 lb 6.4 oz)  "  08/08/18 0700 128 kg (282 lb)   08/07/18 0600 122 kg (270 lb)   08/06/18 0705 123 kg (272 lb)   08/05/18 0500 124 kg (273 lb)   08/04/18 0516 108 kg (238 lb 6.4 oz)   08/03/18 0637 129 kg (283 lb 6.4 oz)   08/02/18 0353 132 kg (292 lb 1.6 oz)   08/01/18 0545 134 kg (295 lb 4.8 oz)   07/31/18 0310 133 kg (294 lb 1.6 oz)   07/29/18 1649 131 kg (288 lb 12.8 oz)   07/29/18 0700 59.4 kg (131 lb)   07/28/18 0500 129 kg (284 lb 1.6 oz)   07/26/18 0520 127 kg (280 lb)   07/25/18 1111 125 kg (274 lb 9 oz)       Objective:  General Appearance:  Comfortable, in no acute distress and not in pain (Obese).    Vital signs: (most recent): Blood pressure 114/76, pulse 93, temperature 98.5 °F (36.9 °C), temperature source Oral, resp. rate 16, height 185.4 cm (73\"), weight 132 kg (290 lb 12.8 oz), SpO2 99 %.  Vital signs are normal.  No fever.    Output: Producing urine.    HEENT: Normal HEENT exam.    Lungs:  Normal effort and normal respiratory rate.  Breath sounds clear to auscultation.  He is not in respiratory distress.  No decreased breath sounds.    Heart: Normal rate.  Regular rhythm.  S1 normal.  No murmur.   Abdomen: Abdomen is soft and non-distended.  Bowel sounds are normal.   There is no abdominal tenderness.   (Significant scrotal edema).   Extremities: Normal range of motion.  There is dependent edema.  (Anasarca improved, decreased scrotal edema)  Pulses: Distal pulses are intact.    Neurological: Patient is alert and oriented to person, place and time.    Skin:  Warm and dry.  No rash or cyanosis.             Results Review:    Intake/Output:     Intake/Output Summary (Last 24 hours) at 09/02/18 1353  Last data filed at 09/02/18 1308   Gross per 24 hour   Intake              950 ml   Output              700 ml   Net              250 ml         DATA:  Radiology and Labs:  The following labs independently reviewed by me. Additional labs ordered for tomorrow a.m.  Interval notes, chart personally reviewed by me.   Old " records independently reviewed showing Stage III chronic kidney disease as described above   Discussed dialysis and ultrafiltration plans per Dr. Jameel Delaney  Discussed dialysis with abhay Quevedo RN      Labs:   Recent Results (from the past 24 hour(s))   POC Glucose Once    Collection Time: 09/01/18  3:46 PM   Result Value Ref Range    Glucose 105 70 - 130 mg/dL   POC Glucose Once    Collection Time: 09/01/18  8:06 PM   Result Value Ref Range    Glucose 143 (H) 70 - 130 mg/dL   Protime-INR    Collection Time: 09/02/18  5:01 AM   Result Value Ref Range    Protime 13.8 11.7 - 14.2 Seconds    INR 1.08 0.90 - 1.10   POC Glucose Once    Collection Time: 09/02/18  6:49 AM   Result Value Ref Range    Glucose 113 70 - 130 mg/dL   Renal Function Panel    Collection Time: 09/02/18  7:08 AM   Result Value Ref Range    Glucose 129 (H) 65 - 99 mg/dL    BUN 35 (H) 8 - 23 mg/dL    Creatinine 2.25 (H) 0.76 - 1.27 mg/dL    Sodium 133 (L) 136 - 145 mmol/L    Potassium 5.1 3.5 - 5.2 mmol/L    Chloride 99 98 - 107 mmol/L    CO2 25.0 22.0 - 29.0 mmol/L    Calcium 7.8 (L) 8.6 - 10.5 mg/dL    Albumin 2.10 (L) 3.50 - 5.20 g/dL    Phosphorus 2.9 2.5 - 4.5 mg/dL    Anion Gap 9.0 mmol/L    BUN/Creatinine Ratio 15.6 7.0 - 25.0    eGFR Non African Amer 30 (L) >60 mL/min/1.73   CBC Auto Differential    Collection Time: 09/02/18  7:08 AM   Result Value Ref Range    WBC 11.50 (H) 4.50 - 10.70 10*3/mm3    RBC 4.43 (L) 4.60 - 6.00 10*6/mm3    Hemoglobin 10.2 (L) 13.7 - 17.6 g/dL    Hematocrit 36.7 (L) 40.4 - 52.2 %    MCV 82.8 79.8 - 96.2 fL    MCH 23.0 (L) 27.0 - 32.7 pg    MCHC 27.8 (L) 32.6 - 36.4 g/dL    RDW 22.1 (H) 11.5 - 14.5 %    RDW-SD 64.2 (H) 37.0 - 54.0 fl    MPV  6.0 - 12.0 fL    Platelets 60 (L) 140 - 500 10*3/mm3    Neutrophil % 74.9 42.7 - 76.0 %    Lymphocyte % 13.8 (L) 19.6 - 45.3 %    Monocyte % 6.4 5.0 - 12.0 %    Eosinophil % 4.1 0.3 - 6.2 %    Basophil % 0.2 0.0 - 1.5 %    Immature Grans % 0.6 (H) 0.0 - 0.5 %    Neutrophils,  Absolute 8.61 (H) 1.90 - 8.10 10*3/mm3    Lymphocytes, Absolute 1.59 0.90 - 4.80 10*3/mm3    Monocytes, Absolute 0.74 0.20 - 1.20 10*3/mm3    Eosinophils, Absolute 0.47 0.00 - 0.70 10*3/mm3    Basophils, Absolute 0.02 0.00 - 0.20 10*3/mm3    Immature Grans, Absolute 0.07 (H) 0.00 - 0.03 10*3/mm3    nRBC 0.0 0.0 - 0.0 /100 WBC   POC Glucose Once    Collection Time: 09/02/18 10:39 AM   Result Value Ref Range    Glucose 220 (H) 70 - 130 mg/dL       Radiology:  Imaging Results (last 24 hours)     ** No results found for the last 24 hours. **             Medications have been reviewed:  Current Facility-Administered Medications   Medication Dose Route Frequency Provider Last Rate Last Dose   • acetaminophen (TYLENOL) suppository 650 mg  650 mg Rectal Q4H PRN Glen Brasher MD       • acetaminophen (TYLENOL) tablet 650 mg  650 mg Oral Q4H PRN Glen Brasher MD   650 mg at 08/31/18 1632   • aspirin EC tablet 81 mg  81 mg Oral Daily Glen Brasher MD   81 mg at 09/02/18 0843   • atorvastatin (LIPITOR) tablet 10 mg  10 mg Oral Nightly Terri Jane MD   10 mg at 08/17/18 2020   • bisacodyl (DULCOLAX) EC tablet 10 mg  10 mg Oral Daily PRN Glen Brasher MD   10 mg at 08/24/18 0917   • bisacodyl (DULCOLAX) suppository 10 mg  10 mg Rectal Daily PRN Glen Brasher MD       • bisacodyl (DULCOLAX) suppository 10 mg  10 mg Rectal Once Soraya Wu APRN       • budesonide-formoterol (SYMBICORT) 160-4.5 MCG/ACT inhaler 2 puff  2 puff Inhalation BID - RT Soraya Wu APRN   2 puff at 09/02/18 0801   • cyclobenzaprine (FLEXERIL) tablet 10 mg  10 mg Oral Q8H PRN Glen Brasher MD   10 mg at 08/30/18 1229   • dextrose (D50W) solution 25 g  25 g Intravenous Q15 Min PRN Glen Brasher MD   25 g at 08/09/18 1441   • dextrose (GLUTOSE) oral gel 15 g  15 g Oral Q15 Min PRN Glen Brasher MD       • diphenhydrAMINE (BENADRYL) capsule 25 mg  25 mg Oral Q6H PRN Lina Patiño PA   25  mg at 08/20/18 1221   • epoetin roxi (EPOGEN,PROCRIT) injection 10,000 Units  10,000 Units Intravenous Once per day on Mon Wed Fri Marty Espitia MD   10,000 Units at 08/31/18 1115   • ferric gluconate (FERRLECIT) 125 mg in sodium chloride 0.9 % 100 mL IVPB  125 mg Intravenous Once per day on Mon Wed Fri Marty Espitia  mL/hr at 08/31/18 2130 125 mg at 08/31/18 2130   • ferrous sulfate tablet 325 mg  325 mg Oral Daily With Breakfast Lavon Berger MD   325 mg at 09/02/18 0843   • fluticasone (FLONASE) 50 MCG/ACT nasal spray 2 spray  2 spray Each Nare Daily Renetta Hancock APRN   2 spray at 09/02/18 0843   • glucagon (human recombinant) (GLUCAGEN DIAGNOSTIC) injection 1 mg  1 mg Subcutaneous PRN Glen Brasher MD       • guaiFENesin (MUCINEX) 12 hr tablet 1,200 mg  1,200 mg Oral Q12H Soraya Wu APRN   1,200 mg at 09/02/18 0843   • heparin (porcine) 5000 UNIT/ML injection 5,000 Units  5,000 Units Intravenous Take As Directed Sammy Barillas MD       • heparin (porcine) injection 3,000 Units  3,000 Units Intracatheter PRN Marty Espitia MD   3,000 Units at 08/29/18 1836   • Hold medication  1 each Does not apply Continuous PRN Renetta Hancock APRN       • hydrocortisone 1 % cream   Topical Q12H Lavon Berger MD       • insulin aspart (novoLOG) injection 0-10 Units  0-10 Units Subcutaneous 4x Daily With Meals & Nightly Terri Jane MD   2 Units at 09/01/18 1124   • insulin aspart (novoLOG) injection 8 Units  8 Units Subcutaneous TID With Meals Terri Jane MD   8 Units at 09/02/18 0842   • insulin detemir (LEVEMIR) injection 18 Units  18 Units Subcutaneous Nightly Ata Reza MD   18 Units at 09/01/18 2050   • ipratropium-albuterol (DUO-NEB) nebulizer solution 3 mL  3 mL Nebulization Q4H PRN Glen Brasher MD   3 mL at 08/29/18 1315   • lactulose (CHRONULAC) 10 GM/15ML solution 20 g  20 g Oral Daily Soraya Wu APRN   20 g at 08/27/18 1427   •  levothyroxine (SYNTHROID, LEVOTHROID) tablet 50 mcg  50 mcg Oral Q AM Ata Reza MD   50 mcg at 09/02/18 0513   • Magnesium Sulfate 2 gram infusion - Mg less than or equal to 1.5 mg/dL  2 g Intravenous PRN Glen Brasher MD        Or   • Magesium Sulfate 1 gram infusion - Mg 1.6-1.9 mg/dL  1 g Intravenous PRN Glen Brasher MD       • magic mouthwash oral supsension 10 mL  10 mL Swish & Spit Q4H Glen Brasher MD   10 mL at 09/02/18 0842   • magnesium hydroxide (MILK OF MAGNESIA) suspension 2400 mg/10mL 10 mL  10 mL Oral Daily PRN Glen Brasher MD   10 mL at 08/17/18 1833   • midodrine (PROAMATINE) tablet 5 mg  5 mg Oral TID AC Sae Boothe MD   5 mg at 09/02/18 0843   • montelukast (SINGULAIR) tablet 10 mg  10 mg Oral Daily Soraya Wu APRN   10 mg at 09/02/18 0852   • naloxone (NARCAN) injection 0.4 mg  0.4 mg Intravenous Q5 Min PRN Glen Brasher MD       • nystatin (MYCOSTATIN) 410984 UNIT/ML suspension 500,000 Units  5 mL Swish & Swallow 4x Daily Glen Brasher MD   500,000 Units at 09/02/18 0843   • nystatin (MYCOSTATIN) powder   Topical Q12H Renetta Hancock APRN       • ondansetron (ZOFRAN) injection 4 mg  4 mg Intravenous Q6H PRN Glen Brasher MD   4 mg at 08/20/18 1823   • ondansetron (ZOFRAN) tablet 4 mg  4 mg Oral Q6H PRN Javed Huitron MD        Or   • ondansetron ODT (ZOFRAN-ODT) disintegrating tablet 4 mg  4 mg Oral Q6H PRN Javed Huitron MD        Or   • ondansetron (ZOFRAN) injection 4 mg  4 mg Intravenous Q6H PRN Javed Huitron MD   4 mg at 09/01/18 0051   • oxyCODONE (ROXICODONE) immediate release tablet 10 mg  10 mg Oral Q4H PRN Glen Brasher MD   10 mg at 09/02/18 0852   • pantoprazole (PROTONIX) EC tablet 40 mg  40 mg Oral Daily Glen Brasher MD   40 mg at 09/02/18 0843   • polyethylene glycol (MIRALAX) powder 17 g  17 g Oral Daily Soraya Wu APRN   17 g at 09/02/18 0852   • potassium chloride (MICRO-K) CR  capsule 40 mEq  40 mEq Oral PRN Glen Brasher MD   40 mEq at 08/06/18 2352    Or   • potassium chloride (KLOR-CON) packet 40 mEq  40 mEq Oral PRN Glen Brasher MD       • potassium chloride 10 mEq in 100 mL IVPB  10 mEq Intravenous Q1H PRN Glen Brasher MD        Or   • potassium chloride 10 mEq in 100 mL IVPB  10 mEq Intravenous Q1H PRN Glen Brasher MD       • promethazine (PHENERGAN) tablet 12.5 mg  12.5 mg Oral Q6H PRN Glen Brasher MD        Or   • promethazine (PHENERGAN) injection 12.5 mg  12.5 mg Intravenous Q6H PRN Glen Brasher MD       • Scopolamine (TRANSDERM-SCOP) 1.5 MG/3DAYS patch 1 patch  1 patch Transdermal Q72H Renetta Hancock APRN   1 patch at 08/31/18 1632   • sennosides-docusate sodium (SENOKOT-S) 8.6-50 MG tablet 2 tablet  2 tablet Oral Nightly Glen Brasher MD   2 tablet at 08/31/18 2129   • sodium chloride (OCEAN) nasal spray 2 spray  2 spray Each Nare PRN Glen Brasher MD       • sodium chloride 0.9 % infusion  9 mL/hr Intravenous Continuous PRN Satnam Arguello MD 9 mL/hr at 08/28/18 1220     • sucralfate (CARAFATE) 1 GM/10ML suspension 1 g  1 g Oral Q6H Juan Sanz MD   1 g at 09/02/18 0513   • torsemide (DEMADEX) tablet 100 mg  100 mg Oral Daily Donnell Madison MD   100 mg at 09/02/18 0843   • zolpidem (AMBIEN) tablet 10 mg  10 mg Oral Nightly PRN Roslyn Jimenez MD   10 mg at 09/01/18 2347       ASSESSMENT:  ESRD, continue Monday Wednesday Friday dialysis  volume overload/anasarca, slowly improving with dialysis  Hyponatremia, adjust sodium bath with dialysis today  Hypoalbuminemia, contributing to edema, no proteinuria   Severe aortic stenosis status post replacement  Tricuspid insufficiency status post repair  Pulmonary hypertension  Acute on chronic CHF  hypotension related to heart failure, stable on midodrine  Anemia of chronic kidney disease: Epogen, IV iron  Thrombocytopenia, hematology following, no heparin with  dialysis  pleural effusions      PLAN:   Continue dialysis, Monday Wednesday Friday schedule, ultrafiltration as tolerated with dialysis.  Bone marrow biopsy results pending, platelet count 60k today  Continue midodrine, torsemide, BP support on dialysis with albumin and mannitol as needed  Eventual AVF once platelets are better    Ronald Olivares MD   [facility]   Office phone number: [phone]  Answering service phone number: [phone]    09/02/18  1:53 PM      Dictation performed using Dragon dictation software

## 2018-09-02 NOTE — THERAPY TREATMENT NOTE
Acute Care - Physical Therapy Treatment Note  [facility]     Patient Name: Bj Duarte  : 1955  MRN: [id number]  Today's Date: 2018  Onset of Illness/Injury or Date of Surgery: 18  Date of Referral to PT: 18  Referring Physician: Jazmin    Admit Date: 2018    Visit Dx:    ICD-10-CM ICD-9-CM   1. Non-rheumatic tricuspid valve insufficiency I36.1 424.2   2. Pulmonary hypertension I27.20 416.8   3. Acute on chronic systolic congestive heart failure (CMS/HCC) I50.23 428.23     428.0   4. Nonrheumatic aortic valve stenosis I35.0 424.1   5. Persistent atrial fibrillation (CMS/HCC) I48.1 427.31   6. Generalized edema R60.1 782.3   7. Nausea R11.0 787.02   8. Obstructive sleep apnea G47.33 327.23     Patient Active Problem List   Diagnosis   • Permanent atrial fibrillation (CMS/Formerly [facility])   • Right heart failure   • Pulmonary HTN   • Type 2 diabetes mellitus with diabetic peripheral angiopathy without gangrene, without long-term current use of insulin (CMS/[facility])   • Obstructive sleep apnea   • Nonrheumatic aortic valve stenosis   • Non-rheumatic tricuspid valve insufficiency   • CKD (chronic kidney disease) stage 3, GFR 30-59 ml/min   • Generalized edema   • Hyperkalemia   • Pulmonary hypertension   • Acute on chronic systolic congestive heart failure (CMS/HCC)   • Hypoalbuminemia   • Protein malnutrition (CMS/[facility])   • Type 2 diabetes mellitus with complication, with long-term current use of insulin (CMS/[facility])   • Primary hypothyroidism   • End stage renal disease (CMS/[facility])   • Thrombocytopenia (CMS/[facility]ly [facility])       Therapy Treatment          Rehabilitation Treatment Summary     Row Name 18 0930             Treatment Time/Intention    Discipline physical therapist  -AL      Document Type therapy note (daily note)  -AL      Subjective Information complains of;weakness;pain  -AL      Mode of Treatment physical therapy  -AL      Patient/Family Observations Sitting up in chair.  -AL      Therapy  Frequency (PT Clinical Impression) daily  -AL      Patient Effort adequate  -AL      Existing Precautions/Restrictions fall  -AL      Recorded by [AL] Marsha Groves, PT 09/02/18 0952      Row Name 09/02/18 0930             Cognitive Assessment/Intervention- PT/OT    Orientation Status (Cognition) oriented x 4  -AL      Follows Commands (Cognition) WFL  -AL      Recorded by [AL] Marsha Groves, PT 09/02/18 0952      Row Name 09/02/18 0930             Cognitive Assessment Intervention- SLP    Cognitive Function (Cognition) WFL  -AL      Recorded by [AL] Marsha Groves, PT 09/02/18 0952      Row Name 09/02/18 0930             Bed Mobility Assessment/Treatment    Bed Mobility Assessment/Treatment supine-sit;sit-supine  -AL      Supine-Sit [place] (Bed Mobility) not tested   up in chair  -AL      Sit-Supine [place] (Bed Mobility) not tested   up in chair  -AL      Recorded by [AL] Marsha Groves, PT 09/02/18 0952      Row Name 09/02/18 0930             Transfer Assessment/Treatment    Transfer Assessment/Treatment sit-stand transfer;stand-sit transfer  -AL      Maintains Weight-bearing Status (Transfers) able to maintain  -AL      Recorded by [AL] Marsha Groves, PT 09/02/18 0952      Row Name 09/02/18 0930             Sit-Stand Transfer    Sit-Stand [place] (Transfers) moderate assist (50% patient effort);2 person assist;verbal cues;nonverbal cues (demo/gesture)  -AL      Assistive Device (Sit-Stand Transfers) walker, front-wheeled  -AL      Recorded by [AL] Marsha Groves, PT 09/02/18 0952      Row Name 09/02/18 0930             Stand-Sit Transfer    Stand-Sit [place] (Transfers) moderate assist (50% patient effort);2 person assist;verbal cues;nonverbal cues (demo/gesture)  -AL      Assistive Device (Stand-Sit Transfers) walker, front-wheeled  -AL      Recorded by [AL] Marsha Groves, PT 09/02/18 0952      Row Name 09/02/18 0930             Gait/Stairs Assessment/Training    Gait/Stairs Assessment/Training  gait/ambulation independence  -AL      [place] Level (Gait) contact guard;2 person assist;verbal cues;nonverbal cues (demo/gesture)  -AL      Assistive Device (Gait) walker, front-wheeled  -AL      Distance in Feet (Gait) 60  -AL      Pattern (Gait) step-through  -AL      Deviations/Abnormal Patterns (Gait) kimber decreased;gait speed decreased  -AL      Bilateral Gait Deviations forward flexed posture  -AL      Maintains Weight-bearing Status (Gait) able to maintain  -AL      Recorded by [AL] Marsha Groves, PT 09/02/18 0952      Row Name 09/02/18 0930             Positioning and Restraints    Pre-Treatment Position sitting in chair/recliner  -AL      Post Treatment Position chair  -AL      In Chair sitting;call light within reach  -AL      Recorded by [AL] Marsha Groves, PT 09/02/18 0952      Row Name 09/02/18 0930             Pain Assessment    Additional Documentation Pain Scale: Numbers Pre/Post-Treatment (Group)  -AL      Recorded by [AL] Marsha Groves, PT 09/02/18 0952      Row Name 09/02/18 0930             Pain Scale: Numbers Pre/Post-Treatment    Pain Scale: Numbers, Pretreatment 4/10  -AL      Pain Scale: Numbers, Post-Treatment 5/10  -AL      Pain Location - Side Bilateral  -AL      Pain Location --   legs  -AL      Recorded by [AL] Marsha Groves, PT 09/02/18 0952      Row Name                Wound 07/26/18 0900 midline chest surgical    Wound - Properties Group Date first assessed: 07/26/18 [RS] Time first assessed: 0900 [RS] Orientation: midline [RS] Location: chest [RS] Type: surgical [RS] Recorded by:  [RS] Milka Hernandez RN 07/29/18 1618    Row Name                Wound 08/03/18 0830 Right coccyx pressure injury    Wound - Properties Group Date first assessed: 08/03/18 [RS] Time first assessed: 0830 [RS] Present On Admission : no;picture taken [RS] Side: Right [RS] Location: coccyx [RS] Type: pressure injury [RS] Stage, Pressure Injury: Stage 1 [RS] Recorded by:  [RS] Milka Hernandez RN  08/03/18 1459    Row Name                Wound 08/14/18 Left toe    Wound - Properties Group Date first assessed: 08/14/18 [AH] Present On Admission : no [AH] Side: Left [AH] Location: toe [AH] Recorded by:  [AH] Deisy English RN 08/14/18 1743    Row Name                Wound 08/23/18 Right other (see comments) foot other (see comments)    Wound - Properties Group Date first assessed: 08/23/18 [DA] Side: Right [DA] Orientation: other (see comments) [DA], plantar  Location: foot [DA] Type: other (see comments) [DA] Additional Comments: patient states the wound was callused prior- has been open in recent past [DA] Recorded by:  [DA] Susanna Dc RN, CWANGELAN 08/23/18 1629    Row Name                Wound 08/28/18 1412 Right neck incision    Wound - Properties Group Date first assessed: 08/28/18 [WC] Time first assessed: 1412 [WC] Side: Right [WC] Location: neck [WC] Type: incision [WC] Recorded by:  [WC] Javed Lamb RN 08/28/18 1412    Row Name 09/02/18 0930             Plan of Care Review    Plan of Care Reviewed With patient  -AL      Recorded by [AL] Marsha Groves, PT 09/02/18 0952      Row Name 09/02/18 0930             Outcome Summary/Treatment Plan (PT)    Daily Summary of Progress (PT) progress towards functional goals is fair  -AL      Plan for Continued Treatment (PT) Focus on ambulation distance.  -AL      Recorded by [AL] Marsha Groves, PT 09/02/18 0952        User Key  (r) = Recorded By, (t) = Taken By, (c) = Cosigned By    Initials Name Effective Dates Discipline    DA Susanna Dc RN, CWOCN 06/16/16 -  Nurse    Marsha Tatum, PT 04/03/18 -  PT    Deisy Funes RN 06/16/16 -  Nurse    Milka Gatica RN 06/16/16 -  Nurse    Javed Sheikh RN 11/09/17 -  Nurse          Rash 08/18/18 1000 other (see comments) thigh patch (Active)   Distribution generalized 9/2/2018  8:10 AM   Configuration/Shape asymmetric 9/2/2018  8:10 AM   Characteristics dry 9/2/2018  8:10 AM    Color pink 9/2/2018  8:10 AM   Care, Rash open to air 9/1/2018  8:44 PM       Wound Bilateral lower leg  (Active)   Dressing Appearance dry;intact;no drainage 9/2/2018  8:10 AM   Closure KIMANI 9/2/2018  8:10 AM   Base dressing in place, unable to visualize 9/2/2018  8:10 AM       Wound 07/26/18 0900 midline chest surgical (Active)   Dressing Appearance open to air 9/2/2018  8:10 AM   Closure Approximated 9/2/2018  8:10 AM   Base clean;dry;pink;scab 9/2/2018  8:10 AM   Periwound intact;pink;dry 9/2/2018  8:10 AM   Periwound Temperature warm 9/2/2018  8:10 AM   Drainage Amount none 9/2/2018  8:10 AM   Care, Wound cleansed with 9/2/2018  8:10 AM   Dressing Care, Wound open to air 9/2/2018  8:10 AM       Wound 08/03/18 0830 Right coccyx pressure injury (Active)   Dressing Appearance open to air 9/2/2018  8:10 AM   Base pink;dry;clean 9/2/2018  8:10 AM   Periwound non-blanchable 9/2/2018  8:10 AM   Periwound Temperature warm 9/2/2018  8:10 AM   Drainage Amount none 9/2/2018  8:10 AM   Care, Wound cleansed with;soap and water 9/1/2018  8:44 PM   Dressing Care, Wound foam;low-adherent 9/2/2018  8:10 AM   Periwound Care, Wound barrier ointment applied 9/2/2018  8:10 AM       Wound 08/14/18 Left toe (Active)   Dressing Appearance open to air 9/2/2018  8:10 AM   Closure Open to air 9/2/2018  8:10 AM   Base clean;pink;dry 9/2/2018  8:10 AM   Periwound dry;pink;ecchymotic 9/2/2018  8:10 AM   Periwound Temperature warm 9/2/2018  4:55 AM   Drainage Amount none 9/2/2018  8:10 AM   Care, Wound cleansed with 9/2/2018  8:10 AM   Dressing Care, Wound foam;low-adherent 9/2/2018  8:10 AM       Wound 08/23/18 Right other (see comments) foot other (see comments) (Active)   Dressing Appearance dry;intact;no drainage 9/2/2018  8:10 AM   Closure KIMANI 9/2/2018  8:10 AM   Base dressing in place, unable to visualize 9/2/2018  8:10 AM       Wound 08/28/18 1412 Right neck incision (Active)   Dressing Appearance open to air 9/2/2018  8:10 AM    Closure Approximated;Liquid skin adhesive 9/2/2018  8:10 AM   Base pink;scab 9/2/2018  8:10 AM   Periwound dry;pink 9/2/2018  8:10 AM   Periwound Temperature warm 9/2/2018  4:55 AM   Drainage Amount none 9/2/2018  4:55 AM   Care, Wound cleansed with 9/2/2018  8:10 AM   Dressing Care, Wound open to air 9/2/2018  4:55 AM             Physical Therapy Education     Title: PT OT SLP Therapies (Done)     Topic: Physical Therapy (Done)     Point: Mobility training (Done)    Learning Progress Summary     Learner Status Readiness Method Response Comment Documented by    Patient Done Acceptance E VU  AL 09/02/18 0952     Done Acceptance E,TB VU  KH 08/18/18 1017     Done Acceptance E,D KYA LATHAM  JR 08/11/18 1113     Done Acceptance E VU  LA 08/08/18 1513     Done Acceptance E VU  PM 08/07/18 1003     Done Acceptance E,TB VU needs reinforcement of sternal prec, ed on 5 lb limit, body mech: uses pillows to elevate seat in chair. reminder up only with assist. SP 08/05/18 0957     Done Acceptance E,TB VU for ex ed to do glute sets to increase circulation for periwound needs/scrotal swelling, ice for pain and ed on prec needs assist when up and gait belt. Shoes off as soon as not needed due to LE edema. SP 08/04/18 0955     Done Acceptance E,D DU  PC 08/03/18 1107     Done Acceptance E VU  PM 07/30/18 1047     Done Acceptance E,TB VU  CS 07/29/18 0945     Done Acceptance E,TB VU  CS 07/28/18 0945          Point: Home exercise program (Done)    Learning Progress Summary     Learner Status Readiness Method Response Comment Documented by    Patient Done Acceptance E,D KYA LATHAM  JR 08/11/18 1113     Done Acceptance E VU  LA 08/08/18 1513     Done Acceptance E VU  PM 08/07/18 1003     Done Acceptance E,TB VU needs reinforcement of sternal prec, ed on 5 lb limit, body mech: uses pillows to elevate seat in chair. reminder up only with assist. SP 08/05/18 0957     Done Acceptance E,TB VU for ex ed to do glute sets to increase circulation for  periwound needs/scrotal swelling, ice for pain and ed on prec needs assist when up and gait belt. Shoes off as soon as not needed due to LE edema. SP 08/04/18 0955     Done Acceptance E,D DU  PC 08/03/18 1107     Done Acceptance E,TB VU  CS 07/29/18 0945          Point: Body mechanics (Done)    Learning Progress Summary     Learner Status Readiness Method Response Comment Documented by    Patient Done Acceptance E VU  AL 09/02/18 0952     Done Acceptance E,TB YEISON  KH 08/18/18 1017     Done Acceptance E,D KYA LATHAM  JR 08/11/18 1113     Done Acceptance E VU  LA 08/08/18 1513     Done Acceptance E VU  PM 08/07/18 1003     Done Acceptance E,TB VU needs reinforcement of sternal prec, ed on 5 lb limit, body mech: uses pillows to elevate seat in chair. reminder up only with assist. SP 08/05/18 0957     Done Acceptance E,TB VU for ex ed to do glute sets to increase circulation for periwound needs/scrotal swelling, ice for pain and ed on prec needs assist when up and gait belt. Shoes off as soon as not needed due to LE edema. SP 08/04/18 0955     Done Acceptance E,D DU  PC 08/03/18 1107     Done Acceptance E VU  PM 07/30/18 1047     Done Acceptance E,TB YEISON  CS 07/29/18 0945     Done Acceptance E,TB VU  CS 07/28/18 0945          Point: Precautions (Done)    Learning Progress Summary     Learner Status Readiness Method Response Comment Documented by    Patient Done Acceptance E VU  AL 09/02/18 0952     Done Acceptance E YEISON QUINONEZ 08/31/18 1007     Done Acceptance E YEISON QUINONEZ 08/30/18 1005     Done Acceptance E YEISON QUINONEZ 08/24/18 1003     Done Acceptance E YEISON QUINONEZ 08/23/18 0933     Done Acceptance E YEISON QUINONEZ 08/22/18 1413     Done Acceptance E,WALKER LATHAM  KH 08/18/18 1017     Done Acceptance E YEISON QUINONEZ 08/16/18 1103     Done Acceptance E YEISON QUINONEZ 08/15/18 1632     Done Acceptance E,D KYA LATHAM  JR 08/11/18 1113     Done Acceptance E YEISON QUINONEZ 08/10/18 1035     Done Acceptance E VU  LA 08/08/18 1513     Done Acceptance E VU  PM 08/07/18 1003     Done  Acceptance E VU  MD 08/06/18 0914     Done Acceptance E,TB VU needs reinforcement of sternal prec, ed on 5 lb limit, body mech: uses pillows to elevate seat in chair. reminder up only with assist. SP 08/05/18 0957     Done Acceptance E,TB VU for ex ed to do glute sets to increase circulation for periwound needs/scrotal swelling, ice for pain and ed on prec needs assist when up and gait belt. Shoes off as soon as not needed due to LE edema. SP 08/04/18 0955     Done Acceptance E,D DU  PC 08/03/18 1107     Done Acceptance E VU  MD 08/02/18 1005     Done Acceptance E VU  MD 08/01/18 0951     Done Acceptance E VU  PM 07/30/18 1047     Done Acceptance E,TB VU  CS 07/29/18 0945     Done Acceptance E,TB VU  CS 07/28/18 0945     Done Acceptance E VU  MD 07/27/18 0847                      User Key     Initials Effective Dates Name Provider Type Discipline    LA 06/16/16 -  Veronika Flores, RN Registered Nurse Nurse    PC 04/03/18 -  Dione Short, PT Physical Therapist PT    MD 04/03/18 -  Effie Carey, PT Physical Therapist PT    AL 04/03/18 -  Marsha Groves, PT Physical Therapist PT    JR 04/03/18 -  Mike Street, PT Physical Therapist PT    KH 06/22/16 -  Roseanne Loaiza, PT Physical Therapist PT    SP 04/03/18 -  Ines Guaman, PT Physical Therapist PT    CS 05/14/18 -  Carmelo Denney, PT Physical Therapist PT    PM 06/25/18 -  Emerson Messer, PT Student PT Student PT                    PT Recommendation and Plan  Therapy Frequency (PT Clinical Impression): daily  Outcome Summary/Treatment Plan (PT)  Daily Summary of Progress (PT): progress towards functional goals is fair  Plan for Continued Treatment (PT): Focus on ambulation distance.  Plan of Care Reviewed With: patient  Progress: no change  Outcome Summary: Patient participated well with skilled PT. No change in ambulation distance this visit seocndary to fatigue. Will continue to progress patient towards goals.          Outcome Measures     Row Name 09/02/18  0900 08/31/18 1000 08/30/18 1000       How much help from another person do you currently need...    Turning from your back to your side while in flat bed without using bedrails? 2  -AL 2  -MD 3  -MD    Moving from lying on back to sitting on the side of a flat bed without bedrails? 2  -AL 2  -MD 2  -MD    Moving to and from a bed to a chair (including a wheelchair)? 2  -AL 2  -MD 2  -MD    Standing up from a chair using your arms (e.g., wheelchair, bedside chair)? 2  -AL 2  -MD 2  -MD    Climbing 3-5 steps with a railing? 2  -AL 2  -MD 2  -MD    To walk in hospital room? 3  -AL 3  -MD 3  -MD    AM-PAC 6 Clicks Score 13  -AL 13  -MD 14  -MD       Functional Assessment    Outcome Measure Options AM-PAC 6 Clicks Basic Mobility (PT)  -AL AM-PAC 6 Clicks Basic Mobility (PT)  -MD AM-PAC 6 Clicks Basic Mobility (PT)  -MD      User Key  (r) = Recorded By, (t) = Taken By, (c) = Cosigned By    Initials Name Provider Type    Effie Ventura, PT Physical Therapist    Marsha Tatum, PT Physical Therapist           Time Calculation:         PT Charges     Row Name 09/02/18 0954             Time Calculation    Start Time 0930  -AL      Stop Time 0939  -AL      Time Calculation (min) 9 min  -AL        User Key  (r) = Recorded By, (t) = Taken By, (c) = Cosigned By    Initials Name Provider Type    Marsha Tatum, PT Physical Therapist        Therapy Suggested Charges     Code   Minutes Charges    36004 (CPT®) Hc Pt Neuromusc Re Education Ea 15 Min      09042 (CPT®) Hc Pt Ther Proc Ea 15 Min      64374 (CPT®) Hc Gait Training Ea 15 Min      90191 (CPT®) Hc Pt Therapeutic Act Ea 15 Min 2     43549 (CPT®) Hc Pt Manual Therapy Ea 15 Min      01369 (CPT®) Hc Pt Iontophoresis Ea 15 Min      46127 (CPT®) Hc Pt Elec Stim Ea-Per 15 Min      47039 (CPT®) Hc Pt Ultrasound Ea 15 Min      26304 (CPT®) Hc Pt Self Care/Mgmt/Train Ea 15 Min      60610 (CPT®) Hc Pt Prosthetic (S) Train Initial Encounter, Each 15 Min      15290 (CPT®) Hc Pt  Orthotic(S)/Prosthetic(S) Encounter, Each 15 Min      43777 (CPT®) Hc Orthotic(S) Mgmt/Train Initial Encounter, Each 15min      Total  2         Therapy Charges for Today     Code Description Service Date Service Provider Modifiers Qty    12093499663 HC PT THER SUPP EA 15 MIN 9/2/2018 Marsha Groves, PT GP 1    21222729426 HC PT THER PROC EA 15 MIN 9/2/2018 Marsha Groves, PT GP 1          PT G-Codes  Outcome Measure Options: AM-PAC 6 Clicks Basic Mobility (PT)    Marsha Groves, PT  9/2/2018

## 2018-09-02 NOTE — PROGRESS NOTES
" LOS: 39 days   Patient Care Team:  Gus Cordova MD as PCP - General (Internal Medicine)  Alexandria Benitez MD as Consulting Physician (Nephrology)  Mike Johnson MD as Consulting Physician (Pulmonary Disease)  Oriana Steinberg MD as Consulting Physician (Cardiology)    Chief Complaint: post op fu    Subjective:  Symptoms:  No shortness of breath or chest pain.    Diet:  Adequate intake.    Activity level: Impaired due to weakness.    Pain:  He reports no pain.          Vital Signs  Temp:  [97.4 °F (36.3 °C)-98.7 °F (37.1 °C)] 97.7 °F (36.5 °C)  Heart Rate:  [73-99] 94  Resp:  [16] 16  BP: (104-118)/(58-74) 108/71  Body mass index is 38.37 kg/m².    Intake/Output Summary (Last 24 hours) at 09/02/18 1053  Last data filed at 09/02/18 0845   Gross per 24 hour   Intake              830 ml   Output              700 ml   Net              130 ml     I/O this shift:  In: 240 [P.O.:240]  Out: -       1    08/31/18  0700 09/01/18  0700 09/02/18  0658   Weight: 132 kg (290 lb 12.8 oz) 135 kg (298 lb 6 oz) 132 kg (290 lb 12.8 oz)         Objective:  General Appearance:  Comfortable.    Vital signs: (most recent): Blood pressure 108/71, pulse 94, temperature 97.7 °F (36.5 °C), temperature source Oral, resp. rate 16, height 185.4 cm (73\"), weight 132 kg (290 lb 12.8 oz), SpO2 99 %.    Output: Producing urine.    Lungs:  Normal effort and normal respiratory rate.  (On room air)  Heart: Normal rate.  Irregular rhythm.    Extremities: There is dependent edema (4+ lower extremities and penile).    Neurological: Patient is alert and oriented to person, place and time.    Skin:  Warm and dry.  (Stable sternum)            Results Review:        WBC WBC   Date Value Ref Range Status   09/02/2018 11.50 (H) 4.50 - 10.70 10*3/mm3 Final   09/01/2018 13.79 (H) 4.50 - 10.70 10*3/mm3 Final   08/31/2018 15.28 (H) 4.50 - 10.70 10*3/mm3 Final      HGB Hemoglobin   Date Value Ref Range Status   09/02/2018 10.2 (L) 13.7 - 17.6 g/dL " Final   09/01/2018 9.7 (L) 13.7 - 17.6 g/dL Final   08/31/2018 9.3 (L) 13.7 - 17.6 g/dL Final      HCT Hematocrit   Date Value Ref Range Status   09/02/2018 36.7 (L) 40.4 - 52.2 % Final   09/01/2018 34.4 (L) 40.4 - 52.2 % Final   08/31/2018 32.5 (L) 40.4 - 52.2 % Final      Platelets Platelets   Date Value Ref Range Status   09/02/2018 60 (L) 140 - 500 10*3/mm3 Final   09/01/2018 30 (C) 140 - 500 10*3/mm3 Final   08/31/2018 45 (L) 140 - 500 10*3/mm3 Final        PT/INR:    Protime   Date Value Ref Range Status   09/02/2018 13.8 11.7 - 14.2 Seconds Final   09/01/2018 16.6 (H) 11.7 - 14.2 Seconds Final   08/31/2018 14.9 (H) 11.7 - 14.2 Seconds Final   /  INR   Date Value Ref Range Status   09/02/2018 1.08 0.90 - 1.10 Final   09/01/2018 1.36 (H) 0.90 - 1.10 Final   08/31/2018 1.19 (H) 0.90 - 1.10 Final       Sodium Sodium   Date Value Ref Range Status   09/02/2018 133 (L) 136 - 145 mmol/L Final   09/01/2018 133 (L) 136 - 145 mmol/L Final   08/31/2018 133 (L) 136 - 145 mmol/L Final      Potassium Potassium   Date Value Ref Range Status   09/02/2018 5.1 3.5 - 5.2 mmol/L Final   09/01/2018 5.1 3.5 - 5.2 mmol/L Final   08/31/2018 4.7 3.5 - 5.2 mmol/L Final      Chloride Chloride   Date Value Ref Range Status   09/02/2018 99 98 - 107 mmol/L Final   09/01/2018 101 98 - 107 mmol/L Final   08/31/2018 100 98 - 107 mmol/L Final      Bicarbonate CO2   Date Value Ref Range Status   09/02/2018 25.0 22.0 - 29.0 mmol/L Final   09/01/2018 21.1 (L) 22.0 - 29.0 mmol/L Final   08/31/2018 25.0 22.0 - 29.0 mmol/L Final      BUN BUN   Date Value Ref Range Status   09/02/2018 35 (H) 8 - 23 mg/dL Final   09/01/2018 26 (H) 8 - 23 mg/dL Final   08/31/2018 32 (H) 8 - 23 mg/dL Final      Creatinine Creatinine   Date Value Ref Range Status   09/02/2018 2.25 (H) 0.76 - 1.27 mg/dL Final   09/01/2018 1.98 (H) 0.76 - 1.27 mg/dL Final   08/31/2018 2.28 (H) 0.76 - 1.27 mg/dL Final      Calcium Calcium   Date Value Ref Range Status   09/02/2018 7.8 (L)  8.6 - 10.5 mg/dL Final   09/01/2018 7.5 (L) 8.6 - 10.5 mg/dL Final   08/31/2018 7.8 (L) 8.6 - 10.5 mg/dL Final      Magnesium No results found for: MG         aspirin 81 mg Oral Daily   atorvastatin 10 mg Oral Nightly   bisacodyl 10 mg Rectal Once   budesonide-formoterol 2 puff Inhalation BID - RT   epoetin roxi 10,000 Units Intravenous Once per day on Mon Wed Fri   sodium ferric gluconate complex IVPB in 100 mL  mg Intravenous Once per day on Mon Wed Fri   ferrous sulfate 325 mg Oral Daily With Breakfast   fluticasone 2 spray Each Nare Daily   guaiFENesin 1,200 mg Oral Q12H   heparin (porcine) 5,000 Units Intravenous Take As Directed   hydrocortisone  Topical Q12H   insulin aspart 0-10 Units Subcutaneous 4x Daily With Meals & Nightly   insulin aspart 8 Units Subcutaneous TID With Meals   insulin detemir 18 Units Subcutaneous Nightly   lactulose 20 g Oral Daily   levothyroxine 50 mcg Oral Q AM   magic mouthwash 10 mL Swish & Spit Q4H   midodrine 5 mg Oral TID AC   montelukast 10 mg Oral Daily   nystatin 5 mL Swish & Swallow 4x Daily   nystatin  Topical Q12H   pantoprazole 40 mg Oral Daily   polyethylene glycol 17 g Oral Daily   Scopolamine 1 patch Transdermal Q72H   sennosides-docusate sodium 2 tablet Oral Nightly   sucralfate 1 g Oral Q6H   torsemide 100 mg Oral Daily       hold 1 each    sodium chloride 9 mL/hr Last Rate: 9 mL/hr (08/28/18 1220)           Patient Active Problem List   Diagnosis Code   • Permanent atrial fibrillation (CMS/[facility]) I48.2   • Right heart failure I50.810   • Pulmonary HTN I27.20   • Type 2 diabetes mellitus with diabetic peripheral angiopathy without gangrene, without long-term current use of insulin (CMS/[facility]) E11.51   • Obstructive sleep apnea G47.33   • Nonrheumatic aortic valve stenosis I35.0   • Non-rheumatic tricuspid valve insufficiency I36.1   • CKD (chronic kidney disease) stage 3, GFR 30-59 ml/min N18.3   • Generalized edema R60.1   • Hyperkalemia E87.5   • Pulmonary  hypertension I27.20   • Acute on chronic systolic congestive heart failure (CMS/[facility]) I50.23   • Hypoalbuminemia E88.09   • Protein malnutrition (CMS/[facility]) E46   • Type 2 diabetes mellitus with complication, with long-term current use of insulin (CMS/[facility]) E11.8, Z79.4   • Primary hypothyroidism E03.9   • End stage renal disease (CMS/[facility]) N18.6   • Thrombocytopenia (CMS/[facility]) D69.6       Assessment & Plan    -Severe aortic stenosis and tricuspid regurgitation-s/p AVR, TVr, CIERA, RAA----POD #38 (Anshu)      Able to ambulate with PT with decreased scrotal edema, stopping r/t fatigue.  Platelets up to 60 today, still await bone marrow result.  Holding coumadin until ok with hematology.        IRENE Lawson  09/02/18  10:53 AM     Hematology workup ongoing.

## 2018-09-02 NOTE — PROGRESS NOTES
REASON FOR FOLLOWUP/CHIEF COMPLAINT:  Thrombocytopenia    HISTORY OF PRESENT ILLNESS:   Denies bleeding.  No new problems overnight.    Past Medical History, Past Surgical History, Social History, Family History have been reviewed and are without significant changes except as mentioned.    Review of Systems   Review of Systems   Constitutional: Negative for activity change.   HENT: Negative for nosebleeds and trouble swallowing.    Respiratory: Negative for shortness of breath and wheezing.    Cardiovascular: Negative for chest pain and palpitations.   Gastrointestinal: Negative for constipation, diarrhea and nausea.   Genitourinary: Negative for dysuria and hematuria.   Musculoskeletal: Negative for arthralgias and myalgias.   Neurological: Negative for seizures and syncope.   Hematological: Negative for adenopathy. Does not bruise/bleed easily.   Psychiatric/Behavioral: Negative for confusion.       Medications:  The current medication list was reviewed in the EMR    ALLERGIES:  No Known Allergies           Vitals:    09/02/18 0455 09/02/18 0658 09/02/18 0801 09/02/18 0809   BP: 104/58   108/71   BP Location: Right arm   Right arm   Patient Position: Lying   Sitting   Pulse: 73  84 94   Resp: 16  16 16   Temp: 97.4 °F (36.3 °C)   97.7 °F (36.5 °C)   TempSrc: Oral   Oral   SpO2: 100%   99%   Weight:  132 kg (290 lb 12.8 oz)     Height:         Physical Exam    CONSTITUTIONAL:  Vital signs reviewed.  No distress, looks comfortable.  EYES:  Conjunctiva and lids unremarkable.  PERRLA  EARS,NOSE,MOUTH,THROAT:  Ears and nose appear unremarkable.  Lips, teeth, gums appear unremarkable.  RESPIRATORY:  Normal respiratory effort.  Lungs clear to auscultation bilaterally.  CARDIOVASCULAR:  Normal S1, S2.  No murmurs rubs or gallops.  No significant lower extremity edema.  GASTROINTESTINAL: Abdomen appears unremarkable.  Nontender.  No hepatomegaly.  No splenomegaly.  NEURO: cranial nerves 2-12 grossly intact.  No  focal deficits.  Appears to have equal strength all 4 extremities.  MUSCULOSKELETAL:  Unremarkable digits/nails.  No cyanosis or clubbing.  SKIN:  Warm.  No rashes.  PSYCHIATRIC:  Normal judgment and insight.  Normal mood and affect.     RECENT LABS:  WBC   Date Value Ref Range Status   09/02/2018 11.50 (H) 4.50 - 10.70 10*3/mm3 Final   09/01/2018 13.79 (H) 4.50 - 10.70 10*3/mm3 Final   08/31/2018 15.28 (H) 4.50 - 10.70 10*3/mm3 Final     Hemoglobin   Date Value Ref Range Status   09/02/2018 10.2 (L) 13.7 - 17.6 g/dL Final   09/01/2018 9.7 (L) 13.7 - 17.6 g/dL Final   08/31/2018 9.3 (L) 13.7 - 17.6 g/dL Final     Platelets   Date Value Ref Range Status   09/02/2018 60 (L) 140 - 500 10*3/mm3 Final   09/01/2018 30 (C) 140 - 500 10*3/mm3 Final   08/31/2018 45 (L) 140 - 500 10*3/mm3 Final       ASSESSMENT/PLAN:  *thrombocytopenia.  B12 and folate unremarkable.  IPF normal, suggesting a production problem.  PTT and INR minimally elevated.  Fibrinogen normal.  No significant schistocytes.  ·  On review of labs dating through 3/26/18, PLT normal, around 160 March 2018 and June 2018.  At the end of June, PLT drop 215.  · Mid to late July 2018, PLT dropped from 120-138, down to a low point of 69 on 7/27/18.  PLT improved up to 92 on 8/4/18.  · PLT around 170-195 8/10/18-8/16/18.  · PLT around 100 on 8/21/18-8/23/18, dropped to 64 on 8/25/18, to 41 on 8/26/18, down to 29 on 8/28/18.  After a 2 unit transfusion, PLT only improved to 40 on 8/28/18.  · HIT test sent 8/27/18, ×2.  Both were negative.  Since IPF is low, which is not consistent with ITP and PLT is not recovering on its own, bone marrow biopsy 8/30/18.  PLT has been fluctuating 30-45.  PLT surprisingly 60 today.    *Anemia.  Multifactorial.  Hb stable/slightly better.    *End-stage renal disease.  On hemodialysis since 8/20/18.  Tunneled catheter placed 8/28/18.     *Atrial fibrillation.  He was on Coumadin.  This has been stopped due to  thrombocytopenia.     *Family history of different autoimmune conditions including a daughter with ITP.  Usually, ITP does not run in families.  Also, usually ITP does not occur in the setting of other illness.  I suspect his low PLT count is due to his other significant health issues and I suspect with time (perhaps one or 2 weeks), the PLT count will normalize or at least significantly improved.  Until then, I recommend supportive care with transfusions as needed.  I realize he did not get a significant improvement in PLT after a 2 unit transfusion, only improving from 29 up to 40.  This does support a destructive process.  However, I think high-dose steroids for ITP would only complicate other health issues such as worsening his anasarca.  Furthermore, I doubt he has ITP.  Therefore, I would prefer to not empirically treat for ITP.  Instead, I will follow the PLT count.  Await bone marrow biopsy.     Plan  Await bone marrow biopsy from 8/30/18    Daughter assisted with history.

## 2018-09-02 NOTE — PLAN OF CARE
Problem: Patient Care Overview  Goal: Plan of Care Review  Outcome: Ongoing (interventions implemented as appropriate)   09/02/18 0520   Coping/Psychosocial   Plan of Care Reviewed With patient   Plan of Care Review   Progress improving   OTHER   Outcome Summary Vitals stable. No falls. No c/o pain. Dressing on coccyx changed. Dressing on right foot changed. Ambien given for sleep. Pt resting comfortably. Monitoring closely.        Problem: Fall Risk (Adult)  Goal: Absence of Fall  Outcome: Ongoing (interventions implemented as appropriate)   09/02/18 0520   Fall Risk (Adult)   Absence of Fall making progress toward outcome       Problem: Cardiac Surgery (Adult)  Goal: Signs and Symptoms of Listed Potential Problems Will be Absent, Minimized or Managed (Cardiac Surgery)  Outcome: Ongoing (interventions implemented as appropriate)   09/02/18 0520   Goal/Outcome Evaluation   Problems Assessed (Cardiac Surgery) all   Problems Present (Cardiac Surgery) functional deficit;pain;situational response       Problem: Cardiac: Heart Failure (Adult)  Goal: Signs and Symptoms of Listed Potential Problems Will be Absent, Minimized or Managed (Cardiac: Heart Failure)  Outcome: Ongoing (interventions implemented as appropriate)   09/02/18 0520   Goal/Outcome Evaluation   Problems Assessed (Heart Failure) all   Problems Present (Heart Failure) fluid/electrolyte imbalance;situational response       Problem: Skin Injury Risk (Adult)  Goal: Skin Health and Integrity  Outcome: Ongoing (interventions implemented as appropriate)   09/02/18 0520   Skin Injury Risk (Adult)   Skin Health and Integrity making progress toward outcome

## 2018-09-02 NOTE — PLAN OF CARE
Problem: Patient Care Overview  Goal: Plan of Care Review  Outcome: Ongoing (interventions implemented as appropriate)   09/02/18 0952   Coping/Psychosocial   Plan of Care Reviewed With patient   Plan of Care Review   Progress no change   OTHER   Outcome Summary Patient participated well with skilled PT. No change in ambulation distance this visit seocndary to fatigue. Will continue to progress patient towards goals.

## 2018-09-02 NOTE — PROGRESS NOTES
"CC: A fib/Post op follow up s/p AVR, TVr    Interval History: no chest pain or shortness of breath. \"feeling good\" up in the chair      Vital Signs  Temp:  [97.4 °F (36.3 °C)-99.1 °F (37.3 °C)] 97.4 °F (36.3 °C)  Heart Rate:  [73-99] 73  Resp:  [16-18] 16  BP: ()/(58-74) 104/58    Intake/Output Summary (Last 24 hours) at 09/02/18 0754  Last data filed at 09/02/18 0658   Gross per 24 hour   Intake              710 ml   Output              700 ml   Net               10 ml     Flowsheet Rows      First Filed Value   Admission Height  185.4 cm (73\") Documented at 07/25/2018 1111   Admission Weight  125 kg (274 lb 9 oz) Documented at 07/25/2018 1111          PHYSICAL EXAM:  General: No acute distress  Resp:NL Rate, unlabored, clear  CV:NL rate and irregular rhythm, NL PMI, Nl S1 and S2, LLSB 2/6 systolic  Murmur, no gallop, no rub, No JVD. normal pedal pulses  ABD:Nl sounds, no masses or tenderness, nondistended, no guarding or rebound  Neuro: alert,cooperative and oriented  Extr: 1+ edema, ace wrapped bilateral, moves all extremities      Results Review:      Results from last 7 days  Lab Units 09/02/18  0708   SODIUM mmol/L 133*   POTASSIUM mmol/L 5.1   CHLORIDE mmol/L 99   CO2 mmol/L 25.0   BUN mg/dL 35*   CREATININE mg/dL 2.25*   GLUCOSE mg/dL 129*   CALCIUM mg/dL 7.8*           Results from last 7 days  Lab Units 09/02/18  0708   WBC 10*3/mm3 11.50*   HEMOGLOBIN g/dL 10.2*   HEMATOCRIT % 36.7*   PLATELETS 10*3/mm3 60*       Results from last 7 days  Lab Units 09/02/18  0501 09/01/18  0515 08/31/18  0600  08/29/18  0400   INR  1.08 1.36* 1.19*  < > 1.26*   APTT seconds  --   --   --   --  36.7*   < > = values in this interval not displayed.        Results from last 7 days  Lab Units 08/27/18  0521   MAGNESIUM mg/dL 2.3         I reviewed the patient's new clinical results.  I personally viewed and interpreted the patient's EKG/Telemetry data- NSR        Medication Review:   Meds reviewed      hold 1 each  "   sodium chloride 9 mL/hr Last Rate: 9 mL/hr (08/28/18 1220)       Assessment/Plan  1. Severe aortic stenosis and tricuspid regurgitation status post AVR, TV repair, CIERA,RAA   2. Atrial fibrillation, chronic, rate controlled, warfarin held for thrombocytopenia continue to hold, await bone marrow biopsy  3. DM endocrinology following  4. MYCHAL  5. GEOVANNI/CKD-HD m,w,f  nephrology following continuing torsemide. Eventually will need AV fistula.  6.R pleural effusion status post thoracenteses 8/29 1.5 L removed  7.Thrombocytopenia- bone marrow pending from 8/30/. HIT panel negative platelets up to 60 from 30 today  8.Hypotension off antihypertensives on midodrine now stable  9. Leukocytosis continues to trend down  10.UTI- completed course        IRENE Mathews  09/02/18  7:54 AM

## 2018-09-02 NOTE — PROGRESS NOTES
"  ENDOCRINE    Subjective   AND PLANS  Bj Duarte is a 63 y.o. male.     Follow-up diabetes    Slowly improving.  Edema slowly receding.  For hemodialysis tomorrow  Appetite good.  Fasting glucose 113.  Random glucose 105-220.  Continue Levemir 18 units at bedtime and NovoLog 8 units with each meal plus sliding scale.      Objective   /76 (BP Location: Right arm, Patient Position: Sitting)   Pulse 93   Temp 98.5 °F (36.9 °C) (Oral)   Resp 16   Ht 185.4 cm (73\")   Wt 132 kg (290 lb 12.8 oz)   SpO2 99%   BMI 38.37 kg/m²   Physical Exam    Awake, alert, not in distress.  Irregularly irregular heart rate and rhythm.  Abdomen soft, globular, nontender.  Scrotal edema slowly improving.  Upper extremity edema slowly improving  No calf tenderness.  No cyanosis or clubbing       Lab Results (last 24 hours)     Procedure Component Value Units Date/Time    POC Glucose Once [[id number]]  (Abnormal) Collected:  09/02/18 1539    Specimen:  Blood Updated:  09/02/18 1541     Glucose 187 (H) mg/dL     Narrative:       Meter: [id number] : [id number] Emmanuel Borden RN    POC Glucose Once [[id number]]  (Abnormal) Collected:  09/02/18 1039    Specimen:  Blood Updated:  09/02/18 1040     Glucose 220 (H) mg/dL     Narrative:       Meter: [id number] : [id number] Sae PHAN    Renal Function Panel [[id number]]  (Abnormal) Collected:  09/02/18 0708    Specimen:  Blood Updated:  09/02/18 0745     Glucose 129 (H) mg/dL      BUN 35 (H) mg/dL      Creatinine 2.25 (H) mg/dL      Sodium 133 (L) mmol/L      Potassium 5.1 mmol/L      Chloride 99 mmol/L      CO2 25.0 mmol/L      Calcium 7.8 (L) mg/dL      Albumin 2.10 (L) g/dL      Phosphorus 2.9 mg/dL      Anion Gap 9.0 mmol/L      BUN/Creatinine Ratio 15.6     eGFR Non African Amer 30 (L) mL/min/1.73     CBC & Differential [[id number]] Collected:  09/02/18 0708    Specimen:  Blood Updated:  09/02/18 0725    Narrative:       The following orders were created for panel order " CBC & Differential.  Procedure                               Abnormality         Status                     ---------                               -----------         ------                     Scan Slide[[id number]]                                                                  CBC Auto Differential[[id number]]        Abnormal            Final result                 Please view results for these tests on the individual orders.    CBC Auto Differential [[id number]]  (Abnormal) Collected:  09/02/18 0708    Specimen:  Blood Updated:  09/02/18 0725     WBC 11.50 (H) 10*3/mm3      RBC 4.43 (L) 10*6/mm3      Hemoglobin 10.2 (L) g/dL      Hematocrit 36.7 (L) %      MCV 82.8 fL      MCH 23.0 (L) pg      MCHC 27.8 (L) g/dL      RDW 22.1 (H) %      RDW-SD 64.2 (H) fl      MPV -- fL      Comment: .        Platelets 60 (L) 10*3/mm3      Neutrophil % 74.9 %      Lymphocyte % 13.8 (L) %      Monocyte % 6.4 %      Eosinophil % 4.1 %      Basophil % 0.2 %      Immature Grans % 0.6 (H) %      Neutrophils, Absolute 8.61 (H) 10*3/mm3      Lymphocytes, Absolute 1.59 10*3/mm3      Monocytes, Absolute 0.74 10*3/mm3      Eosinophils, Absolute 0.47 10*3/mm3      Basophils, Absolute 0.02 10*3/mm3      Immature Grans, Absolute 0.07 (H) 10*3/mm3      nRBC 0.0 /100 WBC     POC Glucose Once [[id number]]  (Normal) Collected:  09/02/18 0649    Specimen:  Blood Updated:  09/02/18 0651     Glucose 113 mg/dL     Narrative:       Meter: [id number] : Carl WEAVER    Protime-INR [[id number]]  (Normal) Collected:  09/02/18 0501    Specimen:  Blood Updated:  09/02/18 0538     Protime 13.8 Seconds      INR 1.08    POC Glucose Once [[id number]]  (Abnormal) Collected:  09/01/18 2006    Specimen:  Blood Updated:  09/01/18 2036     Glucose 143 (H) mg/dL     Narrative:       Meter: [id number] : [id number] WEAVER            Active Problems:    Obstructive sleep apnea    Nonrheumatic aortic valve stenosis    Non-rheumatic tricuspid  valve insufficiency    Pulmonary hypertension    Acute on chronic systolic congestive heart failure (CMS/HCC)    Hypoalbuminemia    Protein malnutrition (CMS/HCC)    Type 2 diabetes mellitus with complication, with long-term current use of insulin (CMS/HCC)    Primary hypothyroidism    End stage renal disease (CMS/HCC)    Thrombocytopenia (CMS/HCC)     insulin therapy as discussed above    continue levothyroxine

## 2018-09-03 NOTE — SIGNIFICANT NOTE
09/03/18 0843   Rehab Treatment   Discipline physical therapist   Reason Treatment Not Performed unavailable for treatment  (Patient at dialysis. Unable to treat today. Will check back tomorrow.)   Recommendation   PT - Next Appointment 09/04/18

## 2018-09-03 NOTE — PROGRESS NOTES
"Patient Care Team:  Gus Cordova MD as PCP - General (Internal Medicine)  Alexandria Benitez MD as Consulting Physician (Nephrology)  Mike Johnson MD as Consulting Physician (Pulmonary Disease)  Oriana Steinberg MD as Consulting Physician (Cardiology)    Chief Complaint: Follow-up aortic valve replacement    Interval History:   Platelets have improved.  Still weak.    Objective   Vital Signs  Temp:  [97.2 °F (36.2 °C)-98.5 °F (36.9 °C)] 97.2 °F (36.2 °C)  Heart Rate:  [88-95] 92  Resp:  [16] 16  BP: (109-120)/(66-91) 120/73    Intake/Output Summary (Last 24 hours) at 09/03/18 0837  Last data filed at 09/03/18 0800   Gross per 24 hour   Intake             1640 ml   Output              875 ml   Net              765 ml     Flowsheet Rows      First Filed Value   Admission Height  185.4 cm (73\") Documented at 07/25/2018 1111   Admission Weight  125 kg (274 lb 9 oz) Documented at 07/25/2018 1111          General Appearance:    Alert, cooperative, in no acute distress   Head:    Normocephalic, without obvious abnormality, atraumatic       Neck:   No adenopathy, supple, no thyromegaly, no carotid bruit, no    JVD   Lungs:     Clear to auscultation bilaterally, no wheezes, rales, or     rhonchi    Heart:    Normal rate, irregular rhythm,  No murmur, rub, or gallop   Chest Wall:    No abnormalities observed   Abdomen:     Normal bowel sounds, soft, non-tender, non-distended,            no rebound tenderness   Extremities:   No cyanosis, clubbing.  Trace to 1+ bilateral lower extremity edema.     Pulses:   Pulses palpable and equal bilaterally   Skin:   No bleeding or rash       Neurologic:   Cranial nerves 2 - 12 grossly intact, sensation intact             aspirin 81 mg Oral Daily   atorvastatin 10 mg Oral Nightly   bisacodyl 10 mg Rectal Once   budesonide-formoterol 2 puff Inhalation BID - RT   epoetin roxi 10,000 Units Intravenous Once per day on Mon Wed Fri   sodium ferric gluconate complex IVPB in 100 mL NS " 125 mg Intravenous Once per day on Mon Wed Fri   ferrous sulfate 325 mg Oral Daily With Breakfast   fluticasone 2 spray Each Nare Daily   guaiFENesin 1,200 mg Oral Q12H   heparin (porcine) 5,000 Units Intravenous Take As Directed   hydrocortisone  Topical Q12H   insulin aspart 0-10 Units Subcutaneous 4x Daily With Meals & Nightly   insulin aspart 8 Units Subcutaneous TID With Meals   insulin detemir 18 Units Subcutaneous Nightly   lactulose 20 g Oral Daily   levothyroxine 50 mcg Oral Q AM   magic mouthwash 10 mL Swish & Spit Q4H   midodrine 5 mg Oral TID AC   montelukast 10 mg Oral Daily   nystatin 5 mL Swish & Swallow 4x Daily   nystatin  Topical Q12H   pantoprazole 40 mg Oral Daily   polyethylene glycol 17 g Oral Daily   Scopolamine 1 patch Transdermal Q72H   sennosides-docusate sodium 2 tablet Oral Nightly   sucralfate 1 g Oral Q6H   torsemide 100 mg Oral Daily         hold 1 each    sodium chloride 9 mL/hr Last Rate: 9 mL/hr (08/28/18 1220)       Results Review:      Results from last 7 days  Lab Units 09/03/18  0509   SODIUM mmol/L 133*   POTASSIUM mmol/L 5.3*   CHLORIDE mmol/L 100   CO2 mmol/L 22.8   BUN mg/dL 40*   CREATININE mg/dL 2.16*   GLUCOSE mg/dL 148*   CALCIUM mg/dL 7.7*           Results from last 7 days  Lab Units 09/03/18  0509   WBC 10*3/mm3 8.77   HEMOGLOBIN g/dL 10.3*   HEMATOCRIT % 37.0*   PLATELETS 10*3/mm3 60*       Results from last 7 days  Lab Units 09/02/18  0501 09/01/18  0515 08/31/18  0600  08/29/18  0400   INR  1.08 1.36* 1.19*  < > 1.26*   APTT seconds  --   --   --   --  36.7*   < > = values in this interval not displayed.              I reviewed the patient's new clinical results.  I personally viewed and interpreted the patient's EKG/Telemetry data        Assessment/Plan   1. Severe aortic stenosis and tricuspid regurgitation status post AVR, TV repair, CIERA,RAA  2. Atrial fibrillation, chronic, rate controlled, warfarin held   3. DM endocrinology following  4. MYCHAL  5. GEOVANNI/CKD-HD  m,w,f  nephrology following continuing torsemide. Eventually will need AV fistula.  6.R pleural effusion status post thoracenteses 8/29 1.5 L removed  7.Thrombocytopenia- bone marrow pending from 8/30/. HIT panel negative platelets up to 60 from 30 today  8.Hypotension off antihypertensives on midodrine now stable  9. Leukocytosis continues to trend down  10.UTI- completed course     -Patient clearly needs rehabilitation   -Long-term plan for fistula still a little unclear to me.  Platelets 60,000.  -Recommend DVT prophylaxis.

## 2018-09-03 NOTE — NURSING NOTE
WOCN: Unna Boot/ Coban Compression wraps.  Legs cleanse bilateral. Right upper plantar wound rolled edges base red. Cleanse with saline maximilian and silicone border dressing applied. 1cmx1.3cmx0.3cm  Cleanse with saline left lateral 5th toe, yellow base, cleanse saline apply maximilian and silicone border dressing .5cmx.5cm. Left lateral upper plantar wound base red [id number] Cleanse saline apply maximilian and silicone border dressings. Unna boots and Coban applied,  Instructed patient to elevate legs. Verbalizes understanding. Compression wraps and wound care 2 times per week.

## 2018-09-03 NOTE — PLAN OF CARE
Problem: Patient Care Overview  Goal: Plan of Care Review  Outcome: Ongoing (interventions implemented as appropriate)   09/03/18 8671   Coping/Psychosocial   Plan of Care Reviewed With patient   Plan of Care Review   Progress no change   OTHER   Outcome Summary DIalysis today, patient tolerated well. Resting comfortably now. Cardiology would like to resume prophylactic anticoagulation, awaiting hematology approval. VSS. Will continue to monitor.       Problem: Fall Risk (Adult)  Goal: Absence of Fall  Outcome: Ongoing (interventions implemented as appropriate)      Problem: Cardiac Surgery (Adult)  Goal: Signs and Symptoms of Listed Potential Problems Will be Absent, Minimized or Managed (Cardiac Surgery)  Outcome: Ongoing (interventions implemented as appropriate)      Problem: Cardiac: Heart Failure (Adult)  Goal: Signs and Symptoms of Listed Potential Problems Will be Absent, Minimized or Managed (Cardiac: Heart Failure)  Outcome: Ongoing (interventions implemented as appropriate)      Problem: Skin Injury Risk (Adult)  Goal: Skin Health and Integrity  Outcome: Ongoing (interventions implemented as appropriate)

## 2018-09-03 NOTE — PLAN OF CARE
Problem: Patient Care Overview  Goal: Plan of Care Review  Outcome: Ongoing (interventions implemented as appropriate)   09/02/18 2027   Coping/Psychosocial   Plan of Care Reviewed With patient   Plan of Care Review   Progress improving   OTHER   Outcome Summary patient taking inhalers scheduled/oxygen when needed/promotion of respiratory care

## 2018-09-03 NOTE — PROGRESS NOTES
LOS: 40 days   Patient Care Team:  Gus Cordova MD as PCP - General (Internal Medicine)  Alexandria Benitez MD as Consulting Physician (Nephrology)  Mike Johnson MD as Consulting Physician (Pulmonary Disease)  Oriana Steinberg MD as Consulting Physician (Cardiology)    Chief Complaint/ Reason for encounter: Anasarca, dialysis management      Subjective     Medical history reviewed:  History of Present Illness    Subjective:  Symptoms:  Improved.  He reports weakness.  No shortness of breath or chest pain.  (Weight and edema continue to slowly improve  No shortness of breath, weakness improved    Patient was seen on hemodialysis  Treatment orders discussed with the dialysis RN, jennifer  Arterial pressure/ Venous pressure: 110/120  Blood flow rate/Dialysate flow rate: 400/600  Blood pressure: 116/65, pulse 87  Potassium bath/Fluid removal goal:  2K/4 kg ).    Diet:  Adequate intake.  No nausea.    Activity level: Returning to normal.    Pain:  He reports no pain.          History taken from: Patient and chart    Objective     Vital Signs  Temp:  [97.2 °F (36.2 °C)-97.6 °F (36.4 °C)] 97.6 °F (36.4 °C)  Heart Rate:  [88-95] 95  Resp:  [16-18] 18  BP: (109-125)/(66-91) 125/73       Wt Readings from Last 1 Encounters:   09/03/18 1244 (!) 149 kg (329 lb 9.4 oz)   09/02/18 0658 132 kg (290 lb 12.8 oz)   09/01/18 0700 135 kg (298 lb 6 oz)   08/31/18 0700 132 kg (290 lb 12.8 oz)   08/30/18 0913 131 kg (288 lb)   08/29/18 0352 134 kg (294 lb 6.4 oz)   08/28/18 0635 133 kg (293 lb 3.2 oz)   08/27/18 1300 131 kg (288 lb 9.3 oz)   08/27/18 0659 135 kg (297 lb 9.6 oz)   08/26/18 0944 135 kg (297 lb)   08/25/18 0622 136 kg (299 lb)   08/23/18 0914 (!) 139 kg (307 lb)   08/21/18 0643 (!) 138 kg (304 lb 8 oz)   08/20/18 0629 (!) 140 kg (309 lb 1.6 oz)   08/19/18 0552 (!) 140 kg (307 lb 14.4 oz)   08/18/18 0524 (!) 140 kg (308 lb 14.4 oz)   08/17/18 0500 (!) 138 kg (305 lb 3.2 oz)   08/16/18 0500 (!) 137 kg (301 lb 14.4  "oz)   08/15/18 0618 (!) 137 kg (303 lb)   08/14/18 1300 (!) 138 kg (303 lb 4.8 oz)   08/13/18 0458 135 kg (298 lb 3.2 oz)   08/12/18 1312 132 kg (291 lb)   08/11/18 0615 132 kg (291 lb 3.2 oz)   08/10/18 0554 130 kg (286 lb 14.4 oz)   08/09/18 0500 130 kg (286 lb 6.4 oz)   08/08/18 0700 128 kg (282 lb)   08/07/18 0600 122 kg (270 lb)   08/06/18 0705 123 kg (272 lb)   08/05/18 0500 124 kg (273 lb)   08/04/18 0516 108 kg (238 lb 6.4 oz)   08/03/18 0637 129 kg (283 lb 6.4 oz)   08/02/18 0353 132 kg (292 lb 1.6 oz)   08/01/18 0545 134 kg (295 lb 4.8 oz)   07/31/18 0310 133 kg (294 lb 1.6 oz)   07/29/18 1649 131 kg (288 lb 12.8 oz)   07/29/18 0700 59.4 kg (131 lb)   07/28/18 0500 129 kg (284 lb 1.6 oz)   07/26/18 0520 127 kg (280 lb)   07/25/18 1111 125 kg (274 lb 9 oz)       Objective:  General Appearance:  Comfortable, in no acute distress and not in pain (Obese).    Vital signs: (most recent): Blood pressure 125/73, pulse 95, temperature 97.6 °F (36.4 °C), temperature source Oral, resp. rate 18, height 185.4 cm (73\"), weight (!) 149 kg (329 lb 9.4 oz), SpO2 98 %.  Vital signs are normal.  No fever.    Output: Producing urine.    HEENT: Normal HEENT exam.    Lungs:  Normal effort and normal respiratory rate.  Breath sounds clear to auscultation.  He is not in respiratory distress.  No decreased breath sounds.    Heart: Normal rate.  Regular rhythm.  S1 normal.  No murmur.   Abdomen: Abdomen is soft and non-distended.  Bowel sounds are normal.   There is no abdominal tenderness.   (Significant scrotal edema).   Extremities: Normal range of motion.  There is dependent edema.  (Anasarca improved, decreased scrotal edema)  Pulses: Distal pulses are intact.    Neurological: Patient is alert and oriented to person, place and time.    Skin:  Warm and dry.  No rash or cyanosis.             Results Review:    Intake/Output:     Intake/Output Summary (Last 24 hours) at 09/03/18 1256  Last data filed at 09/03/18 1244   Gross per " 24 hour   Intake             1400 ml   Output             4875 ml   Net            -3475 ml         DATA:  Radiology and Labs:  The following labs independently reviewed by me. Additional labs ordered for tomorrow a.m.  Interval notes, chart personally reviewed by me.   Old records independently reviewed showing Stage III chronic kidney disease as described above       Labs:   Recent Results (from the past 24 hour(s))   POC Glucose Once    Collection Time: 09/02/18  3:39 PM   Result Value Ref Range    Glucose 187 (H) 70 - 130 mg/dL   POC Glucose Once    Collection Time: 09/02/18  8:24 PM   Result Value Ref Range    Glucose 237 (H) 70 - 130 mg/dL   Renal Function Panel    Collection Time: 09/03/18  5:09 AM   Result Value Ref Range    Glucose 148 (H) 65 - 99 mg/dL    BUN 40 (H) 8 - 23 mg/dL    Creatinine 2.16 (H) 0.76 - 1.27 mg/dL    Sodium 133 (L) 136 - 145 mmol/L    Potassium 5.3 (H) 3.5 - 5.2 mmol/L    Chloride 100 98 - 107 mmol/L    CO2 22.8 22.0 - 29.0 mmol/L    Calcium 7.7 (L) 8.6 - 10.5 mg/dL    Albumin 2.10 (L) 3.50 - 5.20 g/dL    Phosphorus 2.9 2.5 - 4.5 mg/dL    Anion Gap 10.2 mmol/L    BUN/Creatinine Ratio 18.5 7.0 - 25.0    eGFR Non African Amer 31 (L) >60 mL/min/1.73   CBC Auto Differential    Collection Time: 09/03/18  5:09 AM   Result Value Ref Range    WBC 8.77 4.50 - 10.70 10*3/mm3    RBC 4.44 (L) 4.60 - 6.00 10*6/mm3    Hemoglobin 10.3 (L) 13.7 - 17.6 g/dL    Hematocrit 37.0 (L) 40.4 - 52.2 %    MCV 83.3 79.8 - 96.2 fL    MCH 23.2 (L) 27.0 - 32.7 pg    MCHC 27.8 (L) 32.6 - 36.4 g/dL    RDW 22.9 (H) 11.5 - 14.5 %    RDW-SD 66.7 (H) 37.0 - 54.0 fl    Platelets 60 (L) 140 - 500 10*3/mm3    Neutrophil % 75.2 42.7 - 76.0 %    Lymphocyte % 11.4 (L) 19.6 - 45.3 %    Monocyte % 9.7 5.0 - 12.0 %    Eosinophil % 3.5 0.3 - 6.2 %    Basophil % 0.2 0.0 - 1.5 %    Immature Grans % 1.1 (H) 0.0 - 0.5 %    Neutrophils, Absolute 6.59 1.90 - 8.10 10*3/mm3    Lymphocytes, Absolute 1.00 0.90 - 4.80 10*3/mm3    Monocytes,  Absolute 0.85 0.20 - 1.20 10*3/mm3    Eosinophils, Absolute 0.31 0.00 - 0.70 10*3/mm3    Basophils, Absolute 0.02 0.00 - 0.20 10*3/mm3    Immature Grans, Absolute 0.10 (H) 0.00 - 0.03 10*3/mm3    nRBC 0.0 0.0 - 0.0 /100 WBC   POC Glucose Once    Collection Time: 09/03/18  6:47 AM   Result Value Ref Range    Glucose 146 (H) 70 - 130 mg/dL       Radiology:  Imaging Results (last 24 hours)     ** No results found for the last 24 hours. **             Medications have been reviewed:  Current Facility-Administered Medications   Medication Dose Route Frequency Provider Last Rate Last Dose   • acetaminophen (TYLENOL) suppository 650 mg  650 mg Rectal Q4H PRN Glen Brasher MD       • acetaminophen (TYLENOL) tablet 650 mg  650 mg Oral Q4H PRN Glen Brasher MD   650 mg at 08/31/18 1632   • aspirin EC tablet 81 mg  81 mg Oral Daily Glen Brasher MD   81 mg at 09/02/18 0843   • atorvastatin (LIPITOR) tablet 10 mg  10 mg Oral Nightly Terri Jane MD   10 mg at 08/17/18 2020   • bisacodyl (DULCOLAX) EC tablet 10 mg  10 mg Oral Daily PRN Glen Brasher MD   10 mg at 08/24/18 0917   • bisacodyl (DULCOLAX) suppository 10 mg  10 mg Rectal Daily PRN Glen Brasher MD       • bisacodyl (DULCOLAX) suppository 10 mg  10 mg Rectal Once Soraya Wu APRN       • budesonide-formoterol (SYMBICORT) 160-4.5 MCG/ACT inhaler 2 puff  2 puff Inhalation BID - RT Soraya Wu APRN   2 puff at 09/02/18 2022   • cyclobenzaprine (FLEXERIL) tablet 10 mg  10 mg Oral Q8H PRN Glen Brasher MD   10 mg at 08/30/18 1229   • dextrose (D50W) solution 25 g  25 g Intravenous Q15 Min PRN Glen Brasher MD   25 g at 08/09/18 1441   • dextrose (GLUTOSE) oral gel 15 g  15 g Oral Q15 Min PRN Glen Brasher MD       • diphenhydrAMINE (BENADRYL) capsule 25 mg  25 mg Oral Q6H PRN Lina Patiño PA   25 mg at 08/20/18 1221   • epoetin roxi (EPOGEN,PROCRIT) injection 10,000 Units  10,000 Units Intravenous  Once per day on Mon Wed Fri Marty Espitia MD   10,000 Units at 09/03/18 1143   • ferric gluconate (FERRLECIT) 125 mg in sodium chloride 0.9 % 100 mL IVPB  125 mg Intravenous Once per day on Mon Wed Fri Marty Espitia  mL/hr at 08/31/18 2130 125 mg at 08/31/18 2130   • ferrous sulfate tablet 325 mg  325 mg Oral Daily With Breakfast Lavon Berger MD   325 mg at 09/02/18 0843   • fluticasone (FLONASE) 50 MCG/ACT nasal spray 2 spray  2 spray Each Nare Daily Renetta Hancock APRN   2 spray at 09/02/18 0843   • glucagon (human recombinant) (GLUCAGEN DIAGNOSTIC) injection 1 mg  1 mg Subcutaneous PRN Glen Brasher MD       • guaiFENesin (MUCINEX) 12 hr tablet 1,200 mg  1,200 mg Oral Q12H Soraya Wu APRN   1,200 mg at 09/02/18 2039   • heparin (porcine) 5000 UNIT/ML injection 5,000 Units  5,000 Units Intravenous Take As Directed Sammy Barillas MD       • heparin (porcine) injection 3,000 Units  3,000 Units Intracatheter PRN Marty Espitia MD   3,000 Units at 08/29/18 1836   • heparin (porcine) injection 3,000 Units  3,000 Units Intracatheter PRN Glen Brasher MD       • Hold medication  1 each Does not apply Continuous PRN Renetta Hancock APRN       • hydrocortisone 1 % cream   Topical Q12H Lavon Berger MD       • insulin aspart (novoLOG) injection 0-10 Units  0-10 Units Subcutaneous 4x Daily With Meals & Nightly Terri Jane MD   4 Units at 09/02/18 2040   • insulin aspart (novoLOG) injection 8 Units  8 Units Subcutaneous TID With Meals Terri Jane MD   8 Units at 09/02/18 1806   • insulin detemir (LEVEMIR) injection 18 Units  18 Units Subcutaneous Nightly Ata Reza MD   18 Units at 09/02/18 2041   • ipratropium-albuterol (DUO-NEB) nebulizer solution 3 mL  3 mL Nebulization Q4H PRN Glen Brasher MD   3 mL at 08/29/18 1315   • lactulose (CHRONULAC) 10 GM/15ML solution 20 g  20 g Oral Daily Soraya Wu APRN   20 g at 08/27/18 1427   •  levothyroxine (SYNTHROID, LEVOTHROID) tablet 50 mcg  50 mcg Oral Q AM Ata Reza MD   50 mcg at 09/03/18 0558   • Magnesium Sulfate 2 gram infusion - Mg less than or equal to 1.5 mg/dL  2 g Intravenous PRN Glen Brasher MD        Or   • Magesium Sulfate 1 gram infusion - Mg 1.6-1.9 mg/dL  1 g Intravenous PRN Glen Brasher MD       • magic mouthwash oral supsension 10 mL  10 mL Swish & Spit Q4H Glen Brasher MD   10 mL at 09/02/18 2040   • magnesium hydroxide (MILK OF MAGNESIA) suspension 2400 mg/10mL 10 mL  10 mL Oral Daily PRN Glen Brasher MD   10 mL at 08/17/18 1833   • midodrine (PROAMATINE) tablet 5 mg  5 mg Oral TID AC Sae Boothe MD   5 mg at 09/03/18 0740   • montelukast (SINGULAIR) tablet 10 mg  10 mg Oral Daily Soraya Wu APRN   10 mg at 09/02/18 0852   • naloxone (NARCAN) injection 0.4 mg  0.4 mg Intravenous Q5 Min PRN Glen Brasher MD       • nystatin (MYCOSTATIN) 882132 UNIT/ML suspension 500,000 Units  5 mL Swish & Swallow 4x Daily Glen Brasher MD   500,000 Units at 09/02/18 2040   • nystatin (MYCOSTATIN) powder   Topical Q12H Renetta Hancock APRN       • ondansetron (ZOFRAN) injection 4 mg  4 mg Intravenous Q6H PRN Glen Brasher MD   4 mg at 08/20/18 1823   • ondansetron (ZOFRAN) tablet 4 mg  4 mg Oral Q6H PRN Javed Huitron MD        Or   • ondansetron ODT (ZOFRAN-ODT) disintegrating tablet 4 mg  4 mg Oral Q6H PRN Javed Huitron MD        Or   • ondansetron (ZOFRAN) injection 4 mg  4 mg Intravenous Q6H PRN Javed Huitron MD   4 mg at 09/01/18 0051   • oxyCODONE (ROXICODONE) immediate release tablet 10 mg  10 mg Oral Q4H PRN Glen Brasher MD   10 mg at 09/03/18 0557   • pantoprazole (PROTONIX) EC tablet 40 mg  40 mg Oral Daily Glen Brasher MD   40 mg at 09/02/18 0843   • polyethylene glycol (MIRALAX) powder 17 g  17 g Oral Daily Soraya Wu APRN   17 g at 09/02/18 0852   • potassium chloride (MICRO-K) CR  capsule 40 mEq  40 mEq Oral PRN Glen Brasher MD   40 mEq at 08/06/18 2352    Or   • potassium chloride (KLOR-CON) packet 40 mEq  40 mEq Oral PRN Glen Brasher MD       • potassium chloride 10 mEq in 100 mL IVPB  10 mEq Intravenous Q1H PRN Glen Brasher MD        Or   • potassium chloride 10 mEq in 100 mL IVPB  10 mEq Intravenous Q1H PRN Glen Brasher MD       • promethazine (PHENERGAN) tablet 12.5 mg  12.5 mg Oral Q6H PRN Glen Brasher MD        Or   • promethazine (PHENERGAN) injection 12.5 mg  12.5 mg Intravenous Q6H PRN Glen Brasher MD       • Scopolamine (TRANSDERM-SCOP) 1.5 MG/3DAYS patch 1 patch  1 patch Transdermal Q72H Renetta Hancock APRN   1 patch at 08/31/18 1632   • sennosides-docusate sodium (SENOKOT-S) 8.6-50 MG tablet 2 tablet  2 tablet Oral Nightly Glen Brasher MD   2 tablet at 08/31/18 2129   • sodium chloride (OCEAN) nasal spray 2 spray  2 spray Each Nare PRN Glen Brasher MD       • sodium chloride 0.9 % infusion  9 mL/hr Intravenous Continuous PRN Satnam Arguello MD 9 mL/hr at 08/28/18 1220     • sucralfate (CARAFATE) 1 GM/10ML suspension 1 g  1 g Oral Q6H Juan Sanz MD   1 g at 09/03/18 0558   • torsemide (DEMADEX) tablet 100 mg  100 mg Oral Daily Donnell Madison MD   100 mg at 09/02/18 0843   • zolpidem (AMBIEN) tablet 10 mg  10 mg Oral Nightly PRN Roslyn Jimenez MD   10 mg at 09/02/18 2326       ASSESSMENT:  End-stage renal disease, dialysis Monday Wednesday Friday with ultrafiltration as tolerated  Anasarca, improved  Hyponatremia, adjust dialysis bath  Hyponatremia, should correct with dialysis  Hypoalbuminemia, contributing to edema, no proteinuria   Severe aortic stenosis status post replacement  Pulmonary hypertension  Anemia of chronic kidney disease: Epogen, IV iron  Thrombocytopenia, hematology following, up to 60,000 today  pleural effusions, status post thoracentesis      PLAN:   Continue dialysis, Monday Wednesday Friday  schedule, ultrafiltration as tolerated with dialysis.  Platelet count stable  Continue midodrine, torsemide, BP support on dialysis with albumin and mannitol as needed  Eventual AVF once platelets are better: outpatient follow-up with vascular once he is a little stronger and more stable    Ronald Olivares MD   [facility]   Office phone number: [phone]  Answering service phone number: [phone]    09/03/18  12:56 PM      Dictation performed using Dragon dictation software

## 2018-09-03 NOTE — PLAN OF CARE
Problem: Patient Care Overview  Goal: Plan of Care Review  Outcome: Ongoing (interventions implemented as appropriate)   09/03/18 0502   Coping/Psychosocial   Plan of Care Reviewed With patient   Plan of Care Review   Progress no change   OTHER   Outcome Summary VSS, promoting sleep throughout shift, plan for dialysis today, ojeda still in place, will continue to monitor.      Goal: Individualization and Mutuality  Outcome: Ongoing (interventions implemented as appropriate)      Problem: Cardiac Surgery (Adult)  Goal: Signs and Symptoms of Listed Potential Problems Will be Absent, Minimized or Managed (Cardiac Surgery)  Outcome: Ongoing (interventions implemented as appropriate)      Problem: Cardiac: Heart Failure (Adult)  Goal: Signs and Symptoms of Listed Potential Problems Will be Absent, Minimized or Managed (Cardiac: Heart Failure)  Outcome: Ongoing (interventions implemented as appropriate)    Goal: Signs and Symptoms of Listed Potential Problems Will be Absent, Minimized or Managed (Cardiac: Heart Failure)  Outcome: Ongoing (interventions implemented as appropriate)      Problem: Skin Injury Risk (Adult)  Goal: Skin Health and Integrity  Outcome: Ongoing (interventions implemented as appropriate)

## 2018-09-03 NOTE — PROGRESS NOTES
"  ENDOCRINE    Subjective   AND PLANS  Bj Duarte is a 63 y.o. male.     Follow-up diabetes    Just got back from dialysis.  No complaints.  Appetite good.  Fasting glucose 146.  Random glucose 126.  Continue Levemir 18 units at bedtime and NovoLog 8 units with each meal plus sliding scale.    Objective   /73 (BP Location: Right arm, Patient Position: Lying)   Pulse 95   Temp 97.6 °F (36.4 °C) (Oral)   Resp 18   Ht 185.4 cm (73\")   Wt (!) 149 kg (329 lb 9.4 oz)   SpO2 98%   BMI 43.48 kg/m²   Physical Exam    Awake, alert, not in distress.  No rales or wheezes.  Irregularly irregular heart rate and rhythm.  No gallop.  Abdomen soft, nontender.  Upper extremity edema slowly improving.  No cyanosis or clubbing.    Lab Results (last 24 hours)     Procedure Component Value Units Date/Time    Protime-INR [[id number]] Collected:  09/03/18 1411    Specimen:  Blood Updated:  09/03/18 1411    POC Glucose Once [[id number]]  (Normal) Collected:  09/03/18 1345    Specimen:  Blood Updated:  09/03/18 1346     Glucose 126 mg/dL     Narrative:       Meter: [id number] : [id number] Lee BEGUM    POC Glucose Once [[id number]]  (Abnormal) Collected:  09/03/18 0647    Specimen:  Blood Updated:  09/03/18 0648     Glucose 146 (H) mg/dL     Narrative:       Meter: [id number] : [id number] Sergey PHAN    Renal Function Panel [[id number]]  (Abnormal) Collected:  09/03/18 0509    Specimen:  Blood Updated:  09/03/18 0558     Glucose 148 (H) mg/dL      BUN 40 (H) mg/dL      Creatinine 2.16 (H) mg/dL      Sodium 133 (L) mmol/L      Potassium 5.3 (H) mmol/L      Chloride 100 mmol/L      CO2 22.8 mmol/L      Calcium 7.7 (L) mg/dL      Albumin 2.10 (L) g/dL      Phosphorus 2.9 mg/dL      Anion Gap 10.2 mmol/L      BUN/Creatinine Ratio 18.5     eGFR Non African Amer 31 (L) mL/min/1.73     CBC & Differential [[id number]] Collected:  09/03/18 0509    Specimen:  Blood Updated:  09/03/18 0548    Narrative:       The " following orders were created for panel order CBC & Differential.  Procedure                               Abnormality         Status                     ---------                               -----------         ------                     Scan Slide[[id number]]                                                                  CBC Auto Differential[[id number]]        Abnormal            Final result                 Please view results for these tests on the individual orders.    CBC Auto Differential [[id number]]  (Abnormal) Collected:  09/03/18 0509    Specimen:  Blood Updated:  09/03/18 0548     WBC 8.77 10*3/mm3      RBC 4.44 (L) 10*6/mm3      Hemoglobin 10.3 (L) g/dL      Hematocrit 37.0 (L) %      MCV 83.3 fL      MCH 23.2 (L) pg      MCHC 27.8 (L) g/dL      RDW 22.9 (H) %      RDW-SD 66.7 (H) fl      Platelets 60 (L) 10*3/mm3      Neutrophil % 75.2 %      Lymphocyte % 11.4 (L) %      Monocyte % 9.7 %      Eosinophil % 3.5 %      Basophil % 0.2 %      Immature Grans % 1.1 (H) %      Neutrophils, Absolute 6.59 10*3/mm3      Lymphocytes, Absolute 1.00 10*3/mm3      Monocytes, Absolute 0.85 10*3/mm3      Eosinophils, Absolute 0.31 10*3/mm3      Basophils, Absolute 0.02 10*3/mm3      Immature Grans, Absolute 0.10 (H) 10*3/mm3      nRBC 0.0 /100 WBC     POC Glucose Once [[id number]]  (Abnormal) Collected:  09/02/18 2024    Specimen:  Blood Updated:  09/02/18 2025     Glucose 237 (H) mg/dL     Narrative:       Meter: [id number] : [id number] Sergey PHAN    POC Glucose Once [[id number]]  (Abnormal) Collected:  09/02/18 1539    Specimen:  Blood Updated:  09/02/18 1541     Glucose 187 (H) mg/dL     Narrative:       Meter: [id number] : [id number] Emmanuel Borden RN            Active Problems:    Obstructive sleep apnea    Nonrheumatic aortic valve stenosis    Non-rheumatic tricuspid valve insufficiency    Pulmonary hypertension    Acute on chronic systolic congestive heart failure (CMS/HCC)    Hypoalbuminemia     Protein malnutrition (CMS/HCC)    Type 2 diabetes mellitus with complication, with long-term current use of insulin (CMS/HCC)    Primary hypothyroidism    End stage renal disease (CMS/HCC)    Thrombocytopenia (CMS/HCC)    Insulin therapy as discussed above.  Continue levothyroxine.

## 2018-09-03 NOTE — PROGRESS NOTES
Patient off unit for dialysis, will see tomorrow.  Ok for anticoagulation from CTS standpoint, oncology following.     Renetta Hancock, APRN

## 2018-09-04 NOTE — PROGRESS NOTES
REASON FOR FOLLOWUP/CHIEF COMPLAINT:  Thrombocytopenia    HISTORY OF PRESENT ILLNESS:   Denies bleeding.  No new problems overnight.    Past Medical History, Past Surgical History, Social History, Family History have been reviewed and are without significant changes except as mentioned.    Review of Systems   Review of Systems   HENT: Negative.  Negative for nosebleeds and trouble swallowing.    Eyes: Negative.    Respiratory: Negative.    Cardiovascular: Positive for leg swelling. Negative for chest pain and palpitations.   Gastrointestinal: Negative.  Negative for constipation, diarrhea and nausea.   Genitourinary: Negative for dysuria and hematuria.   Musculoskeletal: Negative.  Negative for arthralgias and myalgias.   Skin: Negative.    Neurological: Negative.  Negative for seizures and syncope.   Hematological: Negative.  Negative for adenopathy. Does not bruise/bleed easily.   Psychiatric/Behavioral: Negative.  Negative for confusion.       Medications:  The current medication list was reviewed in the EMR    ALLERGIES:  No Known Allergies           Vitals:    09/04/18 0821 09/04/18 0830 09/04/18 0835 09/04/18 1143   BP: 111/80   122/74   BP Location: Right arm   Right arm   Patient Position: Lying   Sitting   Pulse: 93 89 90 95   Resp: 16 16 16 16   Temp: 97.9 °F (36.6 °C)   97.7 °F (36.5 °C)   TempSrc: Oral   Oral   SpO2:  95%     Weight:       Height:           GENERAL:  Well-developed, well-nourished  Patient  in no acute distress.   SKIN:  Warm, dry ,NO rashes,NO purpura ,NO petechiae.  HEENT:  Pupils were equal and reactive to light and accomodation, conjunctivas non injected, no pterigion, normal extraocular movements, normal visual acuity.   Mouth mucosa was moist, no exudates in oropharynx, normal gum line, normal roof of the mouth and pillars, normal papillations of the tongue.  NECK:  Supple with good range of motion; no thyromegaly or masses, no JVD or bruits, no cervical adenopathies.No  carotid arteries pain, no carotid abnormal pulsation , NO arterial dance.  LYMPHATICS:  No cervical, NO supraclavicular, NO axillary,NO epitrochlear , NO inguinal adenopathy.  CHEST:  Normal excursion of both bessie thoraces, normal voice fremitus, no subcutaneous emphysema, normal axillas, no rashes or acanthosis nigricans. Lungs clear to percussion and auscultation, normal breath sounds bilaterally, no wheezing,NO crackles NO ronchi, NO stridor, NO rubs.  CARDIAC AND VASCULAR:  normal rate and regular rhythm, without murmurs,NO rubs NO S3 NO S4 right or left . Normal femoral, popliteal, pedis, brachial and carotid pulses.surgical site dry and healed  ABDOMEN:  Soft, nontender with no organomegaly or masses, positive ascites, no collateral circulation,no distention,no Dora sign, no abdominal pain, no inguinal hernias,small umbilical hernia, no abdominal bruits. Normal bowel sounds.  GENITAL: Not  Performed.  EXTREMITIES  AND SPINE:  No clubbing, cyanosis 3+ bilateral edema, no deformities or pain .No kyphosis, scoliosis, deformities or pain in spine, ribs or pelvic bone.  NEUROLOGICAL:  Patient was awake, alert, oriented to time, person and place.       RECENT LABS:  WBC   Date Value Ref Range Status   09/03/2018 8.77 4.50 - 10.70 10*3/mm3 Final   09/02/2018 11.50 (H) 4.50 - 10.70 10*3/mm3 Final     Hemoglobin   Date Value Ref Range Status   09/03/2018 10.3 (L) 13.7 - 17.6 g/dL Final   09/02/2018 10.2 (L) 13.7 - 17.6 g/dL Final     Platelets   Date Value Ref Range Status   09/03/2018 60 (L) 140 - 500 10*3/mm3 Final   09/02/2018 60 (L) 140 - 500 10*3/mm3 Final   CT ABDOMEN AND PELVIS WITHOUT IV CONTRAST     HISTORY: 62-year-old male with right-sided abdominal pain and melena.  Patient is on anticoagulation.     TECHNIQUE: Radiation dose reduction techniques were utilized, including  automated exposure control and exposure modulation based on body size.   3 mm images were obtained through the abdomen and pelvis  without the  administration of IV contrast. There is no previous CT for comparison.     FINDINGS: The liver is nodular and lobular in contour. Spleen is  enlarged measuring 15 cm in diameter. There is a tiny amount of free  fluid within the abdomen. There are multiple tiny gallstones within the  gallbladder. There is no convincing evidence for cholecystitis and there  is no biliary dilatation. Pancreas, adrenals, and kidneys appear  unremarkable. No acute bowel abnormality is seen. There is an average  volume of formed stool within the colon. There is no significant colonic  diverticulosis. Appendix appears normal. There is mild diffuse 3rd  spacing of fluid and there is a small right pleural effusion. There is a  small ill-defined ground glass opacity at the posterior aspect of the  right lower lobe.     IMPRESSION:  1. The ground glass opacity in the right lower lobe likely represents  pneumonia and there is a small parapneumonic effusion. The appearance  can be seen as a sequela of pulmonary thromboemboli, but this is  considered unlikely as the patient is on anticoagulation.  2. Cirrhotic liver morphology with splenomegaly and a tiny amount of  ascites.  3. Cholelithiasis without convincing evidence for cholecystitis.     Discussed with ANGELICA Juarez.     This report was finalized on 6/22/2018 5:00 PM by Dr. Melania Cotto M.D.         ASSESSMENT/PLAN:  *thrombocytopenia.  B12 and folate unremarkable.  IPF normal, suggesting a production problem.  PTT and INR minimally elevated.  Fibrinogen normal.  No significant schistocytes.  ·  On review of labs dating through 3/26/18, PLT normal, around 160 March 2018 and June 2018.  At the end of June, PLT drop 215.  · Mid to late July 2018, PLT dropped from 120-138, down to a low point of 69 on 7/27/18.  PLT improved up to 92 on 8/4/18.  · PLT around 170-195 8/10/18-8/16/18.  · PLT around 100 on 8/21/18-8/23/18, dropped to 64 on 8/25/18, to 41 on 8/26/18, down to 29 on  8/28/18.  After a 2 unit transfusion, PLT only improved to 40 on 8/28/18.  · HIT test sent 8/27/18, ×2.  Both were negative.  Since IPF is low, which is not consistent with ITP and PLT is not recovering on its own, bone marrow biopsy 8/30/18.  PLT has been fluctuating 30-45.  PLT surprisingly 60 today.    *Anemia.  Multifactorial.  Hb stable/slightly better.    *End-stage renal disease.  On hemodialysis since 8/20/18.  Tunneled catheter placed 8/28/18.     *Atrial fibrillation.  He was on Coumadin.  This has been stopped due to thrombocytopenia.     I reviewed with the patient today and his nurse all the issues in regard to his thrombocytopenia. In summary, the patient has had a CT scan of the abdomen that documented a cirrhotic liver morphology associated with splenomegaly and very likely this is the main reason for the thrombocytopenia. Also, it is the same reason why the patient has had minor leukopenia as well. Unless that all the congestion of the splenic circulation decreases with aggressive volume removal through dialysis, doubt that the platelet count is going to get any better on its own. The initial report of the bone marrow test that has been documented at least by flow cytometry disclosed no evidence of myeloma, lymphoma, leukemia, myelodysplasia or anything of this nature and the morphological analysis of the bone marrow done here disclosed no morphological alteration. A final bone marrow report is pending by [facility] in [place].     Given the question in regard if the patient is a candidate for anticoagulation, given the fact that he has kidney dysfunction, Lovenox and Arixtra are not choices. Given the fact that liver is dysfunctional, Coumadin is not a choice. Given the fact that the kidneys are also dysfunctional, neither new anticoagulants like Pradaxa, Eliquis or Xarelto are choices. On many occasions, it is better to do nothing than do something and I think this is going to be one of  the situations where only an aspirin could be as high as we can go in regard to some form of preventing process in regard to embolic phenomenon related to atrial fibrillation. I will not recommend to anticoagulate this patient under these circumstances knowing that very likely due to the cirrhotic liver morphology, he has portal hypertension and he has portal hypertensive gastropathy. Anticoagulation in this patient will be more than terrible looking for complications including massive GI bleeding. Therefore, going back into my original commentary, as far as I know aspirin is the only choice.     I communicated this to the patient today and the patient’s nurse in detail and I reviewed the analysis of the bone marrow as well as the CT scan that has been placed above in regard to splenomegaly and ascites. This goes along with the clinical examination of a large abdomen full of fluid and umbilical hernia with suggestion of collateral circulation. Very likely the ascites and the portal hypertension and cirrhosis are consequence of nonalcoholic steatohepatitis. The patient has had diabetes for a long time and has been obese for a long time, more than 25 years. Finally, I would like for the patient to return to see us in a couple of months and review laboratory parameters and see where he stands after aggressive program of dialysis. If the portal circulation improves in regard to pressure there is tendency for platelet count to improve over time.

## 2018-09-04 NOTE — PLAN OF CARE
Problem: Patient Care Overview  Goal: Plan of Care Review  Outcome: Ongoing (interventions implemented as appropriate)      Problem: Fall Risk (Adult)  Goal: Absence of Fall  Outcome: Ongoing (interventions implemented as appropriate)      Problem: Cardiac Surgery (Adult)  Goal: Signs and Symptoms of Listed Potential Problems Will be Absent, Minimized or Managed (Cardiac Surgery)  Outcome: Ongoing (interventions implemented as appropriate)      Problem: Skin Injury Risk (Adult)  Goal: Skin Health and Integrity  Outcome: Ongoing (interventions implemented as appropriate)

## 2018-09-04 NOTE — SIGNIFICANT NOTE
09/04/18 1522   Rehab Treatment   Discipline occupational therapist   Reason Treatment Not Performed unavailable for treatment  (will follow up)

## 2018-09-04 NOTE — PLAN OF CARE
Problem: Patient Care Overview  Goal: Plan of Care Review  Outcome: Ongoing (interventions implemented as appropriate)   09/04/18 5193   Coping/Psychosocial   Plan of Care Reviewed With patient   Plan of Care Review   Progress improving   OTHER   Outcome Summary Patient will likely be discharged tomorrow after dialysis. All MDs on the case have given the go ahead. VSS. Will continue to monitor.       Problem: Fall Risk (Adult)  Goal: Absence of Fall  Outcome: Ongoing (interventions implemented as appropriate)      Problem: Cardiac Surgery (Adult)  Goal: Signs and Symptoms of Listed Potential Problems Will be Absent, Minimized or Managed (Cardiac Surgery)  Outcome: Ongoing (interventions implemented as appropriate)      Problem: Cardiac: Heart Failure (Adult)  Goal: Signs and Symptoms of Listed Potential Problems Will be Absent, Minimized or Managed (Cardiac: Heart Failure)  Outcome: Ongoing (interventions implemented as appropriate)      Problem: Skin Injury Risk (Adult)  Goal: Skin Health and Integrity  Outcome: Ongoing (interventions implemented as appropriate)

## 2018-09-04 NOTE — PROGRESS NOTES
"  ENDOCRINE    Subjective   AND PLANS  Bj Duarte is a 63 y.o. male.     Follow-up diabetes    No nausea or vomiting.  Fasting glucose 185.  Random glucose 120-156.  Patient did not receive Levemir last night.  Continue Levemir 18 units at bedtime and NovoLog 8 units with each meal plus sliding scale.      Objective   /80 (BP Location: Right arm, Patient Position: Lying)   Pulse 90   Temp 97.9 °F (36.6 °C) (Oral)   Resp 16   Ht 185.4 cm (73\")   Wt 130 kg (286 lb 12.8 oz)   SpO2 95%   BMI 37.84 kg/m²   Physical Exam    Awake, alert, not in distress.  No rales or wheezes.  Regular heart rate and rhythm.  Abdomen soft, nontender.  Penile and scrotal edema slowly improving.  Upper extremity edema about the same  No cyanosis.    Lab Results (last 24 hours)     Procedure Component Value Units Date/Time    Renal Function Panel [[id number]]  (Abnormal) Collected:  09/04/18 0504    Specimen:  Blood Updated:  09/04/18 0623     Glucose 173 (H) mg/dL      BUN 30 (H) mg/dL      Creatinine 1.86 (H) mg/dL      Sodium 133 (L) mmol/L      Potassium 4.7 mmol/L      Chloride 99 mmol/L      CO2 22.1 mmol/L      Calcium 7.8 (L) mg/dL      Albumin 2.00 (L) g/dL      Phosphorus 3.0 mg/dL      Anion Gap 11.9 mmol/L      BUN/Creatinine Ratio 16.1     eGFR Non African Amer 37 (L) mL/min/1.73     Protime-INR [[id number]]  (Normal) Collected:  09/04/18 0504    Specimen:  Blood Updated:  09/04/18 0608     Protime 13.8 Seconds      INR 1.08    POC Glucose Once [[id number]]  (Abnormal) Collected:  09/04/18 0546    Specimen:  Blood Updated:  09/04/18 0547     Glucose 185 (H) mg/dL     Narrative:       Meter: [id number] : [id number] Saleem Samuels CNA    POC Glucose Once [[id number]]  (Normal) Collected:  09/03/18 2046    Specimen:  Blood Updated:  09/03/18 2047     Glucose 120 mg/dL     Narrative:       Meter: [id number] : [id number] Chavez Oksana F NA    POC Glucose Once [[id number]]  (Abnormal) Collected:  09/03/18 1540    " Specimen:  Blood Updated:  09/03/18 1541     Glucose 156 (H) mg/dL     Narrative:       Meter: [id number] : [id number] Lee BEGUM    Protime-INR [[id number]]  (Normal) Collected:  09/03/18 1411    Specimen:  Blood Updated:  09/03/18 1456     Protime 13.8 Seconds      INR 1.08    POC Glucose Once [[id number]]  (Normal) Collected:  09/03/18 1345    Specimen:  Blood Updated:  09/03/18 1346     Glucose 126 mg/dL     Narrative:       Meter: [id number] : [id number] Lee BEGUM            Active Problems:    Obstructive sleep apnea    Nonrheumatic aortic valve stenosis    Non-rheumatic tricuspid valve insufficiency    Pulmonary hypertension    Acute on chronic systolic congestive heart failure (CMS/HCC)    Hypoalbuminemia    Protein malnutrition (CMS/HCC)    Type 2 diabetes mellitus with complication, with long-term current use of insulin (CMS/HCC)    Primary hypothyroidism    End stage renal disease (CMS/HCC)    Thrombocytopenia (CMS/HCC)    Insulin therapy as discussed above.

## 2018-09-04 NOTE — NURSING NOTE
Spoke with Dr. Olivares regarding possible discharge.  From his standpoint, as long as there are arrangements for outpatient dialysis, the patient can be discharged at any time.

## 2018-09-04 NOTE — NURSING NOTE
Spoke with Dr. Landers ([facility]) regarding possible discharge.  MD said he will be here later today to see the patient.

## 2018-09-04 NOTE — NURSING NOTE
Spoke with Dr. Ortiz to request opinion on possible discharge tomorrow. Per Dr. Ortiz, patient will be discharged with the ojeda catheter and follow-up with his office in 2-3 weeks.

## 2018-09-04 NOTE — SIGNIFICANT NOTE
09/04/18 0919   Rehab Treatment   Discipline physical therapist   Reason Treatment Not Performed other (see comments)  (Pt getting ready to have catheter switched.  PT will follow up this pm. )   Recommendation   PT - Next Appointment 09/04/18

## 2018-09-04 NOTE — PROGRESS NOTES
Adult Nutrition  Assessment/PES    Patient Name:  Bj Daurte  YOB: 1955  MRN: [id number]  Admit Date:  7/25/2018    Assessment Date:  9/4/2018          Reason for Assessment     Row Name 09/04/18 1357          Reason for Assessment    Reason For Assessment follow-up protocol             Nutrition/Diet History     Row Name 09/04/18 1357          Nutrition/Diet History    Typical Food/Fluid Intake Intake typically good.                Labs/Tests/Procedures/Meds     Row Name 09/04/18 1357          Labs/Procedures/Meds    Lab Results Reviewed reviewed     Lab Results Comments na, glu, bun, cr, alb        Diagnostic Tests/Procedures    Diagnostic Test/Procedure Reviewed reviewed        Medications    Pertinent Medications Reviewed reviewed             Physical Findings     Row Name 09/04/18 1357          Physical Findings    Overall Physical Appearance obese     Skin poor skin integrity/turgor;other (see comments)   chest incision; R coccyx PI, L toe wound               Nutrition Prescription Ordered     Row Name 09/04/18 1357          Nutrition Prescription PO    Common Modifiers Fluid Restriction;Cardiac     Fluid Restriction mL per Day Other (comment)   1200 cc         Problem/Interventions:          Intervention Goal     Row Name 09/04/18 1359          Intervention Goal    General Maintain nutrition     PO Maintain intake             Nutrition Intervention     Row Name 09/04/18 1400          Nutrition Intervention    RD/Tech Action Interview for preference;Encourage intake;Follow Tx progress               Education/Evaluation     Row Name 09/04/18 1400          Education    Education Previous education by JOSE/MILI         Electronically signed by:  Soraya Ramos RD  09/04/18 2:00 PM

## 2018-09-04 NOTE — PROGRESS NOTES
"Patient Care Team:  Gus Cordova MD as PCP - General (Internal Medicine)  Alexandria Benitez MD as Consulting Physician (Nephrology)  Mike Johnson MD as Consulting Physician (Pulmonary Disease)  Oriana Steinberg MD as Consulting Physician (Cardiology)    Chief Complaint: Follow-up    Interval History:   No complaints today.  He states that he tolerated hemodialysis well.    Objective   Vital Signs  Temp:  [97.5 °F (36.4 °C)-98.7 °F (37.1 °C)] 97.7 °F (36.5 °C)  Heart Rate:  [81-95] 95  Resp:  [16-18] 16  BP: (107-125)/(56-80) 122/74    Intake/Output Summary (Last 24 hours) at 09/04/18 1154  Last data filed at 09/04/18 0915   Gross per 24 hour   Intake              300 ml   Output             4502 ml   Net            -4202 ml     Flowsheet Rows      First Filed Value   Admission Height  185.4 cm (73\") Documented at 07/25/2018 1111   Admission Weight  125 kg (274 lb 9 oz) Documented at 07/25/2018 1111          General Appearance:    Alert, cooperative, in no acute distress   Head:    Normocephalic, without obvious abnormality, atraumatic       Neck:   Right-sided tunnel catheter    Lungs:     Clear to auscultation bilaterally, no wheezes, rales, or     rhonchi    Heart:    Normal rate, regular rhythm,  No murmur, rub, or gallop   Chest Wall:    No abnormalities observed   Abdomen:     Normal bowel sounds, soft, non-tender, non-distended,            no rebound tenderness   Extremities:   Lower extremities wrapped, edematous    Pulses:   Pulses palpable and equal bilaterally   Skin:   No bleeding or rash       Neurologic:   Cranial nerves 2 - 12 grossly intact, sensation intact             aspirin 81 mg Oral Daily   atorvastatin 10 mg Oral Nightly   bisacodyl 10 mg Rectal Once   budesonide-formoterol 2 puff Inhalation BID - RT   epoetin roxi 10,000 Units Intravenous Once per day on Mon Wed Fri   sodium ferric gluconate complex IVPB in 100 mL  mg Intravenous Once per day on Mon Wed Fri   ferrous " sulfate 325 mg Oral Daily With Breakfast   fluticasone 2 spray Each Nare Daily   guaiFENesin 1,200 mg Oral Q12H   heparin (porcine) 5,000 Units Intravenous Take As Directed   hydrocortisone  Topical Q12H   insulin aspart 0-10 Units Subcutaneous 4x Daily With Meals & Nightly   insulin aspart 8 Units Subcutaneous TID With Meals   insulin detemir 18 Units Subcutaneous Nightly   lactulose 20 g Oral Daily   levothyroxine 50 mcg Oral Q AM   magic mouthwash 10 mL Swish & Spit Q4H   midodrine 5 mg Oral TID AC   montelukast 10 mg Oral Daily   nystatin 5 mL Swish & Swallow 4x Daily   nystatin  Topical Q12H   pantoprazole 40 mg Oral Daily   polyethylene glycol 17 g Oral Daily   Scopolamine 1 patch Transdermal Q72H   sennosides-docusate sodium 2 tablet Oral Nightly   sucralfate 1 g Oral Q6H   torsemide 100 mg Oral Daily         hold 1 each    sodium chloride 9 mL/hr Last Rate: 9 mL/hr (08/28/18 1220)       Results Review:      Results from last 7 days  Lab Units 09/04/18  0504   SODIUM mmol/L 133*   POTASSIUM mmol/L 4.7   CHLORIDE mmol/L 99   CO2 mmol/L 22.1   BUN mg/dL 30*   CREATININE mg/dL 1.86*   GLUCOSE mg/dL 173*   CALCIUM mg/dL 7.8*           Results from last 7 days  Lab Units 09/03/18  0509   WBC 10*3/mm3 8.77   HEMOGLOBIN g/dL 10.3*   HEMATOCRIT % 37.0*   PLATELETS 10*3/mm3 60*       Results from last 7 days  Lab Units 09/04/18  0504 09/03/18  1411 09/02/18  0501  08/29/18  0400   INR  1.08 1.08 1.08  < > 1.26*   APTT seconds  --   --   --   --  36.7*   < > = values in this interval not displayed.              @LABRCNT(bnp)@  I reviewed the patient's new clinical results.  I personally viewed and interpreted the patient's EKG/Telemetry data            Assessment/Plan   1. Severe aortic stenosis and tricuspid regurgitation status post AVR, TV repair, CIERA,RAA  2. Atrial fibrillation, chronic, rate controlled, warfarin held   3. DM endocrinology following  4. MYCHAL  5. GEOVANNI/CKD-HD m,w,f  nephrology following continuing  torsemide. Eventually will need AV fistula.  6.R pleural effusion status post thoracenteses 8/29 1.5 L removed  7.Thrombocytopenia- bone marrow pending from 8/30/. HIT panel negative platelets up to 60 from 30 today  8.Hypotension off antihypertensives on midodrine now stable  9. Leukocytosis continues to trend down  10.UTI- completed course     -Patient clearly needs rehabilitation   -Long-term plan for fistula as outpatient  -I think we should start working towards discharge tomorrow.  He tolerated hemodialysis okay today.  -Although he does have atrial fibrillation I would love to see his platelets improve a little bit more before moving him onto systemic anti-coagulation as an outpatient.  -We will try to touch base with the consultants today to work towards discharge tomorrow

## 2018-09-04 NOTE — SIGNIFICANT NOTE
09/04/18 1614   Rehab Treatment   Discipline physical therapist   Reason Treatment Not Performed patient/family declined treatment  (Pt declined PT tx this pm due to B hand cramps.  PT provided pt w warm blankets around B arms to decrease cramps.  Pt reports cramps improved.  PT notified RN of the above and recommended heating pad for arms.)   Recommendation   PT - Next Appointment 09/05/18

## 2018-09-04 NOTE — NURSING NOTE
Spoke with Dr. Reza regarding possible discharge.  He said to continue the same doses of insulin and levothyroxine that he has been prescribed during the hospital stay. Ok from his standpoint for discharge.

## 2018-09-04 NOTE — PROGRESS NOTES
[facility]    Physicians Statement of Medical Necessity for Ambulance Transportation    It is medically necessary for:    Patient Name: Bj Duarte    Insurance Information: Passport ID [id number]     To be transported by ambulance:    From (if nursing facility, specify level of care: skilled, alf, etc): [facility]    To (specify [facility] of care if [facility], [facility]    Date of Service: 7/25/2018 -     For dialysis patients state date dialysis began: 8/20/18    Diagnosis: Severe aortic stenosis & tricuspid regurgitation.  Pt s/p AVR, TV repair, CIERA, RAA, Atrial fibrillation, GEOVANNI/CKD- HD (every Mon, Wed & Fri),  Rt pleural effusion s/p thoracenteses 8/29/18, Thrombocytopenia, Hypotension     Past Medical/Surgical History:  Past Medical History:   Diagnosis Date   • Abdominal pain    • afib 2010   • Anemia    • Anticoagulation goal of INR 2 to 3    • Aortic valve stenosis    • Arthritis    • Asthma    • BPH (benign prostatic hyperplasia)    • Chronic atrial fibrillation (CMS/HCC)    • Chronic diastolic congestive heart failure (CMS/HCC)    • Chronic insomnia    • Cirrhosis (CMS/HCC)     w/Ascites   • CKD (chronic kidney disease)     Stage III   • CKD (chronic kidney disease) requiring chronic dialysis (CMS/HCC)    • Deep vein thrombosis (CMS/HCC)    • Diabetes (CMS/HCC)    • Diabetic peripheral neuropathy (CMS/HCC)    • Edema of both legs    • Hyperkalemia    • Hyperlipidemia    • Hypermagnesemia    • Hypertension    • Hypokalemia     Severe   • Lumbar disc disease    • MYCHAL on CPAP    • PAD (peripheral artery disease) (CMS/HCC)    • Peripheral vascular disease (CMS/HCC)    • Primary hypothyroidism 8/15/2018   • Pulmonary HTN    • Right lower lobe pneumonia (CMS/HCC)    • Splenomegaly    • Tricuspid regurgitation     mild to moderate       Past Surgical History:   Procedure Laterality Date   • AORTIC VALVE REPAIR/REPLACEMENT N/A  7/26/2018    Procedure: ASHA STERNOTOMY AORTIC VALVE REPLACEMENT, TRICUSPID VALVE REPAIR, ATRIAL APPENDAGE EXCISION LEFT, RIGHT ATRIAL APPENDAGE EXCISION, PRP;  Surgeon: Glen Brasher MD;  Location: [facility] MAIN OR;  Service: Cardiothoracic   • BACK SURGERY     • CARDIAC CATHETERIZATION N/A 12/12/2017    Procedure: Right Heart Cath with adenosine challenge if indicated;  Surgeon: Delvin Roberts MD;  [facility];  Service:    • CARDIAC CATHETERIZATION  2017   • CARDIAC CATHETERIZATION N/A 7/25/2018    Procedure: Right and Left Heart Cath;  Surgeon: Javed Huitron MD;  [facility];  Service: Cardiovascular   • CARDIAC CATHETERIZATION N/A 8/9/2018    Procedure: Right Heart Cath with adenosine challenge;  Surgeon: Javed Huitron MD;  Location: [facility] CATH INVASIVE LOCATION;  Service: Cardiovascular   • CARDIAC CATHETERIZATION N/A 8/9/2018    Procedure: Left Heart Cath;  Surgeon: Javed Huitron MD;  Location: [facility] CATH INVASIVE LOCATION;  Service: Cardiovascular   • FOOT SURGERY     • INSERTION HEMODIALYSIS CATHETER Right 8/28/2018    Procedure: TUNNEL CATHETER PLACEMENT;  Surgeon: Nick Hargrove MD;  Location: [facility] MAIN OR;  Service: Vascular   • TONSILLECTOMY          Current Objective Medical Evidence(including physical exam finding to support reason for limitations):    Other:    Physician Signature:           (RN,NP,PA,CAN, Discharge Planner) Date/Time:     Printed Name:    __________________________________    [facility]   Phone: [phone] Phone: [phone]   Fax: [phone] Fax: [phone]

## 2018-09-04 NOTE — PROGRESS NOTES
LOS: 41 days   Patient Care Team:  Gus Cordova MD as PCP - General (Internal Medicine)  Alexandria Benitez MD as Consulting Physician (Nephrology)  Mike Johnson MD as Consulting Physician (Pulmonary Disease)  Oriana Steinberg MD as Consulting Physician (Cardiology)    Chief Complaint/ Reason for encounter: Anasarca, dialysis management      Subjective     Medical history reviewed:  History of Present Illness    Subjective:  Symptoms:  Improved.  He reports weakness.  No shortness of breath or chest pain.  (Weight and edema continue to slowly improve  No shortness of breath, weakness improved  Tolerating dialysis well, outpatient arrangements have been finalized ).    Diet:  Adequate intake.  No nausea.    Activity level: Returning to normal.    Pain:  He reports no pain.          History taken from: Patient and chart    Objective     Vital Signs  Temp:  [97.5 °F (36.4 °C)-97.9 °F (36.6 °C)] 97.7 °F (36.5 °C)  Heart Rate:  [81-95] 95  Resp:  [16] 16  BP: (107-124)/(56-80) 122/74       Wt Readings from Last 1 Encounters:   09/04/18 0500 130 kg (286 lb 12.8 oz)   09/03/18 1244 (!) 149 kg (329 lb 9.4 oz)   09/02/18 0658 132 kg (290 lb 12.8 oz)   09/01/18 0700 135 kg (298 lb 6 oz)   08/31/18 0700 132 kg (290 lb 12.8 oz)   08/30/18 0913 131 kg (288 lb)   08/29/18 0352 134 kg (294 lb 6.4 oz)   08/28/18 0635 133 kg (293 lb 3.2 oz)   08/27/18 1300 131 kg (288 lb 9.3 oz)   08/27/18 0659 135 kg (297 lb 9.6 oz)   08/26/18 0944 135 kg (297 lb)   08/25/18 0622 136 kg (299 lb)   08/23/18 0914 (!) 139 kg (307 lb)   08/21/18 0643 (!) 138 kg (304 lb 8 oz)   08/20/18 0629 (!) 140 kg (309 lb 1.6 oz)   08/19/18 0552 (!) 140 kg (307 lb 14.4 oz)   08/18/18 0524 (!) 140 kg (308 lb 14.4 oz)   08/17/18 0500 (!) 138 kg (305 lb 3.2 oz)   08/16/18 0500 (!) 137 kg (301 lb 14.4 oz)   08/15/18 0618 (!) 137 kg (303 lb)   08/14/18 1300 (!) 138 kg (303 lb 4.8 oz)   08/13/18 0458 135 kg (298 lb 3.2 oz)   08/12/18 1312 132 kg (291 lb)  "  08/11/18 0615 132 kg (291 lb 3.2 oz)   08/10/18 0554 130 kg (286 lb 14.4 oz)   08/09/18 0500 130 kg (286 lb 6.4 oz)   08/08/18 0700 128 kg (282 lb)   08/07/18 0600 122 kg (270 lb)   08/06/18 0705 123 kg (272 lb)   08/05/18 0500 124 kg (273 lb)   08/04/18 0516 108 kg (238 lb 6.4 oz)   08/03/18 0637 129 kg (283 lb 6.4 oz)   08/02/18 0353 132 kg (292 lb 1.6 oz)   08/01/18 0545 134 kg (295 lb 4.8 oz)   07/31/18 0310 133 kg (294 lb 1.6 oz)   07/29/18 1649 131 kg (288 lb 12.8 oz)   07/29/18 0700 59.4 kg (131 lb)   07/28/18 0500 129 kg (284 lb 1.6 oz)   07/26/18 0520 127 kg (280 lb)   07/25/18 1111 125 kg (274 lb 9 oz)       Objective:  General Appearance:  Comfortable, in no acute distress and not in pain (Obese).    Vital signs: (most recent): Blood pressure 122/74, pulse 95, temperature 97.7 °F (36.5 °C), temperature source Oral, resp. rate 16, height 185.4 cm (73\"), weight 130 kg (286 lb 12.8 oz), SpO2 95 %.  Vital signs are normal.  No fever.    Output: Producing urine.    HEENT: Normal HEENT exam.    Lungs:  Normal effort and normal respiratory rate.  Breath sounds clear to auscultation.  He is not in respiratory distress.  No decreased breath sounds.    Heart: Normal rate.  Regular rhythm.  S1 normal.  No murmur.   Abdomen: Abdomen is soft and non-distended.  Bowel sounds are normal.   There is no abdominal tenderness.   (Significant scrotal edema).   Extremities: Normal range of motion.  There is dependent edema.  (Anasarca improved, decreased scrotal edema)  Pulses: Distal pulses are intact.    Neurological: Patient is alert and oriented to person, place and time.    Skin:  Warm and dry.  No rash or cyanosis.             Results Review:    Intake/Output:     Intake/Output Summary (Last 24 hours) at 09/04/18 1433  Last data filed at 09/04/18 0915   Gross per 24 hour   Intake                0 ml   Output              501 ml   Net             -501 ml         DATA:  Radiology and Labs:  The following labs " independently reviewed by me. Additional labs ordered for tomorrow a.m.  Interval notes, chart personally reviewed by me.   Old records independently reviewed showing Stage III chronic kidney disease as described above       Labs:   Recent Results (from the past 24 hour(s))   POC Glucose Once    Collection Time: 09/03/18  8:46 PM   Result Value Ref Range    Glucose 120 70 - 130 mg/dL   Protime-INR    Collection Time: 09/04/18  5:04 AM   Result Value Ref Range    Protime 13.8 11.7 - 14.2 Seconds    INR 1.08 0.90 - 1.10   Renal Function Panel    Collection Time: 09/04/18  5:04 AM   Result Value Ref Range    Glucose 173 (H) 65 - 99 mg/dL    BUN 30 (H) 8 - 23 mg/dL    Creatinine 1.86 (H) 0.76 - 1.27 mg/dL    Sodium 133 (L) 136 - 145 mmol/L    Potassium 4.7 3.5 - 5.2 mmol/L    Chloride 99 98 - 107 mmol/L    CO2 22.1 22.0 - 29.0 mmol/L    Calcium 7.8 (L) 8.6 - 10.5 mg/dL    Albumin 2.00 (L) 3.50 - 5.20 g/dL    Phosphorus 3.0 2.5 - 4.5 mg/dL    Anion Gap 11.9 mmol/L    BUN/Creatinine Ratio 16.1 7.0 - 25.0    eGFR Non African Amer 37 (L) >60 mL/min/1.73   POC Glucose Once    Collection Time: 09/04/18  5:46 AM   Result Value Ref Range    Glucose 185 (H) 70 - 130 mg/dL   POC Glucose Once    Collection Time: 09/04/18 10:32 AM   Result Value Ref Range    Glucose 201 (H) 70 - 130 mg/dL   Urinalysis With Culture If Indicated - Urine, Catheter    Collection Time: 09/04/18 10:41 AM   Result Value Ref Range    Color, UA Dark Yellow (A) Yellow, Straw    Appearance, UA Cloudy (A) Clear    pH, UA <=5.0 5.0 - 8.0    Specific Gravity, UA 1.020 1.005 - 1.030    Glucose, UA Negative Negative    Ketones, UA Trace (A) Negative    Bilirubin, UA Negative Negative    Blood, UA Trace (A) Negative    Protein, UA 30 mg/dL (1+) (A) Negative    Leuk Esterase, UA Trace (A) Negative    Nitrite, UA Negative Negative    Urobilinogen, UA 0.2 E.U./dL 0.2 - 1.0 E.U./dL   Urinalysis, Microscopic Only - Urine, Clean Catch    Collection Time: 09/04/18 10:41  AM   Result Value Ref Range    RBC, UA 0-2 None Seen, 0-2 /HPF    WBC, UA 0-2 None Seen, 0-2 /HPF    Bacteria, UA None Seen None Seen /HPF    Squamous Epithelial Cells, UA 0-2 None Seen, 0-2 /HPF    Hyaline Casts, UA 3-6 None Seen /LPF    Methodology Manual Light Microscopy    POC Glucose Once    Collection Time: 09/04/18  3:41 PM   Result Value Ref Range    Glucose 147 (H) 70 - 130 mg/dL       Radiology:  Imaging Results (last 24 hours)     ** No results found for the last 24 hours. **             Medications have been reviewed:  Current Facility-Administered Medications   Medication Dose Route Frequency Provider Last Rate Last Dose   • acetaminophen (TYLENOL) suppository 650 mg  650 mg Rectal Q4H PRN Glen Brasher MD       • acetaminophen (TYLENOL) tablet 650 mg  650 mg Oral Q4H PRN Glen Brasher MD   650 mg at 08/31/18 1632   • aspirin EC tablet 81 mg  81 mg Oral Daily Glen Brasher MD   81 mg at 09/04/18 0949   • atorvastatin (LIPITOR) tablet 10 mg  10 mg Oral Nightly Terri Jane MD   10 mg at 08/17/18 2020   • bisacodyl (DULCOLAX) EC tablet 10 mg  10 mg Oral Daily PRN Glen Brasher MD   10 mg at 08/24/18 0917   • bisacodyl (DULCOLAX) suppository 10 mg  10 mg Rectal Daily PRN Glen Brasher MD       • bisacodyl (DULCOLAX) suppository 10 mg  10 mg Rectal Once Soraya Wu APRN       • budesonide-formoterol (SYMBICORT) 160-4.5 MCG/ACT inhaler 2 puff  2 puff Inhalation BID - RT Soraya Wu APRN   2 puff at 09/04/18 0830   • cyclobenzaprine (FLEXERIL) tablet 10 mg  10 mg Oral Q8H PRN Glen Brasher MD   10 mg at 08/30/18 1229   • dextrose (D50W) solution 25 g  25 g Intravenous Q15 Min PRN Glen Brasher MD   25 g at 08/09/18 1441   • dextrose (GLUTOSE) oral gel 15 g  15 g Oral Q15 Min PRN Glen Brasher MD       • diphenhydrAMINE (BENADRYL) capsule 25 mg  25 mg Oral Q6H PRN Lina Patiño PA   25 mg at 08/20/18 1221   • epoetin roxi  (EPOGEN,PROCRIT) injection 10,000 Units  10,000 Units Intravenous Once per day on Mon Wed Fri Marty Espitia MD   10,000 Units at 09/03/18 1143   • ferric gluconate (FERRLECIT) 125 mg in sodium chloride 0.9 % 100 mL IVPB  125 mg Intravenous Once per day on Mon Wed Fri Marty Espitia  mL/hr at 09/03/18 1756 125 mg at 09/03/18 1756   • ferrous sulfate tablet 325 mg  325 mg Oral Daily With Breakfast Lavon Berger MD   325 mg at 09/04/18 0807   • fluticasone (FLONASE) 50 MCG/ACT nasal spray 2 spray  2 spray Each Nare Daily Renetta Hancock APRN   2 spray at 09/04/18 0949   • glucagon (human recombinant) (GLUCAGEN DIAGNOSTIC) injection 1 mg  1 mg Subcutaneous PRN Glen Brasher MD       • guaiFENesin (MUCINEX) 12 hr tablet 1,200 mg  1,200 mg Oral Q12H Soraya Wu APRN   1,200 mg at 09/04/18 0949   • heparin (porcine) 5000 UNIT/ML injection 5,000 Units  5,000 Units Intravenous Take As Directed Sammy Barillas MD       • heparin (porcine) injection 3,000 Units  3,000 Units Intracatheter PRN Marty Espitia MD   3,000 Units at 09/03/18 1245   • heparin (porcine) injection 3,000 Units  3,000 Units Intracatheter PRN Glen Brasher MD       • Hold medication  1 each Does not apply Continuous PRN Renetta Hancock APRN       • hydrocortisone 1 % cream   Topical Q12H Lavon Berger MD       • insulin aspart (novoLOG) injection 0-10 Units  0-10 Units Subcutaneous 4x Daily With Meals & Nightly Terri Jane MD   4 Units at 09/04/18 1126   • insulin aspart (novoLOG) injection 8 Units  8 Units Subcutaneous TID With Meals Terri Jane MD   8 Units at 09/04/18 1126   • insulin detemir (LEVEMIR) injection 18 Units  18 Units Subcutaneous Nightly Ata Reza MD   18 Units at 09/02/18 2041   • ipratropium-albuterol (DUO-NEB) nebulizer solution 3 mL  3 mL Nebulization Q4H PRN Glen Brasher MD   3 mL at 08/29/18 1315   • lactulose (CHRONULAC) 10 GM/15ML solution 20 g  20 g Oral  Daily Soraya Wu, APRN   20 g at 08/27/18 1427   • levothyroxine (SYNTHROID, LEVOTHROID) tablet 50 mcg  50 mcg Oral Q AM Ata Reza MD   50 mcg at 09/04/18 0643   • Magnesium Sulfate 2 gram infusion - Mg less than or equal to 1.5 mg/dL  2 g Intravenous PRN Glen Brasher MD        Or   • Magesium Sulfate 1 gram infusion - Mg 1.6-1.9 mg/dL  1 g Intravenous PRN Glen Brasher MD       • magic mouthwash oral supsension 10 mL  10 mL Swish & Spit Q4H Glen Brasher MD   10 mL at 09/04/18 1127   • magnesium hydroxide (MILK OF MAGNESIA) suspension 2400 mg/10mL 10 mL  10 mL Oral Daily PRN Glen Brasher MD   10 mL at 08/17/18 1833   • midodrine (PROAMATINE) tablet 5 mg  5 mg Oral TID AC Sae Boothe MD   5 mg at 09/04/18 1127   • montelukast (SINGULAIR) tablet 10 mg  10 mg Oral Daily WuSoraya miguel, APRN   10 mg at 09/04/18 0949   • naloxone (NARCAN) injection 0.4 mg  0.4 mg Intravenous Q5 Min PRN Glen Brasher MD       • nystatin (MYCOSTATIN) 424906 UNIT/ML suspension 500,000 Units  5 mL Swish & Swallow 4x Daily Glen Brasher MD   500,000 Units at 09/04/18 1126   • nystatin (MYCOSTATIN) powder   Topical Q12H Renetta Hancock, APRAIDE       • ondansetron (ZOFRAN) injection 4 mg  4 mg Intravenous Q6H PRN Glen Brasher MD   4 mg at 08/20/18 1823   • ondansetron (ZOFRAN) tablet 4 mg  4 mg Oral Q6H PRN Javed Huitron MD        Or   • ondansetron ODT (ZOFRAN-ODT) disintegrating tablet 4 mg  4 mg Oral Q6H PRN Javed Huitron MD        Or   • ondansetron (ZOFRAN) injection 4 mg  4 mg Intravenous Q6H PRN Javed Huitron MD   4 mg at 09/03/18 2058   • pantoprazole (PROTONIX) EC tablet 40 mg  40 mg Oral Daily Glen Brasher MD   40 mg at 09/04/18 0949   • polyethylene glycol (MIRALAX) powder 17 g  17 g Oral Daily Soraya Wu APRN   17 g at 09/04/18 0949   • potassium chloride (MICRO-K) CR capsule 40 mEq  40 mEq Oral PRN Glen Brasher MD   40 mEq at  08/06/18 2352    Or   • potassium chloride (KLOR-CON) packet 40 mEq  40 mEq Oral PRN Glen Brasher MD       • potassium chloride 10 mEq in 100 mL IVPB  10 mEq Intravenous Q1H PRN Glen Brasher MD        Or   • potassium chloride 10 mEq in 100 mL IVPB  10 mEq Intravenous Q1H PRN Glen Brasher MD       • promethazine (PHENERGAN) tablet 12.5 mg  12.5 mg Oral Q6H PRN Glen Brasher MD        Or   • promethazine (PHENERGAN) injection 12.5 mg  12.5 mg Intravenous Q6H PRN Glen Brasher MD       • Scopolamine (TRANSDERM-SCOP) 1.5 MG/3DAYS patch 1 patch  1 patch Transdermal Q72H Renetta Hancock APRN   1 patch at 09/03/18 1607   • sennosides-docusate sodium (SENOKOT-S) 8.6-50 MG tablet 2 tablet  2 tablet Oral Nightly Glen Brasher MD   2 tablet at 09/03/18 2052   • sodium chloride (OCEAN) nasal spray 2 spray  2 spray Each Nare PRN Glen Brasher MD       • sodium chloride 0.9 % infusion  9 mL/hr Intravenous Continuous PRN Satnam Arguello MD 9 mL/hr at 08/28/18 1220     • sucralfate (CARAFATE) 1 GM/10ML suspension 1 g  1 g Oral Q6H Juan Sanz MD   1 g at 09/04/18 1126   • torsemide (DEMADEX) tablet 100 mg  100 mg Oral Daily Donnell Madison MD   100 mg at 09/04/18 0949   • zolpidem (AMBIEN) tablet 10 mg  10 mg Oral Nightly PRN Roslyn Jimenez MD   10 mg at 09/03/18 2309       ASSESSMENT:  End-stage renal disease, dialysis Monday Wednesday Friday with ultrafiltration as tolerated  Anasarca, improved  Hyponatremia, adjust dialysis bath  Hypoalbuminemia, contributing to edema, no proteinuria   Severe aortic stenosis status post replacement  Pulmonary hypertension  Anemia of chronic kidney disease: Epogen, IV iron  Thrombocytopenia, hematology following, up to 60,000 today  pleural effusions, status post thoracentesis      PLAN:   He is stable for discharge at any time from my standpoint  Outpatient dialysis has been set up at [facility] Monday Wednesday Friday  Continue  ultrafiltration challenge with dialysis as tolerated, weights continue to decrease  Continue midodrine, torsemide, BP support on dialysis with albumin and mannitol as needed  Eventual AVF once platelets are better: outpatient follow-up with vascular once he is a little stronger and more stable    Ronald Olivares MD   [facility]   Office phone number: [phone]  Answering service phone number: [phone]    09/04/18  5:19 PM      Dictation performed using Dragon dictation software

## 2018-09-04 NOTE — PROGRESS NOTES
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  Renal insufficiency necessitating dialysis    Objective     Vital Signs  Temp:  [97.5 °F (36.4 °C)-98.7 °F (37.1 °C)] 97.9 °F (36.6 °C)  Heart Rate:  [81-95] 93  Resp:  [16-18] 16  BP: (107-125)/(56-80) 111/80    Physical Exam:     General Appearance:    Alert, cooperative, in no acute distress   Head:    Normocephalic, without obvious abnormality, atraumatic   Eyes:            Lids and lashes normal, conjunctivae and sclerae normal, no   icterus, no pallor, corneas clear, PERRLA   Ears:    Ears appear intact with no abnormalities noted   Throat:   No oral lesions, no thrush, oral mucosa moist   Neck:   No adenopathy, supple, trachea midline,    Back:     No kyphosis present, no scoliosis present, no skin lesions,      erythema or scars, no tenderness to percussion or                   palpation,   range of motion normal   Lungs:     Clear to auscultation,respirations regular, even and                  unlabored    Heart:    Regular rhythm and normal rate, normal S1 and S2, no            murmur, no gallop, no rub, no click   Chest Wall:    No abnormalities observed   Abdomen:     Normal bowel sounds, no masses, no organomegaly, soft        non-tender, non-distended, no guarding, no rebound                tenderness   Genital/Rectal:     Catheter in place.  Urine output clear.  Swollen penis and scrotum from anasarca.  Foreskin can be retracted.     Extremities:   Moves all extremities well, no edema, no cyanosis,  no             redness       Skin:   No bleeding, bruising or rash       Neurologic:   Cranial nerves 2 - 12 grossly intact, sensation intact,        Results Review:     I reviewed the patient's new clinical results.    Recent Results (from the past 24 hour(s))   POC Glucose Once    Collection Time: 09/03/18  1:45 PM   Result Value Ref Range    Glucose 126 70 - 130 mg/dL   Protime-INR    Collection Time: 09/03/18  2:11 PM   Result Value Ref Range    Protime 13.8 11.7 - 14.2 Seconds    INR 1.08 0.90 - 1.10   POC Glucose Once    Collection Time: 09/03/18  3:40 PM   Result Value Ref Range    Glucose 156 (H) 70 - 130 mg/dL   POC Glucose Once    Collection Time: 09/03/18  8:46 PM   Result Value Ref Range    Glucose 120 70 - 130 mg/dL   Protime-INR    Collection Time: 09/04/18  5:04 AM   Result Value Ref Range    Protime 13.8 11.7 - 14.2 Seconds    INR 1.08 0.90 - 1.10   Renal Function Panel    Collection Time: 09/04/18  5:04 AM   Result Value Ref Range    Glucose 173 (H) 65 - 99 mg/dL    BUN 30 (H) 8 - 23 mg/dL    Creatinine 1.86 (H) 0.76 - 1.27 mg/dL    Sodium 133 (L) 136 - 145 mmol/L    Potassium 4.7 3.5 - 5.2 mmol/L    Chloride 99 98 - 107 mmol/L    CO2 22.1 22.0 - 29.0 mmol/L    Calcium 7.8 (L) 8.6 - 10.5 mg/dL    Albumin 2.00 (L) 3.50 - 5.20 g/dL    Phosphorus 3.0 2.5 - 4.5 mg/dL    Anion Gap 11.9 mmol/L    BUN/Creatinine Ratio 16.1 7.0 - 25.0    eGFR Non African Amer 37 (L) >60 mL/min/1.73   POC Glucose Once    Collection Time: 09/04/18  5:46 AM   Result Value Ref Range    Glucose 185 (H) 70 - 130 mg/dL       Medication Review:   Current Facility-Administered Medications:   •  acetaminophen (TYLENOL) suppository 650 mg, 650 mg, Rectal, Q4H PRN, Glen Brasher MD  •  acetaminophen (TYLENOL) tablet 650 mg, 650 mg, Oral, Q4H PRN, Glen Brasher MD, 650 mg at 08/31/18 1632  •  aspirin EC tablet 81 mg, 81 mg, Oral, Daily, Glen Brasher MD, 81 mg at 09/03/18 1338  •  atorvastatin (LIPITOR) tablet 10 mg,  10 mg, Oral, Nightly, Terri Jane MD, 10 mg at 08/17/18 2020  •  bisacodyl (DULCOLAX) EC tablet 10 mg, 10 mg, Oral, Daily PRN, Glen rBasher MD, 10 mg at 08/24/18 0917  •  bisacodyl (DULCOLAX) suppository 10 mg, 10 mg, Rectal, Daily PRN, Glen Brasher MD  •  bisacodyl (DULCOLAX) suppository 10 mg, 10 mg, Rectal, Once, Soraya Wu APRN  •  budesonide-formoterol (SYMBICORT) 160-4.5 MCG/ACT inhaler 2 puff, 2 puff, Inhalation, BID - RT, Soraya Wu APRN, 2 puff at 09/03/18 1940  •  cyclobenzaprine (FLEXERIL) tablet 10 mg, 10 mg, Oral, Q8H PRN, Glen Brasher MD, 10 mg at 08/30/18 1229  •  dextrose (D50W) solution 25 g, 25 g, Intravenous, Q15 Min PRN, Glen Brasher MD, 25 g at 08/09/18 1441  •  dextrose (GLUTOSE) oral gel 15 g, 15 g, Oral, Q15 Min PRN, Glen Brasher MD  •  diphenhydrAMINE (BENADRYL) capsule 25 mg, 25 mg, Oral, Q6H PRN, Lina Patiño PA, 25 mg at 08/20/18 1221  •  epoetin roxi (EPOGEN,PROCRIT) injection 10,000 Units, 10,000 Units, Intravenous, Once per day on Mon Wed Fri, Marty Espitia MD, 10,000 Units at 09/03/18 1143  •  ferric gluconate (FERRLECIT) 125 mg in sodium chloride 0.9 % 100 mL IVPB, 125 mg, Intravenous, Once per day on Mon Wed Fri, Marty Espitia MD, Last Rate: 100 mL/hr at 09/03/18 1756, 125 mg at 09/03/18 1756  •  ferrous sulfate tablet 325 mg, 325 mg, Oral, Daily With Breakfast, [place], Lavon J., MD, 325 mg at 09/04/18 0807  •  fluticasone (FLONASE) 50 MCG/ACT nasal spray 2 spray, 2 spray, Each Nare, Daily, Renetta Hancock APRN, 2 spray at 09/03/18 1339  •  glucagon (human recombinant) (GLUCAGEN DIAGNOSTIC) injection 1 mg, 1 mg, Subcutaneous, PRN, Glen Brasher MD  •  guaiFENesin (MUCINEX) 12 hr tablet 1,200 mg, 1,200 mg, Oral, Q12H, Soraya Wu APRN, 1,200 mg at 09/03/18 2052  •  heparin (porcine) 5000 UNIT/ML injection 5,000 Units, 5,000 Units, Intravenous, Take As Directed, Sammy Barillas MD  •  heparin  (porcine) injection 3,000 Units, 3,000 Units, Intracatheter, PRN, Marty Espitia MD, 3,000 Units at 18 1245  •  heparin (porcine) injection 3,000 Units, 3,000 Units, Intracatheter, PRN, Glen Brasher MD  •  Hold medication, 1 each, Does not apply, Continuous PRN, Renetta Hancock, APRN  •  hydrocortisone 1 % cream, , Topical, Q12H, Lavon Berger MD  •  insulin aspart (novoLOG) injection 0-10 Units, 0-10 Units, Subcutaneous, 4x Daily With Meals & Nightly, Terri Jane MD, 2 Units at 18 0643  •  insulin aspart (novoLOG) injection 8 Units, 8 Units, Subcutaneous, TID With Meals, Terri Jane MD, 8 Units at 18 0808  •  insulin detemir (LEVEMIR) injection 18 Units, 18 Units, Subcutaneous, Nightly, Ata Reza MD, 18 Units at 18 2041  •  ipratropium-albuterol (DUO-NEB) nebulizer solution 3 mL, 3 mL, Nebulization, Q4H PRN, Glen Brasher MD, 3 mL at 18 1315  •  lactulose (CHRONULAC) 10 GM/15ML solution 20 g, 20 g, Oral, Daily, Soraya Wu, APRN, 20 g at 18 1427  •  levothyroxine (SYNTHROID, LEVOTHROID) tablet 50 mcg, 50 mcg, Oral, Q AM, Ata Reza MD, 50 mcg at 18 0643  •  Magnesium Sulfate 2 gram infusion - Mg less than or equal to 1.5 mg/dL, 2 g, Intravenous, PRN **OR** Magesium Sulfate 1 gram infusion - Mg 1.6-1.9 mg/dL, 1 g, Intravenous, PRN, Glen Brasher MD  •  magic mouthwash oral supsension 10 mL, 10 mL, Swish & Spit, Q4H, Glen Brasher MD, 10 mL at 18 1606  •  magnesium hydroxide (MILK OF MAGNESIA) suspension 2400 mg/10mL 10 mL, 10 mL, Oral, Daily PRN, Glen Brasher MD, 10 mL at 18 1833  •  midodrine (PROAMATINE) tablet 5 mg, 5 mg, Oral, TID AC, Sae Boothe MD, 5 mg at 18 0807  •  montelukast (SINGULAIR) tablet 10 mg, 10 mg, Oral, Daily, Soraya Wu, IRENE, 10 mg at 18 1338  •  [] morphine injection 1 mg, 1 mg, Intravenous, Q4H PRN **AND** naloxone (NARCAN)  injection 0.4 mg, 0.4 mg, Intravenous, Q5 Min PRN, Glen Brasher MD  •  nystatin (MYCOSTATIN) 306980 UNIT/ML suspension 500,000 Units, 5 mL, Swish & Swallow, 4x Daily, Glen Brasher MD, 500,000 Units at 09/04/18 0807  •  nystatin (MYCOSTATIN) powder, , Topical, Q12H, Renetta Hancock, IRENE  •  ondansetron (ZOFRAN) injection 4 mg, 4 mg, Intravenous, Q6H PRN, Glen Brasher MD, 4 mg at 08/20/18 1823  •  ondansetron (ZOFRAN) tablet 4 mg, 4 mg, Oral, Q6H PRN **OR** ondansetron ODT (ZOFRAN-ODT) disintegrating tablet 4 mg, 4 mg, Oral, Q6H PRN **OR** ondansetron (ZOFRAN) injection 4 mg, 4 mg, Intravenous, Q6H PRN, Javed Huitron MD, 4 mg at 09/03/18 2058  •  oxyCODONE (ROXICODONE) immediate release tablet 10 mg, 10 mg, Oral, Q4H PRN, Glen Brasher MD, 10 mg at 09/04/18 0811  •  pantoprazole (PROTONIX) EC tablet 40 mg, 40 mg, Oral, Daily, Glen Brasher MD, 40 mg at 09/03/18 1339  •  polyethylene glycol (MIRALAX) powder 17 g, 17 g, Oral, Daily, Soraya Wu, APRN, 17 g at 09/03/18 1339  •  potassium chloride (MICRO-K) CR capsule 40 mEq, 40 mEq, Oral, PRN, 40 mEq at 08/06/18 2352 **OR** potassium chloride (KLOR-CON) packet 40 mEq, 40 mEq, Oral, PRN, Glen Brasher MD  •  potassium chloride 10 mEq in 100 mL IVPB, 10 mEq, Intravenous, Q1H PRN **OR** potassium chloride 10 mEq in 100 mL IVPB, 10 mEq, Intravenous, Q1H PRN, Glen Brasher MD  •  promethazine (PHENERGAN) tablet 12.5 mg, 12.5 mg, Oral, Q6H PRN **OR** promethazine (PHENERGAN) injection 12.5 mg, 12.5 mg, Intravenous, Q6H PRN, Glen Brasher MD  •  Scopolamine (TRANSDERM-SCOP) 1.5 MG/3DAYS patch 1 patch, 1 patch, Transdermal, Q72H, Renetta Hancock, IRENE, 1 patch at 09/03/18 1607  •  sennosides-docusate sodium (SENOKOT-S) 8.6-50 MG tablet 2 tablet, 2 tablet, Oral, Nightly, Glen Brasher MD, 2 tablet at 09/03/18 2052  •  sodium chloride (OCEAN) nasal spray 2 spray, 2 spray, Each Nare, PRN, Glen Brasher MD  •   sodium chloride 0.9 % infusion, 9 mL/hr, Intravenous, Continuous PRN, Satnam Arguello MD, Last Rate: 9 mL/hr at 08/28/18 1220  •  sucralfate (CARAFATE) 1 GM/10ML suspension 1 g, 1 g, Oral, Q6H, Juan Sanz MD, 1 g at 09/04/18 0643  •  torsemide (DEMADEX) tablet 100 mg, 100 mg, Oral, Daily, Donnell Madison MD, 100 mg at 09/03/18 1338  •  zolpidem (AMBIEN) tablet 10 mg, 10 mg, Oral, Nightly PRN, Roslyn Jimenez MD, 10 mg at 09/03/18 2302    Assessment/Plan     Active Problems:    Obstructive sleep apnea    Nonrheumatic aortic valve stenosis    Non-rheumatic tricuspid valve insufficiency    Pulmonary hypertension    Acute on chronic systolic congestive heart failure (CMS/HCC)    Hypoalbuminemia    Protein malnutrition (CMS/HCC)    Type 2 diabetes mellitus with complication, with long-term current use of insulin (CMS/HCC)    Primary hypothyroidism    End stage renal disease (CMS/HCC)    Thrombocytopenia (CMS/HCC)      Long-term [place] care  Penile/scrotal edema    Plan for disposition:Needs catheter changed.  I discussed this with the nursing staff.  Culture will be obtained    Adrian Ortiz MD  09/04/18  8:25 AM      Time: 25+ minutes reviewing chart, history and physical, discussion with patient and staff

## 2018-09-04 NOTE — PAYOR COMM NOTE
"Bj Duarte  (63 y.o. Male)     PLEASE SEE ATTACHED CLINICAL REVIEW. PT . REMAINS ON A MONITORED TELEM  CVU FLOOR.    REF#[id number]    PLEASE CALL   OR  [phone] WITH INPT CONTINUED STAY APPROVAL.     THANK YOU    CELESTINO REBOLLEDO LPN CCP    Date of Birth Social Security Number Address Home Phone MRN    1955  [address] [facility] [address] [phone] [id number]    Taoist Marital Status          None        Admission Date Admission Type Admitting Provider Attending Provider Department, Room/Bed    7/25/18 Elective Javed Huitron MD Lash, Jennifer A, MD [facility] CARDIOVASC UNIT, 2224/1    Discharge Date Discharge Disposition Discharge Destination                       Attending Provider:  Oriana Steinberg MD    Allergies:  No Known Allergies    Isolation:  None   Infection:  None   Code Status:  CPR    Ht:  185.4 cm (73\")   Wt:  130 kg (286 lb 12.8 oz)    Admission Cmt:  None   Principal Problem:  None                Active Insurance as of 7/25/2018     Primary Coverage     Payor Plan Insurance Group Employer/Plan Group    PAS[employer]ORT PAS[employer]ORT MEDICAID     Payor Plan Address Payor Plan Phone Number Effective From Effective To    [address] [phone] 11/1/1997     [facility] [address]       Subscriber Name Subscriber Birth Date Member ID       BJ DUARTE 1955 [id number]                 Emergency Contacts      (Rel.) Home Phone Work Phone Mobile Phone    Estrella Jaimes (Daughter) [phone] -- [phone]    Jackie Duarte (Sister) [phone] -- [phone]    Risa Duarte (Mother) [phone] -- --              Intake & Output (last 3 days)       09/01 0701 - 09/02 0700 09/02 0701 - 09/03 0700 09/03 0701 - 09/04 0700 09/04 0701 - 09/05 0700    P.O. 710 1160 780     Total Intake(mL/kg) 710 (5.4) 1160 (8.8) 780 (6)     Urine (mL/kg/hr) 700 (0.2) 875 (0.3) 400 (0.1) 100 (0.1)    Other   4000     Stool   2     Total Output 700 " 875 4402 100    Net +10 +285 -3622 -100            Unmeasured Urine Occurrence   1 x     Unmeasured Stool Occurrence 1 x           Lines, Drains & Airways    Active LDAs     Name:   Placement date:   Placement time:   Site:   Days:    Peripheral IV 08/28/18 1147 Right;Upper Arm  08/28/18    1147    Arm    7    Urethral Catheter Double-lumen;Silicone 16 Fr.  09/04/18    1035      less than 1    Hemodialysis Cath Double  08/28/18    1335    Subclavian    7                Lab Results (last 72 hours)     Procedure Component Value Units Date/Time    Bone Marrow Comprehensive (Int Onc) [[id number]] Collected:  08/30/18 1530    Specimen:  Bone Marrow Updated:  09/04/18 1128     Reference Lab Report See addendum Pathology report    Urinalysis With Culture If Indicated - Urine, Catheter [[id number]]  (Abnormal) Collected:  09/04/18 1041    Specimen:  Urine from Urine, Catheter Updated:  09/04/18 1108     Color, UA Dark Yellow (A)     Appearance, UA Cloudy (A)     pH, UA <=5.0     Specific Gravity, UA 1.020     Glucose, UA Negative     Ketones, UA Trace (A)     Bilirubin, UA Negative     Blood, UA Trace (A)     Protein, UA 30 mg/dL (1+) (A)     Leuk Esterase, UA Trace (A)     Nitrite, UA Negative     Urobilinogen, UA 0.2 E.U./dL    Urinalysis, Microscopic Only - Urine, Clean Catch [[id number]] Collected:  09/04/18 1041    Specimen:  Urine from Urine, Catheter Updated:  09/04/18 1108     RBC, UA 0-2 /HPF      WBC, UA 0-2 /HPF      Bacteria, UA None Seen /HPF      Squamous Epithelial Cells, UA 0-2 /HPF      Hyaline Casts, UA 3-6 /LPF      Methodology Manual Light Microscopy    POC Glucose Once [[id number]]  (Abnormal) Collected:  09/04/18 1032    Specimen:  Blood Updated:  09/04/18 1033     Glucose 201 (H) mg/dL     Narrative:       Meter: [id number] : [id number] Lee BEGUM    Renal Function Panel [[id number]]  (Abnormal) Collected:  09/04/18 0504    Specimen:  Blood Updated:  09/04/18 0623     Glucose 173 (H) mg/dL       BUN 30 (H) mg/dL      Creatinine 1.86 (H) mg/dL      Sodium 133 (L) mmol/L      Potassium 4.7 mmol/L      Chloride 99 mmol/L      CO2 22.1 mmol/L      Calcium 7.8 (L) mg/dL      Albumin 2.00 (L) g/dL      Phosphorus 3.0 mg/dL      Anion Gap 11.9 mmol/L      BUN/Creatinine Ratio 16.1     eGFR Non African Amer 37 (L) mL/min/1.73     Protime-INR [[id number]]  (Normal) Collected:  09/04/18 0504    Specimen:  Blood Updated:  09/04/18 0608     Protime 13.8 Seconds      INR 1.08    POC Glucose Once [[id number]]  (Abnormal) Collected:  09/04/18 0546    Specimen:  Blood Updated:  09/04/18 0547     Glucose 185 (H) mg/dL     Narrative:       Meter: [id number] : [id number] Saleem Samuels CNA    POC Glucose Once [[id number]]  (Normal) Collected:  09/03/18 2046    Specimen:  Blood Updated:  09/03/18 2047     Glucose 120 mg/dL     Narrative:       Meter: [id number] : [id number] Scott WEAVER    POC Glucose Once [[id number]]  (Abnormal) Collected:  09/03/18 1540    Specimen:  Blood Updated:  09/03/18 1541     Glucose 156 (H) mg/dL     Narrative:       Meter: [id number] : [id number] Lee BEGUM    Protime-INR [[id number]]  (Normal) Collected:  09/03/18 1411    Specimen:  Blood Updated:  09/03/18 1456     Protime 13.8 Seconds      INR 1.08    POC Glucose Once [[id number]]  (Normal) Collected:  09/03/18 1345    Specimen:  Blood Updated:  09/03/18 1346     Glucose 126 mg/dL     Narrative:       Meter: [id number] : [id number] Lee BEGUM    POC Glucose Once [[id number]]  (Abnormal) Collected:  09/03/18 0647    Specimen:  Blood Updated:  09/03/18 0648     Glucose 146 (H) mg/dL     Narrative:       Meter: [id number] : [id number] Sergey PHAN    Renal Function Panel [[id number]]  (Abnormal) Collected:  09/03/18 0509    Specimen:  Blood Updated:  09/03/18 0558     Glucose 148 (H) mg/dL      BUN 40 (H) mg/dL      Creatinine 2.16 (H) mg/dL      Sodium 133 (L) mmol/L      Potassium 5.3 (H) mmol/L       Chloride 100 mmol/L      CO2 22.8 mmol/L      Calcium 7.7 (L) mg/dL      Albumin 2.10 (L) g/dL      Phosphorus 2.9 mg/dL      Anion Gap 10.2 mmol/L      BUN/Creatinine Ratio 18.5     eGFR Non African Amer 31 (L) mL/min/1.73     CBC & Differential [[id number]] Collected:  09/03/18 0509    Specimen:  Blood Updated:  09/03/18 0548    Narrative:       CBC Auto Differential [[id number]]  (Abnormal) Collected:  09/03/18 0509    Specimen:  Blood Updated:  09/03/18 0548     WBC 8.77 10*3/mm3      RBC 4.44 (L) 10*6/mm3      Hemoglobin 10.3 (L) g/dL      Hematocrit 37.0 (L) %      MCV 83.3 fL      MCH 23.2 (L) pg      MCHC 27.8 (L) g/dL      RDW 22.9 (H) %      RDW-SD 66.7 (H) fl      Platelets 60 (L) 10*3/mm3      Neutrophil % 75.2 %      Lymphocyte % 11.4 (L) %      Monocyte % 9.7 %      Eosinophil % 3.5 %      Basophil % 0.2 %      Immature Grans % 1.1 (H) %      Neutrophils, Absolute 6.59 10*3/mm3      Lymphocytes, Absolute 1.00 10*3/mm3      Monocytes, Absolute 0.85 10*3/mm3      Eosinophils, Absolute 0.31 10*3/mm3      Basophils, Absolute 0.02 10*3/mm3      Immature Grans, Absolute 0.10 (H) 10*3/mm3      nRBC 0.0 /100 WBC     POC Glucose Once [[id number]]  (Abnormal) Collected:  09/02/18 2024    Specimen:  Blood Updated:  09/02/18 2025     Glucose 237 (H) mg/dL     Narrative:       Meter: [id number] : [id number] Sergey PHAN    POC Glucose Once [[id number]]  (Abnormal) Collected:  09/02/18 1539    Specimen:  Blood Updated:  09/02/18 1541     Glucose 187 (H) mg/dL     Narrative:       Meter: [id number] : [id number] Emmanuel Borden RN    POC Glucose Once [[id number]]  (Abnormal) Collected:  09/02/18 1039    Specimen:  Blood Updated:  09/02/18 1040     Glucose 220 (H) mg/dL     Narrative:       Meter: [id number] : [id number] Sae PHAN    Renal Function Panel [[id number]]  (Abnormal) Collected:  09/02/18 0708    Specimen:  Blood Updated:  09/02/18 0745     Glucose 129 (H) mg/dL      BUN 35 (H) mg/dL       Creatinine 2.25 (H) mg/dL      Sodium 133 (L) mmol/L      Potassium 5.1 mmol/L      Chloride 99 mmol/L      CO2 25.0 mmol/L      Calcium 7.8 (L) mg/dL      Albumin 2.10 (L) g/dL      Phosphorus 2.9 mg/dL      Anion Gap 9.0 mmol/L      BUN/Creatinine Ratio 15.6     eGFR Non African Amer 30 (L) mL/min/1.73     CBC & Differential [[id number]] Collected:  09/02/18 0708    Specimen:  Blood Updated:  09/02/18 0725    Narrative:       CBC Auto Differential [[id number]]  (Abnormal) Collected:  09/02/18 0708    Specimen:  Blood Updated:  09/02/18 0725     WBC 11.50 (H) 10*3/mm3      RBC 4.43 (L) 10*6/mm3      Hemoglobin 10.2 (L) g/dL      Hematocrit 36.7 (L) %      MCV 82.8 fL      MCH 23.0 (L) pg      MCHC 27.8 (L) g/dL      RDW 22.1 (H) %      RDW-SD 64.2 (H) fl      MPV -- fL      Comment: .        Platelets 60 (L) 10*3/mm3      Neutrophil % 74.9 %      Lymphocyte % 13.8 (L) %      Monocyte % 6.4 %      Eosinophil % 4.1 %      Basophil % 0.2 %      Immature Grans % 0.6 (H) %      Neutrophils, Absolute 8.61 (H) 10*3/mm3      Lymphocytes, Absolute 1.59 10*3/mm3      Monocytes, Absolute 0.74 10*3/mm3      Eosinophils, Absolute 0.47 10*3/mm3      Basophils, Absolute 0.02 10*3/mm3      Immature Grans, Absolute 0.07 (H) 10*3/mm3      nRBC 0.0 /100 WBC     POC Glucose Once [[id number]]  (Normal) Collected:  09/02/18 0649    Specimen:  Blood Updated:  09/02/18 0651     Glucose 113 mg/dL     Narrative:       Meter: [id number] : [id number] Alvin Anastasia WEAVER    Protime-INR [[id number]]  (Normal) Collected:  09/02/18 0501    Specimen:  Blood Updated:  09/02/18 0538     Protime 13.8 Seconds      INR 1.08    POC Glucose Once [[id number]]  (Abnormal) Collected:  09/01/18 2006    Specimen:  Blood Updated:  09/01/18 2036     Glucose 143 (H) mg/dL     Narrative:       Meter: [id number] : [id number] Alvin WEAVER    POC Glucose Once [[id number]]  (Normal) Collected:  09/01/18 1546    Specimen:  Blood Updated:  09/01/18 1547     Glucose  "105 mg/dL     Narrative:       Meter: [id number] : [id number] Willis PHAN          Physician Progress Notes (last 72 hours) (Notes from 9/1/2018  2:01 PM through 9/4/2018  2:01 PM)      Adan Gallardo MD at 9/4/2018 11:54 AM          Patient Care Team:  Gus Cordova MD as PCP - General (Internal Medicine)  Alexandria Benitez MD as Consulting Physician (Nephrology)  Mike Johnson MD as Consulting Physician (Pulmonary Disease)  Oriana Steinberg MD as Consulting Physician (Cardiology)    Chief Complaint: Follow-up    Interval History:   No complaints today.  He states that he tolerated hemodialysis well.    Objective   Vital Signs  Temp:  [97.5 °F (36.4 °C)-98.7 °F (37.1 °C)] 97.7 °F (36.5 °C)  Heart Rate:  [81-95] 95  Resp:  [16-18] 16  BP: (107-125)/(56-80) 122/74    Intake/Output Summary (Last 24 hours) at 09/04/18 1154  Last data filed at 09/04/18 0915   Gross per 24 hour   Intake              300 ml   Output             4502 ml   Net            -4202 ml     Flowsheet Rows      First Filed Value   Admission Height  185.4 cm (73\") Documented at 07/25/2018 1111   Admission Weight  125 kg (274 lb 9 oz) Documented at 07/25/2018 1111          General Appearance:    Alert, cooperative, in no acute distress   Head:    Normocephalic, without obvious abnormality, atraumatic       Neck:   Right-sided tunnel catheter    Lungs:     Clear to auscultation bilaterally, no wheezes, rales, or     rhonchi    Heart:    Normal rate, regular rhythm,  No murmur, rub, or gallop   Chest Wall:    No abnormalities observed   Abdomen:     Normal bowel sounds, soft, non-tender, non-distended,            no rebound tenderness   Extremities:   Lower extremities wrapped, edematous    Pulses:   Pulses palpable and equal bilaterally   Skin:   No bleeding or rash       Neurologic:   Cranial nerves 2 - 12 grossly intact, sensation intact               Assessment/Plan   1. Severe aortic stenosis and tricuspid regurgitation " "status post AVR, TV repair, CIERA,RAA  2. Atrial fibrillation, chronic, rate controlled, warfarin held   3. DM endocrinology following  4. MYCHAL  5. GEOVANNI/CKD-HD m,w,f  nephrology following continuing torsemide. Eventually will need AV fistula.  6.R pleural effusion status post thoracenteses 8/29 1.5 L removed  7.Thrombocytopenia- bone marrow pending from 8/30/. HIT panel negative platelets up to 60 from 30 today  8.Hypotension off antihypertensives on midodrine now stable  9. Leukocytosis continues to trend down  10.UTI- completed course     -Patient clearly needs rehabilitation   -Long-term plan for fistula as outpatient  -I think we should start working towards discharge tomorrow.  He tolerated hemodialysis okay today.  -Although he does have atrial fibrillation I would love to see his platelets improve a little bit more before moving him onto systemic anti-coagulation as an outpatient.  -We will try to touch base with the consultants today to work towards discharge tomorrow                    Electronically signed by Adan Gallardo MD at 9/4/2018  1:01 PM     Ata Reza MD at 9/4/2018 10:03 AM            ENDOCRINE    Subjective   AND PLANS  Bj Duarte is a 63 y.o. male.     Follow-up diabetes    No nausea or vomiting.  Fasting glucose 185.  Random glucose 120-156.  Patient did not receive Levemir last night.  Continue Levemir 18 units at bedtime and NovoLog 8 units with each meal plus sliding scale.      Objective   /80 (BP Location: Right arm, Patient Position: Lying)   Pulse 90   Temp 97.9 °F (36.6 °C) (Oral)   Resp 16   Ht 185.4 cm (73\")   Wt 130 kg (286 lb 12.8 oz)   SpO2 95%   BMI 37.84 kg/m²    Physical Exam    Awake, alert, not in distress.  No rales or wheezes.  Regular heart rate and rhythm.  Abdomen soft, nontender.  Penile and scrotal edema slowly improving.  Upper extremity edema about the same  No cyanosis.      Active Problems:    Obstructive sleep apnea    Nonrheumatic " aortic valve stenosis    Non-rheumatic tricuspid valve insufficiency    Pulmonary hypertension    Acute on chronic systolic congestive heart failure (CMS/HCC)    Hypoalbuminemia    Protein malnutrition (CMS/HCC)    Type 2 diabetes mellitus with complication, with long-term current use of insulin (CMS/HCC)    Primary hypothyroidism    End stage renal disease (CMS/HCC)    Thrombocytopenia (CMS/HCC)    Insulin therapy as discussed above.          Electronically signed by Ata Reza MD at 9/4/2018 10:09 AM     Adrian Ortiz MD at 9/4/2018  8:25 AM          [facility]  Adrian Ortiz MD     LOS: 41 days   Patient Care Team:  Gus Cordova MD as PCP - General (Internal Medicine)  Alexandria Benitez MD as Consulting Physician (Nephrology)  Mike Johnson MD as Consulting Physician (Pulmonary Disease)  Oriana Steinberg MD as Consulting Physician (Cardiology)        Subjective     Interval History:     Patient Complaints: none  History taken from: patient RN    Review of Systems:   All systems were reviewed and were negative except for sarcoma.  Renal insufficiency necessitating dialysis    Objective     Vital Signs  Temp:  [97.5 °F (36.4 °C)-98.7 °F (37.1 °C)] 97.9 °F (36.6 °C)  Heart Rate:  [81-95] 93  Resp:  [16-18] 16  BP: (107-125)/(56-80) 111/80    Physical Exam:     General Appearance:    Alert, cooperative, in no acute distress   Head:    Normocephalic, without obvious abnormality, atraumatic   Eyes:            Lids and lashes normal, conjunctivae and sclerae normal, no   icterus, no pallor, corneas clear, PERRLA   Ears:    Ears appear intact with no abnormalities noted   Throat:   No oral lesions, no thrush, oral mucosa moist   Neck:   No adenopathy, supple, trachea midline,    Back:     No kyphosis present, no scoliosis present, no skin lesions,      erythema or scars, no tenderness to percussion or                   palpation,   range of motion normal   Lungs:     Clear to  auscultation,respirations regular, even and                  unlabored    Heart:    Regular rhythm and normal rate, normal S1 and S2, no            murmur, no gallop, no rub, no click   Chest Wall:    No abnormalities observed   Abdomen:     Normal bowel sounds, no masses, no organomegaly, soft        non-tender, non-distended, no guarding, no rebound                tenderness   Genital/Rectal:     Catheter in place.  Urine output clear.  Swollen penis and scrotum from anasarca.  Foreskin can be retracted.     Extremities:   Moves all extremities well, no edema, no cyanosis, no             redness       Skin:   No bleeding, bruising or rash            Active Problems:    Obstructive sleep apnea    Nonrheumatic aortic valve stenosis    Non-rheumatic tricuspid valve insufficiency    Pulmonary hypertension    Acute on chronic systolic congestive heart failure (CMS/HCC)    Hypoalbuminemia    Protein malnutrition (CMS/[facility])    Type 2 diabetes mellitus with complication, with long-term current use of insulin (CMS/[facility])    Primary hypothyroidism    End stage renal disease (CMS/[facility])    Thrombocytopenia (CMS/[facility])      Long-term [place] care  Penile/scrotal edema    Plan for disposition:Needs catheter changed.  I discussed this with the nursing staff.  Culture will be obtained    Adrian Ortiz MD  09/04/18  8:25 AM      Time: 25+ minutes reviewing chart, history and physical, discussion with patient and staff    Electronically signed by Adrian Ortiz MD at 9/4/2018  8:27 AM     Ata Reza MD at 9/3/2018  2:27 PM            ENDOCRINE    Subjective   AND PLANS  jB Duarte is a 63 y.o. male.     Follow-up diabetes    Just got back from dialysis.  No complaints.  Appetite good.  Fasting glucose 146.  Random glucose 126.  Continue Levemir 18 units at bedtime and NovoLog 8 units with each meal plus sliding scale.    Objective   /73 (BP Location: Right arm, Patient Position: Lying)   Pulse 95   Temp 97.6  "°F (36.4 °C) (Oral)   Resp 18   Ht 185.4 cm (73\")   Wt (!) 149 kg (329 lb 9.4 oz)   SpO2 98%   BMI 43.48 kg/m²    Physical Exam    Awake, alert, not in distress.  No rales or wheezes.  Irregularly irregular heart rate and rhythm.  No gallop.  Abdomen soft, nontender.  Upper extremity edema slowly improving.  No cyanosis or clubbing.        Active Problems:    Obstructive sleep apnea    Nonrheumatic aortic valve stenosis    Non-rheumatic tricuspid valve insufficiency    Pulmonary hypertension    Acute on chronic systolic congestive heart failure (CMS/HCC)    Hypoalbuminemia    Protein malnutrition (CMS/[facility])    Type 2 diabetes mellitus with complication, with long-term current use of insulin (CMS/[facility])    Primary hypothyroidism    End stage renal disease (CMS/[facility])    Thrombocytopenia (CMS/[facility])    Insulin therapy as discussed above.  Continue levothyroxine.          Electronically signed by Ata Reza MD at 9/3/2018  2:29 PM     Ronald Olivares MD at 9/3/2018 12:56 PM             LOS: 40 days   Patient Care Team:  Gus Cordova MD as PCP - General (Internal Medicine)  Alexandria Benitez MD as Consulting Physician (Nephrology)  Mike Johnson MD as Consulting Physician (Pulmonary Disease)  Oriana Steinberg MD as Consulting Physician (Cardiology)    Chief Complaint/ Reason for encounter: Anasarca, dialysis management      Subjective     Medical history reviewed:  History of Present Illness    Subjective:  Symptoms:  Improved.  He reports weakness.  No shortness of breath or chest pain.  (Weight and edema continue to slowly improve  No shortness of breath, weakness improved    Patient was seen on hemodialysis  Treatment orders discussed with the dialysis RN, jennifer  Arterial pressure/ Venous pressure: 110/120  Blood flow rate/Dialysate flow rate: 400/600  Blood pressure: 116/65, pulse 87  Potassium bath/Fluid removal goal:  2K/4 kg ).    Diet:  Adequate intake.  No nausea.    Activity level: " Returning to normal.    Pain:  He reports no pain.          History taken from: Patient and chart    Objective     Vital Signs  Temp:  [97.2 °F (36.2 °C)-97.6 °F (36.4 °C)] 97.6 °F (36.4 °C)  Heart Rate:  [88-95] 95  Resp:  [16-18] 18  BP: (109-125)/(66-91) 125/73       Wt Readings from Last 1 Encounters:   09/03/18 1244 (!) 149 kg (329 lb 9.4 oz)   09/02/18 0658 132 kg (290 lb 12.8 oz)   09/01/18 0700 135 kg (298 lb 6 oz)   08/31/18 0700 132 kg (290 lb 12.8 oz)   08/30/18 0913 131 kg (288 lb)   08/29/18 0352 134 kg (294 lb 6.4 oz)   08/28/18 0635 133 kg (293 lb 3.2 oz)   08/27/18 1300 131 kg (288 lb 9.3 oz)   08/27/18 0659 135 kg (297 lb 9.6 oz)   08/26/18 0944 135 kg (297 lb)   08/25/18 0622 136 kg (299 lb)   08/23/18 0914 (!) 139 kg (307 lb)   08/21/18 0643 (!) 138 kg (304 lb 8 oz)   08/20/18 0629 (!) 140 kg (309 lb 1.6 oz)   08/19/18 0552 (!) 140 kg (307 lb 14.4 oz)   08/18/18 0524 (!) 140 kg (308 lb 14.4 oz)   08/17/18 0500 (!) 138 kg (305 lb 3.2 oz)   08/16/18 0500 (!) 137 kg (301 lb 14.4 oz)   08/15/18 0618 (!) 137 kg (303 lb)   08/14/18 1300 (!) 138 kg (303 lb 4.8 oz)   08/13/18 0458 135 kg (298 lb 3.2 oz)   08/12/18 1312 132 kg (291 lb)   08/11/18 0615 132 kg (291 lb 3.2 oz)   08/10/18 0554 130 kg (286 lb 14.4 oz)   08/09/18 0500 130 kg (286 lb 6.4 oz)   08/08/18 0700 128 kg (282 lb)   08/07/18 0600 122 kg (270 lb)   08/06/18 0705 123 kg (272 lb)   08/05/18 0500 124 kg (273 lb)   08/04/18 0516 108 kg (238 lb 6.4 oz)   08/03/18 0637 129 kg (283 lb 6.4 oz)   08/02/18 0353 132 kg (292 lb 1.6 oz)   08/01/18 0545 134 kg (295 lb 4.8 oz)   07/31/18 0310 133 kg (294 lb 1.6 oz)   07/29/18 1649 131 kg (288 lb 12.8 oz)   07/29/18 0700 59.4 kg (131 lb)   07/28/18 0500 129 kg (284 lb 1.6 oz)   07/26/18 0520 127 kg (280 lb)   07/25/18 1111 125 kg (274 lb 9 oz)       Objective:  General Appearance:  Comfortable, in no acute distress and not in pain (Obese).    Vital signs: (most recent): Blood pressure 125/73, pulse 95,  "temperature 97.6 °F (36.4 °C), temperature source Oral, resp. rate 18, height 185.4 cm (73\"), weight (!) 149 kg (329 lb 9.4 oz), SpO2 98 %.  Vital signs are normal.  No fever.    Output: Producing urine.    HEENT: Normal HEENT exam.    Lungs:  Normal effort and normal respiratory rate.  Breath sounds clear to auscultation.  He is not in respiratory distress.  No decreased breath sounds.    Heart: Normal rate.  Regular rhythm.  S1 normal.  No murmur.   Abdomen: Abdomen is soft and non-distended.  Bowel sounds are normal.   There is no abdominal tenderness.   (Significant scrotal edema).   Extremities: Normal range of motion.  There is dependent edema.  (Anasarca improved, decreased scrotal edema)  Pulses: Distal pulses are intact.    Neurological: Patient is alert and oriented to person, place and time.    Skin:  Warm and dry.  No rash or cyanosis.             Results Review:    Intake/Output:     Intake/Output Summary (Last 24 hours) at 09/03/18 1256  Last data filed at 09/03/18 1244   Gross per 24 hour   Intake             1400 ml   Output             4875 ml   Net            -3475 ml         DATA:  Radiology and Labs:  The following labs independently reviewed by me. Additional labs ordered for tomorrow a.m.  Interval notes, chart personally reviewed by me.   Old records independently reviewed showing Stage III chronic kidney disease as described above       ASSESSMENT:  End-stage renal disease, dialysis Monday Wednesday Friday with ultrafiltration as tolerated  Anasarca, improved  Hyponatremia, adjust dialysis bath  Hyponatremia, should correct with dialysis  Hypoalbuminemia, contributing to edema, no proteinuria   Severe aortic stenosis status post replacement  Pulmonary hypertension  Anemia of chronic kidney disease: Epogen, IV iron  Thrombocytopenia, hematology following, up to 60,000 today  pleural effusions, status post thoracentesis      PLAN:   Continue dialysis, Monday Wednesday Friday schedule, " "ultrafiltration as tolerated with dialysis.  Platelet count stable  Continue midodrine, torsemide, BP support on dialysis with albumin and mannitol as needed  Eventual AVF once platelets are better: outpatient follow-up with vascular once he is a little stronger and more stable    Ronald Olivares MD   [facility]   Office phone number: [phone]  Answering service phone number: [phone]    09/03/18  12:56 PM      Dictation performed using Dragon dictation software              Electronically signed by Ronald Olivares MD at 9/3/2018 12:59 PM     Renetta Hancock APRN at 9/3/2018 11:17 AM        Patient off unit for dialysis, will see tomorrow.  Ok for anticoagulation from CTS standpoint, oncology following.     IRENE [facility]    Electronically signed by Renetta Hancock APRN at 9/3/2018 11:18 AM     Adan Gallardo MD at 9/3/2018  8:37 AM          Patient Care Team:  Gus Cordova MD as PCP - General (Internal Medicine)  Alexandria Benitez MD as Consulting Physician (Nephrology)  Mike Johnson MD as Consulting Physician (Pulmonary Disease)  Oriana Steinberg MD as Consulting Physician (Cardiology)    Chief Complaint: Follow-up aortic valve replacement    Interval History:   Platelets have improved.  Still weak.    Objective   Vital Signs  Temp:  [97.2 °F (36.2 °C)-98.5 °F (36.9 °C)] 97.2 °F (36.2 °C)  Heart Rate:  [88-95] 92  Resp:  [16] 16  BP: (109-120)/(66-91) 120/73    Intake/Output Summary (Last 24 hours) at 09/03/18 0837  Last data filed at 09/03/18 0800   Gross per 24 hour   Intake             1640 ml   Output              875 ml   Net              765 ml     Flowsheet Rows      First Filed Value   Admission Height  185.4 cm (73\") Documented at 07/25/2018 1111   Admission Weight  125 kg (274 lb 9 oz) Documented at 07/25/2018 1111          General Appearance:    Alert, cooperative, in no acute distress   Head:    Normocephalic, without obvious abnormality, " atraumatic       Neck:   No adenopathy, supple, no thyromegaly, no carotid bruit, no    JVD   Lungs:     Clear to auscultation bilaterally, no wheezes, rales, or     rhonchi    Heart:    Normal rate, irregular rhythm,  No murmur, rub, or gallop   Chest Wall:    No abnormalities observed   Abdomen:     Normal bowel sounds, soft, non-tender, non-distended,            no rebound tenderness   Extremities:   No cyanosis, clubbing.  Trace to 1+ bilateral lower extremity edema.     Pulses:   Pulses palpable and equal bilaterally   Skin:   No bleeding or rash       Neurologic:   Cranial nerves 2 - 12 grossly intact, sensation intact                   I reviewed the patient's new clinical results.  I personally viewed and interpreted the patient's EKG/Telemetry data        Assessment/Plan   1. Severe aortic stenosis and tricuspid regurgitation status post AVR, TV repair, CIERA,RAA  2. Atrial fibrillation, chronic, rate controlled, warfarin held   3. DM endocrinology following  4. MYCHAL  5. GEOVANNI/CKD-HD m,w,f  nephrology following continuing torsemide. Eventually will need AV fistula.  6.R pleural effusion status post thoracenteses 8/29 1.5 L removed  7.Thrombocytopenia- bone marrow pending from 8/30/. HIT panel negative platelets up to 60 from 30 today  8.Hypotension off antihypertensives on midodrine now stable  9. Leukocytosis continues to trend down  10.UTI- completed course     -Patient clearly needs rehabilitation   -Long-term plan for fistula still a little unclear to me.  Platelets 60,000.  -Recommend DVT prophylaxis.                Electronically signed by Adan Gallardo MD at 9/3/2018 11:11 AM     Ata Reza MD at 9/2/2018  3:59 PM            ENDOCRINE    Subjective   AND PLANS  Bj Duarte is a 63 y.o. male.     Follow-up diabetes    Slowly improving.  Edema slowly receding.  For hemodialysis tomorrow  Appetite good.  Fasting glucose 113.  Random glucose 105-220.  Continue Levemir 18 units at bedtime and  "NovoLog 8 units with each meal plus sliding scale.      Objective   /76 (BP Location: Right arm, Patient Position: Sitting)   Pulse 93   Temp 98.5 °F (36.9 °C) (Oral)   Resp 16   Ht 185.4 cm (73\")   Wt 132 kg (290 lb 12.8 oz)   SpO2 99%   BMI 38.37 kg/m²    Physical Exam    Awake, alert, not in distress.  Irregularly irregular heart rate and rhythm.  Abdomen soft, globular, nontender.  Scrotal edema slowly improving.  Upper extremity edema slowly improving  No calf tenderness.  No cyanosis or clubbing           Active Problems:    Obstructive sleep apnea    Nonrheumatic aortic valve stenosis    Non-rheumatic tricuspid valve insufficiency    Pulmonary hypertension    Acute on chronic systolic congestive heart failure (CMS/HCC)    Hypoalbuminemia    Protein malnutrition (CMS/[facility])    Type 2 diabetes mellitus with complication, with long-term current use of insulin (CMS/[facility])    Primary hypothyroidism    End stage renal disease (CMS/[facility])    Thrombocytopenia (CMS/[facility])     insulin therapy as discussed above    continue levothyroxine          Electronically signed by Ata Reza MD at 9/2/2018  4:01 PM     Ronald Olivares MD at 9/2/2018  1:53 PM             LOS: 39 days   Patient Care Team:  Gus Cordova MD as PCP - General (Internal Medicine)  Alexandria Benitez MD as Consulting Physician (Nephrology)  Mike Johnson MD as Consulting Physician (Pulmonary Disease)  Oriana Steinberg MD as Consulting Physician (Cardiology)    Chief Complaint/ Reason for encounter: Anasarca, dialysis management      Subjective     Medical history reviewed:  History of Present Illness    Subjective:  Symptoms:  Improved.  He reports weakness.  No shortness of breath or chest pain.  (Weight and edema continue to slowly improve  No shortness of breath, weakness improved  Resting, no interval events noted ).    Diet:  Adequate intake.  No nausea.    Activity level: Returning to normal.    Pain:  He reports no " "pain.          History taken from: Patient and chart    Objective     Vital Signs  Temp:  [97.4 °F (36.3 °C)-98.7 °F (37.1 °C)] 98.5 °F (36.9 °C)  Heart Rate:  [73-99] 93  Resp:  [16] 16  BP: (104-118)/(58-76) 114/76    Objective:  General Appearance:  Comfortable, in no acute distress and not in pain (Obese).    Vital signs: (most recent): Blood pressure 114/76, pulse 93, temperature 98.5 °F (36.9 °C), temperature source Oral, resp. rate 16, height 185.4 cm (73\"), weight 132 kg (290 lb 12.8 oz), SpO2 99 %.  Vital signs are normal.  No fever.    Output: Producing urine.    HEENT: Normal HEENT exam.    Lungs:  Normal effort and normal respiratory rate.  Breath sounds clear to auscultation.  He is not in respiratory distress.  No decreased breath sounds.    Heart: Normal rate.  Regular rhythm.  S1 normal.  No murmur.   Abdomen: Abdomen is soft and non-distended.  Bowel sounds are normal.   There is no abdominal tenderness.   (Significant scrotal edema).   Extremities: Normal range of motion.  There is dependent edema.  (Anasarca improved, decreased scrotal edema)  Pulses: Distal pulses are intact.    Neurological: Patient is alert and oriented to person, place and time.    Skin:  Warm and dry.  No rash or cyanosis.             Results Review:    Intake/Output:     Intake/Output Summary (Last 24 hours) at 09/02/18 1353  Last data filed at 09/02/18 1308   Gross per 24 hour   Intake              950 ml   Output              700 ml   Net              250 ml         DATA:  Radiology and Labs:  The following labs independently reviewed by me. Additional labs ordered for tomorrow a.m.  Interval notes, chart personally reviewed by me.   Old records independently reviewed showing Stage III chronic kidney disease as described above   Discussed dialysis and ultrafiltration plans per Dr. Jameel Delaney  Discussed dialysis with Sae dialysis RN      ASSESSMENT:  ESRD, continue Monday Wednesday Friday dialysis  volume " overload/anasarca, slowly improving with dialysis  Hyponatremia, adjust sodium bath with dialysis today  Hypoalbuminemia, contributing to edema, no proteinuria   Severe aortic stenosis status post replacement  Tricuspid insufficiency status post repair  Pulmonary hypertension  Acute on chronic CHF  hypotension related to heart failure, stable on midodrine  Anemia of chronic kidney disease: Epogen, IV iron  Thrombocytopenia, hematology following, no heparin with dialysis  pleural effusions      PLAN:   Continue dialysis, Monday Wednesday Friday schedule, ultrafiltration as tolerated with dialysis.  Bone marrow biopsy results pending, platelet count 60k today  Continue midodrine, torsemide, BP support on dialysis with albumin and mannitol as needed  Eventual AVF once platelets are better    Ronald Olivares MD   [facility]   Office phone number: [phone]  Answering service phone number: [phone]    09/02/18  1:53 PM      Dictation performed using Dragon dictation software              Electronically signed by Ronald Olivares MD at 9/2/2018  1:55 PM     Chacha Marshall MD at 9/2/2018 10:52 AM           LOS: 39 days   Patient Care Team:  Gus Cordova MD as PCP - General (Internal Medicine)  Alexandria Benitez MD as Consulting Physician (Nephrology)  Mike Johnson MD as Consulting Physician (Pulmonary Disease)  Oriana Steinberg MD as Consulting Physician (Cardiology)    Chief Complaint: post op fu    Subjective:  Symptoms:  No shortness of breath or chest pain.    Diet:  Adequate intake.    Activity level: Impaired due to weakness.    Pain:  He reports no pain.          Vital Signs  Temp:  [97.4 °F (36.3 °C)-98.7 °F (37.1 °C)] 97.7 °F (36.5 °C)  Heart Rate:  [73-99] 94  Resp:  [16] 16  BP: (104-118)/(58-74) 108/71  Body mass index is 38.37 kg/m².    Intake/Output Summary (Last 24 hours) at 09/02/18 1053  Last data filed at 09/02/18 0845   Gross per 24 hour   Intake               "830 ml   Output              700 ml   Net              130 ml     I/O this shift:  In: 240 [P.O.:240]  Out: -       1    08/31/18  0700 09/01/18  0700 09/02/18  0658   Weight: 132 kg (290 lb 12.8 oz) 135 kg (298 lb 6 oz) 132 kg (290 lb 12.8 oz)         Objective:  General Appearance:  Comfortable.    Vital signs: (most recent): Blood pressure 108/71, pulse 94, temperature 97.7 °F (36.5 °C), temperature source Oral, resp. rate 16, height 185.4 cm (73\"), weight 132 kg (290 lb 12.8 oz), SpO2 99 %.    Output: Producing urine.    Lungs:  Normal effort and normal respiratory rate.  (On room air)  Heart: Normal rate.  Irregular rhythm.    Extremities: There is dependent edema (4+ lower extremities and penile).    Neurological: Patient is alert and oriented to person, place and time.    Skin:  Warm and dry.  (Stable sternum)        Assessment & Plan    -Severe aortic stenosis and tricuspid regurgitation-s/p AVR, TVr, CIERA, RAA----POD #38 ([facility])      Able to ambulate with PT with decreased scrotal edema, stopping r/t fatigue.  Platelets up to 60 today, still await bone marrow result.  Holding coumadin until ok with hematology.        IRENE Lawson  09/02/18  10:53 AM     Hematology workup ongoing.    Electronically signed by Chacha Marshall MD at 9/2/2018  2:53 PM     Bonnie Rose APRN at 9/2/2018  7:49 AM          CC: A fib/Post op follow up s/p AVR, TVr    Interval History: no chest pain or shortness of breath. \"feeling good\" up in the chair      Vital Signs  Temp:  [97.4 °F (36.3 °C)-99.1 °F (37.3 °C)] 97.4 °F (36.3 °C)  Heart Rate:  [73-99] 73  Resp:  [16-18] 16  BP: ()/(58-74) 104/58    Intake/Output Summary (Last 24 hours) at 09/02/18 0754  Last data filed at 09/02/18 0658   Gross per 24 hour   Intake              710 ml   Output              700 ml   Net               10 ml     Flowsheet Rows      First Filed Value   Admission Height  185.4 cm (73\") Documented at 07/25/2018 1111   Admission Weight  " 125 kg (274 lb 9 oz) Documented at 07/25/2018 1111          PHYSICAL EXAM:  General: No acute distress  Resp:NL Rate, unlabored, clear  CV:NL rate and irregular rhythm, NL PMI, Nl S1 and S2, LLSB 2/6 systolic   Murmur, no gallop, no rub, No JVD. normal pedal pulses  ABD:Nl sounds, no masses or tenderness, nondistended, no guarding or rebound  Neuro: alert,cooperative and oriented  Extr: 1+ edema, ace wrapped bilateral, moves all extremities  Assessment/Plan  1. Severe aortic stenosis and tricuspid regurgitation status post AVR, TV repair, CIERA,RAA   2. Atrial fibrillation, chronic, rate controlled, warfarin held for thrombocytopenia continue to hold, await bone marrow biopsy  3. DM endocrinology following  4. MYCHAL  5. GEOVANNI/CKD-HD m,w,f  nephrology following continuing torsemide. Eventually will need AV fistula.  6.R pleural effusion status post thoracenteses 8/29 1.5 L removed  7.Thrombocytopenia- bone marrow pending from 8/30/. HIT panel negative platelets up to 60 from 30 today  8.Hypotension off antihypertensives on midodrine now stable  9. Leukocytosis continues to trend down  10.UTI- completed course        IRENE Mathews  09/02/18  7:54 AM          Electronically signed by Bonnie Rose APRN at 9/2/2018  7:58 AM     Code, Killian REECE II, MD at 9/2/2018  7:11 AM            Subjective      REASON FOR FOLLOWUP/CHIEF COMPLAINT:  Thrombocytopenia    HISTORY OF PRESENT ILLNESS:   Denies bleeding.  No new problems overnight.    Past Medical History, Past Surgical History, Social History, Family History have been reviewed and are without significant changes except as mentioned.    Review of Systems   Review of Systems   Constitutional: Negative for activity change.   HENT: Negative for nosebleeds and trouble swallowing.    Respiratory: Negative for shortness of breath and wheezing.    Cardiovascular: Negative for chest pain and palpitations.   Gastrointestinal: Negative for constipation, diarrhea and nausea.    Genitourinary: Negative for dysuria and hematuria.   Musculoskeletal: Negative for arthralgias and myalgias.   Neurological: Negative for seizures and syncope.   Hematological: Negative for adenopathy. Does not bruise/bleed easily.   Psychiatric/Behavioral: Negative for confusion.       Medications:  The current medication list was reviewed in the EMR    ALLERGIES:  No Known Allergies    Objective       Vitals:    09/02/18 0455 09/02/18 0658 09/02/18 0801 09/02/18 0809   BP: 104/58   108/71   BP Location: Right arm   Right arm   Patient Position: Lying   Sitting   Pulse: 73  84 94   Resp: 16  16 16   Temp: 97.4 °F (36.3 °C)   97.7 °F (36.5 °C)   TempSrc: Oral   Oral   SpO2: 100%   99%   Weight:  132 kg (290 lb 12.8 oz)     Height:         Physical Exam    CONSTITUTIONAL:  Vital signs reviewed.  No distress, looks comfortable.  EYES:  Conjunctiva and lids unremarkable.  PERRLA  EARS,NOSE,MOUTH,THROAT:  Ears and nose appear unremarkable.  Lips, teeth, gums appear unremarkable.  RESPIRATORY:  Normal respiratory effort.  Lungs clear to auscultation bilaterally.  CARDIOVASCULAR:  Normal S1, S2.  No murmurs rubs or gallops.  No significant lower extremity edema.  GASTROINTESTINAL: Abdomen appears unremarkable.  Nontender.  No hepatomegaly.  No splenomegaly.  NEURO: cranial nerves 2-12 grossly intact.  No focal deficits.  Appears to have equal strength all 4 extremities.  MUSCULOSKELETAL:  Unremarkable digits/nails.  No cyanosis or clubbing.  SKIN:  Warm.  No rashes.  PSYCHIATRIC:  Normal judgment and insight.  Normal mood and affect.       Assessment/Plan  ASSESSMENT/PLAN:  *thrombocytopenia.  B12 and folate unremarkable.  IPF normal, suggesting a production problem.  PTT and INR minimally elevated.  Fibrinogen normal.  No significant schistocytes.  ·  On review of labs dating through 3/26/18, PLT normal, around 160 March 2018 and June 2018.  At the end of June, PLT drop 215.  · Mid to late July 2018, PLT dropped from  120-138, down to a low point of 69 on 7/27/18.  PLT improved up to 92 on 8/4/18.  · PLT around 170-195 8/10/18-8/16/18.  · PLT around 100 on 8/21/18-8/23/18, dropped to 64 on 8/25/18, to 41 on 8/26/18, down to 29 on 8/28/18.  After a 2 unit transfusion, PLT only improved to 40 on 8/28/18.  · HIT test sent 8/27/18, ×2.  Both were negative.  Since IPF is low, which is not consistent with ITP and PLT is not recovering on its own, bone marrow biopsy 8/30/18.  PLT has been fluctuating 30-45.  PLT surprisingly 60 today.    *Anemia.  Multifactorial.  Hb stable/slightly better.    *End-stage renal disease.  On hemodialysis since 8/20/18.  Tunneled catheter placed 8/28/18.     *Atrial fibrillation.  He was on Coumadin.  This has been stopped due to thrombocytopenia.     *Family history of different autoimmune conditions including a daughter with ITP.  Usually, ITP does not run in families.  Also, usually ITP does not occur in the setting of other illness.  I suspect his low PLT count is due to his other significant health issues and I suspect with time (perhaps one or 2 weeks), the PLT count will normalize or at least significantly improved.  Until then, I recommend supportive care with transfusions as needed.  I realize he did not get a significant improvement in PLT after a 2 unit transfusion, only improving from 29 up to 40.  This does support a destructive process.  However, I think high-dose steroids for ITP would only complicate other health issues such as worsening his anasarca.  Furthermore, I doubt he has ITP.  Therefore, I would prefer to not empirically treat for ITP.  Instead, I will follow the PLT count.  Await bone marrow biopsy.     Plan  Await bone marrow biopsy from 8/30/18    Daughter assisted with history.      Electronically signed by Killian Earl II, MD at 9/2/2018 12:19 PM     Ronald Olivares MD at 9/1/2018  2:16 PM             LOS: 38 days   Patient Care Team:  Gus Cordova MD as PCP -  "General (Internal Medicine)  Alexandria Benitez MD as Consulting Physician (Nephrology)  Mike Johnson MD as Consulting Physician (Pulmonary Disease)  Oriana Steinberg MD as Consulting Physician (Cardiology)    Chief Complaint/ Reason for encounter: Anasarca, dialysis management      Subjective     Medical history reviewed:  History of Present Illness    Subjective:  Symptoms:  Improved.  He reports weakness.  No shortness of breath or chest pain.  (Weight and edema continue to slowly improve  No shortness of breath, weakness improved  Resting, no interval events noted  Tolerated dialysis well yesterday with 4 L ultrafiltration).    Diet:  Adequate intake.  No nausea.    Activity level: Returning to normal.    Pain:  He reports no pain.          History taken from: Patient and chart    Objective     Vital Signs  Temp:  [97.6 °F (36.4 °C)-99.1 °F (37.3 °C)] 98.5 °F (36.9 °C)  Heart Rate:  [83-96] 87  Resp:  [16-18] 16  BP: ()/(57-87) 105/73        Objective:  General Appearance:  Comfortable, in no acute distress and not in pain (Obese).    Vital signs: (most recent): Blood pressure 105/73, pulse 87, temperature 98.5 °F (36.9 °C), temperature source Oral, resp. rate 16, height 185.4 cm (73\"), weight 135 kg (298 lb 6 oz), SpO2 97 %.  Vital signs are normal.  No fever.    Output: Producing urine.    HEENT: Normal HEENT exam.    Lungs:  Normal effort and normal respiratory rate.  Breath sounds clear to auscultation.  He is not in respiratory distress.  No decreased breath sounds.    Heart: Normal rate.  Regular rhythm.  S1 normal.  No murmur.   Abdomen: Abdomen is soft and non-distended.  Bowel sounds are normal.   There is no abdominal tenderness.   (Significant scrotal edema).   Extremities: Normal range of motion.  There is dependent edema.  (Anasarca improved, decreased scrotal edema)  Pulses: Distal pulses are intact.    Neurological: Patient is alert and oriented to person, place and time.    Skin:  " Warm and dry.  No rash or cyanosis.             Results Review:    Intake/Output:     Intake/Output Summary (Last 24 hours) at 09/01/18 1416  Last data filed at 09/01/18 1152   Gross per 24 hour   Intake              780 ml   Output              200 ml   Net              580 ml         DATA:  Radiology and Labs:  The following labs independently reviewed by me. Additional labs ordered for tomorrow a.m.  Interval notes, chart personally reviewed by me.   Old records independently reviewed showing Stage III chronic kidney disease as described above   Discussed dialysis and ultrafiltration plans per Dr. Jameel Delaney  Discussed dialysis with abhay Quevedo RN      ASSESSMENT:  ESRD, continue Monday Wednesday Friday dialysis  volume overload/anasarca, slowly improving with dialysis  Hyponatremia, adjust sodium bath with dialysis today  Hypoalbuminemia, contributing to edema, no proteinuria   Severe aortic stenosis status post replacement  Tricuspid insufficiency status post repair  Pulmonary hypertension  Acute on chronic CHF  hypotension related to heart failure, stable on midodrine  Anemia of chronic kidney disease: Epogen, IV iron  Thrombocytopenia, hematology following, no heparin with dialysis  pleural effusions      PLAN:   Continue dialysis, Monday Wednesday Friday schedule, ultrafiltration as tolerated with dialysis.  Bone marrow biopsy results pending, platelet count down to 30k  Continue midodrine, torsemide, BP support on dialysis with albumin and mannitol as needed  HIT panel negative x2 so restart heparin with HD   Eventual AVF    Rnoald Olivares MD   [facility]   Office phone number: [phone]  Answering service phone number: [phone]    09/01/18  2:16 PM      Dictation performed using Dragon dictation software              Electronically signed by Ronald Olivares MD at 9/1/2018  2:17 PM     Bonnie Rose APRN at 9/1/2018  2:08 PM          CC: A fib/Post op follow up s/p AVR,  "TVr    Interval History: denies chest pain or shortness of breath.       Vital Signs  Temp:  [97.6 °F (36.4 °C)-99.1 °F (37.3 °C)] 98.5 °F (36.9 °C)  Heart Rate:  [83-96] 87  Resp:  [16-18] 16  BP: ()/(57-87) 105/73    Intake/Output Summary (Last 24 hours) at 09/01/18 1409  Last data filed at 09/01/18 1152   Gross per 24 hour   Intake              780 ml   Output              200 ml   Net              580 ml     Flowsheet Rows      First Filed Value   Admission Height  185.4 cm (73\") Documented at 07/25/2018 1111   Admission Weight  125 kg (274 lb 9 oz) Documented at 07/25/2018 1111          PHYSICAL EXAM:  General: No acute distress  Resp:NL Rate, unlabored, clear  CV:NL rate and irregular rhythm, NL PMI, Nl S1 and S2, LLSB 2/6 systolic Murmur, no gallop, no rub, No JVD. Normal pedal pulses  ABD:Nl sounds, no masses or tenderness, nondistended, no guarding or rebound  Neuro: alert,cooperative and oriented  Extr: ACE wrapped BLE,edema present moves all extremities        I reviewed the patient's new clinical results.  I personally viewed and interpreted the patient's EKG/Telemetry data- NSR        Medication Review:   Meds reviewed        Assessment/Plan  1. Severe aortic stenosis and tricuspid regurgitation status post AVR, TV repair, CIERA,RAA   2. Atrial fibrillation, warfarin held for thrombocytopenia  3.DM endocrinology following  4.MYCHAL  5. GEOVANNI/CKD-HD m,w,f yday removed 4L, nephrology following continuing torsemide. Eventually will need AV fistula.  6.R pleural effusion status post thoracenteses 8/29 1.5 L removed  7.Thrombocytopenia- bone marrow pending. HIT panel negative  8.Hypotension off antihypertensives on midodrine  9.Leukocytosis trending down  10.UTI- completed course    IRENE Mathews  09/01/18  2:09 PM  Electronically signed by Bonnie Rose APRN at 9/1/2018  3:58 PM       All medication doses during the admission are shown, including meds that are no longer on order.   Scheduled Meds " Sorted by Name   for Bj Duarte as of 9/2/18 through 9/4/18     1 Day 3 Days 7 Days 10 Days < Today >    Legend:                          Inactive     Active     Other Encounter    Linked               Medications 09/02/18 09/03/18 09/04/18   aspirin EC tablet 81 mg  Dose: 81 mg  Freq: Daily Route: PO  Start: 07/27/18 0900    Admin Instructions:   Begin post-op day 1  Do not exceed 4 grams of aspirin in a 24 hr period.    If given for pain, use the following pain scale:   Mild Pain = Pain Score of 1-3, CPOT 1-2  Moderate Pain = Pain Score of 4-6, CPOT 3-4  Severe Pain = Pain Score of 7-10, CPOT 5-8    [id number]            atorvastatin (LIPITOR) tablet 10 mg  Dose: 10 mg  Freq: Nightly Route: PO  Start: 08/03/18 2100    Admin Instructions:   Avoid grapefruit juice.    (2041)          [phone]            bisacodyl (DULCOLAX) suppository 10 mg  Dose: 10 mg  Freq: Once Route: RE  Start: 07/30/18 1015         budesonide-formoterol (SYMBICORT) 160-4.5 MCG/ACT inhaler 2 puff  Dose: 2 puff  Freq: 2 Times Daily - RT Route: IN  Start: 07/27/18 0930    Admin Instructions:    Shake well.  Rinse mouth after use, do not swallow water.  Send aerosols to pharmacy in ziplock bag for proper disposal.    0801 2022        [phone]   2030          epoetin roxi (EPOGEN,PROCRIT) injection 10,000 Units  Dose: 10,000 Units  Freq: Once per day on Mon Wed Fri Route: IV  Indications of Use: ABNORMAL BUN  Start: 08/27/18 0900    Admin Instructions:   Refrigerate. Do not shake or send via pneumatic tube.     1143             ferric gluconate (FERRLECIT) 125 mg in sodium chloride 0.9 % 100 mL IVPB  Dose: 125 mg  Freq: Once per day on Mon Wed Fri Route: IV  Last Dose: 125 mg (09/03/18 1756)  Start: 08/27/18 1800 End: 09/14/18 1759    Admin Instructions:   GIVE I/V MWF ON H.D TIMES 8 DOSES     1756             ferrous sulfate tablet 325 mg  Dose: 325 mg  Freq: Daily With Breakfast Route:  PO  Start: 08/22/18 0800    Admin Instructions:   Swallow whole. Do not crush, split, or chew. Take with food if GI upset occurs.    0843          1338          0807            fluticasone (FLONASE) 50 MCG/ACT nasal spray 2 spray  Dose: 2 spray  Freq: Daily Route: EACH NARE  Start: 08/25/18 1230    0843          1339          0949            guaiFENesin (MUCINEX) 12 hr tablet 1,200 mg  Dose: 1,200 mg  Freq: Every 12 Hours Scheduled Route: PO  Start: 07/27/18 0900    Admin Instructions:   Caution: Look alike/sound alike drug alert               Do not crush, split, or chew.    0843   2039 1338   2052        0949   2100          heparin (porcine) 5000 UNIT/ML injection 5,000 Units  Dose: 5,000 Units  Freq: Take As Directed Route: IV  Indications Comment: shiley placement  Start: 08/20/18 1049    Admin Instructions:   Give 4 doses         hydrocortisone 1 % cream  Freq: Every 12 Hours Scheduled Route: TOP  Start: 08/20/18 2130    Admin Instructions:   Apply to rash on abd. Do not apply to groin.    0843   2041        1339   2052        0949   2100          insulin aspart (novoLOG) injection 0-10 Units  Dose: 0-10 Units  Freq: 4 Times Daily With Meals & Nightly Route: SC  Start: 07/30/18 1800    Admin Instructions:   BG less than 150 - zero  151 - 200 - 2 unit  201 - 250 - 4 units  251 - 300 - 6 units  301 - 350 - 8 units  Above 350 mg/dl - 10 units.      (0828)    1807     2040          (0848)   (1123)   1607     (1748)   (2053)        0643   0803)   1126     1800   2100          insulin aspart (novoLOG) injection 8 Units  Dose: 8 Units  Freq: 3 Times Daily With Meals Route: SC  Start: 08/09/18 1800    Admin Instructions:   Hold if BG is less than 100 mg/dl.      0842    1806      (0848)   (1123)   1606     1749)          0808   1126   1800        insulin detemir (LEVEMIR) injection 18 Units  Dose: 18 Units  Freq: Nightly Route: SC  Start: 08/20/18 2100    Admin Instructions:   Hold if BG is less than 80  mg/dl. If BG is , give only 10 units      2041          (2053)          2100            lactulose (CHRONULAC) 10 GM/15ML solution 20 g  Dose: 20 g  Freq: Daily Route: PO  Start: 07/30/18 1100    Admin Instructions:   May be mixed with fruit juice, water, or milk.    (0844)          (1340)          (0945)            levothyroxine (SYNTHROID, LEVOTHROID) tablet 50 mcg  Dose: 50 mcg  Freq: Every Early Morning Route: PO  Start: 08/15/18 0945    Admin Instructions:   Take on empty stomach.    0513          0558          0627            magic mouthwash oral supsension 10 mL  Dose: 10 mL  Freq: Every 4 Hours Route: SWISH & SPIT  Start: 08/22/18 1130    (0415)   0842      1806   2040   (2326)      (0535)   (0848)   (1304)     1606   (2053)   (2318)      (2694)   0950   1122     1615   2015          midodrine (PROAMATINE) tablet 5 mg  Dose: 5 mg  Freq: 3 Times Daily Before Meals Route: PO  Start: 08/11/18 0900    0843   (1411)   1806      0740   1338   1756      0807   1127   1730        montelukast (SINGULAIR) tablet 10 mg  Dose: 10 mg  Freq: Daily Route: PO  Start: 07/27/18 0900    0852          1336          0949            nystatin (MYCOSTATIN) 580339 UNIT/ML suspension 500,000 Units  Dose: 5 mL  Freq: 4 Times Daily Route: SWISH & SWAL  Start: 08/22/18 1215    Admin Instructions:   Shake well before use.      0843    1806     2040          (0848)   (1328)   (1749)     1759   2052        0807   1126   1800     2100            nystatin (MYCOSTATIN) powder  Freq: Every 12 Hours Scheduled Route: TOP  Start: 08/18/18 1100    (0844)   (2041)        1339   (2053)        (0949)   2100          pantoprazole (PROTONIX) EC tablet 40 mg  Dose: 40 mg  Freq: Daily Route: PO  Start: 07/27/18 0900    Admin Instructions:   Swallow whole; do not crush, split, or chew.    0843          1339          0949            polyethylene glycol (MIRALAX) powder 17 g  Dose: 17 g  Freq: Daily Route: PO  Start: 07/30/18 1015    Admin  Instructions:   Mix dose in 6-8 ounces of water.    0852          1339          0949            Scopolamine (TRANSDERM-SCOP) 1.5 MG/3DAYS patch 1 patch  Dose: 1 patch  Freq: Every 72 Hours Route: TD  Start: 08/22/18 1545    Admin Instructions:        1602   1607           sennosides-docusate sodium (SENOKOT-S) 8.6-50 MG tablet 2 tablet  Dose: 2 tablet  Freq: Nightly Route: PO  Start: 07/27/18 2100    2041          2052 2100            sucralfate (CARAFATE) 1 GM/10ML suspension 1 g  Dose: 1 g  Freq: Every 6 Hours Scheduled Route: PO  Start: 08/23/18 1300    0514    1809 [phone]   1331 (7002) (9002) [phone]        torsemide (DEMADEX) tablet 100 mg  Dose: 100 mg  Freq: Daily Route: PO  Start: 08/25/18 0900    0843          1338          0949            Medications 09/02/18 09/03/18 09/04/18       Continuous Meds Sorted by Name   for Bj Duarte as of 9/2/18 through 9/4/18    Legend:                          Inactive     Active     Other Encounter    Linked               Medications 09/02/18 09/03/18 09/04/18   BH (CUPID ONLY) ADENOSINE UNDILUTED INFUSION infusion  Freq: Continuous PRN  Last Dose: Stopped (08/09/18 1643)  Start: 08/09/18 1638 End: 08/09/18 1657         EPINEPHrine PF (ADRENALIN) 16 mcg/mL in sodium chloride 0.9 % 254.1 mL infusion  Freq: Continuous PRN Route: IV  Last Dose: 0.02 mcg/kg/min (07/26/18 0936)  Start: 07/26/18 0936 End: 07/26/18 1135         Hold medication  Dose: 1 each  Freq: Continuous PRN Route: XX  PRN Comment: For 24 Hours Prior to Thoracentesis  Start: 08/28/18 1058    Order specific questions:   Medication(s) Anticoagulation  Hold Details Hold next dose(s) (please specify in comment field)  Which Dose Future dose (specify in rationale/details)  Rationale/Details For 24 hours before thoracentesis         insulin regular (humuLIN R,novoLIN R) 1 Units/mL in sodium chloride 0.9 % 100 mL infusion  Freq: Continuous PRN Route: IV  Last  Dose: 5.6 Units/hr (07/26/18 1035)  Start: 07/26/18 0801 End: 07/26/18 1135         milrinone lactate (PRIMACOR) 200 mcg/mL in sodium chloride 0.9 % 312.5 mL infusion  Freq: Continuous PRN Route: IV  Last Dose: 0.25 mcg/kg/min (07/26/18 0918)  Start: 07/26/18 0918 End: 07/26/18 1135         norepinephrine (LEVOPHED) injection  Freq: Continuous PRN  Last Dose: 0.03 mcg/kg/min (07/26/18 1059)  Start: 07/26/18 0826 End: 07/26/18 1135         phenylephrine (LEXA-SYNEPHRINE) 50 mg in sodium chloride 0.9 % 250 mL infusion  Freq: Continuous PRN  Last Dose: 0.3 mcg/kg/min (07/26/18 0957)  Start: 07/26/18 0704 End: 07/26/18 1135         Propofol (DIPRIVAN) injection  Freq: Continuous PRN  Last Dose: Stopped (08/28/18 1341)  Start: 08/28/18 1317 End: 08/28/18 1402         Propofol (DIPRIVAN) injection  Freq: Continuous PRN Route: IV  Last Dose: 50 mcg/kg/min (07/26/18 0803)  Start: 07/26/18 0803 End: 07/26/18 1135         sodium chloride 0.9 % infusion  Rate: 9 mL/hr Dose: 9 mL/hr  Freq: Continuous PRN Route: IV  PRN Comment: Start Prior to Surgery  Last Dose: 9 mL/hr (08/28/18 1220)  Start: 08/28/18 1210         sodium chloride 0.9 % infusion  Freq: Continuous PRN  Start: 07/26/18 0726 End: 07/26/18 1135         sodium chloride 0.9 % infusion  Freq: Continuous PRN Route: IV  Start: 07/26/18 0648 End: 07/26/18 1135         Medications 09/02/18 09/03/18 09/04/18       PRN Meds Sorted by Name   for Gerald Bj as of 9/2/18 through 9/4/18    Legend:                          Inactive     Active     Other Encounter    Linked               Medications 09/02/18 09/03/18 09/04/18   acetaminophen (TYLENOL) suppository 650 mg  Dose: 650 mg  Freq: Every 4 Hours PRN Route: RE  PRN Reason: Mild Pain   Start: 07/26/18 1127    Admin Instructions:   Do not exceed 4 grams of acetaminophen in a 24 hr period.  Do not exceed 4 grams of acetaminophen in a 24 hr period.    If given for pain, use the following pain scale:   Mild Pain = Pain Score  of 1-3, CPOT 1-2  Moderate Pain = Pain Score of 4-6, CPOT 3-4  Severe Pain = Pain Score of 7-10, CPOT 5-8         acetaminophen (TYLENOL) tablet 650 mg  Dose: 650 mg  Freq: Every 4 Hours PRN Route: PO  PRN Reason: Mild Pain   Start: 07/26/18 1127    Admin Instructions:   Do not exceed 4 grams of acetaminophen in a 24 hr period.    If given for pain, use the following pain scale:   Mild Pain = Pain Score of 1-3, CPOT 1-2  Moderate Pain = Pain Score of 4-6, CPOT 3-4  Severe Pain = Pain Score of 7-10, CPOT 5-8               bisacodyl (DULCOLAX) EC tablet 10 mg  Dose: 10 mg  Freq: Daily PRN Route: PO  PRN Reason: Constipation  Start: 07/26/18 1127    Admin Instructions:   Swallow whole. Do not crush, split, or chew tablet.         bisacodyl (DULCOLAX) suppository 10 mg  Dose: 10 mg  Freq: Daily PRN Route: RE  PRN Reason: Constipation  Start: 07/27/18 0000         cyclobenzaprine (FLEXERIL) tablet 10 mg  Dose: 10 mg  Freq: Every 8 Hours PRN Route: PO  PRN Reason: Muscle Spasms  Start: 07/27/18 0000         dextrose (D50W) solution 25 g  Dose: 25 g  Freq: Every 15 Minutes PRN Route: IV  PRN Reason: Low Blood Sugar  PRN Comment: Blood Sugar Less Than 70  Start: 07/27/18 0903    Admin Instructions:   Blood sugar less than 70; patient has IV access - Unresponsive, NPO or Unable To Safely Swallow         dextrose (GLUTOSE) oral gel 15 g  Dose: 15 g  Freq: Every 15 Minutes PRN Route: PO  PRN Reason: Low Blood Sugar  PRN Comment: Blood sugar less than 70  Start: 07/27/18 0903    Admin Instructions:   BS<70, Patient Alert, Is not NPO, Can safely swallow.         diphenhydrAMINE (BENADRYL) capsule 25 mg  Dose: 25 mg  Freq: Every 6 Hours PRN Route: PO  PRN Reason: Itching  Start: 08/20/18 1137    Admin Instructions:   This med may be ordered in other forms and routes. Before giving verify the last time the drug was given by any route/form.         EPINEPHrine PF (ADRENALIN) 16 mcg/mL in sodium chloride 0.9 % 254.1 mL  infusion  Freq: Continuous PRN Route: IV  Start: 07/26/18 0936 End: 07/26/18 1135         fentaNYL citrate (PF) (SUBLIMAZE) injection  Freq: As Needed Route: IV  PRN Reason: Severe Pain   Start: 08/28/18 1319 End: 08/28/18 1402         fentaNYL citrate (PF) (SUBLIMAZE) injection  Freq: As Needed Route: IV  PRN Reason: Severe Pain   Start: 07/26/18 0654 End: 07/26/18 1135         fentaNYL citrate (PF) (SUBLIMAZE) injection  Freq: As Needed  Start: 07/25/18 1147 End: 07/25/18 1216         glucagon (human recombinant) (GLUCAGEN DIAGNOSTIC) injection 1 mg  Dose: 1 mg  Freq: As Needed Route: SC  PRN Comment: Blood Glucose Less Than 70  Start: 07/27/18 0903    Admin Instructions:   Blood Glucose Less Than 70 - Patient Without IV Access - Unresponsive, NPO or Unable To Safely Swallow         heparin (porcine) injection  Freq: As Needed Route: IV  Start: 07/26/18 0801 End: 07/26/18 1135         heparin (porcine) injection 3,000 Units  Dose: 3,000 Units  Freq: As Needed Route: IK  PRN Comment: as needed for lock HD cath w/ heparin  Indications Comment: as needed for lock HD cath w/heparin  Start: 09/03/18 1218                heparin (porcine) injection 3,000 Units  Dose: 3,000 Units  Freq: As Needed Route: IK  PRN Comment: LOCK HD CATH WITH HEPARIN  Indications Comment: DIALYSIS  Start: 08/25/18 1800     1245             Hold medication  Dose: 1 each  Freq: Continuous PRN Route: XX  PRN Comment: For 24 Hours Prior to Thoracentesis  Start: 08/28/18 1058    Order specific questions:   Medication(s) Anticoagulation  Hold Details Hold next dose(s) (please specify in comment field)  Which Dose Future dose (specify in rationale/details)  Rationale/Details For 24 hours before thoracentesis         HYDROcodone-acetaminophen (NORCO) 5-325 MG per tablet 2 tablet  Dose: 2 tablet  Freq: Every 4 Hours PRN Route: PO  PRN Reason: Moderate Pain   Start: 07/26/18 1127 End: 08/05/18 1126    Admin Instructions:       Do not exceed 4 grams of  acetaminophen in a 24 hr period.    If given for pain, use the following pain scale:   Mild Pain = Pain Score of 1-3, CPOT 1-2  Moderate Pain = Pain Score of 4-6, CPOT 3-4  Severe Pain = Pain Score of 7-10, CPOT 5-8         insulin regular (humuLIN R,novoLIN R) 1 Units/mL in sodium chloride 0.9 % 100 mL infusion  Freq: Continuous PRN Route: IV  Start: 07/26/18 0801 End: 07/26/18 1135         ipratropium-albuterol (DUO-NEB) nebulizer solution 3 mL  Dose: 3 mL  Freq: Every 4 Hours PRN Route: NEBULIZATION  PRN Reason: Shortness of Air  Start: 08/29/18 1309         Magnesium Sulfate 2 gram infusion - Mg less than or equal to 1.5 mg/dL  Dose: 2 g  Freq: As Needed Route: IV  PRN Comment: for Mg less than or equal 1.5 mg/dL  Start: 07/26/18 1128    Admin Instructions:   Recheck Mg level in the AM.         Or  Magesium Sulfate 1 gram infusion - Mg 1.6-1.9 mg/dL  Dose: 1 g  Freq: As Needed Route: IV  PRN Comment: Mg 1.6-1.9 mg/dL.  Start: 07/26/18 1128    Admin Instructions:   Recheck Mg level in the AM.         magnesium hydroxide (MILK OF MAGNESIA) suspension 2400 mg/10mL 10 mL  Dose: 10 mL  Freq: Daily PRN Route: PO  PRN Reason: Constipation  Start: 07/27/18 0000         midazolam (VERSED) injection  Freq: As Needed Route: IV  Start: 08/28/18 1319 End: 08/28/18 1402         midazolam (VERSED) injection  Freq: As Needed Route: IV  Start: 07/26/18 0654 End: 07/26/18 1135         midazolam (VERSED) injection  Freq: As Needed  Start: 07/25/18 1147 End: 07/25/18 1216               milrinone lactate (PRIMACOR) 200 mcg/mL in sodium chloride 0.9 % 312.5 mL infusion  Freq: Continuous PRN Route: IV  Start: 07/26/18 0918 End: 07/26/18 1135               And  naloxone (NARCAN) injection 0.4 mg  Dose: 0.4 mg  Freq: Every 5 Minutes PRN Route: IV  PRN Reason: Respiratory Depression  Start: 07/26/18 1128    Admin Instructions:   If respiratory rate is less than 8 breaths/minute or patient is difficult to arouse stop any narcotics and  contact physician.   Administer slow IV push. Repeat as ordered until patient's respiratory rate is greater than 12 breaths/minute.         norepinephrine (LEVOPHED) injection  Freq: Continuous PRN  Start: 07/26/18 0826 End: 07/26/18 1135               Shake well before use.           ondansetron (ZOFRAN) injection  Freq: As Needed Route: IV  PRN Reasons: Nausea,Vomiting  Start: 07/26/18 1027 End: 07/26/18 1135               ondansetron (ZOFRAN) tablet 4 mg  Dose: 4 mg  Freq: Every 6 Hours PRN Route: PO  PRN Reasons: Nausea,Vomiting  Start: 08/09/18 1710                Or  ondansetron ODT (ZOFRAN-ODT) disintegrating tablet 4 mg  Dose: 4 mg  Freq: Every 6 Hours PRN Route: PO  PRN Reasons: Nausea,Vomiting  Start: 08/09/18 1710    Admin Instructions:   Place on tongue and allow to dissolve.                Or  ondansetron (ZOFRAN) injection 4 mg  Dose: 4 mg  Freq: Every 6 Hours PRN Route: IV  PRN Reasons: Nausea,Vomiting  Start: 08/09/18 1710 2058             ondansetron (ZOFRAN) injection 4 mg  Dose: 4 mg  Freq: Every 6 Hours PRN Route: IV  PRN Reasons: Nausea,Vomiting  Start: 07/26/18 1127                           oxyCODONE (ROXICODONE) immediate release tablet 10 mg  Dose: 10 mg  Freq: Every 4 Hours PRN Route: PO  PRN Reason: Severe Pain   Start: 08/15/18 1554 End: 09/04/18 1443    Admin Instructions:       If given for pain, use the following pain scale:  Mild Pain = Pain Score of 1-3, CPOT 1-2  Moderate Pain = Pain Score of 4-6, CPOT 3-4  Severe Pain = Pain Score of 7-10, CPOT 5-8    0852   1524   1936      0557   1338   1803      0811   1353   1443-D/C'd                        phenylephrine (LEXA-SYNEPHRINE) 50 mg in sodium chloride 0.9 % 250 mL infusion  Freq: Continuous PRN  Start: 07/26/18 0704 End: 07/26/18 1135         potassium chloride (MICRO-K) CR capsule 40 mEq  Dose: 40 mEq  Freq: As Needed Route: PO  PRN Comment: potassium replacement.  see admin instructions  Start: 07/26/18 1128    Admin  Instructions:   Potassium replacement    Oral (may give tablet or powder packet)  If K+ less than or equal to 3.1 give KCl 40 mEq q4h x 3 doses  If K+ 3.2-3.6 give KCl 40 mEq q4h x 2 doses    Peripheral IV  If K+ less than or equal to 3.1 give KCl 10 mEq/100 mL NS IV q1h x 6 doses  If K+ 3.2-3.6 give KCl 10 mEq/100 mL NS q1h x 4 doses    Central Line  If K+ less than or equal to 3.1 give KCl 20 mEq/50 mL NS IV q1h x 3 doses  If K+ 3.2-3.6 give KCl 20 mEq/50 mL NS q1h x 2 doses    Check potassium 4 hours after last dose given.  Check magnesium if K stays low after replacement.  DO NOT GIVE if CrCl is less than 30 mL/minute or urine output is less than 30 mL/hr         Or  potassium chloride (KLOR-CON) packet 40 mEq  Dose: 40 mEq  Freq: As Needed Route: PO  PRN Comment: potassium replacement, see admin instructions  Start: 07/26/18 1128    Admin Instructions:   Potassium replacement    Oral (may give tablet or powder packet)  If K+ less than or equal to 3.1 give KCl 40 mEq q4h x 3 doses  If K+ 3.2-3.6 give KCl 40 mEq q4h x 2 doses    Peripheral IV  If K+ less than or equal to 3.1 give KCl 10 mEq/100 mL NS IV q1h x 6 doses  If K+ 3.2-3.6 give KCl 10 mEq/100 mL NS q1h x 4 doses    Central Line  If K+ less than or equal to 3.1 give KCl 20 mEq/50 mL NS IV q1h x 3 doses  If K+ 3.2-3.6 give KCl 20 mEq/50 mL NS q1h x 2 doses    Check potassium 4 hours after last dose given.  Check magnesium if K stays low after replacement.  DO NOT GIVE if CrCl is less than 30 mL/minute or urine output is less than 30 mL/hr         potassium chloride 10 mEq in 100 mL IVPB  Dose: 10 mEq  Freq: Every 1 Hour PRN Route: IV  PRN Comment: for K+ less than or equal to 3.1  Start: 07/26/18 1128    Admin Instructions:   Potassium replacement:  Infuse IV over 1 hour for 6 doses.  DO NOT GIVE IF CrCl is less than 30 mL/minute or urine output < 30 mL/hr. Check Potassium 4 hours after last dose given. Check Magnesium if K stays low after replacement.          Or  potassium chloride 10 mEq in 100 mL IVPB  Dose: 10 mEq  Freq: Every 1 Hour PRN Route: IV  PRN Comment: for K+ 3.2 - 3.6  Start: 07/26/18 1128    Admin Instructions:   Potassium replacement:  Infuse IV over 1 hour for 4 doses.  DO NOT GIVE IF CrCl is less than 30 mL/minute or urine output < 30 mL/hr. Check Potassium 4 hours after last dose given. Check Magnesium if K stays low after replacement.                                    promethazine (PHENERGAN) tablet 12.5 mg  Dose: 12.5 mg  Freq: Every 6 Hours PRN Route: PO  PRN Reasons: Nausea,Vomiting  Start: 07/26/18 1128    Admin Instructions:   If both ondansetron (ZOFRAN) and promethazine (PHENERGAN) are ordered use ondansetron first and THEN promethazine if ondansetron is ineffective.           Or  promethazine (PHENERGAN) injection 12.5 mg  Dose: 12.5 mg  Freq: Every 6 Hours PRN Route: IV  PRN Reasons: Nausea,Vomiting  Start: 07/26/18 1128    Admin Instructions:   If both ondansetron (ZOFRAN) and promethazine (PHENERGAN) are ordered use ondansetron first and THEN promethazine if ondansetron is ineffective.  If giving IV, dilute dose in 20 mL NS and slow IV push over 3-5 minutes. Administer through large bore vein (not hand or wrist) through running IV line at port furthest from patient's vein.               Propofol (DIPRIVAN) injection  Freq: Continuous PRN  Start: 08/28/18 1317 End: 08/28/18 1402         Propofol (DIPRIVAN) injection  Freq: Continuous PRN Route: IV  Start: 07/26/18 0803 End: 07/26/18 1135         Propofol (DIPRIVAN) injection  Freq: As Needed Route: IV  Start: 07/26/18 0704 End: 07/26/18 1135                     sodium chloride (OCEAN) nasal spray 2 spray  Dose: 2 spray  Freq: As Needed Route: EACH NARE  PRN Reason: Congestion  Start: 08/07/18 0033    Admin Instructions:                              sodium chloride 0.9 % infusion  Rate: 9 mL/hr Dose: 9 mL/hr  Freq: Continuous PRN Route: IV  PRN Comment: Start Prior to Surgery  Start:  08/28/18 1210                     sodium chloride 0.9 % infusion  Freq: Continuous PRN  Start: 07/26/18 0726 End: 07/26/18 1135         sodium chloride 0.9 % infusion  Freq: Continuous PRN Route: IV  Start: 07/26/18 0648 End: 07/26/18 1135               SUFentanil (SUFENTA) injection  Freq: As Needed Route: IV  PRN Reason: Severe Pain   Start: 07/26/18 0743 End: 07/26/18 1135               Tranexamic Acid Sterile Solution  Freq: As Needed  Start: 07/26/18 0738 End: 07/26/18 1135               vecuronium (NORCURON) injection  Freq: As Needed Route: IV  Start: 07/26/18 0704 End: 07/26/18 1135                     zolpidem (AMBIEN) tablet 10 mg  Dose: 10 mg  Freq: Nightly PRN Route: PO  PRN Reason: Sleep  Start: 09/01/18 2330 End: 09/11/18 2328    Admin Instructions:       2326          2309             Medications 09/02/18 09/03/18 09/04/18

## 2018-09-05 NOTE — DISCHARGE SUMMARY
Patient Name: Bj Duarte  :1955  63 y.o.    Date of Admit: 2018  Date of Discharge:  2018    Discharge Diagnosis:  Problem List Items Addressed This Visit        Cardiovascular and Mediastinum    Pulmonary hypertension    Relevant Medications    Hold medication    Other Relevant Orders    Cardiac Catheterization/Vascular Study (Completed)    Case Request Cath Lab: Right Heart Cath with adenosine challenge (Completed)    Cardiac Catheterization/Vascular Study (Completed)    Acute on chronic systolic congestive heart failure (CMS/HCC)    Relevant Medications    midodrine (PROAMATINE) 5 MG tablet    Other Relevant Orders    Cardiac Catheterization/Vascular Study (Completed)    Nonrheumatic aortic valve stenosis    Relevant Medications    fluconazole (DIFLUCAN) tablet 150 mg (Completed)    midodrine (PROAMATINE) 5 MG tablet    Other Relevant Orders    Cardiac Catheterization/Vascular Study (Completed)    Case Request (Completed)    Tissue Pathology Exam (Completed)    Non-rheumatic tricuspid valve insufficiency - Primary    Relevant Medications    midodrine (PROAMATINE) 5 MG tablet    Other Relevant Orders    Case Request (Completed)    Tissue Pathology Exam (Completed)       Respiratory    Obstructive sleep apnea    Relevant Medications    fluticasone (FLONASE) 50 MCG/ACT nasal spray 2 spray    fluticasone (FLONASE) 50 MCG/ACT nasal spray (Start on 2018)       Other    Generalized edema      Other Visit Diagnoses     Persistent atrial fibrillation (CMS/HCC)        Relevant Medications    warfarin (COUMADIN) tablet 5 mg (Completed)    fluconazole (DIFLUCAN) tablet 200 mg (Completed)    fluconazole (DIFLUCAN) tablet 150 mg (Completed)    torsemide (DEMADEX) tablet 100 mg    torsemide (DEMADEX) 100 MG tablet (Start on 2018)    midodrine (PROAMATINE) 5 MG tablet    Nausea        Relevant Medications    Scopolamine (TRANSDERM-SCOP) 1.5 MG/3DAYS patch 1 patch          Hospital Course:     1.   Aortic stenosis.  Status post aortic valve replacement with a 25 mm Medtronic bovine pericardial valve performed on July 26, 2018.  2.  Tricuspid regurgitation.  Status post tricuspid valve repair with a ring on July 26, 2018.  3.  Left atrial appendage resection  4.  Pulmonary hypertension with right heart failure.  Right heart catheterization performed on August 9 showed severe pulmonary hypertension with a pulmonary artery pressure of 84/32 with a wedge of approximately 18.  His pressures did not decline with vasodilator challenge.  Pulmonary has seen him and they do not recommend treatment with Revatio.  I spoke with Waqas Johnson he recommends ultrafiltration until the patient is as dry as possible and then try sildenafil. He said the usual dose is 20 three times a day but he likes to try to get them to 50 three times a day if possible.  It may lower his blood pressure further though.  He is going to continue to get dialysis with ultrafiltration as an outpatient.  He has not yet euvolemic.  I would like him to follow up with Dr. Johnson.  5.  Atrial fibrillation.  Rate controlled.  He was seen by Dr. Salgado regarding low platelets.  I agree with the recommendation that the patient not be on anticoagulation at this time.  6.  Acute on chronic kidney disease.  Dr. Olivares is following.    7.  Hyponatremia.  Stable  8.  Anemia.   9. Diabetes.  Endocrinology is following.  10.  Scrotal and penile edema.  Urology recommends discharge with the Omalley catheter in place and follow-up as an outpatient in 2 weeks.  11.  Hypotension.  On midodrine.  Off blood pressure lowering medications.  12.  Moderate pericardial effusion.  No evidence of tamponade physiology on echo.  13.  Hypoalbuminemia  14.  Thrombocytopenia.   Seen by hematology.  They think this is likely due to splenic and hepatic congestion.  15.  Hypothyroid.  Started on replacement.    - Discharged to rehabilitation  - Outpatient hemodialysis  - Follow up  with me in 4 weeks.    Procedures Performed  Procedure(s):  TUNNEL CATHETER PLACEMENT       Consults     Date and Time Order Name Status Description    8/28/2018 1059 Hematology & Oncology Inpatient Consult Completed     8/27/2018 1030 Inpatient Vascular Surgery Consult Completed     8/22/2018 1544 Inpatient Gastroenterology Consult Completed     8/20/2018 0933 Inpatient Vascular Surgery Consult Completed     8/20/2018 0030 Inpatient Internal Medicine Consult      8/12/2018 1816 Inpatient Podiatry Consult Completed     8/9/2018 1713 Inpatient Pulmonology Consult Completed     8/1/2018 0846 Inpatient Urology Consult Completed     7/29/2018 1200 Inpatient Endocrinology Consult Completed     7/28/2018 1514 Inpatient Nephrology Consult Completed     6/23/2018 1252 Inpatient Gastroenterology Consult Completed     6/23/2018 1250 Inpatient Cardiology Consult Completed     6/22/2018 2016 Inpatient Neurology Consult Completed     6/22/2018 1718 JOSÉ MIGUEL (on-call MD unless specified) Completed           Pertinent Test Results:     Results from last 7 days  Lab Units 09/05/18  0627   SODIUM mmol/L 134*   POTASSIUM mmol/L 4.7   CHLORIDE mmol/L 99   CO2 mmol/L 24.6   BUN mg/dL 36*   CREATININE mg/dL 2.16*   CALCIUM mg/dL 7.7*   GLUCOSE mg/dL 91           Results from last 7 days  Lab Units 09/03/18  0509   WBC 10*3/mm3 8.77   HEMOGLOBIN g/dL 10.3*   HEMATOCRIT % 37.0*   PLATELETS 10*3/mm3 60*       Results from last 7 days  Lab Units 09/05/18  0627 09/04/18  0504 09/03/18  1411   INR  1.12* 1.08 1.08               Condition on Discharge: stable    Discharge Medications     Discharge Medications      New Medications      Instructions Start Date   aspirin 81 MG EC tablet   81 mg, Oral, Daily      ferrous sulfate 325 (65 FE) MG tablet   325 mg, Oral, Daily With Breakfast      fluticasone 50 MCG/ACT nasal spray  Commonly known as:  FLONASE   2 sprays, Each Nare, Daily      levothyroxine 50 MCG tablet  Commonly known as:  SYNTHROID,  LEVOTHROID   50 mcg, Oral, Daily      midodrine 5 MG tablet  Commonly known as:  PROAMATINE   5 mg, Oral, 3 Times Daily Before Meals         Changes to Medications      Instructions Start Date   insulin aspart 100 UNIT/ML injection  Commonly known as:  novoLOG  What changed:  · how much to take  · when to take this   8 Units, Subcutaneous, 3 Times Daily With Meals      insulin detemir 100 UNIT/ML injection  Commonly known as:  LEVEMIR  What changed:  how much to take   18 Units, Subcutaneous, Nightly         Continue These Medications      Instructions Start Date   budesonide-formoterol 160-4.5 MCG/ACT inhaler  Commonly known as:  SYMBICORT   2 puffs, Inhalation, 2 Times Daily - RT      guaiFENesin 600 MG 12 hr tablet  Commonly known as:  MUCINEX   600 mg, Oral, 2 Times Daily      montelukast 10 MG tablet  Commonly known as:  SINGULAIR   10 mg, Oral, Daily      pantoprazole 40 MG EC tablet  Commonly known as:  PROTONIX   40 mg, Oral, Daily      torsemide 100 MG tablet  Commonly known as:  DEMADEX   100 mg, Oral, Daily      zolpidem 10 MG tablet  Commonly known as:  AMBIEN   TK 1 T PO QD HS         Stop These Medications    carvedilol 12.5 MG tablet  Commonly known as:  COREG     doxazosin 2 MG tablet  Commonly known as:  CARDURA     potassium chloride 20 MEQ CR tablet  Commonly known as:  K-DURKLOR-CON     spironolactone 100 MG tablet  Commonly known as:  ALDACTONE     tiZANidine 4 MG tablet  Commonly known as:  ZANAFLEX            Discharge Diet:   Diet Instructions     Diet: Consistent Carbohydrate, Renal, Cardiac; Thin       Discharge Diet:   Consistent Carbohydrate  Renal  Cardiac       Fluid Consistency:  Thin          Activity at Discharge:   Activity Instructions     Activity as Tolerated             Discharge disposition: home    Follow-up Appointments  No future appointments.  Additional Instructions for the Follow-ups that You Need to Schedule     Discharge Follow-up with Specified Provider: Dr Steinberg in 4  weeks.    As directed      To:  Dr Steinberg in 4 weeks.              follow-up with Dr. Ortiz in 2 weeks     Oriana Steinberg MD, [facility]  09/05/18  12:52 PM    Time: Discharge 45 min

## 2018-09-05 NOTE — PROGRESS NOTES
"  ENDOCRINE    Subjective   AND PLANS  Bj Duarte is a 63 y.o. male.     Follow-up diabetes    No nausea or vomiting.  Fasting glucose 91.  Random glucose 109-208.  Continue Levemir 18 units at bedtime and NovoLog 8 units with each meal plus sliding scale.    No chest pain or palpitations.  Continue levothyroxine 50 µg per day.  Recheck thyroid function tests in 4 weeks and adjust dose if needed.    Follow-up with Dr. Jane as needed    Objective   /69   Pulse 96   Temp 97.5 °F (36.4 °C) (Oral)   Resp 16   Ht 185.4 cm (73\")   Wt 132 kg (290 lb)   SpO2 95%   BMI 38.26 kg/m²   Physical Exam    Awake, alert, not in distress.  No rales or wheezes.  Irregularly irregular heart rate and rhythm.  No gallop.  Abdomen soft, nontender.  Upper extremity edema about the same.  Penile skin and scrotal edema slowly improving.    Lab Results (last 24 hours)     Procedure Component Value Units Date/Time    Renal Function Panel [[id number]]  (Abnormal) Collected:  09/05/18 0627    Specimen:  Blood Updated:  09/05/18 0714     Glucose 91 mg/dL      BUN 36 (H) mg/dL      Creatinine 2.16 (H) mg/dL      Sodium 134 (L) mmol/L      Potassium 4.7 mmol/L      Chloride 99 mmol/L      CO2 24.6 mmol/L      Calcium 7.7 (L) mg/dL      Albumin 2.10 (L) g/dL      Phosphorus 3.7 mg/dL      Anion Gap 10.4 mmol/L      BUN/Creatinine Ratio 16.7     eGFR Non African Amer 31 (L) mL/min/1.73     Protime-INR [[id number]]  (Abnormal) Collected:  09/05/18 0627    Specimen:  Blood Updated:  09/05/18 0649     Protime 14.2 Seconds      INR 1.12 (H)    POC Glucose Once [[id number]]  (Normal) Collected:  09/05/18 0532    Specimen:  Blood Updated:  09/05/18 0552     Glucose 109 mg/dL     Narrative:       Meter: [id number] : [id number] Aamir WEAVER    POC Glucose Once [[id number]]  (Abnormal) Collected:  09/1955    Specimen:  Blood Updated:  09/04/18 1958     Glucose 208 (H) mg/dL     Narrative:       Meter: [id number] : " [id number] SajanSaleem Reynolds     POC Glucose Once [[id number]]  (Abnormal) Collected:  09/04/18 1541    Specimen:  Blood Updated:  09/04/18 1543     Glucose 147 (H) mg/dL     Narrative:       Meter: [id number] : [id number] Lee BEGUM    Bone Marrow Comprehensive (Int Onc) [[id number]] Collected:  08/30/18 1530    Specimen:  Bone Marrow Updated:  09/04/18 1128     Reference Lab Report See addendum Pathology report    Urinalysis With Culture If Indicated - Urine, Catheter [[id number]]  (Abnormal) Collected:  09/04/18 1041    Specimen:  Urine from Urine, Catheter Updated:  09/04/18 1108     Color, UA Dark Yellow (A)     Appearance, UA Cloudy (A)     pH, UA <=5.0     Specific Gravity, UA 1.020     Glucose, UA Negative     Ketones, UA Trace (A)     Bilirubin, UA Negative     Blood, UA Trace (A)     Protein, UA 30 mg/dL (1+) (A)     Leuk Esterase, UA Trace (A)     Nitrite, UA Negative     Urobilinogen, UA 0.2 E.U./dL    Urinalysis, Microscopic Only - Urine, Clean Catch [[id number]] Collected:  09/04/18 1041    Specimen:  Urine from Urine, Catheter Updated:  09/04/18 1108     RBC, UA 0-2 /HPF      WBC, UA 0-2 /HPF      Bacteria, UA None Seen /HPF      Squamous Epithelial Cells, UA 0-2 /HPF      Hyaline Casts, UA 3-6 /LPF      Methodology Manual Light Microscopy    POC Glucose Once [[id number]]  (Abnormal) Collected:  09/04/18 1032    Specimen:  Blood Updated:  09/04/18 1033     Glucose 201 (H) mg/dL     Narrative:       Meter: [id number] : [id number] Lee BEGUM            Active Problems:    Obstructive sleep apnea    Nonrheumatic aortic valve stenosis    Non-rheumatic tricuspid valve insufficiency    Pulmonary hypertension    Acute on chronic systolic congestive heart failure (CMS/HCC)    Hypoalbuminemia    Protein malnutrition (CMS/HCC)    Type 2 diabetes mellitus with complication, with long-term current use of insulin (CMS/HCC)    Primary hypothyroidism    End stage renal disease (CMS/HCC)     Thrombocytopenia (CMS/HCC)    Diabetes therapy as discussed above.  Continue levothyroxine.    Follow-up as discussed above.

## 2018-09-05 NOTE — SIGNIFICANT NOTE
09/05/18 1602   Rehab Treatment   Discipline physical therapist   Reason Treatment Not Performed unavailable for treatment  (Pt off floor for x-ray.  PT will follow up 9/6.)   Recommendation   PT - Next Appointment 09/06/18

## 2018-09-05 NOTE — PROGRESS NOTES
LOS: 42 days   Patient Care Team:  Gus Cordova MD as PCP - General (Internal Medicine)  Alexandria Benitez MD as Consulting Physician (Nephrology)  Mike Johnson MD as Consulting Physician (Pulmonary Disease)  Oriana Steinberg MD as Consulting Physician (Cardiology)    Chief Complaint/ Reason for encounter: Anasarca, dialysis management      Subjective     Medical history reviewed:  History of Present Illness    Subjective:  Symptoms:  Improved.  He reports weakness.  No shortness of breath or chest pain.  (Weight and edema continue to slowly improve  No shortness of breath, weakness improved  Tolerating dialysis well, outpatient arrangements have been finalized ).    Diet:  Adequate intake.  No nausea.    Activity level: Returning to normal.    Pain:  He reports no pain.          History taken from: Patient and chart    Objective     Vital Signs  Temp:  [97.5 °F (36.4 °C)-98.5 °F (36.9 °C)] 97.8 °F (36.6 °C)  Heart Rate:  [] 93  Resp:  [15-16] 16  BP: (106-122)/(56-72) 106/56       Wt Readings from Last 1 Encounters:   09/05/18 0642 132 kg (290 lb)   09/04/18 0500 130 kg (286 lb 12.8 oz)   09/03/18 1244 (!) 149 kg (329 lb 9.4 oz)   09/02/18 0658 132 kg (290 lb 12.8 oz)   09/01/18 0700 135 kg (298 lb 6 oz)   08/31/18 0700 132 kg (290 lb 12.8 oz)   08/30/18 0913 131 kg (288 lb)   08/29/18 0352 134 kg (294 lb 6.4 oz)   08/28/18 0635 133 kg (293 lb 3.2 oz)   08/27/18 1300 131 kg (288 lb 9.3 oz)   08/27/18 0659 135 kg (297 lb 9.6 oz)   08/26/18 0944 135 kg (297 lb)   08/25/18 0622 136 kg (299 lb)   08/23/18 0914 (!) 139 kg (307 lb)   08/21/18 0643 (!) 138 kg (304 lb 8 oz)   08/20/18 0629 (!) 140 kg (309 lb 1.6 oz)   08/19/18 0552 (!) 140 kg (307 lb 14.4 oz)   08/18/18 0524 (!) 140 kg (308 lb 14.4 oz)   08/17/18 0500 (!) 138 kg (305 lb 3.2 oz)   08/16/18 0500 (!) 137 kg (301 lb 14.4 oz)   08/15/18 0618 (!) 137 kg (303 lb)   08/14/18 1300 (!) 138 kg (303 lb 4.8 oz)   08/13/18 0458 135 kg (298 lb 3.2  "oz)   08/12/18 1312 132 kg (291 lb)   08/11/18 0615 132 kg (291 lb 3.2 oz)   08/10/18 0554 130 kg (286 lb 14.4 oz)   08/09/18 0500 130 kg (286 lb 6.4 oz)   08/08/18 0700 128 kg (282 lb)   08/07/18 0600 122 kg (270 lb)   08/06/18 0705 123 kg (272 lb)   08/05/18 0500 124 kg (273 lb)   08/04/18 0516 108 kg (238 lb 6.4 oz)   08/03/18 0637 129 kg (283 lb 6.4 oz)   08/02/18 0353 132 kg (292 lb 1.6 oz)   08/01/18 0545 134 kg (295 lb 4.8 oz)   07/31/18 0310 133 kg (294 lb 1.6 oz)   07/29/18 1649 131 kg (288 lb 12.8 oz)   07/29/18 0700 59.4 kg (131 lb)   07/28/18 0500 129 kg (284 lb 1.6 oz)   07/26/18 0520 127 kg (280 lb)   07/25/18 1111 125 kg (274 lb 9 oz)       Objective:  General Appearance:  Comfortable, in no acute distress and not in pain (Obese).    Vital signs: (most recent): Blood pressure 106/56, pulse 93, temperature 97.8 °F (36.6 °C), temperature source Oral, resp. rate 16, height 185.4 cm (73\"), weight 132 kg (290 lb), SpO2 98 %.  Vital signs are normal.  No fever.    Output: Producing urine.    HEENT: Normal HEENT exam.    Lungs:  Normal effort and normal respiratory rate.  Breath sounds clear to auscultation.  He is not in respiratory distress.  No decreased breath sounds.    Heart: Normal rate.  Regular rhythm.  S1 normal.  No murmur.   Abdomen: Abdomen is soft and non-distended.  Bowel sounds are normal.   There is no abdominal tenderness.   (Significant scrotal edema).   Extremities: Normal range of motion.  There is dependent edema.  (Anasarca improved, decreased scrotal edema)  Pulses: Distal pulses are intact.    Neurological: Patient is alert and oriented to person, place and time.    Skin:  Warm and dry.  No rash or cyanosis.             Results Review:    Intake/Output:     Intake/Output Summary (Last 24 hours) at 09/05/18 1605  Last data filed at 09/05/18 0820   Gross per 24 hour   Intake              686 ml   Output                0 ml   Net              686 ml         DATA:  Radiology and " Labs:  The following labs independently reviewed by me. Additional labs ordered for tomorrow a.m.  Interval notes, chart personally reviewed by me.   Old records independently reviewed showing Stage III chronic kidney disease as described above       Labs:   Recent Results (from the past 24 hour(s))   POC Glucose Once    Collection Time: 09/04/18  7:57 PM   Result Value Ref Range    Glucose 208 (H) 70 - 130 mg/dL   POC Glucose Once    Collection Time: 09/05/18  5:32 AM   Result Value Ref Range    Glucose 109 70 - 130 mg/dL   Protime-INR    Collection Time: 09/05/18  6:27 AM   Result Value Ref Range    Protime 14.2 11.7 - 14.2 Seconds    INR 1.12 (H) 0.90 - 1.10   Renal Function Panel    Collection Time: 09/05/18  6:27 AM   Result Value Ref Range    Glucose 91 65 - 99 mg/dL    BUN 36 (H) 8 - 23 mg/dL    Creatinine 2.16 (H) 0.76 - 1.27 mg/dL    Sodium 134 (L) 136 - 145 mmol/L    Potassium 4.7 3.5 - 5.2 mmol/L    Chloride 99 98 - 107 mmol/L    CO2 24.6 22.0 - 29.0 mmol/L    Calcium 7.7 (L) 8.6 - 10.5 mg/dL    Albumin 2.10 (L) 3.50 - 5.20 g/dL    Phosphorus 3.7 2.5 - 4.5 mg/dL    Anion Gap 10.4 mmol/L    BUN/Creatinine Ratio 16.7 7.0 - 25.0    eGFR Non African Amer 31 (L) >60 mL/min/1.73   POC Glucose Once    Collection Time: 09/05/18  1:38 PM   Result Value Ref Range    Glucose 168 (H) 70 - 130 mg/dL       Radiology:  Imaging Results (last 24 hours)     ** No results found for the last 24 hours. **             Medications have been reviewed:  Current Facility-Administered Medications   Medication Dose Route Frequency Provider Last Rate Last Dose   • acetaminophen (TYLENOL) suppository 650 mg  650 mg Rectal Q4H PRN Glen Brasher MD       • acetaminophen (TYLENOL) tablet 650 mg  650 mg Oral Q4H PRN Glen Brasher MD   650 mg at 09/05/18 1435   • aspirin EC tablet 81 mg  81 mg Oral Daily Glen Brasher MD   81 mg at 09/05/18 0822   • atorvastatin (LIPITOR) tablet 10 mg  10 mg Oral Nightly Terri Jane,  MD   10 mg at 08/17/18 2020   • bisacodyl (DULCOLAX) EC tablet 10 mg  10 mg Oral Daily PRN Glen Brasher MD   10 mg at 08/24/18 0917   • bisacodyl (DULCOLAX) suppository 10 mg  10 mg Rectal Daily PRN Glen Brasher MD       • bisacodyl (DULCOLAX) suppository 10 mg  10 mg Rectal Once Soraya Wu APRN       • budesonide-formoterol (SYMBICORT) 160-4.5 MCG/ACT inhaler 2 puff  2 puff Inhalation BID - RT Soraya Wu APRN   2 puff at 09/04/18 2142   • cyclobenzaprine (FLEXERIL) tablet 10 mg  10 mg Oral Q8H PRN Glen Brasher MD   10 mg at 08/30/18 1229   • dextrose (D50W) solution 25 g  25 g Intravenous Q15 Min PRN Glen Brasher MD   25 g at 08/09/18 1441   • dextrose (GLUTOSE) oral gel 15 g  15 g Oral Q15 Min PRN Glen Brasher MD       • diphenhydrAMINE (BENADRYL) capsule 25 mg  25 mg Oral Q6H PRN Lina Patiño PA   25 mg at 08/20/18 1221   • epoetin roxi (EPOGEN,PROCRIT) injection 10,000 Units  10,000 Units Intravenous Once per day on Mon Wed Fri Marty Espitia MD   10,000 Units at 09/05/18 1111   • ferric gluconate (FERRLECIT) 125 mg in sodium chloride 0.9 % 100 mL IVPB  125 mg Intravenous Once per day on Mon Wed Fri Marty Espitia  mL/hr at 09/03/18 1756 125 mg at 09/03/18 1756   • ferrous sulfate tablet 325 mg  325 mg Oral Daily With Breakfast Lavon Berger MD   325 mg at 09/05/18 0821   • fluticasone (FLONASE) 50 MCG/ACT nasal spray 2 spray  2 spray Each Nare Daily Renetta Hancock APRN   2 spray at 09/05/18 0823   • glucagon (human recombinant) (GLUCAGEN DIAGNOSTIC) injection 1 mg  1 mg Subcutaneous PRN Glen Brasher MD       • guaiFENesin (MUCINEX) 12 hr tablet 1,200 mg  1,200 mg Oral Q12H Soraya Wu APRN   1,200 mg at 09/05/18 0822   • heparin (porcine) 5000 UNIT/ML injection 5,000 Units  5,000 Units Intravenous Take As Directed Sammy Barillas MD       • heparin (porcine) injection 3,000 Units  3,000 Units Intracatheter PRN  Marty Espitia MD   3,000 Units at 09/03/18 1245   • heparin (porcine) injection 3,000 Units  3,000 Units Intracatheter PRN Glen Brasher MD   3,000 Units at 09/05/18 1110   • Hold medication  1 each Does not apply Continuous PRN Renetta Hancock APRN       • hydrocortisone 1 % cream   Topical Q12H Lavon Berger MD       • insulin aspart (novoLOG) injection 0-10 Units  0-10 Units Subcutaneous 4x Daily With Meals & Nightly Terri Jane MD   2 Units at 09/05/18 1440   • insulin aspart (novoLOG) injection 8 Units  8 Units Subcutaneous TID With Meals Terri Jane MD   8 Units at 09/05/18 1440   • insulin detemir (LEVEMIR) injection 18 Units  18 Units Subcutaneous Nightly Ata Reza MD   18 Units at 09/04/18 2046   • ipratropium-albuterol (DUO-NEB) nebulizer solution 3 mL  3 mL Nebulization Q4H PRN Glen Brasher MD   3 mL at 08/29/18 1315   • lactulose (CHRONULAC) 10 GM/15ML solution 20 g  20 g Oral Daily Soraya Wu APRN   20 g at 08/27/18 1427   • levothyroxine (SYNTHROID, LEVOTHROID) tablet 50 mcg  50 mcg Oral Q AM Ata Reza MD   50 mcg at 09/05/18 0524   • Magnesium Sulfate 2 gram infusion - Mg less than or equal to 1.5 mg/dL  2 g Intravenous PRN Glen Brasher MD        Or   • Magesium Sulfate 1 gram infusion - Mg 1.6-1.9 mg/dL  1 g Intravenous PRN Glen Brasher MD       • magic mouthwash oral supsension 10 mL  10 mL Swish & Spit Q4H Glen Brasher MD   10 mL at 09/05/18 1435   • magnesium hydroxide (MILK OF MAGNESIA) suspension 2400 mg/10mL 10 mL  10 mL Oral Daily PRN Glen Brasher MD   10 mL at 08/17/18 1833   • midodrine (PROAMATINE) tablet 5 mg  5 mg Oral TID AC Sae Boothe MD   5 mg at 09/05/18 1435   • montelukast (SINGULAIR) tablet 10 mg  10 mg Oral Daily Soraya Wu APRN   10 mg at 09/05/18 0821   • naloxone (NARCAN) injection 0.4 mg  0.4 mg Intravenous Q5 Min PRN Glen Brasher MD       • nystatin (MYCOSTATIN)  176776 UNIT/ML suspension 500,000 Units  5 mL Swish & Swallow 4x Daily Glen Brasher MD   500,000 Units at 09/05/18 1435   • nystatin (MYCOSTATIN) powder   Topical Q12H Renetta Hancock APRN       • ondansetron (ZOFRAN) injection 4 mg  4 mg Intravenous Q6H PRN Glen Brasher MD   4 mg at 08/20/18 1823   • ondansetron (ZOFRAN) tablet 4 mg  4 mg Oral Q6H PRN Javed Huitron MD        Or   • ondansetron ODT (ZOFRAN-ODT) disintegrating tablet 4 mg  4 mg Oral Q6H PRN Javed Huitron MD        Or   • ondansetron (ZOFRAN) injection 4 mg  4 mg Intravenous Q6H PRN Javed Huitron MD   4 mg at 09/03/18 2058   • oxyCODONE (ROXICODONE) immediate release tablet 10 mg  10 mg Oral Q4H PRN Glen Brasher MD   10 mg at 09/05/18 1435   • pantoprazole (PROTONIX) EC tablet 40 mg  40 mg Oral Daily Glen Brasher MD   40 mg at 09/05/18 0821   • polyethylene glycol (MIRALAX) powder 17 g  17 g Oral Daily Soraya Wu APRN   17 g at 09/04/18 0949   • potassium chloride (MICRO-K) CR capsule 40 mEq  40 mEq Oral PRN Glen Brasher MD   40 mEq at 08/06/18 2352    Or   • potassium chloride (KLOR-CON) packet 40 mEq  40 mEq Oral PRN Glen Brasher MD       • potassium chloride 10 mEq in 100 mL IVPB  10 mEq Intravenous Q1H PRN Glen Brasher MD        Or   • potassium chloride 10 mEq in 100 mL IVPB  10 mEq Intravenous Q1H PRN Glen Brasher MD       • promethazine (PHENERGAN) tablet 12.5 mg  12.5 mg Oral Q6H PRN Glen Brasher MD        Or   • promethazine (PHENERGAN) injection 12.5 mg  12.5 mg Intravenous Q6H PRN Glen Brasher MD       • Scopolamine (TRANSDERM-SCOP) 1.5 MG/3DAYS patch 1 patch  1 patch Transdermal Q72H Renetta Hancock APRN   1 patch at 09/03/18 1607   • sennosides-docusate sodium (SENOKOT-S) 8.6-50 MG tablet 2 tablet  2 tablet Oral Nightly Glen Brasher MD   2 tablet at 09/04/18 2045   • sodium chloride (OCEAN) nasal spray 2 spray  2 spray Each Nare PRN Anshu,  Glen DRAKE MD       • sodium chloride 0.9 % infusion  9 mL/hr Intravenous Continuous PRN Satnam Arguello MD 9 mL/hr at 08/28/18 1220     • sucralfate (CARAFATE) 1 GM/10ML suspension 1 g  1 g Oral Q6H Juan Sanz MD   1 g at 09/05/18 1435   • torsemide (DEMADEX) tablet 100 mg  100 mg Oral Daily Donnell Madison MD   100 mg at 09/05/18 0822   • zolpidem (AMBIEN) tablet 10 mg  10 mg Oral Nightly PRN Roslyn Jimenez MD   10 mg at 09/04/18 2308       ASSESSMENT:  End-stage renal disease, dialysis Monday Wednesday Friday with ultrafiltration as tolerated  Anasarca, improved  Hyponatremia, adjust dialysis bath  Hypoalbuminemia, contributing to edema, no proteinuria   Severe aortic stenosis status post replacement  Pulmonary hypertension  Anemia of chronic kidney disease: Epogen, IV iron  Thrombocytopenia, hematology following, up to 60,000 today  pleural effusions, status post thoracentesis      PLAN:   He is stable for discharge at any time from my standpoint  Outpatient dialysis has been set up at [facility] Monday Wednesday Friday  Continue ultrafiltration challenge with dialysis as tolerated, weights continue to decrease  Continue midodrine, torsemide, BP support on dialysis with albumin and mannitol as needed  Eventual AVF once platelets are better: outpatient follow-up with vascular once he is a little stronger and more stable  Recheck labs in a.m. continue fluid restriction    Ronald Olivares MD   [facility]   Office phone number: [phone]  Answering service phone number: [phone]    09/05/18  4:05 PM      Dictation performed using Dragon dictation software

## 2018-09-05 NOTE — PLAN OF CARE
Problem: Patient Care Overview  Goal: Plan of Care Review  Outcome: Ongoing (interventions implemented as appropriate)   09/05/18 0438   Coping/Psychosocial   Plan of Care Reviewed With patient   Plan of Care Review   Progress improving   OTHER   Outcome Summary Pt is a possible discharge tomorrow. Pt has been cleared by all MD's on case. Vital Signs Stable. Will continue to monitor.       Problem: Fall Risk (Adult)  Goal: Absence of Fall  Outcome: Ongoing (interventions implemented as appropriate)      Problem: Cardiac Surgery (Adult)  Goal: Signs and Symptoms of Listed Potential Problems Will be Absent, Minimized or Managed (Cardiac Surgery)  Outcome: Ongoing (interventions implemented as appropriate)      Problem: Skin Injury Risk (Adult)  Goal: Skin Health and Integrity  Outcome: Ongoing (interventions implemented as appropriate)

## 2018-09-05 NOTE — PROGRESS NOTES
Discharge Planning Assessment  [facility]     Patient Name: Bj Duarte  MRN: [id number]  Today's Date: 9/5/2018    Admit Date: 7/25/2018          Discharge Needs Assessment    No documentation.             Discharge Plan     Row Name 09/05/18 1501       Plan    Plan [facility]; [facility] set up for wesley SANTAMARIAW & F @ 6:20 AM.    Plan Comments 9/5 3:00 PM.  Pt d/c canceled for today.  CCP called and notified rojelio [facility] for [facility].  CCP called and cancelled [facility] transport.  Pt packet is on CCP desk.  LUIS MCCARTY RN/TRACEY    Row Name 09/05/18 2301       Plan    Plan Comments 9/5  [facility] has set up Pt transfer to [facility] with [id number] for a  time at 6:00 PM.  CCP s/w Chris at [facility].  Transfer # provided by Chris is [phone].  CCP called rojelio Rios this morning to advise Pt would d/c to rehab this afternoon after hemodialysis.  Heron verbalized understnding.  CCP called Crystal at [facility] at 9:22 AM ([phone]).  CCP following.  LUIS MCCARTY RN/TRACEY        Destination - Selection Complete     Service Request Status Selected Specialties Address Phone Number Fax Number    [facility] [address] [address] [phone]    [facility] Accepted N/A [address] [address] [phone]    [facility] Accepted N/A [address] [address] [phone]    [facility] Pending - Request Sent N/A [address] [address] [phone]    [facility] Pending - Request Sent N/A [address] [address] [phone]    [facility] Pending - Request Sent N/A [address] [address]  [phone]    [facility] Declined N/A [address] , [facility] [address] [phone]    [facility] Declined N/A [address] , [facility] [address] [phone]    [facility] Declined N/A [address] [address] [phone]    [facility] Declined N/A [address] [address] [phone]    [facility] Declined N/A [address] [facility] [facility] [address] [phone]      Durable Medical Equipment     No service coordination in this encounter.      Dialysis/Infusion - Selection Complete     Service Request Status Selected Specialties Address Phone Number Fax Number    [facility] Selected Dialysis [address] [address] [phone]      Home Medical Care     No service coordination in this encounter.      Social Care     No service coordination in this encounter.        Expected Discharge Date and Time     Expected Discharge Date Expected Discharge Time    Sep 5, 2018               Demographic Summary    No documentation.           Functional Status    No documentation.           Psychosocial    No documentation.           Abuse/Neglect    No documentation.           Legal    No documentation.           Substance Abuse    No documentation.           Patient Forms    No documentation.         Delores Lloyd RN

## 2018-09-05 NOTE — PLAN OF CARE
Problem: Patient Care Overview  Goal: Plan of Care Review   09/05/18 1305   Coping/Psychosocial   Plan of Care Reviewed With patient   Plan of Care Review   Progress improving   OTHER   Outcome Summary VSS. 4L removed in dialysis, per Vik dialysis RN at 1300. Room air. Denies SOA today. Assist x2 with walker. C/o pain treated per MD order. [facility] dept contacted at 1305 to notify them that pt would be returning from dialysis soon so they could prepare to take wound photo documentation and re-wrap his legs. To [facility] rehab today, per cardiology - all groups following in agreement to discharge as of yesterday, 9/4. Wound care service that was previously seeing patient prior to admission will continue to assess and assist him on an ongoing basis. Ambulance has been arranged to pick patient up and transport him to rehab at 1600. CTM closely until discharge.

## 2018-09-05 NOTE — PROGRESS NOTES
Name: Bj Daurte ADMIT: 2018   : 1955  PCP: Gus Cordova MD    MRN: [id number] LOS: 42 days   AGE/SEX: 63 y.o. male  ROOM: [facility]     Active Hospital Problems    Diagnosis Date Noted   • Thrombocytopenia (CMS/[facility]) [D69.6] 2018   • End stage renal disease (CMS/[facility]) [N18.6] 2018   • Primary hypothyroidism [E03.9] 08/15/2018   • Type 2 diabetes mellitus with complication, with long-term current use of insulin (CMS/[facility]) [E11.8, Z79.4] 2018   • Protein malnutrition (CMS/[facility]) [E46] 2018   • Hypoalbuminemia [E88.09] 2018   • Pulmonary hypertension [I27.20] 2018   • Acute on chronic systolic congestive heart failure (CMS/[facility]) [I50.23] 2018   • Nonrheumatic aortic valve stenosis [I35.0] 11/15/2017   • Non-rheumatic tricuspid valve insufficiency [I36.1] 11/15/2017   • Obstructive sleep apnea [G47.33] 11/15/2017      Resolved Hospital Problems    Diagnosis Date Noted Date Resolved   No resolved problems to display.     Subjective     63 y.o. male familiar to the vascular service for temporary dialysis access placement after aortic valve replacement.    We have been asked to reevaluate the patient due to the discovery of wounds underneath his bilateral Unna boots.  He has a fifth metatarsal wound that was apparently small week ago, but has grown in size and become black in the center.  I discussed this with the wound care nurse.    He also has a chronic appearing wound of the on the plantar surface of the right foot.  This is bandaged underneath the Unna boot, however his family member showed me a photo that shows that there is chronic callus around the edge of it.    There is also a small posterior calf wound on the left.  I was shown a photo as it is covered under the Unna boot, and is very superficial without chronic changes.    Review of Systems  No complaints, stable neuropathy  Objective     Vital Signs  Temp:  [97.5 °F (36.4 °C)-98.5 °F (36.9 °C)] 97.8 °F (36.6  °C)  Heart Rate:  [] 93  Resp:  [15-16] 16  BP: (106-122)/(56-72) 106/56    Physical Exam   Constitutional: He is oriented to person, place, and time. He appears well-developed and well-nourished.   Neck: Normal range of motion.   Cardiovascular: Normal rate.    Pulmonary/Chest: Effort normal.   Abdominal: Soft.   Musculoskeletal: Normal range of motion.   Bilateral Unna boots are in place.  There is an opening left for the left fifth metatarsal ulcer.  This measures approximately 15 x 15 mm, has dark Eschar over it.  No cellulitis, foul smell, callus.  There is some mild maceration of the tissues around the ulcer.   Neurological: He is alert and oriented to person, place, and time.         Results Review:       I reviewed the patient's new clinical results.    Results from last 7 days  Lab Units 09/03/18  0509 09/02/18  0708 09/01/18  0515 08/31/18  0600 08/30/18  0822   WBC 10*3/mm3 8.77 11.50* 13.79* 15.28* 9.48   HEMOGLOBIN g/dL 10.3* 10.2* 9.7* 9.3* 9.3*   PLATELETS 10*3/mm3 60* 60* 30* 45* 34*     Results from last 7 days  Lab Units 09/05/18  0627 09/04/18  0504 09/03/18  0509 09/02/18  0708 09/01/18  0515 08/31/18  0600 08/30/18  0822   SODIUM mmol/L 134* 133* 133* 133* 133* 133* 132*   POTASSIUM mmol/L 4.7 4.7 5.3* 5.1 5.1 4.7 4.6   CHLORIDE mmol/L 99 99 100 99 101 100 100   CO2 mmol/L 24.6 22.1 22.8 25.0 21.1* 25.0 25.3   BUN mg/dL 36* 30* 40* 35* 26* 32* 27*   CREATININE mg/dL 2.16* 1.86* 2.16* 2.25* 1.98* 2.28* 1.79*   GLUCOSE mg/dL 91 173* 148* 129* 139* 106* 221*   Estimated Creatinine Clearance: 51.3 mL/min (A) (by C-G formula based on SCr of 2.16 mg/dL (H)).  Results from last 7 days  Lab Units 09/05/18  0627 09/04/18  0504 09/03/18  0509 09/02/18  0708 09/01/18  0515 08/31/18  0600 08/30/18  0822   CALCIUM mg/dL 7.7* 7.8* 7.7* 7.8* 7.5* 7.8* 7.7*   ALBUMIN g/dL 2.10* 2.00* 2.10* 2.10* 1.80* 2.20*  --    PHOSPHORUS mg/dL 3.7 3.0 2.9 2.9 2.9 3.0  --        Assessment/Plan           Active  Hospital Problems    Diagnosis Date Noted   • Thrombocytopenia (CMS/[facility]) [D69.6] 08/31/2018   • End stage renal disease (CMS/[facility]) [N18.6] 08/28/2018   • Primary hypothyroidism [E03.9] 08/15/2018   • Type 2 diabetes mellitus with complication, with long-term current use of insulin (CMS/[facility]) [E11.8, Z79.4] 08/04/2018   • Protein malnutrition (CMS/[facility]) [E46] 08/01/2018   • Hypoalbuminemia [E88.09] 07/30/2018   • Pulmonary hypertension [I27.20] 07/20/2018   • Acute on chronic systolic congestive heart failure (CMS/[facility]) [I50.23] 07/20/2018   • Nonrheumatic aortic valve stenosis [I35.0] 11/15/2017   • Non-rheumatic tricuspid valve insufficiency [I36.1] 11/15/2017   • Obstructive sleep apnea [G47.33] 11/15/2017      Resolved Hospital Problems    Diagnosis Date Noted Date Resolved   No resolved problems to display.        Assessment & Plan  63 y.o. male with bilateral foot ulcers.  The left ulcer appears to be more acute.  I will check foot x-ray as well as toe waveforms.  Due to his edema and Unna boots I can't appreciate his pedal pulses.    I recommended debridement of the left fifth metatarsal ulcer to remove all nonviable tissue tomorrow.  We'll paint this with Betadine tonight.  Also, the family is requesting a debridement of the right ulcer.  This appears to be a chronic ulcer but could be helped by removing the rim of callus from around the ulcer.  I will get this posted for surgery tomorrow pending the results of the tests tonight.            Sammy Barillas MD  09/05/18   2:57 PM  Office Number [phone]

## 2018-09-05 NOTE — NURSING NOTE
Pt seen for wound care assessment and changing of unna's boots and coban on bilateral lower extremitiies.  Pt is to transfer to rehab facility this afternoon.  Upon removal of unna's boot on left lower extremity and removal of silicone border drsg over wound on left 5th metatarsal area, noted wound is now covered with soft black slough and measures approximately 2.5 x 2.1cms; surrounding skin without erythema; small amt of bloody drainage on silicone border drsg.  Wound is significantly larger than when I assessed it last week despite being treated with maximilian.  Spoke with IRENE Wyatt at his bedside and after her observation of this wound, discussed possibly using Santyl. With  Pt leaving this afternoon to a rehab setting, so  decided it would be best practice to have vascular surgeon eval wound prior to his transfer.  Pt also has one small open blister on posterior left calf, approximately .3 x .3cms with small amt of serous drainage.  Leg looks good, no other wounds on leg, edema controlled with unna's boot.  Washed leg with warm water; dried; cleansed small blister and left foot wound with saline, covered blister with silicone border drsg and left foot wound with abd pad.  Applied unna's boot and coban, leaving foot wound out from under wrap so vascular surgeon can readily visualize wound.  Removed unna's boot and coban from right leg, washed skin with warm water, and applied unna's boot and coban.  No open areas on right leg but does have a diabetic ulcer on the plantar surface that reopened a week or so ago, now measures 1.5 x 1 x .2 cms; clean and beefy red.  Cleansed with saline, applied maximilian and silicone border drsg. Pt also has very small partial thickness wound on buttocks r/t shear/friction, moisture.  Applied silicone border drsg.  Pt has waffle cushion that he uses when sitting up in chair; is in bariatric bed with a  low air loss mattress .  Pt quite weak, needs assist to stand and  ambulate. Pt alert and oriented.  Discussed fact that he is a very sick man and that I am unsure of the etiology of the left foot wound; many factors involved, poor ambulation, diabetes, dialysis can all impact skin and cause issues to develop.  Dr. Guaman  Here to see patient as I was finished with my visit.

## 2018-09-05 NOTE — SIGNIFICANT NOTE
09/05/18 1017   Rehab Treatment   Discipline physical therapist   Reason Treatment Not Performed unavailable for treatment  (Pt off floor for dialysis.  PT will follow up this pm.)

## 2018-09-06 NOTE — ANESTHESIA POSTPROCEDURE EVALUATION
"Patient: Bj Head    Procedure Summary     Date:  09/06/18 Room / Location:  [facility] OR 02 / [facility] MAIN OR    Anesthesia Start:  1356 Anesthesia Stop:  1446    Procedure:  BILATERAL FOOT DEBRIDEMENT (Bilateral ) Diagnosis:      Surgeon:  Sammy Barillas MD Provider:  Delvin Larsen MD    Anesthesia Type:  MAC ASA Status:  4          Anesthesia Type: MAC  Last vitals  BP   118/65 (09/06/18 1530)   Temp   36.4 °C (97.6 °F) (09/06/18 1445)   Pulse   92 (09/06/18 1530)   Resp   16 (09/06/18 1530)     SpO2   99 % (09/06/18 1530)     Post Anesthesia Care and Evaluation    Patient location during evaluation: PACU  Patient participation: complete - patient participated  Level of consciousness: awake and alert  Pain management: adequate  Airway patency: patent  Anesthetic complications: No anesthetic complications    Cardiovascular status: acceptable  Respiratory status: acceptable  Hydration status: acceptable    Comments: /65   Pulse 92   Temp 36.4 °C (97.6 °F) (Oral)   Resp 16   Ht 185.4 cm (73\")   Wt 130 kg (286 lb)   SpO2 99%   BMI 37.73 kg/m²       "

## 2018-09-06 NOTE — SIGNIFICANT NOTE
Had lengthy conversation (90 mins +) with patient's sister regarding ALL her concerns in patients care. Reassured sister that all involved in patients care would not discharge patient to rehab if he was not stable and ready for discharge. Informed sister that I and the manager of the unit were the point person for her and the patients daughter to contact for any concerns or problems. Left business card with sister with multiple numbers to call for concerns. Patient was not present for conversation due to being off the floor for testing. I tried multiple times to contact patients daughter by phone without success. I will follow up with the patient and sister/daughter daily.

## 2018-09-06 NOTE — OP NOTE
Bj Head  1955     Brief Operative Note    09/06/18   Sammy Barillas MD      Preoperative Diagnosis: Bilateral foot wounds    Postoperative Diagnosis: same as above    Procedure Performed: Excisional debridement of bilateral foot wounds    Surgeon: Sammy Barillas MD    Assistant: Vivienne CHENEY    Anesthesia: MAC    Estimated Blood Loss: minimal    Specimen: None    Complications: None    Dispo: aroused from sedation, and taken to the recovery room in a stable condition    Indication for procedure: 63 y.o. male with bilateral foot wounds.  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  The left is a new pressure ulcer with necrotic skin.     Description of procedure: The patient was taken to the operating room and placed in the supine position.  Bilateral feet were prepped and draped in usual sterile fashion.  Timeout was observed.  Preoperative antibiotics were given.    Starting on the left, the black eschar was excised using a 15 blade scalpel and removed from the underlying tissue.  The underlying tissue was a deep purple, but not obviously necrotic and so it was left alone.  No tendon or bone was exposed.  This was a excisional debridement using a 15 blade scalpel and forceps.  Skin, subcutaneous tissue was removed.  Bovie electrocautery was used extremely sparingly for hemostasis along 2 places and the edge of the wound.  The pre-measurement was 23 mm x 22 mm.  The post-debridement measurement is 24 mm x 25 mm with approximately a 2 mm depth.  This was packed with saline moistened gauze, wrapped with Kerlix and Ace wrap.    On the right, the chronic wound was debrided circumferentially of all callus.  Callus, slough were removed.  The preoperative debridement measurement is a 13 mm x 8 mm.  The postop measurement is 13 mm x 8 mm.  The base of this wound is healthy pink granulation tissue.  The wound was packed with saline moistened gauze, wrapped with  Kerlix and an Ace wrap.    All the counts were correct.  The patient tolerated the procedure well and was returned to the recovery room in stable condition.              Sammy Barillas MD  09/06/18

## 2018-09-06 NOTE — NURSING NOTE
"Discharge canceled today. See notes from [facility] today regarding wound findings after removing bilateral Unna's boots. Additionally, see Dr. Barillas's notes regarding the need for a wound debridement tomorrow. Betadine applied to wounds, per Dr. Barillas request. Pt went down for x-ray of L foot and arterial doppler of BLE, results pending. Spoke with patient about the importance of making sure to get a bath, and he stated, \"I don't want to be messed with. I've had enough for today.\" Continue to monitor closely.  "

## 2018-09-06 NOTE — PAYOR COMM NOTE
"Bj Duarte  (63 y.o. Male)     Please see attached clinical review.     REF#[id number]    PLEASE CALL   OR  [phone].    THANK YOU    CELESTINO REBOLLEDO LPN CCP    Date of Birth Social Security Number Address Home Phone MRN    1955  [address] [facility] [phone] [id number]    Jew Marital Status          None        Admission Date Admission Type Admitting Provider Attending Provider Department, Room/Bed    7/25/18 Elective Javed Huitron MD Lash, Jennifer A, MD [facility] CARDIOVASC UNIT, 2224/1    Discharge Date Discharge Disposition Discharge Destination                       Attending Provider:  Oriana Steinberg MD    Allergies:  No Known Allergies    Isolation:  None   Infection:  None   Code Status:  CPR    Ht:  185.4 cm (73\")   Wt:  130 kg (286 lb)    Admission Cmt:  None   Principal Problem:  None                Active Insurance as of 7/25/2018     Primary Coverage     Payor Plan Insurance Group Employer/Plan Group    PASSPORT PASSPORT MEDICAID     Payor Plan Address Payor Plan Phone Number Effective From Effective To    [address] [phone] 11/1/1997     [facility] [address]       Subscriber Name Subscriber Birth Date Member ID       BJ DUARTE 1955 [id number]                 Emergency Contacts      (Rel.) Home Phone Work Phone Mobile Phone    Estrella Jaimes (Daughter) [phone] -- [phone]    Jackie Duarte (Sister) [phone] -- [phone]    Risa Duarte (Mother) [phone] -- --              Intake & Output (last day)       09/05 0701 - 09/06 0700 09/06 0701 - 09/07 0700    P.O. 1296     IV Piggyback 100     Total Intake(mL/kg) 1396 (10.7)     Urine (mL/kg/hr) 700 (0.2)     Total Output 700      Net +696            Unmeasured Stool Occurrence 2 x 1 x        Lines, Drains & Airways    Active LDAs     Name:   Placement date:   Placement time:   Site:   Days:    Urethral Catheter " "Double-lumen;Silicone 16 Fr.  18    1035      1    Hemodialysis Cath Double  18    1335    Subclavian    8                   Physician Progress Notes (last 24 hours) (Notes from 2018  9:42 AM through 2018  9:42 AM)      Oriana Steinberg MD at 2018  8:29 AM          Patient Name: Bj Duarte  :1955  63 y.o.      Patient Care Team:  Gus Cordova MD as PCP - General (Internal Medicine)  Alexandria Benitez MD as Consulting Physician (Nephrology)  Mike Johnson MD as Consulting Physician (Pulmonary Disease)  Oriana Steinberg MD as Consulting Physician (Cardiology)    Interval History:   Discharge on hold because of concerns over her foot wound    Subjective:  Following for valve disease and right heart failure     Objective   Vital Signs  Temp:  [97.8 °F (36.6 °C)-98.2 °F (36.8 °C)] 98.2 °F (36.8 °C)  Heart Rate:  [] 88  Resp:  [16-20] 18  BP: (101-106)/(55-58) 102/58    Intake/Output Summary (Last 24 hours) at 18 0829  Last data filed at 18 0627   Gross per 24 hour   Intake              950 ml   Output              700 ml   Net              250 ml     Flowsheet Rows      First Filed Value   Admission Height  185.4 cm (73\") Documented at 2018 1111   Admission Weight  125 kg (274 lb 9 oz) Documented at 2018 1111          Physical Exam:   General Appearance:    Alert, cooperative, in no acute distress   Lungs:     Clear to auscultation.  Normal respiratory effort and rate.      Heart:    irreg irreg     Chest Wall:    No abnormalities observed   Abdomen:     Soft, nontender, positive bowel sounds.     Extremities:   Generalized edema       Results Review:      Results from last 7 days  Lab Units 18  0323   SODIUM mmol/L 135*   POTASSIUM mmol/L 4.3   CHLORIDE mmol/L 99   CO2 mmol/L 24.1   BUN mg/dL 27*   CREATININE mg/dL 1.89*   GLUCOSE mg/dL 126*   CALCIUM mg/dL 7.9*           Results from last 7 days  Lab Units 18   WBC 10*3/mm3 " 7.59   HEMOGLOBIN g/dL 9.8*   HEMATOCRIT % 33.6*   PLATELETS 10*3/mm3 50*       Results from last 7 days  Lab Units 09/06/18  0323 09/05/18  0627 09/04/18  0504   INR  1.11* 1.12* 1.08                     Medication Review:     aspirin 81 mg Oral Daily   atorvastatin 10 mg Oral Nightly   bisacodyl 10 mg Rectal Once   budesonide-formoterol 2 puff Inhalation BID - RT   epoetin roxi 10,000 Units Intravenous Once per day on Mon Wed Fri   sodium ferric gluconate complex IVPB in 100 mL  mg Intravenous Once per day on Mon Wed Fri   ferrous sulfate 325 mg Oral Daily With Breakfast   fluticasone 2 spray Each Nare Daily   guaiFENesin 1,200 mg Oral Q12H   heparin (porcine) 5,000 Units Intravenous Take As Directed   hydrocortisone  Topical Q12H   insulin aspart 0-10 Units Subcutaneous 4x Daily With Meals & Nightly   insulin aspart 8 Units Subcutaneous TID With Meals   insulin detemir 18 Units Subcutaneous Nightly   lactulose 20 g Oral Daily   levothyroxine 50 mcg Oral Q AM   magic mouthwash 10 mL Swish & Spit Q4H   midodrine 5 mg Oral TID AC   montelukast 10 mg Oral Daily   nystatin 5 mL Swish & Swallow 4x Daily   nystatin  Topical Q12H   pantoprazole 40 mg Oral Daily   polyethylene glycol 17 g Oral Daily   Scopolamine 1 patch Transdermal Q72H   sennosides-docusate sodium 2 tablet Oral Nightly   sucralfate 1 g Oral Q6H   torsemide 100 mg Oral Daily          hold 1 each    sodium chloride 9 mL/hr Last Rate: 9 mL/hr (09/05/18 1904)       Assessment/Plan     1.  Aortic stenosis.  Status post aortic valve replacement with a 25 mm Medtronic bovine pericardial valve performed on July 26, 2018.  2.  Tricuspid regurgitation.  Status post tricuspid valve repair with a ring on July 26, 2018.  3.  Left atrial appendage resection  4.  Pulmonary hypertension with right heart failure.  Right heart catheterization performed on August 9 showed severe pulmonary hypertension with a pulmonary artery pressure of 84/32 with a wedge of  approximately 18.  His pressures did not decline with vasodilator challenge.  Pulmonary has seen him and they do not recommend treatment with Revatio.  I spoke with Waqas Johnson he recommends ultrafiltration until the patient is as dry as possible and then try sildenafil. He said the usual dose is 20 three times a day but he likes to try to get them to 50 three times a day if possible.  It may lower his blood pressure further though.  He is going to continue to get dialysis with ultrafiltration as an outpatient.  He has not yet euvolemic.  I would like him to follow up with Dr. Johnson.  5.  Atrial fibrillation.  Rate controlled.  He was seen by Dr. Salgado regarding low platelets.  I agree with the recommendation that the patient not be on anticoagulation at this time.  6.  Acute on chronic kidney disease.  Dr. Olivares is following.  HD Mon, Wed, Fri  7.  Hyponatremia.  Stable  8.  Anemia.   9. Diabetes.  Endocrinology is following.  10.  Scrotal and penile edema.  Urology recommends discharge with the Omalley catheter in place and follow-up as an outpatient in 2 weeks.  11.  Hypotension.  On midodrine.  Off blood pressure lowering medications.  12.  Moderate pericardial effusion.  No evidence of tamponade physiology on echo.  13.  Hypoalbuminemia  14.  Thrombocytopenia.   Seen by hematology.  They think this is likely due to splenic and hepatic congestion.  15.  Hypothyroid.  Started on replacement.     - MRI today  - Surgical plans per Dr Nargis Steinberg MD, [facility]  18  8:29 AM          Electronically signed by Oriana Steinberg MD at 2018  8:47 AM     Sammy Barillas MD at 2018  8:20 AM          Name: Bj Head ADMIT: 2018   : 1955  PCP: Gus Cordova MD    MRN: [id number] LOS: 43 days   AGE/SEX: 63 y.o. male  ROOM: [facility]/     Active Hospital Problems    Diagnosis Date Noted   • Thrombocytopenia (CMS/HCC) [D69.6] 2018   • End  stage renal disease (CMS/[facility]) [N18.6] 08/28/2018   • Primary hypothyroidism [E03.9] 08/15/2018   • Type 2 diabetes mellitus with complication, with long-term current use of insulin (CMS/HCC) [E11.8, Z79.4] 08/04/2018   • Protein malnutrition (CMS/[facility]) [E46] 08/01/2018   • Hypoalbuminemia [E88.09] 07/30/2018   • Pulmonary hypertension [I27.20] 07/20/2018   • Acute on chronic systolic congestive heart failure (CMS/[facility]) [I50.23] 07/20/2018   • Nonrheumatic aortic valve stenosis [I35.0] 11/15/2017   • Non-rheumatic tricuspid valve insufficiency [I36.1] 11/15/2017   • Obstructive sleep apnea [G47.33] 11/15/2017      Resolved Hospital Problems    Diagnosis Date Noted Date Resolved   No resolved problems to display.     Subjective     63 y.o. male with new hemodialysis dependence after aortic valve replacement, new finding of left lateral 5th MTH foot wound.   No events overnight    Review of Systems  No complaints  Objective     Vital Signs  Temp:  [97.8 °F (36.6 °C)-98.5 °F (36.9 °C)] 98.2 °F (36.8 °C)  Heart Rate:  [] 88  Resp:  [16-20] 18  BP: (101-121)/(55-69) 102/58    Physical Exam  NAD, A/O x 4  Left foot wound without change, leg wraps on bilaterally.    Results Review:       I reviewed the patient's new clinical results.    Results from last 7 days  Lab Units 09/05/18 2020 09/03/18  0509 09/02/18  0708 09/01/18  0515 08/31/18  0600 08/30/18  0822   WBC 10*3/mm3 7.59 8.77 11.50* 13.79* 15.28* 9.48   HEMOGLOBIN g/dL 9.8* 10.3* 10.2* 9.7* 9.3* 9.3*   PLATELETS 10*3/mm3 50* 60* 60* 30* 45* 34*     Results from last 7 days  Lab Units 09/06/18  0323 09/05/18  0627 09/04/18  0504 09/03/18  0509 09/02/18  0708 09/01/18  0515 08/31/18  0600   SODIUM mmol/L 135* 134* 133* 133* 133* 133* 133*   POTASSIUM mmol/L 4.3 4.7 4.7 5.3* 5.1 5.1 4.7   CHLORIDE mmol/L 99 99 99 100 99 101 100   CO2 mmol/L 24.1 24.6 22.1 22.8 25.0 21.1* 25.0   BUN mg/dL 27* 36* 30* 40* 35* 26* 32*   CREATININE mg/dL 1.89* 2.16* 1.86* 2.16* 2.25*  1.98* 2.28*   GLUCOSE mg/dL 126* 91 173* 148* 129* 139* 106*   Estimated Creatinine Clearance: 58 mL/min (A) (by C-G formula based on SCr of 1.89 mg/dL (H)).  Results from last 7 days  Lab Units 09/06/18  0323 09/05/18  0627 09/04/18  0504 09/03/18  0509 09/02/18  0708 09/01/18  0515 08/31/18  0600   CALCIUM mg/dL 7.9* 7.7* 7.8* 7.7* 7.8* 7.5* 7.8*   ALBUMIN g/dL 1.90* 2.10* 2.00* 2.10* 2.10* 1.80* 2.20*   PHOSPHORUS mg/dL 3.7 3.7 3.0 2.9 2.9 2.9 3.0       Assessment/Plan           Active Hospital Problems    Diagnosis Date Noted   • Thrombocytopenia (CMS/[facility]) [D69.6] 08/31/2018   • End stage renal disease (CMS/[facility]) [N18.6] 08/28/2018   • Primary hypothyroidism [E03.9] 08/15/2018   • Type 2 diabetes mellitus with complication, with long-term current use of insulin (CMS/[facility]) [E11.8, Z79.4] 08/04/2018   • Protein malnutrition (CMS/[facility]) [E46] 08/01/2018   • Hypoalbuminemia [E88.09] 07/30/2018   • Pulmonary hypertension [I27.20] 07/20/2018   • Acute on chronic systolic congestive heart failure (CMS/[facility]) [I50.23] 07/20/2018   • Nonrheumatic aortic valve stenosis [I35.0] 11/15/2017   • Non-rheumatic tricuspid valve insufficiency [I36.1] 11/15/2017   • Obstructive sleep apnea [G47.33] 11/15/2017      Resolved Hospital Problems    Diagnosis Date Noted Date Resolved   No resolved problems to display.        Assessment & Plan  63 y.o. male with new foot wound, has eschar over it and the plan is for debridement and the OR today. Also has right chronic foot wound that will need debrided.     Arterial study shows no sign of arterial insufficiency  Foot x-ray with possible osteomyelitis of the medial side of the 5th MTH.  He has postoperative changes with missing MTHs of the second toe. This is read as possible osteomyelitis as well. Will check an MRI today before surgery to know what we are starting with.  No contrast due to hemodialysis dependence.    His tibial vessels are calcified on x-ray as well.  Due to his post op state,  "low albumin, diabetic neuropathy, and history of surgery for complications of diabetes in the left foot, he has been at high risk for wound complications and is at elevated risk for further tissue loss.       Sammy Barillas MD  09/06/18   8:20 AM  Office Number [phone]                Electronically signed by Sammy Barillas MD at 9/6/2018  8:25 AM     Ata Reza MD at 9/6/2018  8:12 AM            ENDOCRINE    Subjective   AND PLANS  Bj Duarte is a 63 y.o. male.     Follow-up diabetes    No nausea or vomiting.  Fasting glucose 126.  Random glucose 160-202.  Patient was not given NovoLog at breakfast yesterday.  Continue Levemir 18 units at bedtime and NovoLog 8 units with each meal plus sliding scale.    Objective   /58 (BP Location: Right arm, Patient Position: Lying)   Pulse 86   Temp 98.2 °F (36.8 °C) (Oral)   Resp 20   Ht 185.4 cm (73\")   Wt 130 kg (286 lb)   SpO2 100%   BMI 37.73 kg/m²    Physical Exam    Awake, alert, not in distress.  No rales or wheezes.  Irregularly irregular heart rate and rhythm.  Abdomen soft, nontender.  No cyanosis.    Lab Results (last 24 hours)     Procedure Component Value Units Date/Time    Renal Function Panel [[id number]]  (Abnormal) Collected:  09/06/18 0323    Specimen:  Blood Updated:  09/06/18 0426     Glucose 126 (H) mg/dL      BUN 27 (H) mg/dL      Creatinine 1.89 (H) mg/dL      Sodium 135 (L) mmol/L      Potassium 4.3 mmol/L      Chloride 99 mmol/L      CO2 24.1 mmol/L      Calcium 7.9 (L) mg/dL      Albumin 1.90 (L) g/dL      Phosphorus 3.7 mg/dL      Anion Gap 11.9 mmol/L      BUN/Creatinine Ratio 14.3     eGFR Non African Amer 36 (L) mL/min/1.73     Protime-INR [[id number]]  (Abnormal) Collected:  09/06/18 0323    Specimen:  Blood Updated:  09/06/18 0409     Protime 14.1 Seconds      INR 1.11 (H)    CBC (No Diff) [[id number]]  (Abnormal) Collected:  09/05/18 2020    Specimen:  Blood Updated:  09/05/18 2059     WBC 7.59 " 10*3/mm3      RBC 4.16 (L) 10*6/mm3      Hemoglobin 9.8 (L) g/dL      Hematocrit 33.6 (L) %      MCV 80.8 fL      MCH 23.6 (L) pg      MCHC 29.2 (L) g/dL      RDW 22.8 (H) %      RDW-SD 66.0 (H) fl      Platelets 50 (L) 10*3/mm3     POC Glucose Once [[id number]]  (Abnormal) Collected:  09/05/18 2020    Specimen:  Blood Updated:  09/05/18 2026     Glucose 175 (H) mg/dL     Narrative:       Meter: [id number] : [id number] Joel Damien MEG    POC Glucose Once [[id number]]  (Abnormal) Collected:  09/05/18 1802    Specimen:  Blood Updated:  09/05/18 1804     Glucose 202 (H) mg/dL     Narrative:       Meter: [id number] : [id number] Scott VEGA NA    POC Glucose Once [[id number]]  (Abnormal) Collected:  09/05/18 1338    Specimen:  Blood Updated:  09/05/18 1351     Glucose 168 (H) mg/dL     Narrative:       Meter: [id number] : [id number] Cherelle Humphrey RN            Active Problems:    Obstructive sleep apnea    Nonrheumatic aortic valve stenosis    Non-rheumatic tricuspid valve insufficiency    Pulmonary hypertension    Acute on chronic systolic congestive heart failure (CMS/HCC)    Hypoalbuminemia    Protein malnutrition (CMS/HCC)    Type 2 diabetes mellitus with complication, with long-term current use of insulin (CMS/HCC)    Primary hypothyroidism    End stage renal disease (CMS/HCC)    Thrombocytopenia (CMS/HCC)    Insulin therapy as discussed above.    Continue levothyroxine.            Electronically signed by Ata Reza MD at 9/6/2018  8:17 AM     Ronald Olivares MD at 9/5/2018  4:05 PM             LOS: 42 days   Patient Care Team:  Gus Cordova MD as PCP - General (Internal Medicine)  Alexandria Benitez MD as Consulting Physician (Nephrology)  Mike Johnson MD as Consulting Physician (Pulmonary Disease)  Oriana Steinberg MD as Consulting Physician (Cardiology)    Chief Complaint/ Reason for encounter: Anasarca, dialysis management      Subjective     Medical history  reviewed:  History of Present Illness    Subjective:  Symptoms:  Improved.  He reports weakness.  No shortness of breath or chest pain.  (Weight and edema continue to slowly improve  No shortness of breath, weakness improved  Tolerating dialysis well, outpatient arrangements have been finalized ).    Diet:  Adequate intake.  No nausea.    Activity level: Returning to normal.    Pain:  He reports no pain.          History taken from: Patient and chart    Objective     Vital Signs  Temp:  [97.5 °F (36.4 °C)-98.5 °F (36.9 °C)] 97.8 °F (36.6 °C)  Heart Rate:  [] 93  Resp:  [15-16] 16  BP: (106-122)/(56-72) 106/56       Wt Readings from Last 1 Encounters:   09/05/18 0642 132 kg (290 lb)   09/04/18 0500 130 kg (286 lb 12.8 oz)   09/03/18 1244 (!) 149 kg (329 lb 9.4 oz)   09/02/18 0658 132 kg (290 lb 12.8 oz)   09/01/18 0700 135 kg (298 lb 6 oz)   08/31/18 0700 132 kg (290 lb 12.8 oz)   08/30/18 0913 131 kg (288 lb)   08/29/18 0352 134 kg (294 lb 6.4 oz)   08/28/18 0635 133 kg (293 lb 3.2 oz)   08/27/18 1300 131 kg (288 lb 9.3 oz)   08/27/18 0659 135 kg (297 lb 9.6 oz)   08/26/18 0944 135 kg (297 lb)   08/25/18 0622 136 kg (299 lb)   08/23/18 0914 (!) 139 kg (307 lb)   08/21/18 0643 (!) 138 kg (304 lb 8 oz)   08/20/18 0629 (!) 140 kg (309 lb 1.6 oz)   08/19/18 0552 (!) 140 kg (307 lb 14.4 oz)   08/18/18 0524 (!) 140 kg (308 lb 14.4 oz)   08/17/18 0500 (!) 138 kg (305 lb 3.2 oz)   08/16/18 0500 (!) 137 kg (301 lb 14.4 oz)   08/15/18 0618 (!) 137 kg (303 lb)   08/14/18 1300 (!) 138 kg (303 lb 4.8 oz)   08/13/18 0458 135 kg (298 lb 3.2 oz)   08/12/18 1312 132 kg (291 lb)   08/11/18 0615 132 kg (291 lb 3.2 oz)   08/10/18 0554 130 kg (286 lb 14.4 oz)   08/09/18 0500 130 kg (286 lb 6.4 oz)   08/08/18 0700 128 kg (282 lb)   08/07/18 0600 122 kg (270 lb)   08/06/18 0705 123 kg (272 lb)   08/05/18 0500 124 kg (273 lb)   08/04/18 0516 108 kg (238 lb 6.4 oz)   08/03/18 0637 129 kg (283 lb 6.4 oz)   08/02/18 0353 132 kg (292 lb  "1.6 oz)   08/01/18 0545 134 kg (295 lb 4.8 oz)   07/31/18 0310 133 kg (294 lb 1.6 oz)   07/29/18 1649 131 kg (288 lb 12.8 oz)   07/29/18 0700 59.4 kg (131 lb)   07/28/18 0500 129 kg (284 lb 1.6 oz)   07/26/18 0520 127 kg (280 lb)   07/25/18 1111 125 kg (274 lb 9 oz)       Objective:  General Appearance:  Comfortable, in no acute distress and not in pain (Obese).    Vital signs: (most recent): Blood pressure 106/56, pulse 93, temperature 97.8 °F (36.6 °C), temperature source Oral, resp. rate 16, height 185.4 cm (73\"), weight 132 kg (290 lb), SpO2 98 %.  Vital signs are normal.  No fever.    Output: Producing urine.    HEENT: Normal HEENT exam.    Lungs:  Normal effort and normal respiratory rate.  Breath sounds clear to auscultation.  He is not in respiratory distress.  No decreased breath sounds.    Heart: Normal rate.  Regular rhythm.  S1 normal.  No murmur.   Abdomen: Abdomen is soft and non-distended.  Bowel sounds are normal.   There is no abdominal tenderness.   (Significant scrotal edema).   Extremities: Normal range of motion.  There is dependent edema.  (Anasarca improved, decreased scrotal edema)  Pulses: Distal pulses are intact.    Neurological: Patient is alert and oriented to person, place and time.    Skin:  Warm and dry.  No rash or cyanosis.             Results Review:    Intake/Output:     Intake/Output Summary (Last 24 hours) at 09/05/18 1605  Last data filed at 09/05/18 0820   Gross per 24 hour   Intake              686 ml   Output                0 ml   Net              686 ml         DATA:  Radiology and Labs:  The following labs independently reviewed by me. Additional labs ordered for tomorrow a.m.  Interval notes, chart personally reviewed by me.   Old records independently reviewed showing Stage III chronic kidney disease as described above       Labs:   Recent Results (from the past 24 hour(s))   POC Glucose Once    Collection Time: 09/04/18  7:57 PM   Result Value Ref Range    Glucose 208 " (H) 70 - 130 mg/dL   POC Glucose Once    Collection Time: 09/05/18  5:32 AM   Result Value Ref Range    Glucose 109 70 - 130 mg/dL   Protime-INR    Collection Time: 09/05/18  6:27 AM   Result Value Ref Range    Protime 14.2 11.7 - 14.2 Seconds    INR 1.12 (H) 0.90 - 1.10   Renal Function Panel    Collection Time: 09/05/18  6:27 AM   Result Value Ref Range    Glucose 91 65 - 99 mg/dL    BUN 36 (H) 8 - 23 mg/dL    Creatinine 2.16 (H) 0.76 - 1.27 mg/dL    Sodium 134 (L) 136 - 145 mmol/L    Potassium 4.7 3.5 - 5.2 mmol/L    Chloride 99 98 - 107 mmol/L    CO2 24.6 22.0 - 29.0 mmol/L    Calcium 7.7 (L) 8.6 - 10.5 mg/dL    Albumin 2.10 (L) 3.50 - 5.20 g/dL    Phosphorus 3.7 2.5 - 4.5 mg/dL    Anion Gap 10.4 mmol/L    BUN/Creatinine Ratio 16.7 7.0 - 25.0    eGFR Non African Amer 31 (L) >60 mL/min/1.73   POC Glucose Once    Collection Time: 09/05/18  1:38 PM   Result Value Ref Range    Glucose 168 (H) 70 - 130 mg/dL       Radiology:  Imaging Results (last 24 hours)     ** No results found for the last 24 hours. **             Medications have been reviewed:  Current Facility-Administered Medications   Medication Dose Route Frequency Provider Last Rate Last Dose   • acetaminophen (TYLENOL) suppository 650 mg  650 mg Rectal Q4H PRN Glen Brasher MD       • acetaminophen (TYLENOL) tablet 650 mg  650 mg Oral Q4H PRN Glen Brasher MD   650 mg at 09/05/18 1435   • aspirin EC tablet 81 mg  81 mg Oral Daily Glen Brasher MD   81 mg at 09/05/18 0822   • atorvastatin (LIPITOR) tablet 10 mg  10 mg Oral Nightly Terri Jane MD   10 mg at 08/17/18 2020   • bisacodyl (DULCOLAX) EC tablet 10 mg  10 mg Oral Daily PRN Glen Brasher MD   10 mg at 08/24/18 0917   • bisacodyl (DULCOLAX) suppository 10 mg  10 mg Rectal Daily PRN Glen Brasher MD       • bisacodyl (DULCOLAX) suppository 10 mg  10 mg Rectal Once Soraya Wu APRN       • budesonide-formoterol (SYMBICORT) 160-4.5 MCG/ACT inhaler 2 puff  2 puff  Inhalation BID - RT Soraya Wu APRN   2 puff at 09/04/18 2142   • cyclobenzaprine (FLEXERIL) tablet 10 mg  10 mg Oral Q8H PRN Glen Brasher MD   10 mg at 08/30/18 1229   • dextrose (D50W) solution 25 g  25 g Intravenous Q15 Min PRN Glen Brasher MD   25 g at 08/09/18 1441   • dextrose (GLUTOSE) oral gel 15 g  15 g Oral Q15 Min PRN Glen Brasher MD       • diphenhydrAMINE (BENADRYL) capsule 25 mg  25 mg Oral Q6H PRN Lina Patiño PA   25 mg at 08/20/18 1221   • epoetin roxi (EPOGEN,PROCRIT) injection 10,000 Units  10,000 Units Intravenous Once per day on Mon Wed Fri Marty Espitia MD   10,000 Units at 09/05/18 1111   • ferric gluconate (FERRLECIT) 125 mg in sodium chloride 0.9 % 100 mL IVPB  125 mg Intravenous Once per day on Mon Wed Fri Marty Espitia  mL/hr at 09/03/18 1756 125 mg at 09/03/18 1756   • ferrous sulfate tablet 325 mg  325 mg Oral Daily With Breakfast Lavon Berger MD   325 mg at 09/05/18 0821   • fluticasone (FLONASE) 50 MCG/ACT nasal spray 2 spray  2 spray Each Nare Daily Renetta Hancock APRN   2 spray at 09/05/18 0823   • glucagon (human recombinant) (GLUCAGEN DIAGNOSTIC) injection 1 mg  1 mg Subcutaneous PRN Glen Brasher MD       • guaiFENesin (MUCINEX) 12 hr tablet 1,200 mg  1,200 mg Oral Q12H Soraya Wu APRN   1,200 mg at 09/05/18 0822   • heparin (porcine) 5000 UNIT/ML injection 5,000 Units  5,000 Units Intravenous Take As Directed Sammy Barillas MD       • heparin (porcine) injection 3,000 Units  3,000 Units Intracatheter PRN Marty Espitia MD   3,000 Units at 09/03/18 1245   • heparin (porcine) injection 3,000 Units  3,000 Units Intracatheter PRN Glen Brasher MD   3,000 Units at 09/05/18 1110   • Hold medication  1 each Does not apply Continuous PRN Renetta Hancock APRN       • hydrocortisone 1 % cream   Topical Q12H Lavon Berger MD       • insulin aspart (novoLOG) injection 0-10 Units  0-10 Units  Subcutaneous 4x Daily With Meals & Nightly Terri Jane MD   2 Units at 09/05/18 1440   • insulin aspart (novoLOG) injection 8 Units  8 Units Subcutaneous TID With Meals Terri Jane MD   8 Units at 09/05/18 1440   • insulin detemir (LEVEMIR) injection 18 Units  18 Units Subcutaneous Nightly Ata Reza MD   18 Units at 09/04/18 2046   • ipratropium-albuterol (DUO-NEB) nebulizer solution 3 mL  3 mL Nebulization Q4H PRN Glen Brasher MD   3 mL at 08/29/18 1315   • lactulose (CHRONULAC) 10 GM/15ML solution 20 g  20 g Oral Daily Soraya Wu APRN   20 g at 08/27/18 1427   • levothyroxine (SYNTHROID, LEVOTHROID) tablet 50 mcg  50 mcg Oral Q AM Ata Reza MD   50 mcg at 09/05/18 0524   • Magnesium Sulfate 2 gram infusion - Mg less than or equal to 1.5 mg/dL  2 g Intravenous PRN Glen Brasher MD        Or   • Magesium Sulfate 1 gram infusion - Mg 1.6-1.9 mg/dL  1 g Intravenous PRN Glen Brasher MD       • magic mouthwash oral supsension 10 mL  10 mL Swish & Spit Q4H Glen Brasher MD   10 mL at 09/05/18 1435   • magnesium hydroxide (MILK OF MAGNESIA) suspension 2400 mg/10mL 10 mL  10 mL Oral Daily PRN Glen Brasher MD   10 mL at 08/17/18 1833   • midodrine (PROAMATINE) tablet 5 mg  5 mg Oral TID AC Sae Boothe MD   5 mg at 09/05/18 1435   • montelukast (SINGULAIR) tablet 10 mg  10 mg Oral Daily Soraya Wu APRN   10 mg at 09/05/18 0821   • naloxone (NARCAN) injection 0.4 mg  0.4 mg Intravenous Q5 Min PRN Glen Brasher MD       • nystatin (MYCOSTATIN) 162075 UNIT/ML suspension 500,000 Units  5 mL Swish & Swallow 4x Daily [place], Glen B., MD   500,000 Units at 09/05/18 1435   • nystatin (MYCOSTATIN) powder   Topical Q12H Renetta Hancock APRN       • ondansetron (ZOFRAN) injection 4 mg  4 mg Intravenous Q6H PRN Glen Brasher MD   4 mg at 08/20/18 1823   • ondansetron (ZOFRAN) tablet 4 mg  4 mg Oral Q6H PRN Javed Huitron MD         Or   • ondansetron ODT (ZOFRAN-ODT) disintegrating tablet 4 mg  4 mg Oral Q6H PRN Javed Huitron MD        Or   • ondansetron (ZOFRAN) injection 4 mg  4 mg Intravenous Q6H PRN Javed Huitron MD   4 mg at 09/03/18 2058   • oxyCODONE (ROXICODONE) immediate release tablet 10 mg  10 mg Oral Q4H PRN Glen Brasher MD   10 mg at 09/05/18 1435   • pantoprazole (PROTONIX) EC tablet 40 mg  40 mg Oral Daily Glen Brasher MD   40 mg at 09/05/18 0821   • polyethylene glycol (MIRALAX) powder 17 g  17 g Oral Daily Soraya Wu APRN   17 g at 09/04/18 0949   • potassium chloride (MICRO-K) CR capsule 40 mEq  40 mEq Oral PRN Glen Brasher MD   40 mEq at 08/06/18 2352    Or   • potassium chloride (KLOR-CON) packet 40 mEq  40 mEq Oral PRN Glen Brasher MD       • potassium chloride 10 mEq in 100 mL IVPB  10 mEq Intravenous Q1H PRN Glen Brasher MD        Or   • potassium chloride 10 mEq in 100 mL IVPB  10 mEq Intravenous Q1H PRN Glen Brasher MD       • promethazine (PHENERGAN) tablet 12.5 mg  12.5 mg Oral Q6H PRN Glen Brasher MD        Or   • promethazine (PHENERGAN) injection 12.5 mg  12.5 mg Intravenous Q6H PRN Glen Brasher MD       • Scopolamine (TRANSDERM-SCOP) 1.5 MG/3DAYS patch 1 patch  1 patch Transdermal Q72H Renetta Hancock APRN   1 patch at 09/03/18 1607   • sennosides-docusate sodium (SENOKOT-S) 8.6-50 MG tablet 2 tablet  2 tablet Oral Nightly Glen Brasher MD   2 tablet at 09/04/18 2045   • sodium chloride (OCEAN) nasal spray 2 spray  2 spray Each Nare PRN Glen Brasher MD       • sodium chloride 0.9 % infusion  9 mL/hr Intravenous Continuous PRN Satnam Arguello MD 9 mL/hr at 08/28/18 1220     • sucralfate (CARAFATE) 1 GM/10ML suspension 1 g  1 g Oral Q6H Juan Sanz MD   1 g at 09/05/18 1435   • torsemide (DEMADEX) tablet 100 mg  100 mg Oral Daily Donnell Madison MD   100 mg at 09/05/18 0822   • zolpidem (AMBIEN) tablet 10 mg  10 mg Oral Nightly  Roslyn Cordero MD   10 mg at 18 3520       ASSESSMENT:  End-stage renal disease, dialysis  with ultrafiltration as tolerated  Anasarca, improved  Hyponatremia, adjust dialysis bath  Hypoalbuminemia, contributing to edema, no proteinuria   Severe aortic stenosis status post replacement  Pulmonary hypertension  Anemia of chronic kidney disease: Epogen, IV iron  Thrombocytopenia, hematology following, up to 60,000 today  pleural effusions, status post thoracentesis      PLAN:   He is stable for discharge at any time from my standpoint  Outpatient dialysis has been set up at [facility]   Continue ultrafiltration challenge with dialysis as tolerated, weights continue to decrease  Continue midodrine, torsemide, BP support on dialysis with albumin and mannitol as needed  Eventual AVF once platelets are better: outpatient follow-up with vascular once he is a little stronger and more stable  Recheck labs in a.m. continue fluid restriction    Ronald Olivares MD   [facility]   Office phone number: [phone]  Answering service phone number: [phone]    18  4:05 PM      Dictation performed using Dragon dictation software              Electronically signed by Ronald Olivares MD at 2018  4:06 PM     Sammy Barillas MD at 2018  2:57 PM          Name: Bj Head ADMIT: 2018   : 1955  PCP: Gus Cordova MD    MRN: [id number] LOS: 42 days   AGE/SEX: 63 y.o. male  ROOM: [facility]     Active Hospital Problems    Diagnosis Date Noted   • Thrombocytopenia (CMS/HCC) [D69.6] 2018   • End stage renal disease (CMS/HCC) [N18.6] 2018   • Primary hypothyroidism [E03.9] 08/15/2018   • Type 2 diabetes mellitus with complication, with long-term current use of insulin (CMS/HCC) [E11.8, Z79.4] 2018   • Protein malnutrition (CMS/HCC) [E46] 2018   • Hypoalbuminemia [E88.09] 2018   • Pulmonary  hypertension [I27.20] 07/20/2018   • Acute on chronic systolic congestive heart failure (CMS/HCC) [I50.23] 07/20/2018   • Nonrheumatic aortic valve stenosis [I35.0] 11/15/2017   • Non-rheumatic tricuspid valve insufficiency [I36.1] 11/15/2017   • Obstructive sleep apnea [G47.33] 11/15/2017      Resolved Hospital Problems    Diagnosis Date Noted Date Resolved   No resolved problems to display.     Subjective     63 y.o. male familiar to the vascular service for temporary dialysis access placement after aortic valve replacement.    We have been asked to reevaluate the patient due to the discovery of wounds underneath his bilateral Unna boots.  He has a fifth metatarsal wound that was apparently small week ago, but has grown in size and become black in the center.  I discussed this with the wound care nurse.    He also has a chronic appearing wound of the on the plantar surface of the right foot.  This is bandaged underneath the Unna boot, however his family member showed me a photo that shows that there is chronic callus around the edge of it.    There is also a small posterior calf wound on the left.  I was shown a photo as it is covered under the Unna boot, and is very superficial without chronic changes.    Review of Systems  No complaints, stable neuropathy  Objective     Vital Signs  Temp:  [97.5 °F (36.4 °C)-98.5 °F (36.9 °C)] 97.8 °F (36.6 °C)  Heart Rate:  [] 93  Resp:  [15-16] 16  BP: (106-122)/(56-72) 106/56    Physical Exam   Constitutional: He is oriented to person, place, and time. He appears well-developed and well-nourished.   Neck: Normal range of motion.   Cardiovascular: Normal rate.    Pulmonary/Chest: Effort normal.   Abdominal: Soft.   Musculoskeletal: Normal range of motion.   Bilateral Unna boots are in place.  There is an opening left for the left fifth metatarsal ulcer.  This measures approximately 15 x 15 mm, has dark Eschar over it.  No cellulitis, foul smell, callus.  There is some  mild maceration of the tissues around the ulcer.   Neurological: He is alert and oriented to person, place, and time.         Results Review:       I reviewed the patient's new clinical results.    Results from last 7 days  Lab Units 09/03/18  0509 09/02/18  0708 09/01/18  0515 08/31/18  0600 08/30/18  0822   WBC 10*3/mm3 8.77 11.50* 13.79* 15.28* 9.48   HEMOGLOBIN g/dL 10.3* 10.2* 9.7* 9.3* 9.3*   PLATELETS 10*3/mm3 60* 60* 30* 45* 34*     Results from last 7 days  Lab Units 09/05/18  0627 09/04/18  0504 09/03/18  0509 09/02/18  0708 09/01/18  0515 08/31/18  0600 08/30/18  0822   SODIUM mmol/L 134* 133* 133* 133* 133* 133* 132*   POTASSIUM mmol/L 4.7 4.7 5.3* 5.1 5.1 4.7 4.6   CHLORIDE mmol/L 99 99 100 99 101 100 100   CO2 mmol/L 24.6 22.1 22.8 25.0 21.1* 25.0 25.3   BUN mg/dL 36* 30* 40* 35* 26* 32* 27*   CREATININE mg/dL 2.16* 1.86* 2.16* 2.25* 1.98* 2.28* 1.79*   GLUCOSE mg/dL 91 173* 148* 129* 139* 106* 221*   Estimated Creatinine Clearance: 51.3 mL/min (A) (by C-G formula based on SCr of 2.16 mg/dL (H)).  Results from last 7 days  Lab Units 09/05/18  0627 09/04/18  0504 09/03/18  0509 09/02/18  0708 09/01/18  0515 08/31/18  0600 08/30/18  0822   CALCIUM mg/dL 7.7* 7.8* 7.7* 7.8* 7.5* 7.8* 7.7*   ALBUMIN g/dL 2.10* 2.00* 2.10* 2.10* 1.80* 2.20*  --    PHOSPHORUS mg/dL 3.7 3.0 2.9 2.9 2.9 3.0  --        Assessment/Plan           Active Hospital Problems    Diagnosis Date Noted   • Thrombocytopenia (CMS/[facility]) [D69.6] 08/31/2018   • End stage renal disease (CMS/[facility]) [N18.6] 08/28/2018   • Primary hypothyroidism [E03.9] 08/15/2018   • Type 2 diabetes mellitus with complication, with long-term current use of insulin (CMS/[facility]) [E11.8, Z79.4] 08/04/2018   • Protein malnutrition (CMS/[facility]) [E46] 08/01/2018   • Hypoalbuminemia [E88.09] 07/30/2018   • Pulmonary hypertension [I27.20] 07/20/2018   • Acute on chronic systolic congestive heart failure (CMS/[facility]) [I50.23] 07/20/2018   • Nonrheumatic aortic valve stenosis [I35.0]  11/15/2017   • Non-rheumatic tricuspid valve insufficiency [I36.1] 11/15/2017   • Obstructive sleep apnea [G47.33] 11/15/2017      Resolved Hospital Problems    Diagnosis Date Noted Date Resolved   No resolved problems to display.        Assessment & Plan  63 y.o. male with bilateral foot ulcers.  The left ulcer appears to be more acute.  I will check foot x-ray as well as toe waveforms.  Due to his edema and Unna boots I can't appreciate his pedal pulses.    I recommended debridement of the left fifth metatarsal ulcer to remove all nonviable tissue tomorrow.  We'll paint this with Betadine tonight.  Also, the family is requesting a debridement of the right ulcer.  This appears to be a chronic ulcer but could be helped by removing the rim of callus from around the ulcer.  I will get this posted for surgery tomorrow pending the results of the tests tonight.            Sammy Barillas MD  09/05/18   2:57 PM  Office Number [phone]                Electronically signed by Sammy Barillas MD at 9/5/2018  3:02 PM     Soraya Wu APRN at 9/5/2018  1:48 PM        Spoke with daughter today about her concerns for his foot wounds and infection the valve.  I do not think he is showing any signs or symptoms of infection, his WBC is normal, afebrile and wound has been well monitored per wound care.  We should continue with tight control of his blood sugars and great wound care, which is our plan at discharge.  He had wound care involvement prior to this and it will continue, the frequency may need to be adjusted per wound care recommendations. I am not concerned about infecting the valve at this point, we will just need to be diligent and facilitate healing.    Happy to see he is going to rehab it will be good for his mental well-being, long hospitalization and chronic illness can be so taxing on all involved patient and loved ones.  Looks good today. I made follow up appointment for Dr. Brasher in 3  months.   Discussed plans with Daughter, she verbalized understanding.  Patient and daughter can call our office with any questions or concerns.      Addendum   After visualizing with wound care nurse who stated it was larger, the wound on left 5th metatarsal area appears necrotic, Vascular surgery was consulted. Sister at the bedside updated on plan.       IRENE Isabel  Cardiothoracic Surgery  09/05/2018          Electronically signed by Soraya Wu APRN at 9/6/2018  7:54 AM       Consult Notes (last 24 hours) (Notes from 9/5/2018  9:42 AM through 9/6/2018  9:42 AM)     No notes of this type exist for this encounter.

## 2018-09-06 NOTE — PROGRESS NOTES
"  ENDOCRINE    Subjective   AND PLANS  Bj Duarte is a 63 y.o. male.     Follow-up diabetes    No nausea or vomiting.  Fasting glucose 126.  Random glucose 160-202.  Patient was not given NovoLog at breakfast yesterday.  Continue Levemir 18 units at bedtime and NovoLog 8 units with each meal plus sliding scale.    Objective   /58 (BP Location: Right arm, Patient Position: Lying)   Pulse 86   Temp 98.2 °F (36.8 °C) (Oral)   Resp 20   Ht 185.4 cm (73\")   Wt 130 kg (286 lb)   SpO2 100%   BMI 37.73 kg/m²   Physical Exam    Awake, alert, not in distress.  No rales or wheezes.  Irregularly irregular heart rate and rhythm.  Abdomen soft, nontender.  No cyanosis.    Lab Results (last 24 hours)     Procedure Component Value Units Date/Time    Renal Function Panel [[id number]]  (Abnormal) Collected:  09/06/18 0323    Specimen:  Blood Updated:  09/06/18 0426     Glucose 126 (H) mg/dL      BUN 27 (H) mg/dL      Creatinine 1.89 (H) mg/dL      Sodium 135 (L) mmol/L      Potassium 4.3 mmol/L      Chloride 99 mmol/L      CO2 24.1 mmol/L      Calcium 7.9 (L) mg/dL      Albumin 1.90 (L) g/dL      Phosphorus 3.7 mg/dL      Anion Gap 11.9 mmol/L      BUN/Creatinine Ratio 14.3     eGFR Non African Amer 36 (L) mL/min/1.73     Protime-INR [[id number]]  (Abnormal) Collected:  09/06/18 0323    Specimen:  Blood Updated:  09/06/18 0409     Protime 14.1 Seconds      INR 1.11 (H)    CBC (No Diff) [[id number]]  (Abnormal) Collected:  09/05/18 2020    Specimen:  Blood Updated:  09/05/18 2059     WBC 7.59 10*3/mm3      RBC 4.16 (L) 10*6/mm3      Hemoglobin 9.8 (L) g/dL      Hematocrit 33.6 (L) %      MCV 80.8 fL      MCH 23.6 (L) pg      MCHC 29.2 (L) g/dL      RDW 22.8 (H) %      RDW-SD 66.0 (H) fl      Platelets 50 (L) 10*3/mm3     POC Glucose Once [[id number]]  (Abnormal) Collected:  09/05/18 2020    Specimen:  Blood Updated:  09/05/18 2026     Glucose 175 (H) mg/dL     Narrative:       Meter: [id number] : [id number] Joel Quezada" NA    POC Glucose Once [[id number]]  (Abnormal) Collected:  09/05/18 1802    Specimen:  Blood Updated:  09/05/18 1804     Glucose 202 (H) mg/dL     Narrative:       Meter: [id number] : [id number] Scott Oksana SILVIA NA    POC Glucose Once [[id number]]  (Abnormal) Collected:  09/05/18 1338    Specimen:  Blood Updated:  09/05/18 1351     Glucose 168 (H) mg/dL     Narrative:       Meter: [id number] : [id number] Cherelle Humphrey RN            Active Problems:    Obstructive sleep apnea    Nonrheumatic aortic valve stenosis    Non-rheumatic tricuspid valve insufficiency    Pulmonary hypertension    Acute on chronic systolic congestive heart failure (CMS/HCC)    Hypoalbuminemia    Protein malnutrition (CMS/HCC)    Type 2 diabetes mellitus with complication, with long-term current use of insulin (CMS/HCC)    Primary hypothyroidism    End stage renal disease (CMS/HCC)    Thrombocytopenia (CMS/HCC)    Insulin therapy as discussed above.    Continue levothyroxine.

## 2018-09-06 NOTE — SIGNIFICANT NOTE
09/06/18 0944   Rehab Treatment   Discipline physical therapist   Reason Treatment Not Performed unavailable for treatment  (Pt off floor for MRI.  PT will follow up this pm.)   Recommendation   PT - Next Appointment 09/06/18

## 2018-09-06 NOTE — SIGNIFICANT NOTE
09/06/18 1401   Rehab Treatment   Discipline occupational therapist   Reason Treatment Not Performed unavailable for treatment  (OR)   Recommendation   OT - Next Appointment 09/07/18

## 2018-09-06 NOTE — PROGRESS NOTES
Name: Bj Head ADMIT: 2018   : 1955  PCP: Gus Cordova MD    MRN: [id number] LOS: 43 days   AGE/SEX: 63 y.o. male  [place] [facility] [place]     Active Hospital Problems    Diagnosis Date Noted   • Thrombocytopenia (CMS/[facility]) [D69.6] 2018   • End stage renal disease (CMS/[facility]) [N18.6] 2018   • Primary hypothyroidism [E03.9] 08/15/2018   • Type 2 diabetes mellitus with complication, with long-term current use of insulin (CMS/[facility]) [E11.8, Z79.4] 2018   • Protein malnutrition (CMS/[facility]) [E46] 2018   • Hypoalbuminemia [E88.09] 2018   • Pulmonary hypertension [I27.20] 2018   • Acute on chronic systolic congestive heart failure (CMS/[facility]) [I50.23] 2018   • Nonrheumatic aortic valve stenosis [I35.0] 11/15/2017   • Non-rheumatic tricuspid valve insufficiency [I36.1] 11/15/2017   • Obstructive sleep apnea [G47.33] 11/15/2017      Resolved Hospital Problems    Diagnosis Date Noted Date Resolved   No resolved problems to display.     Subjective     63 y.o. male with new hemodialysis dependence after aortic valve replacement, new finding of left lateral 5th MTH foot wound.   No events overnight    Review of Systems  No complaints  Objective     Vital Signs  Temp:  [97.8 °F (36.6 °C)-98.5 °F (36.9 °C)] 98.2 °F (36.8 °C)  Heart Rate:  [] 88  Resp:  [16-20] 18  BP: (101-121)/(55-69) 102/58    Physical Exam  NAD, A/O x 4  Left foot wound without change, leg wraps on bilaterally.    Results Review:       I reviewed the patient's new clinical results.    Results from last 7 days  Lab Units 18  0509 18  0708 18  0515 18  0600 18  0822   WBC 10*3/mm3 7.59 8.77 11.50* 13.79* 15.28* 9.48   HEMOGLOBIN g/dL 9.8* 10.3* 10.2* 9.7* 9.3* 9.3*   PLATELETS 10*3/mm3 50* 60* 60* 30* 45* 34*     Results from last 7 days  Lab Units 18  0323 18  0627 18  0504 18  0509 18  0708 18  0515 18  0600   SODIUM mmol/L  135* 134* 133* 133* 133* 133* 133*   POTASSIUM mmol/L 4.3 4.7 4.7 5.3* 5.1 5.1 4.7   CHLORIDE mmol/L 99 99 99 100 99 101 100   CO2 mmol/L 24.1 24.6 22.1 22.8 25.0 21.1* 25.0   BUN mg/dL 27* 36* 30* 40* 35* 26* 32*   CREATININE mg/dL 1.89* 2.16* 1.86* 2.16* 2.25* 1.98* 2.28*   GLUCOSE mg/dL 126* 91 173* 148* 129* 139* 106*   Estimated Creatinine Clearance: 58 mL/min (A) (by C-G formula based on SCr of 1.89 mg/dL (H)).  Results from last 7 days  Lab Units 09/06/18  0323 09/05/18  0627 09/04/18  0504 09/03/18  0509 09/02/18  0708 09/01/18  0515 08/31/18  0600   CALCIUM mg/dL 7.9* 7.7* 7.8* 7.7* 7.8* 7.5* 7.8*   ALBUMIN g/dL 1.90* 2.10* 2.00* 2.10* 2.10* 1.80* 2.20*   PHOSPHORUS mg/dL 3.7 3.7 3.0 2.9 2.9 2.9 3.0       Assessment/Plan           Active Hospital Problems    Diagnosis Date Noted   • Thrombocytopenia (CMS/[facility]) [D69.6] 08/31/2018   • End stage renal disease (CMS/[facility]) [N18.6] 08/28/2018   • Primary hypothyroidism [E03.9] 08/15/2018   • Type 2 diabetes mellitus with complication, with long-term current use of insulin (CMS/[facility]) [E11.8, Z79.4] 08/04/2018   • Protein malnutrition (CMS/[facility]) [E46] 08/01/2018   • Hypoalbuminemia [E88.09] 07/30/2018   • Pulmonary hypertension [I27.20] 07/20/2018   • Acute on chronic systolic congestive heart failure (CMS/[facility]) [I50.23] 07/20/2018   • Nonrheumatic aortic valve stenosis [I35.0] 11/15/2017   • Non-rheumatic tricuspid valve insufficiency [I36.1] 11/15/2017   • Obstructive sleep apnea [G47.33] 11/15/2017      Resolved Hospital Problems    Diagnosis Date Noted Date Resolved   No resolved problems to display.        Assessment & Plan  63 y.o. male with new foot wound, has eschar over it and the plan is for debridement and the OR today. Also has right chronic foot wound that will need debrided.     Arterial study shows no sign of arterial insufficiency  Foot x-ray with possible osteomyelitis of the medial side of the 5th MTH.  He has postoperative changes with missing MTHs of  the second toe. This is read as possible osteomyelitis as well. Will check an MRI today before surgery to know what we are starting with.  No contrast due to hemodialysis dependence.    His tibial vessels are calcified on x-ray as well.  Due to his post op state, low albumin, diabetic neuropathy, and history of surgery for complications of diabetes in the left foot, he has been at high risk for wound complications and is at elevated risk for further tissue loss.       Sammy Barillas MD  09/06/18   8:20 AM  Office Number [phone]

## 2018-09-06 NOTE — PROGRESS NOTES
"Patient Name: Bj Duarte  :1955  63 y.o.      Patient Care Team:  Gus Cordova MD as PCP - General (Internal Medicine)  Alexandria Benitez MD as Consulting Physician (Nephrology)  Mike Johnson MD as Consulting Physician (Pulmonary Disease)  Oriana Steinberg MD as Consulting Physician (Cardiology)    Interval History:   Discharge on hold because of concerns over her foot wound    Subjective:  Following for valve disease and right heart failure     Objective   Vital Signs  Temp:  [97.8 °F (36.6 °C)-98.2 °F (36.8 °C)] 98.2 °F (36.8 °C)  Heart Rate:  [] 88  Resp:  [16-20] 18  BP: (101-106)/(55-58) 102/58    Intake/Output Summary (Last 24 hours) at 18 08  Last data filed at 18 06   Gross per 24 hour   Intake              950 ml   Output              700 ml   Net              250 ml     Flowsheet Rows      First Filed Value   Admission Height  185.4 cm (73\") Documented at 2018 1111   Admission Weight  125 kg (274 lb 9 oz) Documented at 2018 1111          Physical Exam:   General Appearance:    Alert, cooperative, in no acute distress   Lungs:     Clear to auscultation.  Normal respiratory effort and rate.      Heart:    irreg irreg     Chest Wall:    No abnormalities observed   Abdomen:     Soft, nontender, positive bowel sounds.     Extremities:   Generalized edema       Results Review:      Results from last 7 days  Lab Units 18  0323   SODIUM mmol/L 135*   POTASSIUM mmol/L 4.3   CHLORIDE mmol/L 99   CO2 mmol/L 24.1   BUN mg/dL 27*   CREATININE mg/dL 1.89*   GLUCOSE mg/dL 126*   CALCIUM mg/dL 7.9*           Results from last 7 days  Lab Units 18   WBC 10*3/mm3 7.59   HEMOGLOBIN g/dL 9.8*   HEMATOCRIT % 33.6*   PLATELETS 10*3/mm3 50*       Results from last 7 days  Lab Units 18  0323 18  0504   INR  1.11* 1.12* 1.08                     Medication Review:     aspirin 81 mg Oral Daily   atorvastatin 10 mg Oral Nightly "   bisacodyl 10 mg Rectal Once   budesonide-formoterol 2 puff Inhalation BID - RT   epoetin roxi 10,000 Units Intravenous Once per day on Mon Wed Fri   sodium ferric gluconate complex IVPB in 100 mL  mg Intravenous Once per day on Mon Wed Fri   ferrous sulfate 325 mg Oral Daily With Breakfast   fluticasone 2 spray Each Nare Daily   guaiFENesin 1,200 mg Oral Q12H   heparin (porcine) 5,000 Units Intravenous Take As Directed   hydrocortisone  Topical Q12H   insulin aspart 0-10 Units Subcutaneous 4x Daily With Meals & Nightly   insulin aspart 8 Units Subcutaneous TID With Meals   insulin detemir 18 Units Subcutaneous Nightly   lactulose 20 g Oral Daily   levothyroxine 50 mcg Oral Q AM   magic mouthwash 10 mL Swish & Spit Q4H   midodrine 5 mg Oral TID AC   montelukast 10 mg Oral Daily   nystatin 5 mL Swish & Swallow 4x Daily   nystatin  Topical Q12H   pantoprazole 40 mg Oral Daily   polyethylene glycol 17 g Oral Daily   Scopolamine 1 patch Transdermal Q72H   sennosides-docusate sodium 2 tablet Oral Nightly   sucralfate 1 g Oral Q6H   torsemide 100 mg Oral Daily          hold 1 each    sodium chloride 9 mL/hr Last Rate: 9 mL/hr (09/05/18 1904)       Assessment/Plan     1.  Aortic stenosis.  Status post aortic valve replacement with a 25 mm Medtronic bovine pericardial valve performed on July 26, 2018.  2.  Tricuspid regurgitation.  Status post tricuspid valve repair with a ring on July 26, 2018.  3.  Left atrial appendage resection  4.  Pulmonary hypertension with right heart failure.  Right heart catheterization performed on August 9 showed severe pulmonary hypertension with a pulmonary artery pressure of 84/32 with a wedge of approximately 18.  His pressures did not decline with vasodilator challenge.  Pulmonary has seen him and they do not recommend treatment with Revatio.  I spoke with Waqas Johnson he recommends ultrafiltration until the patient is as dry as possible and then try sildenafil. He said the usual  dose is 20 three times a day but he likes to try to get them to 50 three times a day if possible.  It may lower his blood pressure further though.  He is going to continue to get dialysis with ultrafiltration as an outpatient.  He has not yet euvolemic.  I would like him to follow up with Dr. Johnson.  5.  Atrial fibrillation.  Rate controlled.  He was seen by Dr. Salgado regarding low platelets.  I agree with the recommendation that the patient not be on anticoagulation at this time.  6.  Acute on chronic kidney disease.  Dr. Olivares is following.  HD Mon, Wed, Fri  7.  Hyponatremia.  Stable  8.  Anemia.   9. Diabetes.  Endocrinology is following.  10.  Scrotal and penile edema.  Urology recommends discharge with the Omalley catheter in place and follow-up as an outpatient in 2 weeks.  11.  Hypotension.  On midodrine.  Off blood pressure lowering medications.  12.  Moderate pericardial effusion.  No evidence of tamponade physiology on echo.  13.  Hypoalbuminemia  14.  Thrombocytopenia.   Seen by hematology.  They think this is likely due to splenic and hepatic congestion.  15.  Hypothyroid.  Started on replacement.     - MRI today  - Surgical plans per Dr Nargis Steinberg MD, [facility]  09/06/18  8:29 AM

## 2018-09-06 NOTE — NURSING NOTE
Spoke with Patients daughter, Estrella Godinez, via phone. Updated her on patients testing today (results still pending) and surgery to be done today. She had specific questions about discharge, a possible skin graft for foot wound and possible fistula placement. I placed her questions in the MD sticky notes for the doctors to address. She did not voice any concerns.

## 2018-09-06 NOTE — PROGRESS NOTES
LOS: 43 days   Patient Care Team:  Gus Cordova MD as PCP - General (Internal Medicine)  Alexandria Benitez MD as Consulting Physician (Nephrology)  Mike Johnson MD as Consulting Physician (Pulmonary Disease)  Oriana Steinberg MD as Consulting Physician (Cardiology)    Chief Complaint/ Reason for encounter: Anasarca, dialysis management      Subjective     Medical history reviewed:  History of Present Illness    Subjective:  Symptoms:  Improved.  He reports weakness.  No shortness of breath or chest pain.  (Weight and edema continue to slowly improve  No shortness of breath, weakness improved  Tolerating dialysis well, outpatient arrangements have been finalized ).    Diet:  Adequate intake.  No nausea.    Activity level: Returning to normal.    Pain:  He reports no pain.          History taken from: Patient and chart    Objective     Vital Signs  Temp:  [97.4 °F (36.3 °C)-98.2 °F (36.8 °C)] 97.6 °F (36.4 °C)  Heart Rate:  [] 88  Resp:  [14-20] 16  BP: ()/(55-83) 131/72       Wt Readings from Last 1 Encounters:   09/06/18 0606 130 kg (286 lb)   09/05/18 0642 132 kg (290 lb)   09/04/18 0500 130 kg (286 lb 12.8 oz)   09/03/18 1244 (!) 149 kg (329 lb 9.4 oz)   09/02/18 0658 132 kg (290 lb 12.8 oz)   09/01/18 0700 135 kg (298 lb 6 oz)   08/31/18 0700 132 kg (290 lb 12.8 oz)   08/30/18 0913 131 kg (288 lb)   08/29/18 0352 134 kg (294 lb 6.4 oz)   08/28/18 0635 133 kg (293 lb 3.2 oz)   08/27/18 1300 131 kg (288 lb 9.3 oz)   08/27/18 0659 135 kg (297 lb 9.6 oz)   08/26/18 0944 135 kg (297 lb)   08/25/18 0622 136 kg (299 lb)   08/23/18 0914 (!) 139 kg (307 lb)   08/21/18 0643 (!) 138 kg (304 lb 8 oz)   08/20/18 0629 (!) 140 kg (309 lb 1.6 oz)   08/19/18 0552 (!) 140 kg (307 lb 14.4 oz)   08/18/18 0524 (!) 140 kg (308 lb 14.4 oz)   08/17/18 0500 (!) 138 kg (305 lb 3.2 oz)   08/16/18 0500 (!) 137 kg (301 lb 14.4 oz)   08/15/18 0618 (!) 137 kg (303 lb)   08/14/18 1300 (!) 138 kg (303 lb 4.8 oz)  "  08/13/18 0458 135 kg (298 lb 3.2 oz)   08/12/18 1312 132 kg (291 lb)   08/11/18 0615 132 kg (291 lb 3.2 oz)   08/10/18 0554 130 kg (286 lb 14.4 oz)   08/09/18 0500 130 kg (286 lb 6.4 oz)   08/08/18 0700 128 kg (282 lb)   08/07/18 0600 122 kg (270 lb)   08/06/18 0705 123 kg (272 lb)   08/05/18 0500 124 kg (273 lb)   08/04/18 0516 108 kg (238 lb 6.4 oz)   08/03/18 0637 129 kg (283 lb 6.4 oz)   08/02/18 0353 132 kg (292 lb 1.6 oz)   08/01/18 0545 134 kg (295 lb 4.8 oz)   07/31/18 0310 133 kg (294 lb 1.6 oz)   07/29/18 1649 131 kg (288 lb 12.8 oz)   07/29/18 0700 59.4 kg (131 lb)   07/28/18 0500 129 kg (284 lb 1.6 oz)   07/26/18 0520 127 kg (280 lb)   07/25/18 1111 125 kg (274 lb 9 oz)       Objective:  General Appearance:  Comfortable, in no acute distress and not in pain (Obese).    Vital signs: (most recent): Blood pressure 131/72, pulse 88, temperature 97.6 °F (36.4 °C), temperature source Oral, resp. rate 16, height 185.4 cm (73\"), weight 130 kg (286 lb), SpO2 96 %.  Vital signs are normal.  No fever.    Output: Producing urine.    HEENT: Normal HEENT exam.    Lungs:  Normal effort and normal respiratory rate.  Breath sounds clear to auscultation.  He is not in respiratory distress.  No decreased breath sounds.    Heart: Normal rate.  Regular rhythm.  S1 normal.  No murmur.   Abdomen: Abdomen is soft and non-distended.  Bowel sounds are normal.   There is no abdominal tenderness.   (Significant scrotal edema).   Extremities: Normal range of motion.  There is dependent edema.  (Anasarca improved, decreased scrotal edema)  Pulses: Distal pulses are intact.    Neurological: Patient is alert and oriented to person, place and time.    Skin:  Warm and dry.  No rash or cyanosis.             Results Review:    Intake/Output:     Intake/Output Summary (Last 24 hours) at 09/06/18 1559  Last data filed at 09/06/18 1435   Gross per 24 hour   Intake              700 ml   Output              700 ml   Net                0 ml "         DATA:  Radiology and Labs:  The following labs independently reviewed by me. Additional labs ordered for tomorrow a.m.  Interval notes, chart personally reviewed by me.   Old records independently reviewed showing Stage III chronic kidney disease as described above       Labs:   Recent Results (from the past 24 hour(s))   POC Glucose Once    Collection Time: 09/05/18  6:02 PM   Result Value Ref Range    Glucose 202 (H) 70 - 130 mg/dL   Doppler Arterial Multi Level Lower Extremity - Bilateral CAR    Collection Time: 09/05/18  6:57 PM   Result Value Ref Range    LEFT GREAT TOE SYS  mmHg    LEFT TBI RATIO 0.93     Upper arterial right arm brachial sys max 124 mmHg   CBC (No Diff)    Collection Time: 09/05/18  8:20 PM   Result Value Ref Range    WBC 7.59 4.50 - 10.70 10*3/mm3    RBC 4.16 (L) 4.60 - 6.00 10*6/mm3    Hemoglobin 9.8 (L) 13.7 - 17.6 g/dL    Hematocrit 33.6 (L) 40.4 - 52.2 %    MCV 80.8 79.8 - 96.2 fL    MCH 23.6 (L) 27.0 - 32.7 pg    MCHC 29.2 (L) 32.6 - 36.4 g/dL    RDW 22.8 (H) 11.5 - 14.5 %    RDW-SD 66.0 (H) 37.0 - 54.0 fl    Platelets 50 (L) 140 - 500 10*3/mm3   POC Glucose Once    Collection Time: 09/05/18  8:20 PM   Result Value Ref Range    Glucose 175 (H) 70 - 130 mg/dL   Protime-INR    Collection Time: 09/06/18  3:23 AM   Result Value Ref Range    Protime 14.1 11.7 - 14.2 Seconds    INR 1.11 (H) 0.90 - 1.10   Renal Function Panel    Collection Time: 09/06/18  3:23 AM   Result Value Ref Range    Glucose 126 (H) 65 - 99 mg/dL    BUN 27 (H) 8 - 23 mg/dL    Creatinine 1.89 (H) 0.76 - 1.27 mg/dL    Sodium 135 (L) 136 - 145 mmol/L    Potassium 4.3 3.5 - 5.2 mmol/L    Chloride 99 98 - 107 mmol/L    CO2 24.1 22.0 - 29.0 mmol/L    Calcium 7.9 (L) 8.6 - 10.5 mg/dL    Albumin 1.90 (L) 3.50 - 5.20 g/dL    Phosphorus 3.7 2.5 - 4.5 mg/dL    Anion Gap 11.9 mmol/L    BUN/Creatinine Ratio 14.3 7.0 - 25.0    eGFR Non African Amer 36 (L) >60 mL/min/1.73   POC Glucose Once    Collection Time: 09/06/18  10:53 AM   Result Value Ref Range    Glucose 124 70 - 130 mg/dL   POC Glucose Once    Collection Time: 09/06/18  2:58 PM   Result Value Ref Range    Glucose 124 70 - 130 mg/dL       Radiology:  Imaging Results (last 24 hours)     Procedure Component Value Units Date/Time    MRI Foot Left Without Contrast [[id number]] Collected:  09/06/18 1304     Updated:  09/06/18 1529    Narrative:       LEFT MID AND FOREFOOT MRI WITHOUT CONTRAST     HISTORY: Open wound along the lateral side of the forefoot. Abnormal  x-rays. Evaluate for osteomyelitis.     TECHNIQUE: MRI of the left mid and forefoot was performed without  contrast using standard sequences. This is correlated with x-rays  performed yesterday.     FINDINGS: There has been previous amputation of the distal portions of  the 1st and 2nd metatarsals with only a small portion of the 1st  metatarsal base remaining. The base of the great toe proximal phalanx  has also been previously resected. There is some hypertrophic  ossification along the 3rd metatarsal shaft and advanced osteoarthritic  change at the 3rd metatarsophalangeal joint. There is also an old healed  fracture of the 4th metatarsal neck.     A wound along the plantar lateral aspect of the foot superficial to the  level of the 5th metatarsal head is observed. This measures  approximately 14 mm diameter. It appears that the flexor tendon of the  5th toe may be exposed at the base of the wound or covered by a very  thin layer of tissue. The flexor tendon remains intact. There is a  small, normal volume of fluid in the 5th metatarsophalangeal joint. No  synovial thickening is appreciated in the joint. Marrow signal in the  5th metatarsal head and in the 5th toe is normal. There is no marrow  signal abnormality in the midfoot, forefoot, or visualized anterior  hindfoot suspicious for osteomyelitis.     Mild degenerative change is observed at the tarsometatarsal joints.  Lisfranc's ligament is intact. The  talonavicular and calcaneocuboid  joints and navicular cuneiform joints appear preserved.     Foot musculature is diffusely atrophic. There is substantial  subcutaneous edema around the left foot but there is no focal fluid  collection.       Impression:       1. Soft tissue edema at the left mid and forefoot with an open wound  superficial to the 5th metatarsal head. There is no evidence of abscess  or osteomyelitis.     2. There is postsurgical/posttraumatic change at the metatarsals where  there has been previous partial resection of the 1st and 2nd  metatarsals.     I discussed the case with Dr. Barillas on the day of exam.     This report was finalized on 9/6/2018 3:25 PM by Dr. Mike Bernal M.D.       XR Foot 3+ View Left [[id number]] Collected:  09/05/18 1857     Updated:  09/05/18 2128    Narrative:       Left foot radiograph     HISTORY: Fifth metatarsal head ulcer     TECHNIQUE: AP, lateral and oblique views of the left foot.      COMPARISON: None     FINDINGS:  There is gross deformity of the midfoot and forefoot. The majority of  the 1st metatarsal as well as the neck and head of the 2nd metatarsal  are absent with well corticated osseous fragments in their anatomic  location. The margins of the residual 1st and 2nd metatarsals are smooth  and well corticated. The first phalanges are directed medially.     Extensive callus formation surrounds the shafts of the 3rd and 4th  metatarsals. There is irregularity of the 3rd metatarsophalangeal joint.  The 2nd through 4th metatarsals and proximal phalanges demonstrate  increased sclerosis.     Focal osteolysis of the 5th metatarsal head is visualized on the oblique  radiograph. Diffuse soft tissue swelling is present about the foot.       Impression:       1.  Focal osteolysis in the medial aspect of the 5th metatarsal head  which given patient history is concerning for osteomyelitis. Further  evaluation with left foot MRI with and without contrast is  recommended.  2.  Gross deformity of the mid and forefoot with absence of the 1st and  2nd metatarsals. The 2nd through 4th metatarsals demonstrate increased  sclerosis with extensive periosteal new bone formation. Findings can be  seen in chronic osteomyelitis and can be further evaluated on subsequent  MRI.  3.  Diffuse swelling about the foot.     This report was finalized on 9/5/2018 9:25 PM by Dr. Ramiro Stevenson M.D.                Medications have been reviewed:  Current Facility-Administered Medications   Medication Dose Route Frequency Provider Last Rate Last Dose   • [MAR Hold] acetaminophen (TYLENOL) suppository 650 mg  650 mg Rectal Q4H PRN Glen Brasher MD       • [MAR Hold] acetaminophen (TYLENOL) tablet 650 mg  650 mg Oral Q4H PRN Glen Brasher MD   650 mg at 09/05/18 1903   • [MAR Hold] aspirin EC tablet 81 mg  81 mg Oral Daily Glen Brasher MD   81 mg at 09/06/18 0922   • [MAR Hold] atorvastatin (LIPITOR) tablet 10 mg  10 mg Oral Nightly Terri Jane MD   10 mg at 08/17/18 2020   • [MAR Hold] bisacodyl (DULCOLAX) EC tablet 10 mg  10 mg Oral Daily PRN Glen Brasher MD   10 mg at 08/24/18 0917   • [MAR Hold] bisacodyl (DULCOLAX) suppository 10 mg  10 mg Rectal Daily PRN Glen Brasher MD       • [MAR Hold] bisacodyl (DULCOLAX) suppository 10 mg  10 mg Rectal Once Soraya Wu APRN       • [MAR Hold] budesonide-formoterol (SYMBICORT) 160-4.5 MCG/ACT inhaler 2 puff  2 puff Inhalation BID - RT Soraya Wu APRN   2 puff at 09/06/18 0812   • [MAR Hold] cyclobenzaprine (FLEXERIL) tablet 10 mg  10 mg Oral Q8H PRN Glen Brasher MD   10 mg at 08/30/18 1229   • [MAR Hold] dextrose (D50W) solution 25 g  25 g Intravenous Q15 Min PRN Glen Brasher MD   25 g at 08/09/18 1441   • [MAR Hold] dextrose (GLUTOSE) oral gel 15 g  15 g Oral Q15 Min PRN Glen Brasher MD       • [MAR Hold] diphenhydrAMINE (BENADRYL) capsule 25 mg  25 mg Oral Q6H PRN Kaylyn  ANGELICA Andre   25 mg at 08/20/18 1221   • ePHEDrine injection 5 mg  5 mg Intravenous Once PRN Javed Harper CRNA       • [MAR Hold] epoetin roxi (EPOGEN,PROCRIT) injection 10,000 Units  10,000 Units Intravenous Once per day on Mon Wed Fri Marty Espitia MD   10,000 Units at 09/05/18 1111   • fentaNYL citrate (PF) (SUBLIMAZE) injection 50 mcg  50 mcg Intravenous Q5 Min PRN Javed Harper CRNA       • [MAR Hold] ferric gluconate (FERRLECIT) 125 mg in sodium chloride 0.9 % 100 mL IVPB  125 mg Intravenous Once per day on Mon Wed Fri Marty Espitia  mL/hr at 09/05/18 1905 125 mg at 09/05/18 1905   • [MAR Hold] ferrous sulfate tablet 325 mg  325 mg Oral Daily With Breakfast Lavon Berger MD   325 mg at 09/05/18 0821   • flumazenil (ROMAZICON) injection 0.2 mg  0.2 mg Intravenous PRN Javed Harper CRNA       • [MAR Hold] fluticasone (FLONASE) 50 MCG/ACT nasal spray 2 spray  2 spray Each Nare Daily Renetta Hancock APRN   2 spray at 09/06/18 0923   • [MAR Hold] glucagon (human recombinant) (GLUCAGEN DIAGNOSTIC) injection 1 mg  1 mg Subcutaneous PRN Glen Brasher MD       • [MAR Hold] guaiFENesin (MUCINEX) 12 hr tablet 1,200 mg  1,200 mg Oral Q12H Soraya Wu APRN   1,200 mg at 09/06/18 0922   • [MAR Hold] heparin (porcine) 5000 UNIT/ML injection 5,000 Units  5,000 Units Intravenous Take As Directed Sammy Barillas MD       • [MAR Hold] heparin (porcine) injection 3,000 Units  3,000 Units Intracatheter PRN Marty Espitia MD   3,000 Units at 09/03/18 1245   • [MAR Hold] heparin (porcine) injection 3,000 Units  3,000 Units Intracatheter PRN Glen Brasher MD   3,000 Units at 09/05/18 1110   • Hold medication  1 each Does not apply Continuous PRN Renetta Hancock APRN       • HYDROcodone-acetaminophen (NORCO) 7.5-325 MG per tablet 1 tablet  1 tablet Oral Once PRN Javed Harper CRNA       • [MAR Hold] hydrocortisone 1 % cream   Topical Q12H Lavon Berger,  MD       • HYDROmorphone (DILAUDID) injection 0.5 mg  0.5 mg Intravenous Q5 Min PRN Javed Harper CRNA       • [MAR Hold] insulin aspart (novoLOG) injection 0-10 Units  0-10 Units Subcutaneous 4x Daily With Meals & Nightly Terri Jane MD   2 Units at 09/05/18 2108   • [MAR Hold] insulin aspart (novoLOG) injection 8 Units  8 Units Subcutaneous TID With Meals Terri Jane MD   8 Units at 09/05/18 1905   • [MAR Hold] insulin detemir (LEVEMIR) injection 18 Units  18 Units Subcutaneous Nightly Ata Reza MD   18 Units at 09/05/18 2109   • [MAR Hold] ipratropium-albuterol (DUO-NEB) nebulizer solution 3 mL  3 mL Nebulization Q4H PRN Glen Brasher MD   3 mL at 08/29/18 1315   • labetalol (NORMODYNE,TRANDATE) injection 5 mg  5 mg Intravenous Q5 Min PRN Javed Harper CRNA       • [MAR Hold] lactulose (CHRONULAC) 10 GM/15ML solution 20 g  20 g Oral Daily Soraya Wu APRN   20 g at 08/27/18 1427   • [MAR Hold] levothyroxine (SYNTHROID, LEVOTHROID) tablet 50 mcg  50 mcg Oral Q AM Ata Reza MD   50 mcg at 09/06/18 0634   • [MAR Hold] Magnesium Sulfate 2 gram infusion - Mg less than or equal to 1.5 mg/dL  2 g Intravenous PRN Glen Brasher MD        Or   • [MAR Hold] Magesium Sulfate 1 gram infusion - Mg 1.6-1.9 mg/dL  1 g Intravenous PRN Glen Brasher MD       • [MAR Hold] magic mouthwash oral supsension 10 mL  10 mL Swish & Spit Q4H Glen Brasher MD   10 mL at 09/05/18 1435   • [MAR Hold] magnesium hydroxide (MILK OF MAGNESIA) suspension 2400 mg/10mL 10 mL  10 mL Oral Daily PRN Glen Brasher MD   10 mL at 08/17/18 1833   • [MAR Hold] midodrine (PROAMATINE) tablet 5 mg  5 mg Oral TID Sae Cosme MD   5 mg at 09/05/18 1903   • [MAR Hold] montelukast (SINGULAIR) tablet 10 mg  10 mg Oral Daily Soraya Wu APRN   10 mg at 09/06/18 0922   • [MAR Hold] naloxone (NARCAN) injection 0.4 mg  0.4 mg Intravenous Q5 Min PRN Glen Brasher MD        • [MAR Hold] nystatin (MYCOSTATIN) 259497 UNIT/ML suspension 500,000 Units  5 mL Swish & Swallow 4x Daily Glen Brasher MD   500,000 Units at 09/05/18 2108   • [MAR Hold] nystatin (MYCOSTATIN) powder   Topical Q12H Renetta Hancock, APRAIDE       • [MAR Hold] ondansetron (ZOFRAN) injection 4 mg  4 mg Intravenous Q6H PRN Glen Brasher MD   4 mg at 08/20/18 1823   • [MAR Hold] ondansetron (ZOFRAN) tablet 4 mg  4 mg Oral Q6H PRN Javed Huitron MD        Or   • [MAR Hold] ondansetron ODT (ZOFRAN-ODT) disintegrating tablet 4 mg  4 mg Oral Q6H PRN Javed Huitron MD        Or   • [MAR Hold] ondansetron (ZOFRAN) injection 4 mg  4 mg Intravenous Q6H PRN Javed Huitron MD   4 mg at 09/03/18 2058   • ondansetron (ZOFRAN) injection 4 mg  4 mg Intravenous Once PRN Javed Harper CRNA       • [MAR Hold] oxyCODONE (ROXICODONE) immediate release tablet 10 mg  10 mg Oral Q4H PRN Glen Brasher MD   10 mg at 09/06/18 0922   • oxyCODONE-acetaminophen (PERCOCET) 7.5-325 MG per tablet 1 tablet  1 tablet Oral Once PRN Javed Harper, CRNA       • [MAR Hold] pantoprazole (PROTONIX) EC tablet 40 mg  40 mg Oral Daily Glen Brasher MD   40 mg at 09/06/18 0922   • [MAR Hold] polyethylene glycol (MIRALAX) powder 17 g  17 g Oral Daily Soraya Wu, IRENE   17 g at 09/04/18 0949   • potassium chloride (MICRO-K) CR capsule 40 mEq  40 mEq Oral PRN Glen Brasher MD   40 mEq at 08/06/18 2352    Or   • potassium chloride (KLOR-CON) packet 40 mEq  40 mEq Oral PRN Glen Brasher MD       • potassium chloride 10 mEq in 100 mL IVPB  10 mEq Intravenous Q1H PRN Glen Brasher MD        Or   • potassium chloride 10 mEq in 100 mL IVPB  10 mEq Intravenous Q1H PRN Glen Brasher MD       • [MAR Hold] promethazine (PHENERGAN) tablet 12.5 mg  12.5 mg Oral Q6H PRN Glen Brasher MD        Or   • [MAR Hold] promethazine (PHENERGAN) injection 12.5 mg  12.5 mg Intravenous Q6H PRN Glen Brasher,  MD       • [MAR Hold] Scopolamine (TRANSDERM-SCOP) 1.5 MG/3DAYS patch 1 patch  1 patch Transdermal Q72H Santi RenettaIRENE hogue   1 patch at 09/03/18 1607   • [MAR Hold] sennosides-docusate sodium (SENOKOT-S) 8.6-50 MG tablet 2 tablet  2 tablet Oral Nightly Glen Brasher MD   2 tablet at 09/05/18 2108   • [MAR Hold] sodium chloride (OCEAN) nasal spray 2 spray  2 spray Each Nare PRN Glen Brasher MD       • sodium chloride 0.9 % infusion  9 mL/hr Intravenous Continuous PRN Satnam Arguello MD 9 mL/hr at 09/05/18 1904     • sodium chloride 0.9 % infusion  9 mL/hr Intravenous Continuous PRN Roseanne Sarkar MD       • [MAR Hold] sucralfate (CARAFATE) 1 GM/10ML suspension 1 g  1 g Oral Q6H Juan Sanz MD   1 g at 09/06/18 0634   • [MAR Hold] torsemide (DEMADEX) tablet 100 mg  100 mg Oral Daily Donnell Madison MD   100 mg at 09/06/18 0922   • [MAR Hold] zolpidem (AMBIEN) tablet 10 mg  10 mg Oral Nightly PRN Roslyn Jimenez MD   10 mg at 09/05/18 2310       ASSESSMENT:  End-stage renal disease, dialysis Monday Wednesday Friday with ultrafiltration as tolerated  Anasarca, improved  Hyponatremia, adjust dialysis bath  Hypoalbuminemia, contributing to edema, no proteinuria   Severe aortic stenosis status post replacement  Pulmonary hypertension  Anemia of chronic kidney disease: Epogen, IV iron  Thrombocytopenia, hematology following, up to 60,000 today  pleural effusions, status post thoracentesis  Peripheral vascular disease/bilateral foot wounds, status post excisional debridement in OR      PLAN:   He is stable for discharge at any time from my standpoint  Outpatient dialysis has been set up at [facility] Monday Wednesday Friday  ultrafiltration challenge with dialysis as tolerated, weights continue to decrease  Continue midodrine, torsemide, BP support on dialysis with albumin and mannitol as needed  Eventual AVF once platelets are better  Recheck labs in a.m. continue fluid restriction  Status  post excisional debridement of bilateral foot wounds in the OR today    Ronald Olivares MD   [facility]   Office phone number: [phone]  Answering service phone number: [phone]    09/06/18  3:59 PM      Dictation performed using Dragon dictation software

## 2018-09-06 NOTE — PLAN OF CARE
Problem: Patient Care Overview  Goal: Plan of Care Review  Outcome: Ongoing (interventions implemented as appropriate)   09/06/18 0433   Coping/Psychosocial   Plan of Care Reviewed With patient   Plan of Care Review   Progress improving   OTHER   Outcome Summary Vital Signs Stable. Assist x2. Pt will be going to the OR for a debriment of wounds on feet. 2L O2 while sleeping for comfort. Will continue to monitor.        Problem: Fall Risk (Adult)  Goal: Absence of Fall  Outcome: Ongoing (interventions implemented as appropriate)      Problem: Cardiac Surgery (Adult)  Goal: Signs and Symptoms of Listed Potential Problems Will be Absent, Minimized or Managed (Cardiac Surgery)  Outcome: Ongoing (interventions implemented as appropriate)      Problem: Cardiac: Heart Failure (Adult)  Goal: Signs and Symptoms of Listed Potential Problems Will be Absent, Minimized or Managed (Cardiac: Heart Failure)  Outcome: Ongoing (interventions implemented as appropriate)    Goal: Signs and Symptoms of Listed Potential Problems Will be Absent, Minimized or Managed (Cardiac: Heart Failure)  Outcome: Ongoing (interventions implemented as appropriate)      Problem: Skin Injury Risk (Adult)  Goal: Skin Health and Integrity  Outcome: Ongoing (interventions implemented as appropriate)

## 2018-09-06 NOTE — ANESTHESIA PREPROCEDURE EVALUATION
Anesthesia Evaluation     Patient summary reviewed and Nursing notes reviewed   no history of anesthetic complications:  NPO Solid Status: > 8 hours             Airway   Mallampati: II  TM distance: >3 FB  Neck ROM: full  Dental    (+) poor dentition    Pulmonary    (+) asthma, sleep apnea,     ROS comment: Pulmonary HTN  Cardiovascular     ECG reviewed  Rhythm: irregular  Rate: normal    (+) hypertension, valvular problems/murmurs AS, murmur and TI, dysrhythmias Atrial Fib, CHF (Acute on chronic), murmur, PVD, DVT, hyperlipidemia,     ROS comment: Right heart catheterization performed on August 9 showed severe pulmonary hypertension with a pulmonary artery pressure of 84/32 with a wedge of approximately 18. Little response to vasodilators      Neuro/Psych  (+) numbness,     GI/Hepatic/Renal/Endo    (+)   liver disease, renal disease (Stage 3) dialysis, diabetes mellitus type 2, hypothyroidism,     Musculoskeletal     (+) back pain,   Abdominal    Substance History   Drug use:        OB/GYN          Other   (+) arthritis (  )     ROS/Med Hx Other: Patient has trouble laying flat 2/2 SOA< however patient states if supplemental o2 given he does better    Echo 8/2018  · Peak velocity of the flow distal to the aortic valve is 246 cm/s.  · Moderate tricuspid valve regurgitation is present.  · There is a moderate (1-2cm) pericardial effusion.  · Left atrial cavity size is mildly dilated.  · Right ventricular cavity is mildly dilated.  · Mild mitral valve regurgitation is present  · Mild aortic valve regurgitation is present.  · Calculated EF = 60%.  · There is a moderate (1-2cm) pericardial effusion.                  Anesthesia Plan    ASA 4     MAC     intravenous induction   Anesthetic plan, all risks, benefits, and alternatives have been provided, discussed and informed consent has been obtained with: patient.

## 2018-09-07 NOTE — NURSING NOTE
Received message from patients daughter regarding zinc level and a dietitan to see patient. Patients nurse made aware of requests.

## 2018-09-07 NOTE — PROGRESS NOTES
"Patient Name: Bj Duarte  :1955  63 y.o.      Patient Care Team:  Gus Cordova MD as PCP - General (Internal Medicine)  Alexandria Benitez MD as Consulting Physician (Nephrology)  Mike Johnson MD as Consulting Physician (Pulmonary Disease)  Oriana Steinberg MD as Consulting Physician (Cardiology)    Interval History:   Postop day 1 of bilateral foot debridement    Subjective:  Following for atrial fibrillation, aortic valve stenosis, right heart failure     Objective   Vital Signs  Temp:  [97.4 °F (36.3 °C)-98.1 °F (36.7 °C)] 98.1 °F (36.7 °C)  Heart Rate:  [] 98  Resp:  [14-20] 16  BP: ()/(56-93) 105/64    Intake/Output Summary (Last 24 hours) at 18 0845  Last data filed at 18 0633   Gross per 24 hour   Intake              950 ml   Output              550 ml   Net              400 ml     Flowsheet Rows      First Filed Value   Admission Height  185.4 cm (73\") Documented at 2018 1111   Admission Weight  125 kg (274 lb 9 oz) Documented at 2018 1111          Physical Exam:   General Appearance:    Alert, cooperative, in no acute distress   Lungs:     Clear to auscultation.  Normal respiratory effort and rate.      Heart:    Irregularly irregular     Chest Wall:    No abnormalities observed   Abdomen:     Soft, nontender, positive bowel sounds.     Extremities:   Still with edema though markedly improved.  Both lower extremities are wrapped      Results Review:      Results from last 7 days  Lab Units 18  0449   SODIUM mmol/L 133*   POTASSIUM mmol/L 4.9   CHLORIDE mmol/L 100   CO2 mmol/L 22.3   BUN mg/dL 33*   CREATININE mg/dL 2.19*   GLUCOSE mg/dL 135*   CALCIUM mg/dL 7.5*           Results from last 7 days  Lab Units 18  2020   WBC 10*3/mm3 7.59   HEMOGLOBIN g/dL 9.8*   HEMATOCRIT % 33.6*   PLATELETS 10*3/mm3 50*       Results from last 7 days  Lab Units 18  0628 18  0323 18  0627   INR  1.14* 1.11* 1.12*                   "   Medication Review:     aspirin 81 mg Oral Daily   atorvastatin 10 mg Oral Nightly   bisacodyl 10 mg Rectal Once   budesonide-formoterol 2 puff Inhalation BID - RT   epoetin roxi 10,000 Units Intravenous Once per day on Mon Wed Fri   sodium ferric gluconate complex IVPB in 100 mL  mg Intravenous Once per day on Mon Wed Fri   ferrous sulfate 325 mg Oral Daily With Breakfast   fluticasone 2 spray Each Nare Daily   guaiFENesin 1,200 mg Oral Q12H   heparin (porcine) 5,000 Units Intravenous Take As Directed   hydrocortisone  Topical Q12H   insulin aspart 0-10 Units Subcutaneous 4x Daily With Meals & Nightly   insulin aspart 8 Units Subcutaneous TID With Meals   insulin detemir 18 Units Subcutaneous Nightly   lactulose 20 g Oral Daily   levothyroxine 50 mcg Oral Q AM   magic mouthwash 10 mL Swish & Spit Q4H   midodrine 5 mg Oral TID AC   montelukast 10 mg Oral Daily   nystatin 5 mL Swish & Swallow 4x Daily   nystatin  Topical Q12H   pantoprazole 40 mg Oral Daily   polyethylene glycol 17 g Oral Daily   Scopolamine 1 patch Transdermal Q72H   sennosides-docusate sodium 2 tablet Oral Nightly   sucralfate 1 g Oral Q6H   torsemide 100 mg Oral Daily          hold 1 each    sodium chloride 9 mL/hr Last Rate: Stopped (09/06/18 1642)   sodium chloride 9 mL/hr        Assessment/Plan     1.  Aortic stenosis.  Status post aortic valve replacement with a 25 mm Medtronic bovine pericardial valve performed on July 26, 2018.  2.  Tricuspid regurgitation.  Status post tricuspid valve repair with a ring on July 26, 2018.  3.  Left atrial appendage resection  4.  Pulmonary hypertension with right heart failure.  Right heart catheterization performed on August 9 showed severe pulmonary hypertension with a pulmonary artery pressure of 84/32 with a wedge of approximately 18.  His pressures did not decline with vasodilator challenge.  Pulmonary has seen him and they do not recommend treatment with Revatio.  I spoke with Waqas Johnson he  recommends ultrafiltration until the patient is as dry as possible and then try sildenafil. He said the usual dose is 20 three times a day but he likes to try to get them to 50 three times a day if possible.  It may lower his blood pressure further though.  He is going to continue to get dialysis with ultrafiltration as an outpatient.  He has not yet euvolemic.  I would like him to follow up with Dr. Johnson.  5.  Atrial fibrillation.  Rate controlled.  He was seen by Dr. Salgado regarding low platelets.  I agree with the recommendation that the patient not be on anticoagulation at this time.  6.  Acute on chronic kidney disease.  Dr. Olivares is following.  HD Mon, Wed, Fri  7.  Hyponatremia.  Stable  8.  Anemia.   9. Diabetes.  Endocrinology is following.  10.  Scrotal and penile edema.  Urology recommends we continue the Omalley catheter for now  11.  Hypotension.  On midodrine.  Off blood pressure lowering medications.  12.  Moderate pericardial effusion.  No evidence of tamponade physiology on echo.  13.  Hypoalbuminemia  14.  Thrombocytopenia.   Seen by hematology.  They think this is likely due to splenic and hepatic congestion.  15.  Hypothyroid.  Started on replacement.  16.  Diabetic foot wounds.  He went to the operating room on September 6 and had bilateral debridement of both feet.  Vascular surgery is following.    - Overall stable.  No weight recorded at this time for today.  - Continue hemodialysis and ultrafiltration as tolerated  - Vascular surgery is following for foot wounds.  - I will see him over the weekend    Oriana Steinberg MD, [facility]  09/07/18  8:45 AM

## 2018-09-07 NOTE — SIGNIFICANT NOTE
09/07/18 1129   Rehab Treatment   Discipline physical therapist   Reason Treatment Not Performed unavailable for treatment  (Pt off floor for dialysis.  PT will follow up this pm.)   Recommendation   PT - Next Appointment 09/07/18

## 2018-09-07 NOTE — SIGNIFICANT NOTE
09/07/18 1548   Rehab Treatment   Discipline physical therapist   Reason Treatment Not Performed unavailable for treatment  (Pt continues to be off the floor for dialysis this pm.  PT will follow up 9/8.)   Recommendation   PT - Next Appointment 09/08/18

## 2018-09-07 NOTE — NURSING NOTE
Spoke to Dr. Barillas regarding plan of care for Left food wound.  Dr. Barillas will assess the wound over the weekend and determine further treatment based upon wound progression.     Suha FALL RN

## 2018-09-07 NOTE — NURSING NOTE
Received phone call from patients daughter. Addressed her concerns about plan to monitor wound. Note left for Vascular MD to also address.

## 2018-09-07 NOTE — PLAN OF CARE
Problem: Patient Care Overview  Goal: Plan of Care Review  Outcome: Ongoing (interventions implemented as appropriate)   09/07/18 0517   Coping/Psychosocial   Plan of Care Reviewed With patient   Plan of Care Review   Progress improving   OTHER   Outcome Summary Vitals stable. No falls. Pt c/o pain in lower back, medicated per MAR. Bilateral wound debridement on feet, dressings C/D/I. Pt to be on bedrest until vascular sees this AM. Omalley remains in place. Ambien given for sleep. Pt resting comfortably. Monitoring closely.        Problem: Fall Risk (Adult)  Goal: Absence of Fall  Outcome: Ongoing (interventions implemented as appropriate)   09/07/18 0517   Fall Risk (Adult)   Absence of Fall making progress toward outcome       Problem: Cardiac Surgery (Adult)  Goal: Signs and Symptoms of Listed Potential Problems Will be Absent, Minimized or Managed (Cardiac Surgery)  Outcome: Ongoing (interventions implemented as appropriate)   09/07/18 0517   Goal/Outcome Evaluation   Problems Assessed (Cardiac Surgery) all   Problems Present (Cardiac Surgery) none       Problem: Cardiac: Heart Failure (Adult)  Goal: Signs and Symptoms of Listed Potential Problems Will be Absent, Minimized or Managed (Cardiac: Heart Failure)  Outcome: Ongoing (interventions implemented as appropriate)   09/07/18 0517   Goal/Outcome Evaluation   Problems Assessed (Heart Failure) all   Problems Present (Heart Failure) dysrhythmia/arrhythmia;fluid/electrolyte imbalance;functional decline/self-care deficit;situational response       Problem: Skin Injury Risk (Adult)  Goal: Skin Health and Integrity  Outcome: Ongoing (interventions implemented as appropriate)   09/07/18 0517   Skin Injury Risk (Adult)   Skin Health and Integrity making progress toward outcome

## 2018-09-07 NOTE — PROGRESS NOTES
LOS: 44 days   Patient Care Team:  Gus Cordova MD as PCP - General (Internal Medicine)  Alexandria Benitez MD as Consulting Physician (Nephrology)  Mike Johnson MD as Consulting Physician (Pulmonary Disease)  Oriana Steinberg MD as Consulting Physician (Cardiology)    Chief Complaint/ Reason for encounter: Anasarca, dialysis management      Subjective     Medical history reviewed:  History of Present Illness    Subjective:  Symptoms:  Stable.  He reports weakness.  No shortness of breath or chest pain.  (No shortness of breath, edema about the same today  Good appetite, still fairly weak).    Diet:  Adequate intake.  No nausea.    Activity level: Returning to normal.    Pain:  He reports no pain.          History taken from: Patient and chart    Objective     Vital Signs  Temp:  [97.4 °F (36.3 °C)-98.1 °F (36.7 °C)] 98.1 °F (36.7 °C)  Heart Rate:  [] 98  Resp:  [14-20] 16  BP: ()/(56-93) 105/64       Wt Readings from Last 1 Encounters:   09/06/18 0606 130 kg (286 lb)   09/05/18 0642 132 kg (290 lb)   09/04/18 0500 130 kg (286 lb 12.8 oz)   09/03/18 1244 (!) 149 kg (329 lb 9.4 oz)   09/02/18 0658 132 kg (290 lb 12.8 oz)   09/01/18 0700 135 kg (298 lb 6 oz)   08/31/18 0700 132 kg (290 lb 12.8 oz)   08/30/18 0913 131 kg (288 lb)   08/29/18 0352 134 kg (294 lb 6.4 oz)   08/28/18 0635 133 kg (293 lb 3.2 oz)   08/27/18 1300 131 kg (288 lb 9.3 oz)   08/27/18 0659 135 kg (297 lb 9.6 oz)   08/26/18 0944 135 kg (297 lb)   08/25/18 0622 136 kg (299 lb)   08/23/18 0914 (!) 139 kg (307 lb)   08/21/18 0643 (!) 138 kg (304 lb 8 oz)   08/20/18 0629 (!) 140 kg (309 lb 1.6 oz)   08/19/18 0552 (!) 140 kg (307 lb 14.4 oz)   08/18/18 0524 (!) 140 kg (308 lb 14.4 oz)   08/17/18 0500 (!) 138 kg (305 lb 3.2 oz)   08/16/18 0500 (!) 137 kg (301 lb 14.4 oz)   08/15/18 0618 (!) 137 kg (303 lb)   08/14/18 1300 (!) 138 kg (303 lb 4.8 oz)   08/13/18 0458 135 kg (298 lb 3.2 oz)   08/12/18 1312 132 kg (291 lb)  "  08/11/18 0615 132 kg (291 lb 3.2 oz)   08/10/18 0554 130 kg (286 lb 14.4 oz)   08/09/18 0500 130 kg (286 lb 6.4 oz)   08/08/18 0700 128 kg (282 lb)   08/07/18 0600 122 kg (270 lb)   08/06/18 0705 123 kg (272 lb)   08/05/18 0500 124 kg (273 lb)   08/04/18 0516 108 kg (238 lb 6.4 oz)   08/03/18 0637 129 kg (283 lb 6.4 oz)   08/02/18 0353 132 kg (292 lb 1.6 oz)   08/01/18 0545 134 kg (295 lb 4.8 oz)   07/31/18 0310 133 kg (294 lb 1.6 oz)   07/29/18 1649 131 kg (288 lb 12.8 oz)   07/29/18 0700 59.4 kg (131 lb)   07/28/18 0500 129 kg (284 lb 1.6 oz)   07/26/18 0520 127 kg (280 lb)   07/25/18 1111 125 kg (274 lb 9 oz)       Objective:  General Appearance:  Comfortable, in no acute distress and not in pain (Obese, chronically ill-appearing).    Vital signs: (most recent): Blood pressure 105/64, pulse 98, temperature 98.1 °F (36.7 °C), temperature source Oral, resp. rate 16, height 185.4 cm (73\"), weight 130 kg (286 lb), SpO2 95 %.  Vital signs are normal.  No fever.    Output: Producing urine.    HEENT: Normal HEENT exam.    Lungs:  Normal effort and normal respiratory rate.  Breath sounds clear to auscultation.  He is not in respiratory distress.  There are decreased breath sounds.    Heart: Normal rate.  Regular rhythm.  S1 normal.  No murmur.   Abdomen: Abdomen is soft and non-distended.  Bowel sounds are normal.   There is no abdominal tenderness.   (Significant scrotal edema).   Extremities: Normal range of motion.  There is dependent edema.  (Anasarca improved, decreased scrotal edema, legs wrapped bilaterally)  Pulses: Distal pulses are intact.    Neurological: Patient is alert and oriented to person, place and time.    Skin:  Warm and dry.  No rash or cyanosis.             Results Review:    Intake/Output:     Intake/Output Summary (Last 24 hours) at 09/07/18 1013  Last data filed at 09/07/18 0633   Gross per 24 hour   Intake              950 ml   Output              550 ml   Net              400 ml "         DATA:  Radiology and Labs:  The following labs independently reviewed by me. Additional labs ordered for tomorrow a.m.  Interval notes, chart personally reviewed by me.   Old records independently reviewed showing Stage III chronic kidney disease as described above       Labs:   Recent Results (from the past 24 hour(s))   POC Glucose Once    Collection Time: 09/06/18 10:53 AM   Result Value Ref Range    Glucose 124 70 - 130 mg/dL   POC Glucose Once    Collection Time: 09/06/18  2:58 PM   Result Value Ref Range    Glucose 124 70 - 130 mg/dL   POC Glucose Once    Collection Time: 09/06/18  8:41 PM   Result Value Ref Range    Glucose 193 (H) 70 - 130 mg/dL   Renal Function Panel    Collection Time: 09/07/18  4:49 AM   Result Value Ref Range    Glucose 135 (H) 65 - 99 mg/dL    BUN 33 (H) 8 - 23 mg/dL    Creatinine 2.19 (H) 0.76 - 1.27 mg/dL    Sodium 133 (L) 136 - 145 mmol/L    Potassium 4.9 3.5 - 5.2 mmol/L    Chloride 100 98 - 107 mmol/L    CO2 22.3 22.0 - 29.0 mmol/L    Calcium 7.5 (L) 8.6 - 10.5 mg/dL    Albumin 1.70 (L) 3.50 - 5.20 g/dL    Phosphorus 4.1 2.5 - 4.5 mg/dL    Anion Gap 10.7 mmol/L    BUN/Creatinine Ratio 15.1 7.0 - 25.0    eGFR Non African Amer 31 (L) >60 mL/min/1.73   POC Glucose Once    Collection Time: 09/07/18  6:27 AM   Result Value Ref Range    Glucose 106 70 - 130 mg/dL   Protime-INR    Collection Time: 09/07/18  6:28 AM   Result Value Ref Range    Protime 14.4 (H) 11.7 - 14.2 Seconds    INR 1.14 (H) 0.90 - 1.10       Radiology:  Imaging Results (last 24 hours)     Procedure Component Value Units Date/Time    MRI Foot Left Without Contrast [[id number]] Collected:  09/06/18 1304     Updated:  09/06/18 1529    Narrative:       LEFT MID AND FOREFOOT MRI WITHOUT CONTRAST     HISTORY: Open wound along the lateral side of the forefoot. Abnormal  x-rays. Evaluate for osteomyelitis.     TECHNIQUE: MRI of the left mid and forefoot was performed without  contrast using standard sequences. This is  correlated with x-rays  performed yesterday.     FINDINGS: There has been previous amputation of the distal portions of  the 1st and 2nd metatarsals with only a small portion of the 1st  metatarsal base remaining. The base of the great toe proximal phalanx  has also been previously resected. There is some hypertrophic  ossification along the 3rd metatarsal shaft and advanced osteoarthritic  change at the 3rd metatarsophalangeal joint. There is also an old healed  fracture of the 4th metatarsal neck.     A wound along the plantar lateral aspect of the foot superficial to the  level of the 5th metatarsal head is observed. This measures  approximately 14 mm diameter. It appears that the flexor tendon of the  5th toe may be exposed at the base of the wound or covered by a very  thin layer of tissue. The flexor tendon remains intact. There is a  small, normal volume of fluid in the 5th metatarsophalangeal joint. No  synovial thickening is appreciated in the joint. Marrow signal in the  5th metatarsal head and in the 5th toe is normal. There is no marrow  signal abnormality in the midfoot, forefoot, or visualized anterior  hindfoot suspicious for osteomyelitis.     Mild degenerative change is observed at the tarsometatarsal joints.  Lisfranc's ligament is intact. The talonavicular and calcaneocuboid  joints and navicular cuneiform joints appear preserved.     Foot musculature is diffusely atrophic. There is substantial  subcutaneous edema around the left foot but there is no focal fluid  collection.       Impression:       1. Soft tissue edema at the left mid and forefoot with an open wound  superficial to the 5th metatarsal head. There is no evidence of abscess  or osteomyelitis.     2. There is postsurgical/posttraumatic change at the metatarsals where  there has been previous partial resection of the 1st and 2nd  metatarsals.     I discussed the case with Dr. Barillas on the day of exam.     This report was finalized on  9/6/2018 3:25 PM by Dr. Mike Bernal M.D.                Medications have been reviewed:  Current Facility-Administered Medications   Medication Dose Route Frequency Provider Last Rate Last Dose   • acetaminophen (TYLENOL) suppository 650 mg  650 mg Rectal Q4H PRN Glen Brasher MD       • acetaminophen (TYLENOL) tablet 650 mg  650 mg Oral Q4H PRN Glen Brasher MD   650 mg at 09/05/18 1903   • aspirin EC tablet 81 mg  81 mg Oral Daily Glen Brasher MD   81 mg at 09/07/18 0907   • atorvastatin (LIPITOR) tablet 10 mg  10 mg Oral Nightly Terri Jane MD   10 mg at 08/17/18 2020   • bisacodyl (DULCOLAX) EC tablet 10 mg  10 mg Oral Daily PRN Glen Brasher MD   10 mg at 08/24/18 0917   • bisacodyl (DULCOLAX) suppository 10 mg  10 mg Rectal Daily PRN Glen Brasher MD       • bisacodyl (DULCOLAX) suppository 10 mg  10 mg Rectal Once Soraya Wu APRN       • budesonide-formoterol (SYMBICORT) 160-4.5 MCG/ACT inhaler 2 puff  2 puff Inhalation BID - RT Soraya Wu APRN   2 puff at 09/07/18 0747   • cyclobenzaprine (FLEXERIL) tablet 10 mg  10 mg Oral Q8H PRN Glen Brasher MD   10 mg at 08/30/18 1229   • dextrose (D50W) solution 25 g  25 g Intravenous Q15 Min PRN Glen Brasher MD   25 g at 08/09/18 1441   • dextrose (GLUTOSE) oral gel 15 g  15 g Oral Q15 Min PRN Glen Brasher MD       • diphenhydrAMINE (BENADRYL) capsule 25 mg  25 mg Oral Q6H PRN Lina Patiño PA   25 mg at 08/20/18 1221   • epoetin roxi (EPOGEN,PROCRIT) injection 10,000 Units  10,000 Units Intravenous Once per day on Mon Wed Fri Marty Espitia MD   10,000 Units at 09/05/18 1111   • ferric gluconate (FERRLECIT) 125 mg in sodium chloride 0.9 % 100 mL IVPB  125 mg Intravenous Once per day on Mon Wed Fri Marty Espitia  mL/hr at 09/05/18 1905 125 mg at 09/05/18 1905   • ferrous sulfate tablet 325 mg  325 mg Oral Daily With Breakfast Lavon Berger MD   325 mg at 09/07/18 0907    • fluticasone (FLONASE) 50 MCG/ACT nasal spray 2 spray  2 spray Each Nare Daily Renetta Hancock APRN   2 spray at 09/07/18 0907   • glucagon (human recombinant) (GLUCAGEN DIAGNOSTIC) injection 1 mg  1 mg Subcutaneous PRN Glen Brasher MD       • guaiFENesin (MUCINEX) 12 hr tablet 1,200 mg  1,200 mg Oral Q12H Soraya Wu APRN   1,200 mg at 09/07/18 0907   • heparin (porcine) 5000 UNIT/ML injection 5,000 Units  5,000 Units Intravenous Take As Directed Sammy Barillas MD       • heparin (porcine) injection 3,000 Units  3,000 Units Intracatheter PRN Marty Espitia MD   3,000 Units at 09/03/18 1245   • heparin (porcine) injection 3,000 Units  3,000 Units Intracatheter PRN Glen Brasher MD   3,000 Units at 09/05/18 1110   • Hold medication  1 each Does not apply Continuous PRN Renetta Hancock APRN       • hydrocortisone 1 % cream   Topical Q12H Lavon Berger MD       • insulin aspart (novoLOG) injection 0-10 Units  0-10 Units Subcutaneous 4x Daily With Meals & Nightly Terri Jane MD   2 Units at 09/06/18 2054   • insulin aspart (novoLOG) injection 8 Units  8 Units Subcutaneous TID With Meals Terri Jane MD   8 Units at 09/05/18 1905   • insulin detemir (LEVEMIR) injection 18 Units  18 Units Subcutaneous Nightly Ata Reza MD   18 Units at 09/06/18 2054   • ipratropium-albuterol (DUO-NEB) nebulizer solution 3 mL  3 mL Nebulization Q4H PRN Glen Brasher MD   3 mL at 08/29/18 1315   • lactulose (CHRONULAC) 10 GM/15ML solution 20 g  20 g Oral Daily Soraya Wu APRN   20 g at 08/27/18 1427   • levothyroxine (SYNTHROID, LEVOTHROID) tablet 50 mcg  50 mcg Oral Q AM Ata Reza MD   50 mcg at 09/07/18 0635   • Magnesium Sulfate 2 gram infusion - Mg less than or equal to 1.5 mg/dL  2 g Intravenous PRN Glen Brasher MD        Or   • Magesium Sulfate 1 gram infusion - Mg 1.6-1.9 mg/dL  1 g Intravenous PRN Glen Brasher MD       • magic mouthwash oral  supsension 10 mL  10 mL Swish & Spit Q4H Glen Brasher MD   10 mL at 09/07/18 0635   • magnesium hydroxide (MILK OF MAGNESIA) suspension 2400 mg/10mL 10 mL  10 mL Oral Daily PRN Glen Brasher MD   10 mL at 08/17/18 1833   • midodrine (PROAMATINE) tablet 5 mg  5 mg Oral TID AC Sae Boothe MD   5 mg at 09/07/18 0635   • montelukast (SINGULAIR) tablet 10 mg  10 mg Oral Daily Soraya Wu APRN   10 mg at 09/07/18 0907   • naloxone (NARCAN) injection 0.4 mg  0.4 mg Intravenous Q5 Min PRN Glen Brasher MD       • nystatin (MYCOSTATIN) 403449 UNIT/ML suspension 500,000 Units  5 mL Swish & Swallow 4x Daily Glen Brasher MD   500,000 Units at 09/07/18 0907   • nystatin (MYCOSTATIN) powder   Topical Q12H Renetta Hancock, IRENE       • ondansetron (ZOFRAN) injection 4 mg  4 mg Intravenous Q6H PRN Glen Brasher MD   4 mg at 08/20/18 1823   • ondansetron (ZOFRAN) tablet 4 mg  4 mg Oral Q6H PRN Javed Huitron MD        Or   • ondansetron ODT (ZOFRAN-ODT) disintegrating tablet 4 mg  4 mg Oral Q6H PRN Javed Huitron MD        Or   • ondansetron (ZOFRAN) injection 4 mg  4 mg Intravenous Q6H PRN Javed Huitron MD   4 mg at 09/07/18 0858   • oxyCODONE (ROXICODONE) immediate release tablet 10 mg  10 mg Oral Q4H PRN Glen Brasher MD   10 mg at 09/07/18 0913   • pantoprazole (PROTONIX) EC tablet 40 mg  40 mg Oral Daily Glen Brasher MD   40 mg at 09/07/18 0908   • polyethylene glycol (MIRALAX) powder 17 g  17 g Oral Daily Soraya Wu APRN   Stopped at 09/07/18 0908   • potassium chloride (MICRO-K) CR capsule 40 mEq  40 mEq Oral PRN Glen Brasher MD   40 mEq at 08/06/18 0632    Or   • potassium chloride (KLOR-CON) packet 40 mEq  40 mEq Oral PRN Glen Brasher MD       • potassium chloride 10 mEq in 100 mL IVPB  10 mEq Intravenous Q1H PRN Glen Brasher MD        Or   • potassium chloride 10 mEq in 100 mL IVPB  10 mEq Intravenous Q1H PRN Glen Brasher  MD VIDAL       • promethazine (PHENERGAN) tablet 12.5 mg  12.5 mg Oral Q6H PRN Glen Brasher MD        Or   • promethazine (PHENERGAN) injection 12.5 mg  12.5 mg Intravenous Q6H PRN Glen Brasher MD       • Scopolamine (TRANSDERM-SCOP) 1.5 MG/3DAYS patch 1 patch  1 patch Transdermal Q72H Renetta Hancock APRN   1 patch at 09/03/18 1607   • sennosides-docusate sodium (SENOKOT-S) 8.6-50 MG tablet 2 tablet  2 tablet Oral Nightly Glen Brasher MD   2 tablet at 09/05/18 2108   • sodium chloride (OCEAN) nasal spray 2 spray  2 spray Each Nare PRN Glen Brasher MD       • sodium chloride 0.9 % infusion  9 mL/hr Intravenous Continuous PRN Satnam Arguello MD   Stopped at 09/06/18 1642   • sodium chloride 0.9 % infusion  9 mL/hr Intravenous Continuous PRN Roseanne Sarkar MD       • sucralfate (CARAFATE) 1 GM/10ML suspension 1 g  1 g Oral Q6H Juan Sanz MD   1 g at 09/07/18 0635   • torsemide (DEMADEX) tablet 100 mg  100 mg Oral Daily Donnell Madison MD   100 mg at 09/07/18 0907   • zolpidem (AMBIEN) tablet 10 mg  10 mg Oral Nightly PRN Roslyn Jimenez MD   10 mg at 09/06/18 2240       ASSESSMENT:  Progression to End-stage renal disease, dialysis Monday Wednesday Friday with ultrafiltration as tolerated  Anasarca, improved, slow UF  Hyponatremia, adjust dialysis bath as needed, stable  Hypoalbuminemia, contributing to edema, no proteinuria   Severe aortic stenosis status post replacement  Pulmonary hypertension  Anemia of chronic kidney disease: Epogen, IV iron  Thrombocytopenia, hematology following,  50k now  pleural effusions, status post thoracentesis  Peripheral vascular disease/bilateral foot wounds, status post excisional debridement in OR      PLAN:   dialysis today, ultrafiltration as tolerated  Outpatient dialysis has been set up at [facility]: Monday Wednesday Friday, continue that schedule while inpatient  Continue midodrine, torsemide, BP support on dialysis with albumin and  mannitol as needed  Eventual AVF once platelets are better and patient is improved clinically  Status post excisional debridement of bilateral foot wounds in the OR 9/6  Discharge planning    Ronald Olivares MD   [facility]   Office phone number: [phone]  Answering service phone number: [phone]    09/07/18  10:13 AM      Dictation performed using Dragon dictation software

## 2018-09-07 NOTE — PROGRESS NOTES
Name: Bj Duarte ADMIT: 2018   : 1955  PCP: Gus Cordova MD    MRN: [id number] LOS: 44 days   AGE/SEX: 63 y.o. male  ROOM: [facility] [facility]     Active Hospital Problems    Diagnosis Date Noted   • Thrombocytopenia (CMS/[facility]) [D69.6] 2018   • End stage renal disease (CMS/[facility]) [N18.6] 2018   • Primary hypothyroidism [E03.9] 08/15/2018   • Type 2 diabetes mellitus with complication, with long-term current use of insulin (CMS/[facility]) [E11.8, Z79.4] 2018   • Protein malnutrition (CMS/[facility]) [E46] 2018   • Hypoalbuminemia [E88.09] 2018   • Pulmonary hypertension [I27.20] 2018   • Acute on chronic systolic congestive heart failure (CMS/[facility]) [I50.23] 2018   • Nonrheumatic aortic valve stenosis [I35.0] 11/15/2017   • Non-rheumatic tricuspid valve insufficiency [I36.1] 11/15/2017   • Obstructive sleep apnea [G47.33] 11/15/2017      Resolved Hospital Problems    Diagnosis Date Noted Date Resolved   No resolved problems to display.     Subjective     63 y.o. male with new hemodialysis dependence after aortic valve replacement, new finding of left lateral 5th MTH foot wound no s/p debridement.     No events overnight    Review of Systems  No complaints  Objective     Vital Signs  Temp:  [97.4 °F (36.3 °C)-98.1 °F (36.7 °C)] 98.1 °F (36.7 °C)  Heart Rate:  [] 98  Resp:  [14-20] 16  BP: ()/(56-93) 105/64    Physical Exam  NAD, A/O x 4  Left foot wound approx 2.5 x 2.5 cm, dark discoloration, no exposed tendon or bone, no bleeding at the skin edges.     Right foot wound with no bleeding, healthy granulation    Results Review:       I reviewed the patient's new clinical results.    Results from last 7 days  Lab Units 18  0509 18  0708 18  0515   WBC 10*3/mm3 7.59 8.77 11.50* 13.79*   HEMOGLOBIN g/dL 9.8* 10.3* 10.2* 9.7*   PLATELETS 10*3/mm3 50* 60* 60* 30*       Results from last 7 days  Lab Units 18  0449 18  0323 18  0627  09/04/18  0504 09/03/18  0509 09/02/18  0708 09/01/18  0515   SODIUM mmol/L 133* 135* 134* 133* 133* 133* 133*   POTASSIUM mmol/L 4.9 4.3 4.7 4.7 5.3* 5.1 5.1   CHLORIDE mmol/L 100 99 99 99 100 99 101   CO2 mmol/L 22.3 24.1 24.6 22.1 22.8 25.0 21.1*   BUN mg/dL 33* 27* 36* 30* 40* 35* 26*   CREATININE mg/dL 2.19* 1.89* 2.16* 1.86* 2.16* 2.25* 1.98*   GLUCOSE mg/dL 135* 126* 91 173* 148* 129* 139*   Estimated Creatinine Clearance: 50.1 mL/min (A) (by C-G formula based on SCr of 2.19 mg/dL (H)).    Results from last 7 days  Lab Units 09/07/18  0449 09/06/18  0323 09/05/18  0627 09/04/18  0504 09/03/18  0509 09/02/18  0708 09/01/18  0515   CALCIUM mg/dL 7.5* 7.9* 7.7* 7.8* 7.7* 7.8* 7.5*   ALBUMIN g/dL 1.70* 1.90* 2.10* 2.00* 2.10* 2.10* 1.80*   PHOSPHORUS mg/dL 4.1 3.7 3.7 3.0 2.9 2.9 2.9       Assessment/Plan           Active Hospital Problems    Diagnosis Date Noted   • Thrombocytopenia (CMS/[facility]) [D69.6] 08/31/2018   • End stage renal disease (CMS/[facility]) [N18.6] 08/28/2018   • Primary hypothyroidism [E03.9] 08/15/2018   • Type 2 diabetes mellitus with complication, with long-term current use of insulin (CMS/[facility]) [E11.8, Z79.4] 08/04/2018   • Protein malnutrition (CMS/[facility]) [E46] 08/01/2018   • Hypoalbuminemia [E88.09] 07/30/2018   • Pulmonary hypertension [I27.20] 07/20/2018   • Acute on chronic systolic congestive heart failure (CMS/[facility]) [I50.23] 07/20/2018   • Nonrheumatic aortic valve stenosis [I35.0] 11/15/2017   • Non-rheumatic tricuspid valve insufficiency [I36.1] 11/15/2017   • Obstructive sleep apnea [G47.33] 11/15/2017      Resolved Hospital Problems    Diagnosis Date Noted Date Resolved   No resolved problems to display.        Assessment & Plan  63 y.o. male with new left foot wound, chronic right foot wound, POD # 1 s/p debridement of both.     MRI yesterday without evidence of osteomyelitis.  The patient has had his 1st and 2nd metatarsals resected for severe diabetic foot infection approximately 10 years  ago although he cannot remember the details.     The darkening of the left foot wound subcutaneous tissues is concerning and I will see this wound daily to see the progress that it makes.  He may convert to a wound that exposes the underlying 5th MTH and require a resection of this as well, but I will allow this to demarcate to hopefully save as much tissue as possible. Needs a waffle boot at all times, and I have discussed this with the nurse, also she is going to check and see if the footboard can be removed from his bed to minimize the risk of direct pressure on the wound.      His tibial vessels are calcified on x-ray as well.  Due to his post op state, low albumin, diabetic neuropathy, and history of surgery for complications of diabetes in the left foot, he has been at high risk for wound complications and is at elevated risk for further tissue loss.       Sammy Barillas MD  09/07/18   8:12 AM  Office Number [phone]

## 2018-09-07 NOTE — NURSING NOTE
Spoke to patients daughter Estrella Godinez. Patient and family want to be transferred to [facility] so that patient's podiatrist Chaka Gusman can treat his foot wounds. I informed patients daughter that Dr. Steinberg would need to find a doctor to accept patients care there, and then we could transfer. I spoke with  on the unit who left a message with the weekend CCP office on plans to do this. I also spoke with Shay Shaw RN with [facility] about patient wanting to be transfer. He will leave a message with the weekend rounding nurse and Dr. Steinberg who is on call this weekend.  and patients nurse updated.

## 2018-09-07 NOTE — SIGNIFICANT NOTE
09/07/18 1310   Rehab Treatment   Discipline occupational therapist   Reason Treatment Not Performed unavailable for treatment  (Dialysis.)   Recommendation   OT - Next Appointment 09/10/18

## 2018-09-07 NOTE — PROGRESS NOTES
"  ENDOCRINE    Subjective   AND PLANS  Bj Duarte is a 63 y.o. male.     Follow-up diabetes  Appetite good.  Fasting glucose 106.  Random glucose 124-193.  Continue Levemir 18 units at bedtime and NovoLog 8 units with each meal plus sliding scale.    Continue levothyroxine 50 µg per day.    If discharged, follow-up with Dr. Jane in 1 month      Objective   /64 (BP Location: Right arm, Patient Position: Lying)   Pulse 98   Temp 98.1 °F (36.7 °C) (Oral)   Resp 16   Ht 185.4 cm (73\")   Wt 130 kg (286 lb)   SpO2 95%   BMI 37.73 kg/m²   Physical Exam    Awake, alert, not in distress.  No rales or wheezes.  Irregularly irregular heart rate and rhythm.  Systolic murmur at the apex.  Abdomen soft, nontender.  Penile and scrotal edema and upper extremity edema slowly improving    Lab Results (last 24 hours)     Procedure Component Value Units Date/Time    Protime-INR [[id number]]  (Abnormal) Collected:  09/07/18 0628    Specimen:  Blood Updated:  09/07/18 0712     Protime 14.4 (H) Seconds      INR 1.14 (H)    POC Glucose Once [[id number]]  (Normal) Collected:  09/07/18 0627    Specimen:  Blood Updated:  09/07/18 0628     Glucose 106 mg/dL     Narrative:       Meter: [id number] : [id number] Anastasia Martin CNA    Renal Function Panel [[id number]]  (Abnormal) Collected:  09/07/18 0449    Specimen:  Blood Updated:  09/07/18 0521     Glucose 135 (H) mg/dL      BUN 33 (H) mg/dL      Creatinine 2.19 (H) mg/dL      Sodium 133 (L) mmol/L      Potassium 4.9 mmol/L      Chloride 100 mmol/L      CO2 22.3 mmol/L      Calcium 7.5 (L) mg/dL      Albumin 1.70 (L) g/dL      Phosphorus 4.1 mg/dL      Anion Gap 10.7 mmol/L      BUN/Creatinine Ratio 15.1     eGFR Non African Amer 31 (L) mL/min/1.73     POC Glucose Once [[id number]]  (Abnormal) Collected:  09/06/18 2041    Specimen:  Blood Updated:  09/06/18 2042     Glucose 193 (H) mg/dL     Narrative:       Meter: [id number] : [id number] Anastasia Martin CNA    POC Glucose " Once [[id number]]  (Normal) Collected:  09/06/18 1458    Specimen:  Blood Updated:  09/06/18 1502     Glucose 124 mg/dL     Narrative:       Meter: [id number] : [id number] Lee Espinal RN    POC Glucose Once [[id number]]  (Normal) Collected:  09/06/18 1053    Specimen:  Blood Updated:  09/06/18 1057     Glucose 124 mg/dL     Narrative:       Meter: [id number] : [id number] Wm WEAVER            Active Problems:    Obstructive sleep apnea    Nonrheumatic aortic valve stenosis    Non-rheumatic tricuspid valve insufficiency    Pulmonary hypertension    Acute on chronic systolic congestive heart failure (CMS/HCC)    Hypoalbuminemia    Protein malnutrition (CMS/HCC)    Type 2 diabetes mellitus with complication, with long-term current use of insulin (CMS/HCC)    Primary hypothyroidism    End stage renal disease (CMS/HCC)    Thrombocytopenia (CMS/HCC)    Insulin therapy as discussed above.  Continue levothyroxine.  Follow-up instruction as discussed above

## 2018-09-07 NOTE — CONSULTS
Nutrition Services    Patient Name:  Bj Duarte  YOB: 1955  MRN: [id number]  Admit Date:  7/25/2018    Consulted by RN, per family's request re: diet education.    No family present & pt is currently off the unit in HD. Discussed with RN - the pt and I had a lengthy discussion about his diet therapy on 8/23/18 (refer to that progress note). He had made several changes with his diet/intake once his kidney disease progressed at the direction of his renal physician a few months ago. We also discussed diabetic/heart healthy diet. He has written materials along with my contact info. Will be glad to stop by once he is done with dialysis or at a later date.     Electronically signed by:  Soraya Ramos RD  09/07/18 11:43 AM

## 2018-09-08 NOTE — PLAN OF CARE
Problem: Patient Care Overview  Goal: Plan of Care Review   09/08/18 1111   Coping/Psychosocial   Plan of Care Reviewed With patient   OTHER   Outcome Summary Pt requires mod A for bed mobility, updated WB status - NWB on L LE, heel WB only on R LE. EOB sitting tolerance limited 2' to needing to use bed pan.

## 2018-09-08 NOTE — PROGRESS NOTES
Name: Bj Duarte ADMIT: 2018   : 1955  PCP: Gus Cordova MD    MRN: [id number] LOS: 45 days   AGE/SEX: 63 y.o. male  ROOM: [facility]     Active Hospital Problems    Diagnosis Date Noted   • Thrombocytopenia (CMS/[facility]) [D69.6] 2018   • End stage renal disease (CMS/[facility]) [N18.6] 2018   • Primary hypothyroidism [E03.9] 08/15/2018   • Type 2 diabetes mellitus with complication, with long-term current use of insulin (CMS/[facility]) [E11.8, Z79.4] 2018   • Protein malnutrition (CMS/[facility]) [E46] 2018   • Hypoalbuminemia [E88.09] 2018   • Pulmonary hypertension [I27.20] 2018   • Acute on chronic systolic congestive heart failure (CMS/[facility]) [I50.23] 2018   • Nonrheumatic aortic valve stenosis [I35.0] 11/15/2017   • Non-rheumatic tricuspid valve insufficiency [I36.1] 11/15/2017   • Obstructive sleep apnea [G47.33] 11/15/2017      Resolved Hospital Problems    Diagnosis Date Noted Date Resolved   No resolved problems to display.     Subjective     63 y.o. male with new hemodialysis dependence after aortic valve replacement, new finding of left lateral 5th MTH foot wound no s/p debridement.     No events overnight    Review of Systems  No complaints  Objective     Vital Signs  Temp:  [97 °F (36.1 °C)-97.7 °F (36.5 °C)] 97.5 °F (36.4 °C)  Heart Rate:  [74-99] 82  Resp:  [16-18] 16  BP: (105-123)/(45-86) 114/66    Physical Exam  NAD, A/O x 4  Left foot wound approx 2.5 x 2.5 cm, dark discoloration, no exposed tendon or bone, no bleeding at the skin edges.     Right foot wound with no bleeding, healthy granulation, has decreased in size since [facility], now approx 4x5 mm    Results Review:       I reviewed the patient's new clinical results.    Results from last 7 days  Lab Units 18  0509 18  0708   WBC 10*3/mm3 7.59 8.77 11.50*   HEMOGLOBIN g/dL 9.8* 10.3* 10.2*   PLATELETS 10*3/mm3 50* 60* 60*       Results from last 7 days  Lab Units 18  0410  09/07/18  0449 09/06/18  0323 09/05/18  0627 09/04/18  0504 09/03/18  0509 09/02/18  0708   SODIUM mmol/L 133* 133* 135* 134* 133* 133* 133*   POTASSIUM mmol/L 4.3 4.9 4.3 4.7 4.7 5.3* 5.1   CHLORIDE mmol/L 98 100 99 99 99 100 99   CO2 mmol/L 22.2 22.3 24.1 24.6 22.1 22.8 25.0   BUN mg/dL 27* 33* 27* 36* 30* 40* 35*   CREATININE mg/dL 1.94* 2.19* 1.89* 2.16* 1.86* 2.16* 2.25*   GLUCOSE mg/dL 135* 135* 126* 91 173* 148* 129*   Estimated Creatinine Clearance: 56.5 mL/min (A) (by C-G formula based on SCr of 1.94 mg/dL (H)).    Results from last 7 days  Lab Units 09/08/18  0410 09/07/18  0449 09/06/18  0323 09/05/18  0627 09/04/18  0504 09/03/18  0509 09/02/18  0708   CALCIUM mg/dL 7.4* 7.5* 7.9* 7.7* 7.8* 7.7* 7.8*   ALBUMIN g/dL 2.00* 1.70* 1.90* 2.10* 2.00* 2.10* 2.10*   PHOSPHORUS mg/dL 3.4 4.1 3.7 3.7 3.0 2.9 2.9       Assessment/Plan           Active Hospital Problems    Diagnosis Date Noted   • Thrombocytopenia (CMS/[facility]) [D69.6] 08/31/2018   • End stage renal disease (CMS/[facility]) [N18.6] 08/28/2018   • Primary hypothyroidism [E03.9] 08/15/2018   • Type 2 diabetes mellitus with complication, with long-term current use of insulin (CMS/[facility]) [E11.8, Z79.4] 08/04/2018   • Protein malnutrition (CMS/[facility]) [E46] 08/01/2018   • Hypoalbuminemia [E88.09] 07/30/2018   • Pulmonary hypertension [I27.20] 07/20/2018   • Acute on chronic systolic congestive heart failure (CMS/HCC) [I50.23] 07/20/2018   • Nonrheumatic aortic valve stenosis [I35.0] 11/15/2017   • Non-rheumatic tricuspid valve insufficiency [I36.1] 11/15/2017   • Obstructive sleep apnea [G47.33] 11/15/2017      Resolved Hospital Problems    Diagnosis Date Noted Date Resolved   No resolved problems to display.        Assessment & Plan  63 y.o. male with new left foot wound, chronic right foot wound, POD # 3 s/p debridement of both.     MRI left foot without evidence of osteomyelitis.  The patient has had his 1st and 2nd metatarsals resected for severe diabetic foot  infection.    The left foot wound subcutaneous tissues have progressed to dry gangrene. I started Silvadene dressings to these and did the first dressing change today. Waffle boot is on.    His tibial vessels are calcified on x-ray as well.  Due to his post op state, low albumin, diabetic neuropathy, and history of surgery for complications of diabetes in the left foot, he has been at high risk for wound complications and is at elevated risk for further tissue loss including amputation below the knee. I discussed all this with his daughter Estrella via telephone.  She understands that he is at significant risk for amputation.  She is interested in having him transferred and evaluated by Dr. Chaka Gusman, a podiatrist who has treated him for approximately the last year.  I will be available to continue care for his foot wound in any capacity to the family desires.    I will see him in 3 weeks to discuss creation of arteriovenous fistula as long as he is still dialysis dependent.  Continue with no IV sticks or lab draws to the left arm.  I discussed this with the patient.      Sammy Barillas MD  09/08/18   10:00 AM  Office Number [phone]

## 2018-09-08 NOTE — PLAN OF CARE
Problem: Patient Care Overview  Goal: Plan of Care Review  Outcome: Ongoing (interventions implemented as appropriate)   09/08/18 1722   Coping/Psychosocial   Plan of Care Reviewed With patient   Plan of Care Review   Progress no change   OTHER   Outcome Summary VSS throughout shift. remains on 2L of oxygen. NWB on the left and heel only on the right. dsg change completed per MD. will continue to montior.      Goal: Individualization and Mutuality  Outcome: Ongoing (interventions implemented as appropriate)    Goal: Discharge Needs Assessment  Outcome: Ongoing (interventions implemented as appropriate)      Problem: Fall Risk (Adult)  Goal: Absence of Fall  Outcome: Ongoing (interventions implemented as appropriate)      Problem: Cardiac: Heart Failure (Adult)  Goal: Signs and Symptoms of Listed Potential Problems Will be Absent, Minimized or Managed (Cardiac: Heart Failure)  Outcome: Ongoing (interventions implemented as appropriate)    Goal: Signs and Symptoms of Listed Potential Problems Will be Absent, Minimized or Managed (Cardiac: Heart Failure)  Outcome: Ongoing (interventions implemented as appropriate)      Problem: Skin Injury Risk (Adult)  Goal: Skin Health and Integrity  Outcome: Ongoing (interventions implemented as appropriate)

## 2018-09-08 NOTE — PROGRESS NOTES
"   LOS: 45 days    Patient Care Team:  Gus Cordova MD as PCP - General (Internal Medicine)  Alexandria Benitez MD as Consulting Physician (Nephrology)  Mike Johnson MD as Consulting Physician (Pulmonary Disease)  Oriana Steinberg MD as Consulting Physician (Cardiology)    Chief Complaint:  ESRD    Subjective     Interval History:   Feels well.  S/P HD yesterday.  No N/V/D.  No other complaints.    Objective     Vital Sign Min/Max for last 24 hours  Temp  Min: 97.5 °F (36.4 °C)  Max: 97.7 °F (36.5 °C)   BP  Min: 105/65  Max: 120/45   Pulse  Min: 74  Max: 99   Resp  Min: 16  Max: 18   SpO2  Min: 96 %  Max: 100 %   No Data Recorded   No Data Recorded     Flowsheet Rows      First Filed Value   Admission Height  185.4 cm (73\") Documented at 07/25/2018 1111   Admission Weight  125 kg (274 lb 9 oz) Documented at 07/25/2018 1111          I/O this shift:  In: 400 [P.O.:400]  Out: -   I/O last 3 completed shifts:  In: 900 [P.O.:900]  Out: 4450 [Urine:450; Other:4000]    Physical Exam:  GEN: Awake, NAD  ENT: PERRL, EOMI, MMM  NECK: Supple, no JVD  CHEST: CTAB, no W/R/C  CV: RRR, no M/G/R  ABD: Soft, NT, +BS  SKIN: Warm and Dry    WBC WBC   Date Value Ref Range Status   09/05/2018 7.59 4.50 - 10.70 10*3/mm3 Final      HGB Hemoglobin   Date Value Ref Range Status   09/05/2018 9.8 (L) 13.7 - 17.6 g/dL Final      HCT Hematocrit   Date Value Ref Range Status   09/05/2018 33.6 (L) 40.4 - 52.2 % Final      Platlets No results found for: LABPLAT   MCV MCV   Date Value Ref Range Status   09/05/2018 80.8 79.8 - 96.2 fL Final          Sodium Sodium   Date Value Ref Range Status   09/08/2018 133 (L) 136 - 145 mmol/L Final   09/07/2018 133 (L) 136 - 145 mmol/L Final   09/06/2018 135 (L) 136 - 145 mmol/L Final      Potassium Potassium   Date Value Ref Range Status   09/08/2018 4.3 3.5 - 5.2 mmol/L Final   09/07/2018 4.9 3.5 - 5.2 mmol/L Final   09/06/2018 4.3 3.5 - 5.2 mmol/L Final      Chloride Chloride   Date Value Ref " Range Status   09/08/2018 98 98 - 107 mmol/L Final   09/07/2018 100 98 - 107 mmol/L Final   09/06/2018 99 98 - 107 mmol/L Final      CO2 CO2   Date Value Ref Range Status   09/08/2018 22.2 22.0 - 29.0 mmol/L Final   09/07/2018 22.3 22.0 - 29.0 mmol/L Final   09/06/2018 24.1 22.0 - 29.0 mmol/L Final      BUN BUN   Date Value Ref Range Status   09/08/2018 27 (H) 8 - 23 mg/dL Final   09/07/2018 33 (H) 8 - 23 mg/dL Final   09/06/2018 27 (H) 8 - 23 mg/dL Final      Creatinine Creatinine   Date Value Ref Range Status   09/08/2018 1.94 (H) 0.76 - 1.27 mg/dL Final   09/07/2018 2.19 (H) 0.76 - 1.27 mg/dL Final   09/06/2018 1.89 (H) 0.76 - 1.27 mg/dL Final      Calcium Calcium   Date Value Ref Range Status   09/08/2018 7.4 (L) 8.6 - 10.5 mg/dL Final   09/07/2018 7.5 (L) 8.6 - 10.5 mg/dL Final   09/06/2018 7.9 (L) 8.6 - 10.5 mg/dL Final      PO4 No results found for: CAPO4   Albumin Albumin   Date Value Ref Range Status   09/08/2018 2.00 (L) 3.50 - 5.20 g/dL Final   09/07/2018 1.70 (L) 3.50 - 5.20 g/dL Final   09/06/2018 1.90 (L) 3.50 - 5.20 g/dL Final      Magnesium No results found for: MG   Uric Acid No results found for: URICACID        Results Review:     I reviewed the patient's new clinical results.      aspirin 81 mg Oral Daily   atorvastatin 10 mg Oral Nightly   bisacodyl 10 mg Rectal Once   budesonide-formoterol 2 puff Inhalation BID - RT   cephALEXin 250 mg Oral Q8H   epoetin roxi 10,000 Units Intravenous Once per day on Mon Wed Fri   sodium ferric gluconate complex IVPB in 100 mL  mg Intravenous Once per day on Mon Wed Fri   ferrous sulfate 325 mg Oral Daily With Breakfast   fluticasone 2 spray Each Nare Daily   guaiFENesin 1,200 mg Oral Q12H   heparin (porcine) 5,000 Units Intravenous Take As Directed   hydrocortisone  Topical Q12H   insulin aspart 0-10 Units Subcutaneous 4x Daily With Meals & Nightly   insulin aspart 8 Units Subcutaneous TID With Meals   insulin detemir 18 Units Subcutaneous Nightly    lactulose 20 g Oral Daily   levothyroxine 50 mcg Oral Q AM   magic mouthwash 10 mL Swish & Spit Q4H   metOLazone 5 mg Oral Daily   midodrine 5 mg Oral TID AC   montelukast 10 mg Oral Daily   nystatin 5 mL Swish & Swallow 4x Daily   nystatin  Topical Q12H   pantoprazole 40 mg Oral Daily   polyethylene glycol 17 g Oral Daily   Scopolamine 1 patch Transdermal Q72H   sennosides-docusate sodium 2 tablet Oral Nightly   silver sulfadiazine  Topical Q24H   sucralfate 1 g Oral Q6H   torsemide 100 mg Oral Daily       hold 1 each    sodium chloride 9 mL/hr Last Rate: Stopped (09/06/18 1642)   sodium chloride 9 mL/hr        Medication Review: Reviewed    Assessment/Plan     Active Problems:    Obstructive sleep apnea    Nonrheumatic aortic valve stenosis    Non-rheumatic tricuspid valve insufficiency    Pulmonary hypertension    Acute on chronic systolic congestive heart failure (CMS/HCC)    Hypoalbuminemia    Protein malnutrition (CMS/HCC)    Type 2 diabetes mellitus with complication, with long-term current use of insulin (CMS/HCC)    Primary hypothyroidism    End stage renal disease (CMS/HCC)    Thrombocytopenia (CMS/HCC)    Edema    PLAN: Continue HD MWF.  Patient with significant edema.  On Torsemide.  Add scheduled Metolazone.  UF as tolerated with HD.  OK to rehab from a renal standpoint.      Sae Boothe MD   [facility]  09/08/18  10:54 AM

## 2018-09-08 NOTE — PROGRESS NOTES
63 y.o.   LOS: 45 days   Patient Care Team:  Gus Cordova MD as PCP - General (Internal Medicine)  Alexandria Benitez MD as Consulting Physician (Nephrology)  Mike Johnson MD as Consulting Physician (Pulmonary Disease)  Oriana Steinberg MD as Consulting Physician (Cardiology)    Chief Complaint:  Uncontrolled type 2 diabetes mellitus and postoperative management    No chief complaint on file.      Subjective     HPI  Events noted  Patient wants to be [facility] further podiatric care  There also exploring the option of transfer to rehabilitation facility at this point  As per the nurse patient has been refusing the mealtime insulin 8 units    He only received 2 units for breakfast and lunch today  He is also frequently refusing the sliding scale insulin for fear of hypoglycemia  Patient had hypoglycemia a few days ago    Interval History:    Patient is a 63-year-old white male with a past medical history of uncontrolled type 2 diabetes mellitus with complications and critical aortic stenosis and tricuspid insufficiency underwent replacement/repair surgery and endocrinology is being consulted for postoperative management  Patient also has hypothyroidism     Uncontrolled type 2 diabetes mellitus on insulin regimen at home  He was a diabetic for more than 30 years  Uncontrolled type 2 diabetes mellitus with peripheral neuropathy  Uncontrolled type 2 diabetes mellitus with nephropathy and chronic kidney disease  Uncontrolled type 2 diabetes mellitus with background diabetic retinopathy  History of multiple amputations of the toes in the right foot in the 1 amputation of the toe in the left foot  Review of Systems:      Review of Systems   Constitutional: Positive for appetite change and fatigue.   Respiratory: Negative.    Cardiovascular: Positive for leg swelling.   Gastrointestinal: Negative.    Musculoskeletal: Positive for arthralgias.   Neurological: Positive for weakness.    All other systems reviewed and are negative.    Objective     Vital Signs   Temp:  [97.5 °F (36.4 °C)-97.7 °F (36.5 °C)] 97.5 °F (36.4 °C)  Heart Rate:  [74-99] 95  Resp:  [16-18] 17  BP: (105-120)/(45-81) 116/81    Physical Exam:  Physical Exam   Constitutional: He is oriented to person, place, and time.   Eyes: Pupils are equal, round, and reactive to light. EOM are normal.   Neck: Normal range of motion. Neck supple.   Cardiovascular: Normal rate, regular rhythm, normal heart sounds and intact distal pulses.    Pulmonary/Chest: Effort normal and breath sounds normal.   Abdominal: Soft. Bowel sounds are normal. He exhibits distension.   Musculoskeletal: He exhibits edema.   Neurological: He is alert and oriented to person, place, and time.   Skin: Skin is warm and dry.   Psychiatric: He has a normal mood and affect. His behavior is normal.   Nursing note and vitals reviewed.  Results Review:     I reviewed the patient's new clinical results.      Glucose   Date/Time Value Ref Range Status   09/08/2018 0410 135 (H) 65 - 99 mg/dL Final   09/07/2018 0449 135 (H) 65 - 99 mg/dL Final   09/06/2018 0323 126 (H) 65 - 99 mg/dL Final     Lab Results (last 72 hours)     Procedure Component Value Units Date/Time    POC Glucose Once [[id number]]  (Normal) Collected:  09/08/18 1148    Specimen:  Blood Updated:  09/08/18 1209     Glucose 116 mg/dL     Narrative:       Meter: [id number] : [id number] Mary PHAN    Protime-INR [[id number]]  (Abnormal) Collected:  09/08/18 0658    Specimen:  Blood Updated:  09/08/18 0729     Protime 14.7 (H) Seconds      INR 1.17 (H)    POC Glucose Once [[id number]]  (Normal) Collected:  09/08/18 0546    Specimen:  Blood Updated:  09/08/18 0547     Glucose 125 mg/dL     Narrative:       Meter: [id number] : [id number] Almas Shukri MEG    Renal Function Panel [[id number]]  (Abnormal) Collected:  09/08/18 0410    Specimen:  Blood Updated:  09/08/18 0455     Glucose 135 (H) mg/dL      BUN 27 (H)  mg/dL      Creatinine 1.94 (H) mg/dL      Sodium 133 (L) mmol/L      Potassium 4.3 mmol/L      Chloride 98 mmol/L      CO2 22.2 mmol/L      Calcium 7.4 (L) mg/dL      Albumin 2.00 (L) g/dL      Phosphorus 3.4 mg/dL      Anion Gap 12.8 mmol/L      BUN/Creatinine Ratio 13.9     eGFR Non African Amer 35 (L) mL/min/1.73     Prealbumin [[id number]]  (Abnormal) Collected:  09/08/18 0410    Specimen:  Blood Updated:  09/08/18 0455     Prealbumin 7.4 (L) mg/dL     POC Glucose Once [[id number]]  (Normal) Collected:  09/07/18 1613    Specimen:  Blood Updated:  09/07/18 1626     Glucose 99 mg/dL     Narrative:       Meter: [id number] : [id number] Lisa WEAVER    POC Glucose Once [[id number]]  (Normal) Collected:  09/07/18 1437    Specimen:  Blood Updated:  09/07/18 1439     Glucose 82 mg/dL     Narrative:       Meter: [id number] : [id number] Lee Colvin RN    Transferrin [[id number]]  (Abnormal) Collected:  09/07/18 0449    Specimen:  Blood Updated:  09/07/18 1200     Transferrin 108 (L) mg/dL     Protime-INR [[id number]]  (Abnormal) Collected:  09/07/18 0628    Specimen:  Blood Updated:  09/07/18 0712     Protime 14.4 (H) Seconds      INR 1.14 (H)    POC Glucose Once [[id number]]  (Normal) Collected:  09/07/18 0627    Specimen:  Blood Updated:  09/07/18 0628     Glucose 106 mg/dL     Narrative:       Meter: [id number] : [id number] Anastasia Martin CNA    Renal Function Panel [[id number]]  (Abnormal) Collected:  09/07/18 0449    Specimen:  Blood Updated:  09/07/18 0521     Glucose 135 (H) mg/dL      BUN 33 (H) mg/dL      Creatinine 2.19 (H) mg/dL      Sodium 133 (L) mmol/L      Potassium 4.9 mmol/L      Chloride 100 mmol/L      CO2 22.3 mmol/L      Calcium 7.5 (L) mg/dL      Albumin 1.70 (L) g/dL      Phosphorus 4.1 mg/dL      Anion Gap 10.7 mmol/L      BUN/Creatinine Ratio 15.1     eGFR Non African Amer 31 (L) mL/min/1.73     POC Glucose Once [[id number]]  (Abnormal) Collected:  09/06/18 2041    Specimen:  Blood Updated:   09/06/18 2042     Glucose 193 (H) mg/dL     Narrative:       Meter: [id number] : [id number] Anastasia Martin CNA    POC Glucose Once [[id number]]  (Normal) Collected:  09/06/18 1458    Specimen:  Blood Updated:  09/06/18 1502     Glucose 124 mg/dL     Narrative:       Meter: [id number] : [id number] Lee Espinal RN    POC Glucose Once [[id number]]  (Normal) Collected:  09/06/18 1053    Specimen:  Blood Updated:  09/06/18 1057     Glucose 124 mg/dL     Narrative:       Meter: [id number] : [id number] Wm Low MEG    Renal Function Panel [[id number]]  (Abnormal) Collected:  09/06/18 0323    Specimen:  Blood Updated:  09/06/18 0426     Glucose 126 (H) mg/dL      BUN 27 (H) mg/dL      Creatinine 1.89 (H) mg/dL      Sodium 135 (L) mmol/L      Potassium 4.3 mmol/L      Chloride 99 mmol/L      CO2 24.1 mmol/L      Calcium 7.9 (L) mg/dL      Albumin 1.90 (L) g/dL      Phosphorus 3.7 mg/dL      Anion Gap 11.9 mmol/L      BUN/Creatinine Ratio 14.3     eGFR Non African Amer 36 (L) mL/min/1.73     Protime-INR [[id number]]  (Abnormal) Collected:  09/06/18 0323    Specimen:  Blood Updated:  09/06/18 0409     Protime 14.1 Seconds      INR 1.11 (H)    CBC (No Diff) [[id number]]  (Abnormal) Collected:  09/05/18 2020    Specimen:  Blood Updated:  09/05/18 2059     WBC 7.59 10*3/mm3      RBC 4.16 (L) 10*6/mm3      Hemoglobin 9.8 (L) g/dL      Hematocrit 33.6 (L) %      MCV 80.8 fL      MCH 23.6 (L) pg      MCHC 29.2 (L) g/dL      RDW 22.8 (H) %      RDW-SD 66.0 (H) fl      Platelets 50 (L) 10*3/mm3     POC Glucose Once [[id number]]  (Abnormal) Collected:  09/05/18 2020    Specimen:  Blood Updated:  09/05/18 2026     Glucose 175 (H) mg/dL     Narrative:       Meter: [id number] : [id number] Joel WEAVER    POC Glucose Once [[id number]]  (Abnormal) Collected:  09/05/18 1802    Specimen:  Blood Updated:  09/05/18 1804     Glucose 202 (H) mg/dL     Narrative:       Meter: [id number] : [id number] Scott WEAVER     POC Glucose Once [[id number]]  (Abnormal) Collected:  09/05/18 1338    Specimen:  Blood Updated:  09/05/18 1351     Glucose 168 (H) mg/dL     Narrative:       Meter: [id number] : [id number] Cherelle Humphrey RN        Imaging Results (last 72 hours)     Procedure Component Value Units Date/Time    MRI Foot Left Without Contrast [[id number]] Collected:  09/06/18 1304     Updated:  09/06/18 1529    Narrative:       LEFT MID AND FOREFOOT MRI WITHOUT CONTRAST     HISTORY: Open wound along the lateral side of the forefoot. Abnormal  x-rays. Evaluate for osteomyelitis.     TECHNIQUE: MRI of the left mid and forefoot was performed without  contrast using standard sequences. This is correlated with x-rays  performed yesterday.     FINDINGS: There has been previous amputation of the distal portions of  the 1st and 2nd metatarsals with only a small portion of the 1st  metatarsal base remaining. The base of the great toe proximal phalanx  has also been previously resected. There is some hypertrophic  ossification along the 3rd metatarsal shaft and advanced osteoarthritic  change at the 3rd metatarsophalangeal joint. There is also an old healed  fracture of the 4th metatarsal neck.     A wound along the plantar lateral aspect of the foot superficial to the  level of the 5th metatarsal head is observed. This measures  approximately 14 mm diameter. It appears that the flexor tendon of the  5th toe may be exposed at the base of the wound or covered by a very  thin layer of tissue. The flexor tendon remains intact. There is a  small, normal volume of fluid in the 5th metatarsophalangeal joint. No  synovial thickening is appreciated in the joint. Marrow signal in the  5th metatarsal head and in the 5th toe is normal. There is no marrow  signal abnormality in the midfoot, forefoot, or visualized anterior  hindfoot suspicious for osteomyelitis.     Mild degenerative change is observed at the tarsometatarsal joints.  Lisfranc's ligament  is intact. The talonavicular and calcaneocuboid  joints and navicular cuneiform joints appear preserved.     Foot musculature is diffusely atrophic. There is substantial  subcutaneous edema around the left foot but there is no focal fluid  collection.       Impression:       1. Soft tissue edema at the left mid and forefoot with an open wound  superficial to the 5th metatarsal head. There is no evidence of abscess  or osteomyelitis.     2. There is postsurgical/posttraumatic change at the metatarsals where  there has been previous partial resection of the 1st and 2nd  metatarsals.     I discussed the case with Dr. Barillas on the day of exam.     This report was finalized on 9/6/2018 3:25 PM by Dr. Mike Bernal M.D.       XR Foot 3+ View Left [[id number]] Collected:  09/05/18 1857     Updated:  09/05/18 2128    Narrative:       Left foot radiograph     HISTORY: Fifth metatarsal head ulcer     TECHNIQUE: AP, lateral and oblique views of the left foot.      COMPARISON: None     FINDINGS:  There is gross deformity of the midfoot and forefoot. The majority of  the 1st metatarsal as well as the neck and head of the 2nd metatarsal  are absent with well corticated osseous fragments in their anatomic  location. The margins of the residual 1st and 2nd metatarsals are smooth  and well corticated. The first phalanges are directed medially.     Extensive callus formation surrounds the shafts of the 3rd and 4th  metatarsals. There is irregularity of the 3rd metatarsophalangeal joint.  The 2nd through 4th metatarsals and proximal phalanges demonstrate  increased sclerosis.     Focal osteolysis of the 5th metatarsal head is visualized on the oblique  radiograph. Diffuse soft tissue swelling is present about the foot.       Impression:       1.  Focal osteolysis in the medial aspect of the 5th metatarsal head  which given patient history is concerning for osteomyelitis. Further  evaluation with left foot MRI with and without  contrast is recommended.  2.  Gross deformity of the mid and forefoot with absence of the 1st and  2nd metatarsals. The 2nd through 4th metatarsals demonstrate increased  sclerosis with extensive periosteal new bone formation. Findings can be  seen in chronic osteomyelitis and can be further evaluated on subsequent  MRI.  3.  Diffuse swelling about the foot.     This report was finalized on 9/5/2018 9:25 PM by Dr. Ramiro Stevenson M.D.             Medication Review:       Current Facility-Administered Medications:   •  acetaminophen (TYLENOL) suppository 650 mg, 650 mg, Rectal, Q4H PRN, Glen Brasher MD  •  acetaminophen (TYLENOL) tablet 650 mg, 650 mg, Oral, Q4H PRN, Glen Brasher MD, 650 mg at 09/05/18 1903  •  aspirin EC tablet 81 mg, 81 mg, Oral, Daily, Glen Brasher MD, 81 mg at 09/08/18 0905  •  atorvastatin (LIPITOR) tablet 10 mg, 10 mg, Oral, Nightly, Terri Jane MD, 10 mg at 08/17/18 2020  •  bisacodyl (DULCOLAX) EC tablet 10 mg, 10 mg, Oral, Daily PRN, Glen Brasher MD, 10 mg at 08/24/18 0917  •  bisacodyl (DULCOLAX) suppository 10 mg, 10 mg, Rectal, Daily PRN, Glen Brasher MD  •  bisacodyl (DULCOLAX) suppository 10 mg, 10 mg, Rectal, Once, Soraya Wu APRN  •  budesonide-formoterol (SYMBICORT) 160-4.5 MCG/ACT inhaler 2 puff, 2 puff, Inhalation, BID - RT, Soraya Wu APRN, 2 puff at 09/08/18 1132  •  cephALEXin (KEFLEX) 250 MG/5ML suspension 250 mg, 250 mg, Oral, Q8H, Sammy Barillas MD, 250 mg at 09/08/18 0734  •  cyclobenzaprine (FLEXERIL) tablet 10 mg, 10 mg, Oral, Q8H PRN, Glen Brasher MD, 10 mg at 08/30/18 1229  •  dextrose (D50W) solution 25 g, 25 g, Intravenous, Q15 Min PRN, Glen Brasher MD, 25 g at 08/09/18 1441  •  dextrose (GLUTOSE) oral gel 15 g, 15 g, Oral, Q15 Min PRN, Glen Brasher MD  •  diphenhydrAMINE (BENADRYL) capsule 25 mg, 25 mg, Oral, Q6H PRN, Lina Patiño PA, 25 mg at 08/20/18 1221  •  epoetin roxi  (EPOGEN,PROCRIT) injection 10,000 Units, 10,000 Units, Intravenous, Once per day on Mon Wed Fri, Marty Espitia MD, 10,000 Units at 09/07/18 1221  •  ferric gluconate (FERRLECIT) 125 mg in sodium chloride 0.9 % 100 mL IVPB, 125 mg, Intravenous, Once per day on Mon Wed Fri, Marty Espitia MD, Last Rate: 100 mL/hr at 09/07/18 1837, 125 mg at 09/07/18 1837  •  ferrous sulfate tablet 325 mg, 325 mg, Oral, Daily With Breakfast, Lavon Berger MD, 325 mg at 09/08/18 0905  •  fluticasone (FLONASE) 50 MCG/ACT nasal spray 2 spray, 2 spray, Each Nare, Daily, Renetta Hancock APRN, 2 spray at 09/08/18 0905  •  glucagon (human recombinant) (GLUCAGEN DIAGNOSTIC) injection 1 mg, 1 mg, Subcutaneous, PRN, Glen Brasher MD  •  guaiFENesin (MUCINEX) 12 hr tablet 1,200 mg, 1,200 mg, Oral, Q12H, Soraya Wu APRN, 1,200 mg at 09/08/18 0904  •  heparin (porcine) 5000 UNIT/ML injection 5,000 Units, 5,000 Units, Intravenous, Take As Directed, Sammy Barillas MD  •  heparin (porcine) injection 3,000 Units, 3,000 Units, Intracatheter, PRN, Marty Espitia MD, 3,000 Units at 09/03/18 1245  •  heparin (porcine) injection 3,000 Units, 3,000 Units, Intracatheter, PRN, Glen Brasher MD, 3,000 Units at 09/05/18 1110  •  Hold medication, 1 each, Does not apply, Continuous PRN, Renetta Hancock APRN  •  hydrocortisone 1 % cream, , Topical, Q12H, Lavon Berger MD, 1 application at 09/08/18 0905  •  insulin aspart (novoLOG) injection 2-5 Units, 2-5 Units, Subcutaneous, 4x Daily AC & at Bedtime, Trenton Valverde MD  •  insulin aspart (novoLOG) injection 5 Units, 5 Units, Subcutaneous, TID With Meals, Trenton Valverde MD  •  insulin detemir (LEVEMIR) injection 18 Units, 18 Units, Subcutaneous, Nightly, YsAta naranjo MD, 18 Units at 09/07/18 2114  •  ipratropium-albuterol (DUO-NEB) nebulizer solution 3 mL, 3 mL, Nebulization, Q4H PRN, Glen Brasher MD, 3 mL at 08/29/18 1315  •  lactulose  (CHRONULAC) 10 GM/15ML solution 20 g, 20 g, Oral, Daily, Soraya Wu, APRN, 20 g at 18 1427  •  levothyroxine (SYNTHROID, LEVOTHROID) tablet 50 mcg, 50 mcg, Oral, Q AM, Ata Reza MD, 50 mcg at 18 0734  •  Magnesium Sulfate 2 gram infusion - Mg less than or equal to 1.5 mg/dL, 2 g, Intravenous, PRN **OR** Magesium Sulfate 1 gram infusion - Mg 1.6-1.9 mg/dL, 1 g, Intravenous, PRN, Glen Brasher MD  •  magic mouthwash oral supsension 10 mL, 10 mL, Swish & Spit, Q4H, Glen Brasher MD, 10 mL at 18 0904  •  magnesium hydroxide (MILK OF MAGNESIA) suspension 2400 mg/10mL 10 mL, 10 mL, Oral, Daily PRN, Glen Brasher MD, 10 mL at 18 1833  •  metOLazone (ZAROXOLYN) tablet 5 mg, 5 mg, Oral, Daily, Sae Boothe MD  •  midodrine (PROAMATINE) tablet 5 mg, 5 mg, Oral, TID AC, Sae Boothe MD, 5 mg at 18 1212  •  montelukast (SINGULAIR) tablet 10 mg, 10 mg, Oral, Daily, Soraya Wu, APRN, 10 mg at 18 0905  •  [] morphine injection 1 mg, 1 mg, Intravenous, Q4H PRN **AND** naloxone (NARCAN) injection 0.4 mg, 0.4 mg, Intravenous, Q5 Min PRN, Glen Brasher MD  •  nystatin (MYCOSTATIN) 499551 UNIT/ML suspension 500,000 Units, 5 mL, Swish & Swallow, 4x Daily, Glen Brasher MD, 500,000 Units at 18 1213  •  nystatin (MYCOSTATIN) powder, , Topical, Q12H, Renetat Hancock, IRENE  •  ondansetron (ZOFRAN) injection 4 mg, 4 mg, Intravenous, Q6H PRN, Glen Brasher MD, 4 mg at 18 1823  •  ondansetron (ZOFRAN) tablet 4 mg, 4 mg, Oral, Q6H PRN **OR** ondansetron ODT (ZOFRAN-ODT) disintegrating tablet 4 mg, 4 mg, Oral, Q6H PRN **OR** ondansetron (ZOFRAN) injection 4 mg, 4 mg, Intravenous, Q6H PRN, Javed Huitron MD, 4 mg at 18 0814  •  ondansetron ODT (ZOFRAN-ODT) disintegrating tablet 4 mg, 4 mg, Oral, Q6H PRN, Oriana Steinberg MD  •  oxyCODONE (ROXICODONE) immediate release tablet 10 mg, 10 mg, Oral, Q4H PRN,  Glen Brasher MD, 10 mg at 09/08/18 0915  •  pantoprazole (PROTONIX) EC tablet 40 mg, 40 mg, Oral, Daily, Glen Brasher MD, 40 mg at 09/08/18 0904  •  polyethylene glycol (MIRALAX) powder 17 g, 17 g, Oral, Daily, Soraya Wu, APRN, Stopped at 09/07/18 0908  •  potassium chloride (MICRO-K) CR capsule 40 mEq, 40 mEq, Oral, PRN, 40 mEq at 08/06/18 2352 **OR** potassium chloride (KLOR-CON) packet 40 mEq, 40 mEq, Oral, PRN, Glen Brasher MD  •  potassium chloride 10 mEq in 100 mL IVPB, 10 mEq, Intravenous, Q1H PRN **OR** potassium chloride 10 mEq in 100 mL IVPB, 10 mEq, Intravenous, Q1H PRN, Glen Brasher MD  •  promethazine (PHENERGAN) tablet 12.5 mg, 12.5 mg, Oral, Q6H PRN **OR** promethazine (PHENERGAN) injection 12.5 mg, 12.5 mg, Intravenous, Q6H PRN, Glen Brasher MD  •  Scopolamine (TRANSDERM-SCOP) 1.5 MG/3DAYS patch 1 patch, 1 patch, Transdermal, Q72H, Renetta Hancock, APRN, 1 patch at 09/03/18 1607  •  sennosides-docusate sodium (SENOKOT-S) 8.6-50 MG tablet 2 tablet, 2 tablet, Oral, Nightly, Glen Brasher MD, 2 tablet at 09/07/18 2112  •  silver sulfadiazine (SILVADENE, SSD) 1 % cream, , Topical, Q24H, Sammy Barillas MD  •  sodium chloride (OCEAN) nasal spray 2 spray, 2 spray, Each Nare, PRN, Glen Brasher MD  •  sodium chloride 0.9 % infusion, 9 mL/hr, Intravenous, Continuous PRN, Satnam Arguello MD, Stopped at 09/06/18 1642  •  sodium chloride 0.9 % infusion, 9 mL/hr, Intravenous, Continuous PRN, Roseanne Sarkar MD  •  sucralfate (CARAFATE) 1 GM/10ML suspension 1 g, 1 g, Oral, Q6H, Juan Sanz MD, 1 g at 09/08/18 1213  •  torsemide (DEMADEX) tablet 100 mg, 100 mg, Oral, Daily, Donnell Madison MD, 100 mg at 09/08/18 0904  •  zolpidem (AMBIEN) tablet 10 mg, 10 mg, Oral, Nightly PRN, Roslyn Jimenez MD, 10 mg at 09/06/18 2240    Assessment/Plan     Patient Active Problem List   Diagnosis   • Permanent atrial fibrillation (CMS/HCC)   • Right  heart failure   • Pulmonary HTN   • Type 2 diabetes mellitus with diabetic peripheral angiopathy without gangrene, without long-term current use of insulin (CMS/[facility])   • Obstructive sleep apnea   • Nonrheumatic aortic valve stenosis   • Non-rheumatic tricuspid valve insufficiency   • CKD (chronic kidney disease) stage 3, GFR 30-59 ml/min   • Generalized edema   • Hyperkalemia   • Pulmonary hypertension   • Acute on chronic systolic congestive heart failure (CMS/[facility])   • Hypoalbuminemia   • Protein malnutrition (CMS/[facility])   • Type 2 diabetes mellitus with complication, with long-term current use of insulin (CMS/[facility])   • Primary hypothyroidism   • End stage renal disease (CMS/[facility])   • Thrombocytopenia (CMS/[facility])     Uncontrolled type 2 diabetes mellitus  Uncontrolled type 2 diabetes mellitus with severe peripheral neuropathy  Patient denied any knowledge of diabetic retinopathy  Uncontrolled type 2 diabetes mellitus with nephropathy  Patient has chronic kidney disease  Multiple amputations of the toes in the right foot and 1 toe inThe left foot  Status post aortic valve replacement and tricuspid valve repair     Patient's blood sugars have been in the  range for the most part  MAR suggests that he missed nearly 45 scheduled doses of NovoLog at mealtime out of 88  The nurse taking care of the patient today also reports that patient frequently refuses the mealtime insulin 8 units saying that it is too high'    His insulin dose was adjusted yesterday  Levemir is currently once at night 18 units only    I discussed with the patient and family at the bedside  He is willing to try at least 5 units of NovoLog with meal and the dose will be held if the blood sugars less than 100  He will also be on a very low-dose sliding scale which starts at 200 mg blood sugar and will receive between 2-4 units only  He will continue the Levemir 18 units at night  I advised the patient to consider keeping the blood sugars below 200 for  "optimal tissue healing and recovery  Patient will follow-up with Dr. Reza in 2-3 weeks    The total floor time spent  was more than 35 min of which greater than 20 min of time ( greater than 50% of the total time )  was spent face to face with the patient counseling and coordination of care on recommended evaluation and treatment options, instructions for management/treatment and /or follow up  and importance of compliance with chosen management or treatment options  Trenton Valverde MD FACE.  09/08/18  12:47 PM      EMR Dragon / transcription disclaimer:     \"Dictated utilizing Dragon dictation\".   "

## 2018-09-08 NOTE — PROGRESS NOTES
Hospital Follow Up    LOS:  LOS: 45 days   Patient Name: Bj Duarte  Age/Sex: 63 y.o. male  : 1955  MRN: [id number]    Day of Service: 18   Length of Stay: 45  Encounter Provider: Eliezer Zimmerman MD  Place of Service: [facility] CARDIOLOGY  Patient Care Team:  Gus Cordova MD as PCP - General (Internal Medicine)  Alexandria Benitez MD as Consulting Physician (Nephrology)  Mike Johnson MD as Consulting Physician (Pulmonary Disease)  Oriana Steinberg MD as Consulting Physician (Cardiology)    Subjective:     Chief Complaint: Status post aortic valve replacement.    Interval History: Patient doing relatively well.  Breathing okay no chest pain.  Family member upset and wants patient transferred.  Attempted to contact hospice at [facility] they declined.    Objective:     Objective:  Temp:  [97.5 °F (36.4 °C)-97.8 °F (36.6 °C)] 97.8 °F (36.6 °C)  Heart Rate:  [74-99] 95  Resp:  [16-18] 17  BP: (105-120)/(45-81) 116/81     Intake/Output Summary (Last 24 hours) at 18 1303  Last data filed at 18 0920   Gross per 24 hour   Intake              930 ml   Output             4200 ml   Net            -3270 ml     Body mass index is 37.73 kg/m².  1    18  0500 18  0642 18  0606   Weight: 130 kg (286 lb 12.8 oz) 132 kg (290 lb) 130 kg (286 lb)     Weight change:       Physical Exam:   General : Alert, cooperative, in no acute distress.  Neuro: alert,cooperative and oriented  Lungs: CTAB. Normal respiratory effort and rate.  CV:: Regular rate and rhythm, normal S1 and S2, no murmurs, gallops or rubs.  ABD: Soft, nontender, non-distended. positive bowel sounds  Extr: No edema or cyanosis, moves all extremities    Lab Review:     Results from last 7 days  Lab Units 18  0410 18  0449   SODIUM mmol/L 133* 133*   POTASSIUM mmol/L 4.3 4.9   CHLORIDE mmol/L 98 100   CO2 mmol/L 22.2 22.3   BUN mg/dL 27* 33*   CREATININE mg/dL 1.94*  2.19*   GLUCOSE mg/dL 135* 135*   CALCIUM mg/dL 7.4* 7.5*           Results from last 7 days  Lab Units 09/05/18 2020 09/03/18  0509   WBC 10*3/mm3 7.59 8.77   HEMOGLOBIN g/dL 9.8* 10.3*   HEMATOCRIT % 33.6* 37.0*   PLATELETS 10*3/mm3 50* 60*       Results from last 7 days  Lab Units 09/08/18  0658 09/07/18  0628   INR  1.17* 1.14*           Current Medications:   Scheduled Meds:  aspirin 81 mg Oral Daily   atorvastatin 10 mg Oral Nightly   bisacodyl 10 mg Rectal Once   budesonide-formoterol 2 puff Inhalation BID - RT   cephALEXin 250 mg Oral Q8H   epoetin roxi 10,000 Units Intravenous Once per day on Mon Wed Fri   sodium ferric gluconate complex IVPB in 100 mL  mg Intravenous Once per day on Mon Wed Fri   ferrous sulfate 325 mg Oral Daily With Breakfast   fluticasone 2 spray Each Nare Daily   guaiFENesin 1,200 mg Oral Q12H   heparin (porcine) 5,000 Units Intravenous Take As Directed   hydrocortisone  Topical Q12H   insulin aspart 2-5 Units Subcutaneous 4x Daily AC & at Bedtime   insulin aspart 5 Units Subcutaneous TID With Meals   insulin detemir 18 Units Subcutaneous Nightly   lactulose 20 g Oral Daily   levothyroxine 50 mcg Oral Q AM   magic mouthwash 10 mL Swish & Spit Q4H   metOLazone 5 mg Oral Daily   midodrine 5 mg Oral TID AC   montelukast 10 mg Oral Daily   nystatin 5 mL Swish & Swallow 4x Daily   nystatin  Topical Q12H   pantoprazole 40 mg Oral Daily   polyethylene glycol 17 g Oral Daily   Scopolamine 1 patch Transdermal Q72H   sennosides-docusate sodium 2 tablet Oral Nightly   silver sulfadiazine  Topical Q24H   sucralfate 1 g Oral Q6H   torsemide 100 mg Oral Daily     Continuous Infusions:  hold 1 each    sodium chloride 9 mL/hr Last Rate: Stopped (09/06/18 1642)   sodium chloride 9 mL/hr        Allergies:  No Known Allergies    Assessment:     Active Problems:    Obstructive sleep apnea    Nonrheumatic aortic valve stenosis    Non-rheumatic tricuspid valve insufficiency    Pulmonary  hypertension    Acute on chronic systolic congestive heart failure (CMS/HCC)    Hypoalbuminemia    Protein malnutrition (CMS/[facility])    Type 2 diabetes mellitus with complication, with long-term current use of insulin (CMS/HCC)    Primary hypothyroidism    End stage renal disease (CMS/HCC)    Thrombocytopenia (CMS/[facility])        Plan:       1.  Aortic stenosis status post bovine valve surgery on July 26, 2018.  2.  Status post tricuspid valve repair  3.  Atrial fibrillation.  Status post left atrial appendage resection.  4.  Diabetic foot wounds.  Patient went to operating room 2 days ago for debridement.  After surgery following.  Family member very upset 1 something done more wants to be transferred for better care of this.  5.  Acute on chronic renal insufficiency any better.  6.  Pericardial effusion  7.  Continue supportive care nothing to add.     Eliezer Zimmerman MD  09/08/18  1:03 PM

## 2018-09-08 NOTE — NURSING NOTE
Pts daughter called Devika Perez RN , regarding possible transfer to [place] in AM. Daughter now requests that pt be discharged to [facility] rehab in AM where podiatrist can follow up outpatient.

## 2018-09-08 NOTE — THERAPY TREATMENT NOTE
Acute Care - Physical Therapy Treatment Note  [facility]     Patient Name: Bj Duarte  : 1955  MRN: [id number]  Today's Date: 2018  Onset of Illness/Injury or Date of Surgery: 18  Date of Referral to PT: 18  Referring Physician: Jazmin    Admit Date: 2018    Visit Dx:    ICD-10-CM ICD-9-CM   1. Non-rheumatic tricuspid valve insufficiency I36.1 424.2   2. Pulmonary hypertension I27.20 416.8   3. Acute on chronic systolic congestive heart failure (CMS/HCC) I50.23 428.23     428.0   4. Nonrheumatic aortic valve stenosis I35.0 424.1   5. Persistent atrial fibrillation (CMS/HCC) I48.1 427.31   6. Generalized edema R60.1 782.3   7. Nausea R11.0 787.02   8. Obstructive sleep apnea G47.33 327.23     Patient Active Problem List   Diagnosis   • Permanent atrial fibrillation (CMS/[facility])   • Right heart failure   • Pulmonary HTN   • Type 2 diabetes mellitus with diabetic peripheral angiopathy without gangrene, without long-term current use of insulin (CMS/[facility])   • Obstructive sleep apnea   • Nonrheumatic aortic valve stenosis   • Non-rheumatic tricuspid valve insufficiency   • CKD (chronic kidney disease) stage 3, GFR 30-59 ml/min   • Generalized edema   • Hyperkalemia   • Pulmonary hypertension   • Acute on chronic systolic congestive heart failure (CMS/HCC)   • Hypoalbuminemia   • Protein malnutrition (CMS/[facility])   • Type 2 diabetes mellitus with complication, with long-term current use of insulin (CMS/[facility])   • Primary hypothyroidism   • End stage renal disease (CMS/[facility])   • Thrombocytopenia (CMS/HCC)       Therapy Treatment          Rehabilitation Treatment Summary     Row Name 18 1104             Treatment Time/Intention    Discipline physical therapist  -CA      Document Type therapy note (daily note)  -CA      Subjective Information complains of;weakness;fatigue  -CA      Mode of Treatment physical therapy  -CA2      Patient/Family Observations pt supine in bed, no acute distress noted  at rest  -CA2      Care Plan Review care plan/treatment goals reviewed  -CA2      Patient Effort adequate  -CA2      Existing Precautions/Restrictions fall;non-weight bearing   L LE NWB, R LE WB through heel only  -CA2      Recorded by [CA] Kaela Perez, PT 09/08/18 1104  [CA2] Kaela Perez, PT 09/08/18 1111      Row Name 09/08/18 1104             Cognitive Assessment/Intervention- PT/OT    Orientation Status (Cognition) oriented x 4  -CA      Follows Commands (Cognition) WFL  -CA      Recorded by [CA] Kaela Perez, PT 09/08/18 1111      Row Name 09/08/18 1104             Bed Mobility Assessment/Treatment    Bed Mobility Assessment/Treatment rolling left;rolling right  -CA      Rolling Left [place] (Bed Mobility) maximum assist (25% patient effort)  -CA      Rolling Right [place] (Bed Mobility) maximum assist (25% patient effort)  -CA      Supine-Sit [place] (Bed Mobility) moderate assist (50% patient effort)  -CA      Sit-Supine [place] (Bed Mobility) maximum assist (25% patient effort);2 person assist  -CA      Bed Mobility, Safety Issues decreased use of legs for bridging/pushing  -CA      Assistive Device (Bed Mobility) bed rails;draw sheet;head of bed elevated  -CA      Recorded by [CA] Kaela Perez, PT 09/08/18 1111      Row Name 09/08/18 1104             Transfer Assessment/Treatment    Comment (Transfers) not appropriate to attempt 2' to WB status  -CA      Recorded by [CA] Kaela Perez, PT 09/08/18 1111      Row Name 09/08/18 1104             Gait/Stairs Assessment/Training    Comment (Gait/Stairs) not appropriate to attempt 2' to WB status  -CA      Recorded by [CA] Kaela Perez, PT 09/08/18 1111      Row Name 09/08/18 1104             Therapeutic Exercise    Comment (Therapeutic Exercise) Declines 2' to needing to use bed pan  -CA      Recorded by [CA] Kaela Perez, PT 09/08/18 1111      Row Name 09/08/18 1104             Static Sitting Balance    Level of  [place] (Unsupported Sitting, Static Balance) supervision  -CA      Sitting Position (Unsupported Sitting, Static Balance) sitting on edge of bed  -CA      Time Able to Maintain Position (Unsupported Sitting, Static Balance) 2 to 3 minutes  -CA      Comment (Unsupported Sitting, Static Balance) limited 2' to needing to use bed pan  -CA      Recorded by [CA] Kaela Perez, PT 09/08/18 1111      Row Name 09/08/18 1104             Positioning and Restraints    Pre-Treatment Position in bed  -CA      Post Treatment Position bed  -CA      In Bed supine;call light within reach;encouraged to call for assist;with nsg;side rails up x3  -CA      Recorded by [CA] Kaela Perez, PT 09/08/18 1111      Row Name 09/08/18 1104             Pain Scale: Numbers Pre/Post-Treatment    Pain Scale: Numbers, Pretreatment 4/10  -CA      Pain Scale: Numbers, Post-Treatment 5/10  -CA      Pain Location - Side Bilateral  -CA      Pain Location foot  -CA      Recorded by [CA] Kaela Perez, PT 09/08/18 1111      Row Name                Wound 07/26/18 0900 midline chest surgical    Wound - Properties Group Date first assessed: 07/26/18 [RS] Time first assessed: 0900 [RS] Orientation: midline [RS] Location: chest [RS] Type: surgical [RS] Recorded by:  [RS] Milka Hernandez RN 07/29/18 1618    Row Name                Wound 08/03/18 0830 Right coccyx pressure injury    Wound - Properties Group Date first assessed: 08/03/18 [RS] Time first assessed: 0830 [RS] Present On Admission : no;picture taken [RS] Side: Right [RS] Location: coccyx [RS] Type: pressure injury [RS] Stage, Pressure Injury: Stage 1 [RS] Recorded by:  [RS] Milka Hernandez RN 08/03/18 1459    Row Name                Wound 08/14/18 Left toe    Wound - Properties Group Date first assessed: 08/14/18 [AH] Present On Admission : no [AH] Side: Left [AH] Location: toe [AH] Recorded by:  [AH] Deisy English RN 08/14/18 2353    Row Name                Wound 08/23/18  Right other (see comments) foot other (see comments)    Wound - Properties Group Date first assessed: 08/23/18 [DA] Side: Right [DA] Orientation: other (see comments) [DA], plantar  Location: foot [DA] Type: other (see comments) [DA] Additional Comments: patient states the wound was callused prior- has been open in recent past [DA] Recorded by:  [DA] Susanna Dc RN, CWOCN 08/23/18 1629    Row Name                Wound 08/28/18 1412 Right neck incision    Wound - Properties Group Date first assessed: 08/28/18 [WC] Time first assessed: 1412 [WC] Side: Right [WC] Location: neck [WC] Type: incision [WC] Recorded by:  [WC] Javed Lamb RN 08/28/18 1412    Row Name                Wound 09/06/18 1447 Bilateral foot incision    Wound - Properties Group Date first assessed: 09/06/18 [JT] Time first assessed: 1447 [JT] Side: Bilateral [JT] Location: foot [JT] Type: incision [JT] Recorded by:  [HILLT] Mike Núñez RN 09/06/18 1447    Row Name 09/08/18 1104             Outcome Summary/Treatment Plan (PT)    Daily Summary of Progress (PT) progress toward functional goals is gradual  -CA      Anticipated Discharge Disposition (PT) skilled nursing facility  -CA      Recorded by [CA] Kaela Perez, RANDAL 09/08/18 1111        User Key  (r) = Recorded By, (t) = Taken By, (c) = Cosigned By    Initials Name Effective Dates Discipline    DA Susanna Dc RN, CWOCN 06/16/16 -  Nurse    Deisy Funes RN 06/16/16 -  Nurse    Milka Gatica RN 06/16/16 -  Nurse    Mike Bellamy RN 02/13/17 -  Nurse    Javed Sheikh RN 11/09/17 -  Nurse    Kaela Zimmerman, PT 06/13/18 -  PT          Wound Bilateral lower leg  (Active)   Dressing Appearance intact;dry 9/8/2018  8:10 AM   Closure KIMANI 9/8/2018  8:10 AM   Base dressing in place, unable to visualize 9/8/2018  8:10 AM   Drainage Amount none 9/8/2018  8:10 AM   Dressing Care, Wound elastic bandage 9/8/2018  3:02 AM       Wound 07/26/18 0900 midline chest  surgical (Active)   Dressing Appearance open to air 9/8/2018  8:10 AM   Closure Approximated;Open to air 9/8/2018  8:10 AM   Base dry;clean;pink;scab 9/8/2018  8:10 AM   Periwound intact;dry;pink 9/8/2018  8:10 AM   Drainage Amount none 9/8/2018  8:10 AM   Dressing Care, Wound open to air 9/8/2018  3:02 AM       Wound 08/03/18 0830 Right coccyx pressure injury (Active)   Dressing Appearance no drainage;dry;intact 9/8/2018  8:10 AM   Closure KIMANI 9/8/2018  8:10 AM   Base dressing in place, unable to visualize 9/8/2018  8:10 AM   Periwound non-blanchable 9/8/2018  3:02 AM   Periwound Temperature warm 9/8/2018  3:02 AM   Drainage Amount none 9/8/2018  8:10 AM   Dressing Care, Wound foam;low-adherent 9/8/2018  3:02 AM       Wound 08/14/18 Left toe (Active)   Dressing Appearance open to air 9/8/2018  8:10 AM   Closure Open to air 9/8/2018  8:10 AM   Base clean;pink;dry 9/8/2018  8:10 AM   Periwound intact;dry;pink 9/8/2018  8:10 AM   Periwound Temperature warm 9/8/2018  8:10 AM   Drainage Amount none 9/8/2018  8:10 AM   Dressing Care, Wound open to air 9/8/2018  3:02 AM       Wound 08/23/18 Right other (see comments) foot other (see comments) (Active)   Dressing Appearance no drainage;dry;intact 9/8/2018  8:10 AM   Closure KIMANI 9/8/2018  8:10 AM   Base dressing in place, unable to visualize 9/8/2018  8:10 AM   Drainage Amount none 9/8/2018  8:10 AM   Dressing Care, Wound gauze;elastic bandage 9/8/2018  3:02 AM       Wound 08/28/18 1412 Right neck incision (Active)   Dressing Appearance open to air 9/8/2018  8:10 AM   Closure Approximated 9/8/2018  8:10 AM   Base clean;dry;pink 9/8/2018  8:10 AM   Drainage Amount none 9/8/2018  8:10 AM   Dressing Care, Wound open to air 9/8/2018  3:02 AM       Wound Left foot unspecified (Active)   Dressing Appearance dry;intact;no drainage 9/8/2018  8:10 AM   Closure KIMANI 9/8/2018  8:10 AM   Base dressing in place, unable to visualize 9/8/2018  8:10 AM   Drainage Amount none 9/8/2018  8:10  AM   Dressing Care, Wound gauze;elastic bandage 9/8/2018  3:02 AM       Wound 09/06/18 1447 Bilateral foot incision (Active)   Dressing Appearance no drainage;dry;intact 9/8/2018  8:10 AM   Closure KIMANI 9/8/2018  8:10 AM   Base dressing in place, unable to visualize 9/8/2018  8:10 AM   Drainage Amount none 9/8/2018  8:10 AM   Care, Wound cleansed with;sterile normal saline 9/8/2018 10:00 AM   Dressing Care, Wound dressing changed;gauze, wet-to-dry 9/8/2018 10:00 AM   Periwound Care, Wound topical treatment applied 9/8/2018 10:00 AM             Physical Therapy Education     Title: PT OT SLP Therapies (Done)     Topic: Physical Therapy (Done)     Point: Mobility training (Done)    Learning Progress Summary     Learner Status Readiness Method Response Comment Documented by    Patient Done Acceptance E VU  CA 09/08/18 1111     Done Acceptance E VU  AL 09/02/18 0952     Done Acceptance ETB YEISON  KH 08/18/18 1017     Done Acceptance E,D KYA LATHAM JR 08/11/18 1113     Done Acceptance E VU  LA 08/08/18 1513     Done Acceptance E VU  PM 08/07/18 1003     Done Acceptance E,TB VU needs reinforcement of sternal prec, ed on 5 lb limit, body mech: uses pillows to elevate seat in chair. reminder up only with assist. SP 08/05/18 0957     Done Acceptance ETB VU for ex ed to do glute sets to increase circulation for periwound needs/scrotal swelling, ice for pain and ed on prec needs assist when up and gait belt. Shoes off as soon as not needed due to LE edema. SP 08/04/18 0955     Done Acceptance E,D DU  PC 08/03/18 1107     Done Acceptance E VU  PM 07/30/18 1047     Done Acceptance ETB YEISON  CS 07/29/18 0945     Done Acceptance E,TB YEISON  CS 07/28/18 0945          Point: Home exercise program (Done)    Learning Progress Summary     Learner Status Readiness Method Response Comment Documented by    Patient Done Acceptance E VU  CA 09/08/18 1111     Done Acceptance E,D KYA LATHAM JR 08/11/18 1113     Done Acceptance E VU  LA 08/08/18 1513      Done Acceptance E VU  PM 08/07/18 1003     Done Acceptance E,TB VU needs reinforcement of sternal prec, ed on 5 lb limit, body mech: uses pillows to elevate seat in chair. reminder up only with assist. SP 08/05/18 0957     Done Acceptance E,TB VU for ex ed to do glute sets to increase circulation for periwound needs/scrotal swelling, ice for pain and ed on prec needs assist when up and gait belt. Shoes off as soon as not needed due to LE edema. SP 08/04/18 0955     Done Acceptance E,D DU  PC 08/03/18 1107     Done Acceptance E,TB VU  CS 07/29/18 0945          Point: Body mechanics (Done)    Learning Progress Summary     Learner Status Readiness Method Response Comment Documented by    Patient Done Acceptance E VU  CA 09/08/18 1111     Done Acceptance E VU  AL 09/02/18 0952     Done Acceptance E,TB VU  KH 08/18/18 1017     Done Acceptance E,D VUKYA  JR 08/11/18 1113     Done Acceptance E VU  LA 08/08/18 1513     Done Acceptance E VU  PM 08/07/18 1003     Done Acceptance E,TB VU needs reinforcement of sternal prec, ed on 5 lb limit, body mech: uses pillows to elevate seat in chair. reminder up only with assist. SP 08/05/18 0957     Done Acceptance E,TB VU for ex ed to do glute sets to increase circulation for periwound needs/scrotal swelling, ice for pain and ed on prec needs assist when up and gait belt. Shoes off as soon as not needed due to LE edema. SP 08/04/18 0955     Done Acceptance E,D DU  PC 08/03/18 1107     Done Acceptance E VU  PM 07/30/18 1047     Done Acceptance E,TB VU  CS 07/29/18 0945     Done Acceptance E,TB VU  CS 07/28/18 0945          Point: Precautions (Done)    Learning Progress Summary     Learner Status Readiness Method Response Comment Documented by    Patient Done Acceptance E VU  CA 09/08/18 1111     Done Acceptance E VU  AL 09/02/18 0952     Done Acceptance E VU  MD 08/31/18 1007     Done Acceptance E VU  MD 08/30/18 1005     Done Acceptance E VU  MD 08/24/18 1003     Done Acceptance E VU   MD 08/23/18 0933     Done Acceptance E VU  MD 08/22/18 1413     Done Acceptance E,TB VU  KH 08/18/18 1017     Done Acceptance E VU  MD 08/16/18 1103     Done Acceptance E VU  MD 08/15/18 1632     Done Acceptance E,D KYA LATHAM  JR 08/11/18 1113     Done Acceptance E VU  MD 08/10/18 1035     Done Acceptance E VU  LA 08/08/18 1513     Done Acceptance E VU  PM 08/07/18 1003     Done Acceptance E VU  MD 08/06/18 0914     Done Acceptance E,TB VU needs reinforcement of sternal prec, ed on 5 lb limit, body mech: uses pillows to elevate seat in chair. reminder up only with assist. SP 08/05/18 0957     Done Acceptance E,TB VU for ex ed to do glute sets to increase circulation for periwound needs/scrotal swelling, ice for pain and ed on prec needs assist when up and gait belt. Shoes off as soon as not needed due to LE edema. SP 08/04/18 0955     Done Acceptance E,D KYA  PC 08/03/18 1107     Done Acceptance E VU  MD 08/02/18 1005     Done Acceptance E VU  MD 08/01/18 0951     Done Acceptance E VU  PM 07/30/18 1047     Done Acceptance E,TB VU  CS 07/29/18 0945     Done Acceptance E,TB VU  CS 07/28/18 0945     Done Acceptance E VU  MD 07/27/18 0847                      User Key     Initials Effective Dates Name Provider Type Discipline    LA 06/16/16 -  Veronika Flores, RN Registered Nurse Nurse    PC 04/03/18 -  Dione Short, PT Physical Therapist PT    MD 04/03/18 -  Effie Carey, PT Physical Therapist PT    AL 04/03/18 -  Marsha Groves, PT Physical Therapist PT    JR 04/03/18 -  Mike Street, PT Physical Therapist PT    KH 06/22/16 -  Roseanne Loaiza, PT Physical Therapist PT    SP 04/03/18 -  Ines Guaman, PT Physical Therapist PT    CS 05/14/18 -  Carmelo Denney, PT Physical Therapist PT    CA 06/13/18 -  Kaela Perez, PT Physical Therapist PT    PM 06/25/18 -  Emerson Messer, PT Student PT Student PT                    PT Recommendation and Plan  Anticipated Discharge Disposition (PT): skilled nursing facility  Outcome  Summary/Treatment Plan (PT)  Daily Summary of Progress (PT): progress toward functional goals is gradual  Anticipated Discharge Disposition (PT): skilled nursing facility  Plan of Care Reviewed With: patient  Outcome Summary: Pt requires mod A for bed mobility, updated WB status - NWB on L LE, heel WB only on R LE. EOB sitting tolerance limited 2' to needing to use bed pan.         Time Calculation:         PT Charges     Row Name 09/08/18 1113             Time Calculation    Start Time 1040  -CA      Stop Time 1058  -CA      Time Calculation (min) 18 min  -CA      PT Received On 09/08/18  -CA      PT - Next Appointment 09/09/18  -CA         Time Calculation- PT    Total Timed Code Minutes- PT 18 minute(s)  -CA        User Key  (r) = Recorded By, (t) = Taken By, (c) = Cosigned By    Initials Name Provider Type    Kaela Zimmerman, PT Physical Therapist        Therapy Suggested Charges     Code   Minutes Charges    17100 (CPT®) Hc Pt Neuromusc Re Education Ea 15 Min      40163 (CPT®) Hc Pt Ther Proc Ea 15 Min      03606 (CPT®) Hc Gait Training Ea 15 Min      54193 (CPT®) Hc Pt Therapeutic Act Ea 15 Min 2     22051 (CPT®) Hc Pt Manual Therapy Ea 15 Min      97562 (CPT®) Hc Pt Iontophoresis Ea 15 Min      07613 (CPT®) Hc Pt Elec Stim Ea-Per 15 Min      56507 (CPT®) Hc Pt Ultrasound Ea 15 Min      01764 (CPT®) Hc Pt Self Care/Mgmt/Train Ea 15 Min      46635 (CPT®) Hc Pt Prosthetic (S) Train Initial Encounter, Each 15 Min      81186 (CPT®) Hc Pt Orthotic(S)/Prosthetic(S) Encounter, Each 15 Min      28050 (CPT®) Hc Orthotic(S) Mgmt/Train Initial Encounter, Each 15min      Total  2         Therapy Charges for Today     Code Description Service Date Service Provider Modifiers Qty    90968871638 HC PT THER PROC EA 15 MIN 9/8/2018 Kaela Perez, PT GP 1    90549884199 HC PT THER SUPP EA 15 MIN 9/8/2018 Kaela Perez, PT GP 1          PT G-Codes  Outcome Measure Options: AM-PAC 6 Clicks Basic Mobility (PT)  AM-PAC 6  Clicks Score: 13  Score: 15    Kaela Perez, PT  9/8/2018

## 2018-09-08 NOTE — PROGRESS NOTES
Family decided transfer to [facility] for further foot care instead of rehab  Clinically stable  He will follow with dr. Brasher in 4 weeks

## 2018-09-09 NOTE — THERAPY TREATMENT NOTE
Acute Care - Physical Therapy Progress Note  [facility]     Patient Name: Bj Duarte  : 1955  MRN: [id number]  Today's Date: 2018  Onset of Illness/Injury or Date of Surgery: 18  Date of Referral to PT: 18  Referring Physician: Jazmin    Admit Date: 2018    Visit Dx:    ICD-10-CM ICD-9-CM   1. Non-rheumatic tricuspid valve insufficiency I36.1 424.2   2. Pulmonary hypertension I27.20 416.8   3. Acute on chronic systolic congestive heart failure (CMS/HCC) I50.23 428.23     428.0   4. Nonrheumatic aortic valve stenosis I35.0 424.1   5. Persistent atrial fibrillation (CMS/HCC) I48.1 427.31   6. Generalized edema R60.1 782.3   7. Nausea R11.0 787.02   8. Obstructive sleep apnea G47.33 327.23     Patient Active Problem List   Diagnosis   • Permanent atrial fibrillation (CMS/[facility])   • Right heart failure   • Pulmonary HTN   • Type 2 diabetes mellitus with diabetic peripheral angiopathy without gangrene, without long-term current use of insulin (CMS/[facility])   • Obstructive sleep apnea   • Nonrheumatic aortic valve stenosis   • Non-rheumatic tricuspid valve insufficiency   • CKD (chronic kidney disease) stage 3, GFR 30-59 ml/min   • Generalized edema   • Hyperkalemia   • Pulmonary hypertension   • Acute on chronic systolic congestive heart failure (CMS/HCC)   • Hypoalbuminemia   • Protein malnutrition (CMS/[facility])   • Type 2 diabetes mellitus with complication, with long-term current use of insulin (CMS/[facility])   • Primary hypothyroidism   • End stage renal disease (CMS/[facility])   • Thrombocytopenia (CMS/[facility])       Therapy Treatment          Rehabilitation Treatment Summary     Row Name 18 1021             Treatment Time/Intention    Discipline physical therapist  -CA      Document Type therapy note (daily note)  -CA      Subjective Information complains of;fatigue  -CA      Mode of Treatment physical therapy  -CA      Patient/Family Observations pt supine in bed, no acute distress at rest  -CA       Care Plan Review care plan/treatment goals reviewed  -CA      Patient Effort good  -CA      Existing Precautions/Restrictions fall;non-weight bearing   NWB L LE, heel wb only R LE  -CA      Recorded by [CA] Kaela Perez, PT 09/09/18 1025      Row Name 09/09/18 1021             Cognitive Assessment/Intervention- PT/OT    Orientation Status (Cognition) oriented x 4  -CA      Follows Commands (Cognition) WFL  -CA      Recorded by [CA] Kaela Perez, PT 09/09/18 1025      Row Name 09/09/18 1021             Bed Mobility Assessment/Treatment    Supine-Sit [place] (Bed Mobility) moderate assist (50% patient effort)  -CA      Sit-Supine [place] (Bed Mobility) 2 person assist;moderate assist (50% patient effort)  -CA      Assistive Device (Bed Mobility) bed rails;draw sheet;head of bed elevated  -CA      Recorded by [CA] Kaela Perez, PT 09/09/18 1025      Row Name 09/09/18 1021             Therapeutic Exercise    Lower Extremity (Therapeutic Exercise) LAQ (long arc quad), bilateral;marching while seated   adduction sets w/ pillow  -CA      Position (Therapeutic Exercise) seated  -CA      Sets/Reps (Therapeutic Exercise) x15 ea  -CA      Recorded by [CA] Kaela Perez, PT 09/09/18 1025      Row Name 09/09/18 1021             Balance    Balance sitting balance activity  -CA      Recorded by [CA] Kaela Perez, PT 09/09/18 1025      Row Name 09/09/18 1021             Static Sitting Balance    Level of [place] (Unsupported Sitting, Static Balance) supervision  -CA      Sitting Position (Unsupported Sitting, Static Balance) sitting on edge of bed  -CA      Time Able to Maintain Position (Unsupported Sitting, Static Balance) more than 5 minutes  -CA      Recorded by [CA] Kaela Perez, PT 09/09/18 1025      Row Name 09/09/18 1021             Sitting Balance Activity    Activities Performed (Sitting, Balance Training) seated reaching outside base of support  -CA      Support Needed (Sitting,  Balance Training) CGA  -CA      Comment (Sitting, Balance Training) side to side and fwd, x 15 reps ea.  -CA      Recorded by [CA] Kaela Perez, PT 09/09/18 1025      Row Name 09/09/18 1021             Positioning and Restraints    Pre-Treatment Position in bed  -CA      Post Treatment Position bed  -CA      In Bed supine;call light within reach;encouraged to call for assist;side rails up x2  -CA      Recorded by [CA] Kaela Perez, PT 09/09/18 1025      Row Name 09/09/18 1021             Pain Scale: Numbers Pre/Post-Treatment    Pain Scale: Numbers, Pretreatment 4/10  -CA      Pain Scale: Numbers, Post-Treatment 5/10  -CA      Pain Location - Side Bilateral  -CA      Pain Location - Orientation lower  -CA      Pain Location foot  -CA      Recorded by [CA] Kaela Perez, PT 09/09/18 1025      Row Name                Wound 07/26/18 0900 midline chest surgical    Wound - Properties Group Date first assessed: 07/26/18 [RS] Time first assessed: 0900 [RS] Orientation: midline [RS] Location: chest [RS] Type: surgical [RS] Recorded by:  [RS] Milka Hernandez RN 07/29/18 1618    Row Name                Wound 08/03/18 0830 Right coccyx pressure injury    Wound - Properties Group Date first assessed: 08/03/18 [RS] Time first assessed: 0830 [RS] Present On Admission : no;picture taken [RS] Side: Right [RS] Location: coccyx [RS] Type: pressure injury [RS] Stage, Pressure Injury: Stage 1 [RS] Recorded by:  [RS] Milka Hernandez RN 08/03/18 1459    Row Name                Wound 08/14/18 Left toe    Wound - Properties Group Date first assessed: 08/14/18 [AH] Present On Admission : no [AH] Side: Left [AH] Location: toe [AH] Recorded by:  [AH] Deisy English RN 08/14/18 8381    Row Name                Wound 08/23/18 Right other (see comments) foot other (see comments)    Wound - Properties Group Date first assessed: 08/23/18 [DA] Side: Right [DA] Orientation: other (see comments) [DA], plantar  Location: foot  [DA] Type: other (see comments) [DA] Additional Comments: patient states the wound was callused prior- has been open in recent past [DA] Recorded by:  [DA] Sus[facility] 08/23/18 1629    Row Name                Wound 08/28/18 1412 Right neck incision    Wound - Properties Group Date first assessed: 08/28/18 [WC] Time first assessed: 1412 [WC] Side: Right [WC] Location: neck [WC] Type: incision [WC] Recorded by:  [WC] Javed Lamb RN 08/28/18 1412    Row Name                Wound 09/06/18 1447 Bilateral foot incision    Wound - Properties Group Date first assessed: 09/06/18 [JT] Time first assessed: 1447 [JT] Side: Bilateral [JT] Location: foot [JT] Type: incision [JT] Recorded by:  [HILLT] Mike Núñez RN 09/06/18 1447    Row Name 09/09/18 1021             Outcome Summary/Treatment Plan (PT)    Daily Summary of Progress (PT) progress toward functional goals is gradual  -CA      Anticipated Discharge Disposition (PT) skilled nursing facility  -CA      Recorded by [CA] Kaela Perez, PT 09/09/18 1025        User Key  (r) = Recorded By, (t) = Taken By, (c) = Cosigned By    Initials Name Effective Dates Discipline    DA Susanna Dc, RN, CWOCN 06/16/16 -  Nurse    Deisy uFnes RN 06/16/16 -  Nurse    Milka Gatica RN 06/16/16 -  Nurse    Mike Bellamy RN 02/13/17 -  Nurse    Javed Sheikh RN 11/09/17 -  Nurse    Kaela Zimmerman, PT 06/13/18 -  PT          Wound Bilateral lower leg  (Active)   Dressing Appearance intact;dry 9/9/2018  8:38 AM   Closure KIMANI 9/9/2018  8:38 AM   Base dressing in place, unable to visualize 9/9/2018  8:38 AM   Drainage Amount none 9/9/2018  8:38 AM   Dressing Care, Wound elastic bandage 9/8/2018  9:30 PM       Wound 07/26/18 0900 midline chest surgical (Active)   Dressing Appearance open to air 9/9/2018  8:38 AM   Closure Approximated;Open to air 9/9/2018  8:38 AM   Base dry;clean;pink;scab 9/9/2018  8:38 AM   Periwound intact;dry;pink 9/8/2018   4:02 PM   Drainage Amount none 9/9/2018  8:38 AM   Care, Wound cleansed with 9/8/2018  3:30 PM   Dressing Care, Wound open to air 9/9/2018 12:05 AM       Wound 08/03/18 0830 Right coccyx pressure injury (Active)   Dressing Appearance no drainage;dry;intact 9/9/2018  8:38 AM   Closure KIMANI 9/9/2018  8:38 AM   Base clean;pink 9/8/2018  4:02 PM   Periwound non-blanchable 9/8/2018  4:02 PM   Periwound Temperature warm 9/8/2018  4:02 PM   Drainage Amount none 9/9/2018  8:38 AM   Dressing Care, Wound dressing changed;foam 9/8/2018 12:11 PM   Periwound Care, Wound barrier ointment applied 9/8/2018 12:11 PM       Wound 08/14/18 Left toe (Active)   Dressing Appearance open to air 9/9/2018  8:38 AM   Closure Open to air 9/9/2018  8:38 AM   Base clean;pink;dry 9/9/2018  8:38 AM   Periwound intact;dry;pink 9/8/2018  4:02 PM   Periwound Temperature warm 9/8/2018  4:02 PM   Drainage Amount none 9/9/2018  8:38 AM   Dressing Care, Wound open to air 9/8/2018  9:30 PM       Wound 08/23/18 Right other (see comments) foot other (see comments) (Active)   Dressing Appearance no drainage;dry;intact 9/9/2018  8:38 AM   Closure KIMANI 9/9/2018  8:38 AM   Base dressing in place, unable to visualize 9/9/2018  8:38 AM   Drainage Amount none 9/9/2018  8:38 AM   Dressing Care, Wound gauze;elastic bandage 9/8/2018  9:30 PM       Wound 08/28/18 1412 Right neck incision (Active)   Dressing Appearance open to air 9/9/2018  8:38 AM   Closure Approximated 9/9/2018  8:38 AM   Base clean;dry;pink 9/9/2018  8:38 AM   Drainage Amount none 9/9/2018  8:38 AM   Dressing Care, Wound open to air 9/9/2018 12:05 AM       Wound Left foot unspecified (Active)   Dressing Appearance dry;intact;no drainage 9/9/2018  8:38 AM   Closure KIMANI 9/9/2018  8:38 AM   Base dressing in place, unable to visualize 9/9/2018  8:38 AM   Drainage Amount none 9/9/2018  8:38 AM   Dressing Care, Wound elastic bandage;gauze 9/8/2018  9:30 PM       Wound 09/06/18 1447 Bilateral foot incision  (Active)   Dressing Appearance no drainage;dry;intact 9/9/2018  8:38 AM   Closure KIMANI 9/9/2018  8:38 AM   Base dressing in place, unable to visualize 9/9/2018  8:38 AM   Drainage Amount none 9/9/2018  8:38 AM   Dressing Care, Wound dressing changed 9/9/2018  8:53 AM               PT Rehab Goals     Row Name 09/09/18 1000             Bed Mobility Goal 1 (PT)    Activity/Assistive Device (Bed Mobility Goal 1, PT) sit to supine/supine to sit  -CA      [place] Level/Cues Needed (Bed Mobility Goal 1, PT) verbal cues required;minimum assist (75% or more patient effort)  -CA      Time Frame (Bed Mobility Goal 1, PT) 2 weeks  -CA      Progress/Outcomes (Bed Mobility Goal 1, PT) continuing progress toward goal;goal ongoing  -CA         Transfer Goal 1 (PT)    Barriers (Transfers Goal 1, PT) now NWB L LE, heel WB R LE, goal no longer appropriate  -CA      Progress/Outcome (Transfer Goal 1, PT) goal no longer appropriate  -CA         Gait Training Goal 1 (PT)    Barriers (Gait Training Goal 1, PT) now NWB L LE, heel WB R LE, goal no longer appropriate  -CA      Progress/Outcome (Gait Training Goal 1, PT) goal no longer appropriate  -CA        User Key  (r) = Recorded By, (t) = Taken By, (c) = Cosigned By    Initials Name Provider Type Discipline    Kaela Zimmerman, PT Physical Therapist PT          Physical Therapy Education     Title: PT OT SLP Therapies (Done)     Topic: Physical Therapy (Done)     Point: Mobility training (Done)    Learning Progress Summary     Learner Status Readiness Method Response Comment Documented by    Patient Done Acceptance E VU  CA 09/09/18 1025     Done Acceptance E VU  CA 09/08/18 1111     Done Acceptance E VU  AL 09/02/18 0952     Done Acceptance E,TB VU  KH 08/18/18 1017     Done Acceptance E,D KYA LATHAM  JR 08/11/18 1113     Done Acceptance E VU  LA 08/08/18 1513     Done Acceptance E VU  PM 08/07/18 1003     Done Acceptance E,TB VU needs reinforcement of sternal prec, ed on 5 lb limit,  body mech: uses pillows to elevate seat in chair. reminder up only with assist. SP 08/05/18 0957     Done Acceptance E,TB VU for ex ed to do glute sets to increase circulation for periwound needs/scrotal swelling, ice for pain and ed on prec needs assist when up and gait belt. Shoes off as soon as not needed due to LE edema. SP 08/04/18 0955     Done Acceptance E,D DU  PC 08/03/18 1107     Done Acceptance E VU  PM 07/30/18 1047     Done Acceptance E,TB VU  CS 07/29/18 0945     Done Acceptance E,TB VU  CS 07/28/18 0945          Point: Home exercise program (Done)    Learning Progress Summary     Learner Status Readiness Method Response Comment Documented by    Patient Done Acceptance E VU  CA 09/09/18 1025     Done Acceptance E VU  CA 09/08/18 1111     Done Acceptance E,D VUYKA  JR 08/11/18 1113     Done Acceptance E VU  LA 08/08/18 1513     Done Acceptance E VU  PM 08/07/18 1003     Done Acceptance E,TB VU needs reinforcement of sternal prec, ed on 5 lb limit, body mech: uses pillows to elevate seat in chair. reminder up only with assist. SP 08/05/18 0957     Done Acceptance E,TB VU for ex ed to do glute sets to increase circulation for periwound needs/scrotal swelling, ice for pain and ed on prec needs assist when up and gait belt. Shoes off as soon as not needed due to LE edema. SP 08/04/18 0955     Done Acceptance E,D DU  PC 08/03/18 1107     Done Acceptance E,TB VU  CS 07/29/18 0945          Point: Body mechanics (Done)    Learning Progress Summary     Learner Status Readiness Method Response Comment Documented by    Patient Done Acceptance E VU  CA 09/09/18 1025     Done Acceptance E VU  CA 09/08/18 1111     Done Acceptance E VU  AL 09/02/18 0952     Done Acceptance E,TB VU  KH 08/18/18 1017     Done Acceptance E,D KYA LATHAM  JR 08/11/18 1113     Done Acceptance E VU  LA 08/08/18 1513     Done Acceptance E VU  PM 08/07/18 1003     Done Acceptance E,TB VU needs reinforcement of sternal prec, ed on 5 lb limit, body  [facility]: uses pillows to elevate seat in chair. reminder up only with assist. SP 08/05/18 0957     Done Acceptance E,TB VU for ex ed to do glute sets to increase circulation for periwound needs/scrotal swelling, ice for pain and ed on prec needs assist when up and gait belt. Shoes off as soon as not needed due to LE edema. SP 08/04/18 0955     Done Acceptance E,D DU  PC 08/03/18 1107     Done Acceptance E VU  PM 07/30/18 1047     Done Acceptance E,TB VU  CS 07/29/18 0945     Done Acceptance E,TB VU  CS 07/28/18 0945          Point: Precautions (Done)    Learning Progress Summary     Learner Status Readiness Method Response Comment Documented by    Patient Done Acceptance E VU  CA 09/09/18 1025     Done Acceptance E VU  CA 09/08/18 1111     Done Acceptance E VU  AL 09/02/18 0952     Done Acceptance E VU  MD 08/31/18 1007     Done Acceptance E VU  MD 08/30/18 1005     Done Acceptance E VU  MD 08/24/18 1003     Done Acceptance E VU  MD 08/23/18 0933     Done Acceptance E VU  MD 08/22/18 1413     Done Acceptance E,TB VU  KH 08/18/18 1017     Done Acceptance E VU  MD 08/16/18 1103     Done Acceptance E VU  MD 08/15/18 1632     Done Acceptance E,D KYA LATHAM  JR 08/11/18 1113     Done Acceptance E VU  MD 08/10/18 1035     Done Acceptance E VU  LA 08/08/18 1513     Done Acceptance E VU  PM 08/07/18 1003     Done Acceptance E VU  MD 08/06/18 0914     Done Acceptance E,TB VU needs reinforcement of sternal prec, ed on 5 lb limit, body [facility]: uses pillows to elevate seat in chair. reminder up only with assist. SP 08/05/18 0957     Done Acceptance E,TB VU for ex ed to do glute sets to increase circulation for periwound needs/scrotal swelling, ice for pain and ed on prec needs assist when up and gait belt. Shoes off as soon as not needed due to LE edema. SP 08/04/18 0955     Done Acceptance E,D DU  PC 08/03/18 1107     Done Acceptance E YEISON QUINONEZ 08/02/18 1005     Done Acceptance E VU  MD 08/01/18 0951     Done Acceptance E VU  PM 07/30/18  1047     Done Acceptance E,TB VU  CS 07/29/18 0945     Done Acceptance E,TB VU  CS 07/28/18 0945     Done Acceptance E YEISON QUINONEZ 07/27/18 0847                      User Key     Initials Effective Dates Name Provider Type Discipline    LA 06/16/16 -  Veronika Flores, RN Registered Nurse Nurse    PC 04/03/18 -  Dione Short, PT Physical Therapist PT    MD 04/03/18 -  Effie Carey, PT Physical Therapist PT    AL 04/03/18 -  Marsha Groves, PT Physical Therapist PT    JR 04/03/18 -  Mike Street, PT Physical Therapist PT    KH 06/22/16 -  Roseanne Loaiza, PT Physical Therapist PT    SP 04/03/18 -  Ines Guaman, PT Physical Therapist PT    CS 05/14/18 -  Carmelo Denney, PT Physical Therapist PT    CA 06/13/18 -  Kaela Perez, PT Physical Therapist PT    PM 06/25/18 -  Emerson Messer, PT Student PT Student PT                    PT Recommendation and Plan  Anticipated Discharge Disposition (PT): skilled nursing facility  Outcome Summary/Treatment Plan (PT)  Daily Summary of Progress (PT): progress toward functional goals is gradual  Anticipated Discharge Disposition (PT): skilled nursing facility  Plan of Care Reviewed With: patient  Outcome Summary: Pt is now POD #4 s/p b feet debridement and is NWB L LE and heel WB only R LE. Improved EOB sitting tolerance, including progressing dynamic sitting balance activities. cont to require mod A for bed mobility. Will likely require SNF at ID. All goals addressed and updated.          Outcome Measures     Row Name 09/09/18 1000             How much help from another person do you currently need...    Turning from your back to your side while in flat bed without using bedrails? 2  -CA      Moving from lying on back to sitting on the side of a flat bed without bedrails? 2  -CA      Moving to and from a bed to a chair (including a wheelchair)? 1  -CA      Standing up from a chair using your arms (e.g., wheelchair, bedside chair)? 1  -CA      Climbing 3-5 steps with a railing? 1   -CA      To walk in hospital room? 1  -CA      AM-PAC 6 Clicks Score 8  -CA         Functional Assessment    Outcome Measure Options AM-PAC 6 Clicks Basic Mobility (PT)  -CA        User Key  (r) = Recorded By, (t) = Taken By, (c) = Cosigned By    Initials Name Provider Type    Kaela Zimmerman PT Physical Therapist           Time Calculation:         PT Charges     Row Name 09/09/18 1030             Time Calculation    Start Time 1001  -CA      Stop Time 1030  -CA      Time Calculation (min) 29 min  -CA      PT Received On 09/09/18  -CA      PT - Next Appointment 09/10/18  -CA      PT Goal Re-Cert Due Date 09/16/18  -CA         Time Calculation- PT    Total Timed Code Minutes- PT 29 minute(s)  -CA        User Key  (r) = Recorded By, (t) = Taken By, (c) = Cosigned By    Initials Name Provider Type    Kaela Zimmerman PT Physical Therapist        Therapy Suggested Charges     Code   Minutes Charges    84632 (CPT®) Hc Pt Neuromusc Re Education Ea 15 Min      45122 (CPT®) Hc Pt Ther Proc Ea 15 Min      36036 (CPT®) Hc Gait Training Ea 15 Min      19236 (CPT®) Hc Pt Therapeutic Act Ea 15 Min 2     21527 (CPT®) Hc Pt Manual Therapy Ea 15 Min      17502 (CPT®) Hc Pt Iontophoresis Ea 15 Min      95550 (CPT®) Hc Pt Elec Stim Ea-Per 15 Min      67264 (CPT®) Hc Pt Ultrasound Ea 15 Min      56956 (CPT®) Hc Pt Self Care/Mgmt/Train Ea 15 Min      14768 (CPT®) Hc Pt Prosthetic (S) Train Initial Encounter, Each 15 Min      93653 (CPT®) Hc Pt Orthotic(S)/Prosthetic(S) Encounter, Each 15 Min      75888 (CPT®) Hc Orthotic(S) Mgmt/Train Initial Encounter, Each 15min      Total  2         Therapy Charges for Today     Code Description Service Date Service Provider Modifiers Qty    77521897295 HC PT THER PROC EA 15 MIN 9/8/2018 Kaela Perez, PT GP 1    38268714286 HC PT THER SUPP EA 15 MIN 9/8/2018 Kaela Perez, PT GP 1    73423880614 HC PT THER PROC EA 15 MIN 9/9/2018 Kaela Perez, PT GP 2    41656363177 HC PT THER  SUPP EA 15 MIN 9/9/2018 Kaela Perez, PT GP 2          PT G-Codes  Outcome Measure Options: AM-PAC 6 Clicks Basic Mobility (PT)  AM-PAC 6 Clicks Score: 8  Score: 15    Kaela Perez, PT  9/9/2018

## 2018-09-09 NOTE — PROGRESS NOTES
I spoke with the patient's daughter, Estrella at length.  I explained to her the medical reasons why I would like him to not be discharged until tomorrow morning including dialysis prior to discharge, continuing to monitor his blood pressure, heart rate, and laboratory data looking for any signs of infection.  She is in agreement that we will plan for discharge tomorrow.  Her biggest concern is the need for her father to see Dr. Yoon.  She feels that transportation from a family standpoint is not an option.  I am going to speak with CCP in the morning and try to arrange ambulance transportation to Dr. Yoon from rehab.  Estrella is going to contact either the patient's nurse or [facility] CCP person and let them know when the appointment is with Dr. Yoon and see if an ambulance can be arranged. If he is medically stable, I will discharge him in the AM to rehab.

## 2018-09-09 NOTE — PROGRESS NOTES
Continued Stay Note  [facility]     Patient Name: Bj Duarte  MRN: [id number]  Today's Date: 9/9/2018    Admit Date: 7/25/2018          Discharge Plan     Row Name 09/09/18 0941       Plan    Plan To [facility] skilled bed today. Dialysis arrangements already in place [facility] MWF @6:20am and per [facility], transportation verified for HD tomorrow AM..................Frank ABDALLA     Patient/Family in Agreement with Plan yes    Plan Comments Call from RM Olivas at 0745 this AM stating [facility] declined to admit patient in transfer from [facility]. Deisy states she has been in contact with patient/family and daughter now wishes for patient to go to [facility] as previously planned and they will follow-up with a Podiatrist outpatient. Call to [facility] to verify all is in order for DC as patient will transportation arrangements for HD tomorrow AM. [facility] contacted facility and called CCP back and states all arrangements are in place for patient's hemodialysis tomorrow and they can accept today. [facility] provided CCP with Report #[phone] and Fax#[phone]. Updated information provided to Deisy. Plan to skilled bed at [facility] today..............Frank ABDALLA               Discharge Codes    No documentation.       Expected Discharge Date and Time     Expected Discharge Date Expected Discharge Time    Sep 5, 2018             Bella Canela RN

## 2018-09-09 NOTE — PROGRESS NOTES
63 y.o.   LOS: 46 days   Patient Care Team:  Gus Cordova MD as PCP - General (Internal Medicine)  Alexandria Benitez MD as Consulting Physician (Nephrology)  Mike Johnson MD as Consulting Physician (Pulmonary Disease)  Oriana Steinberg MD as Consulting Physician (Cardiology)    Chief Complaint:  Uncontrolled type 2 diabetes mellitus and postoperative management    No chief complaint on file.      Subjective     HPI  It appears that patient is accepted to rehabilitation transfer  He may be going home this afternoon  [place] but it did not materialize  Patient is comfortable with going to the rehabilitation Center  His blood sugars are remained stable  No hypoglycemia recently.       Interval History:     Patient is a 63-year-old white male with a past medical history of uncontrolled type 2 diabetes mellitus with complications and critical aortic stenosis and tricuspid insufficiency underwent replacement/repair surgery and endocrinology is being consulted for postoperative management  Patient also has hypothyroidism     Uncontrolled type 2 diabetes mellitus on insulin regimen at home  He was a diabetic for more than 30 years  Uncontrolled type 2 diabetes mellitus with peripheral neuropathy  Uncontrolled type 2 diabetes mellitus with nephropathy and chronic kidney disease  Uncontrolled type 2 diabetes mellitus with background diabetic retinopathy  History of multiple amputations of the toes in the right foot in the 1 amputation of the toe in the left foot  Review of Systems:      Review of Systems   Constitutional: Positive for appetite change and fatigue.   Respiratory: Negative.    Cardiovascular: Positive for leg swelling.   Gastrointestinal: Negative.    Musculoskeletal: Positive for arthralgias.   Neurological: Positive for weakness.   All other systems reviewed and are negative.    Objective     Vital Signs   Temp:  [97.2 °F (36.2 °C)-98.1 °F (36.7 °C)] 98.1 °F  (36.7 °C)  Heart Rate:  [] 88  Resp:  [16-18] 17  BP: ()/(60-81) 111/67    Physical Exam:  Physical Exam   Constitutional: He is oriented to person, place, and time.   Eyes: Pupils are equal, round, and reactive to light. EOM are normal.   Neck: Normal range of motion. Neck supple.   Cardiovascular: Normal rate, regular rhythm, normal heart sounds and intact distal pulses.    Pulmonary/Chest: Effort normal and breath sounds normal.   Abdominal: Soft. Bowel sounds are normal. He exhibits distension.   Musculoskeletal: He exhibits edema.   Neurological: He is alert and oriented to person, place, and time.   Skin: Skin is warm and dry.   Psychiatric: He has a normal mood and affect. His behavior is normal.   Nursing note and vitals reviewed.  Results Review:     I reviewed the patient's new clinical results.      Glucose   Date/Time Value Ref Range Status   09/09/2018 0423 117 (H) 65 - 99 mg/dL Final   09/08/2018 0410 135 (H) 65 - 99 mg/dL Final   09/07/2018 0449 135 (H) 65 - 99 mg/dL Final     Lab Results (last 72 hours)     Procedure Component Value Units Date/Time    Needle Stick Pt Source [[id number]] Collected:  09/09/18 0423    Specimen:  Blood Updated:  09/09/18 0533    Narrative:       The following orders were created for panel order Needle Stick Pt Source.  Procedure                               Abnormality         Status                     ---------                               -----------         ------                     HCV RNA, PCR, Qualitative[[id number]]                        In process                 Hepatitis B Surface Antigen[[id number]]  Normal              Final result               HIV-1 / O / 2 Ag / Antib...[[id number]]  Normal              Final result               Hepatitis C Antibody[[id number]]         Normal              Final result                 Please view results for these tests on the individual orders.    Hepatitis B Surface Antigen [[id number]]  (Normal) Collected:   09/09/18 0423    Specimen:  Blood Updated:  09/09/18 0533     Hepatitis B Surface Ag Non-Reactive    Hepatitis C Antibody [[id number]]  (Normal) Collected:  09/09/18 0423    Specimen:  Blood Updated:  09/09/18 0533     Hepatitis C Ab Non-Reactive    POC Glucose Once [[id number]]  (Normal) Collected:  09/09/18 0527    Specimen:  Blood Updated:  09/09/18 0529     Glucose 129 mg/dL     Narrative:       Meter: [id number] : [id number] Almas Shukri NA    HIV-1 / O / 2 Ag / Antibody 4th Generation [[id number]]  (Normal) Collected:  09/09/18 0423    Specimen:  Blood Updated:  09/09/18 0509     HIV-1/ HIV-2 Non-Reactive     HIV-1 P24 Ag Screen Non-Reactive    Renal Function Panel [[id number]]  (Abnormal) Collected:  09/09/18 0423    Specimen:  Blood Updated:  09/09/18 0458     Glucose 117 (H) mg/dL      BUN 33 (H) mg/dL      Creatinine 2.17 (H) mg/dL      Sodium 131 (L) mmol/L      Potassium 4.6 mmol/L      Chloride 96 (L) mmol/L      CO2 24.6 mmol/L      Calcium 7.7 (L) mg/dL      Albumin 1.90 (L) g/dL      Phosphorus 3.8 mg/dL      Anion Gap 10.4 mmol/L      BUN/Creatinine Ratio 15.2     eGFR Non African Amer 31 (L) mL/min/1.73     CBC & Differential [[id number]] Collected:  09/09/18 0423    Specimen:  Blood Updated:  09/09/18 0441    Narrative:       The following orders were created for panel order CBC & Differential.  Procedure                               Abnormality         Status                     ---------                               -----------         ------                     Scan Slide[[id number]]                                                                  CBC Auto Differential[[id number]]        Abnormal            Final result                 Please view results for these tests on the individual orders.    CBC Auto Differential [[id number]]  (Abnormal) Collected:  09/09/18 0423    Specimen:  Blood Updated:  09/09/18 0441     WBC 7.21 10*3/mm3      RBC 4.41 (L) 10*6/mm3      Hemoglobin 10.4 (L) g/dL       Hematocrit 36.0 (L) %      MCV 81.6 fL      MCH 23.6 (L) pg      MCHC 28.9 (L) g/dL      RDW 22.9 (H) %      RDW-SD 67.6 (H) fl      Platelets 86 (L) 10*3/mm3      Neutrophil % 73.9 %      Lymphocyte % 12.1 (L) %      Monocyte % 11.8 %      Eosinophil % 1.8 %      Basophil % 0.4 %      Immature Grans % 0.7 (H) %      Neutrophils, Absolute 5.33 10*3/mm3      Lymphocytes, Absolute 0.87 (L) 10*3/mm3      Monocytes, Absolute 0.85 10*3/mm3      Eosinophils, Absolute 0.13 10*3/mm3      Basophils, Absolute 0.03 10*3/mm3      Immature Grans, Absolute 0.05 (H) 10*3/mm3      nRBC 0.0 /100 WBC     Protime-INR [[id number]]  (Abnormal) Collected:  09/09/18 0423    Specimen:  Blood Updated:  09/09/18 0441     Protime 14.1 Seconds      INR 1.11 (H)    HCV RNA, PCR, Qualitative [[id number]] Collected:  09/09/18 0423    Specimen:  Blood Updated:  09/09/18 0432    POC Glucose Once [[id number]]  (Abnormal) Collected:  09/08/18 1958    Specimen:  Blood Updated:  09/08/18 1959     Glucose 180 (H) mg/dL     Narrative:       Meter: [id number] : [id number] Almas Shukri NA    POC Glucose Once [[id number]]  (Abnormal) Collected:  09/08/18 1600    Specimen:  Blood Updated:  09/08/18 1623     Glucose 131 (H) mg/dL     Narrative:       Meter: [id number] : [id number] Mary Gaytann SYLVESTER    POC Glucose Once [[id number]]  (Normal) Collected:  09/08/18 1148    Specimen:  Blood Updated:  09/08/18 1209     Glucose 116 mg/dL     Narrative:       Meter: [id number] : [id number] Mary Gaytann SYLVESTER    Protime-INR [[id number]]  (Abnormal) Collected:  09/08/18 0658    Specimen:  Blood Updated:  09/08/18 0729     Protime 14.7 (H) Seconds      INR 1.17 (H)    POC Glucose Once [[id number]]  (Normal) Collected:  09/08/18 0546    Specimen:  Blood Updated:  09/08/18 0547     Glucose 125 mg/dL     Narrative:       Meter: [id number] : [id number] Almas WEAVER    Renal Function Panel [[id number]]  (Abnormal) Collected:  09/08/18 0410    Specimen:  Blood Updated:   09/08/18 0455     Glucose 135 (H) mg/dL      BUN 27 (H) mg/dL      Creatinine 1.94 (H) mg/dL      Sodium 133 (L) mmol/L      Potassium 4.3 mmol/L      Chloride 98 mmol/L      CO2 22.2 mmol/L      Calcium 7.4 (L) mg/dL      Albumin 2.00 (L) g/dL      Phosphorus 3.4 mg/dL      Anion Gap 12.8 mmol/L      BUN/Creatinine Ratio 13.9     eGFR Non African Amer 35 (L) mL/min/1.73     Prealbumin [[id number]]  (Abnormal) Collected:  09/08/18 0410    Specimen:  Blood Updated:  09/08/18 0455     Prealbumin 7.4 (L) mg/dL     POC Glucose Once [[id number]]  (Normal) Collected:  09/07/18 1613    Specimen:  Blood Updated:  09/07/18 1626     Glucose 99 mg/dL     Narrative:       Meter: [id number] : [id number] Lisa WEAVER    POC Glucose Once [[id number]]  (Normal) Collected:  09/07/18 1437    Specimen:  Blood Updated:  09/07/18 1439     Glucose 82 mg/dL     Narrative:       Meter: [id number] : [id number] Lee Colvin RN    Transferrin [[id number]]  (Abnormal) Collected:  09/07/18 0449    Specimen:  Blood Updated:  09/07/18 1200     Transferrin 108 (L) mg/dL     Protime-INR [[id number]]  (Abnormal) Collected:  09/07/18 0628    Specimen:  Blood Updated:  09/07/18 0712     Protime 14.4 (H) Seconds      INR 1.14 (H)    POC Glucose Once [[id number]]  (Normal) Collected:  09/07/18 0627    Specimen:  Blood Updated:  09/07/18 0628     Glucose 106 mg/dL     Narrative:       Meter: [id number] : [id number] Anastasia Martin CNA    Renal Function Panel [[id number]]  (Abnormal) Collected:  09/07/18 0449    Specimen:  Blood Updated:  09/07/18 0521     Glucose 135 (H) mg/dL      BUN 33 (H) mg/dL      Creatinine 2.19 (H) mg/dL      Sodium 133 (L) mmol/L      Potassium 4.9 mmol/L      Chloride 100 mmol/L      CO2 22.3 mmol/L      Calcium 7.5 (L) mg/dL      Albumin 1.70 (L) g/dL      Phosphorus 4.1 mg/dL      Anion Gap 10.7 mmol/L      BUN/Creatinine Ratio 15.1     eGFR Non African Amer 31 (L) mL/min/1.73     POC Glucose Once [[id number]]   (Abnormal) Collected:  09/06/18 2041    Specimen:  Blood Updated:  09/06/18 2042     Glucose 193 (H) mg/dL     Narrative:       Meter: [id number] : [id number] Anastasia Martin CNA    POC Glucose Once [[id number]]  (Normal) Collected:  09/06/18 1458    Specimen:  Blood Updated:  09/06/18 1502     Glucose 124 mg/dL     Narrative:       Meter: [id number] : [id number] Lee Espinal RN    POC Glucose Once [[id number]]  (Normal) Collected:  09/06/18 1053    Specimen:  Blood Updated:  09/06/18 1057     Glucose 124 mg/dL     Narrative:       Meter: [id number] : [id number] Wm WEAVER        Imaging Results (last 72 hours)     Procedure Component Value Units Date/Time    MRI Foot Left Without Contrast [[id number]] Collected:  09/06/18 1304     Updated:  09/06/18 1529    Narrative:       LEFT MID AND FOREFOOT MRI WITHOUT CONTRAST     HISTORY: Open wound along the lateral side of the forefoot. Abnormal  x-rays. Evaluate for osteomyelitis.     TECHNIQUE: MRI of the left mid and forefoot was performed without  contrast using standard sequences. This is correlated with x-rays  performed yesterday.     FINDINGS: There has been previous amputation of the distal portions of  the 1st and 2nd metatarsals with only a small portion of the 1st  metatarsal base remaining. The base of the great toe proximal phalanx  has also been previously resected. There is some hypertrophic  ossification along the 3rd metatarsal shaft and advanced osteoarthritic  change at the 3rd metatarsophalangeal joint. There is also an old healed  fracture of the 4th metatarsal neck.     A wound along the plantar lateral aspect of the foot superficial to the  level of the 5th metatarsal head is observed. This measures  approximately 14 mm diameter. It appears that the flexor tendon of the  5th toe may be exposed at the base of the wound or covered by a very  thin layer of tissue. The flexor tendon remains intact. There is a  small, normal volume of  fluid in the 5th metatarsophalangeal joint. No  synovial thickening is appreciated in the joint. Marrow signal in the  5th metatarsal head and in the 5th toe is normal. There is no marrow  signal abnormality in the midfoot, forefoot, or visualized anterior  hindfoot suspicious for osteomyelitis.     Mild degenerative change is observed at the tarsometatarsal joints.  Lisfranc's ligament is intact. The talonavicular and calcaneocuboid  joints and navicular cuneiform joints appear preserved.     Foot musculature is diffusely atrophic. There is substantial  subcutaneous edema around the left foot but there is no focal fluid  collection.       Impression:       1. Soft tissue edema at the left mid and forefoot with an open wound  superficial to the 5th metatarsal head. There is no evidence of abscess  or osteomyelitis.     2. There is postsurgical/posttraumatic change at the metatarsals where  there has been previous partial resection of the 1st and 2nd  metatarsals.     I discussed the case with Dr. Barillas on the day of exam.     This report was finalized on 9/6/2018 3:25 PM by Dr. Mike Bernal M.D.             Medication Review:       Current Facility-Administered Medications:   •  acetaminophen (TYLENOL) suppository 650 mg, 650 mg, Rectal, Q4H PRN, Glen Brasher MD  •  acetaminophen (TYLENOL) tablet 650 mg, 650 mg, Oral, Q4H PRN, Glen Brasher MD, 650 mg at 09/05/18 1903  •  aspirin EC tablet 81 mg, 81 mg, Oral, Daily, Glen Brasher MD, 81 mg at 09/08/18 0905  •  atorvastatin (LIPITOR) tablet 10 mg, 10 mg, Oral, Nightly, Terri Jane MD, 10 mg at 08/17/18 2020  •  bisacodyl (DULCOLAX) EC tablet 10 mg, 10 mg, Oral, Daily PRN, Glen Brasher MD, 10 mg at 08/24/18 0917  •  bisacodyl (DULCOLAX) suppository 10 mg, 10 mg, Rectal, Daily PRN, Glen Brasher MD  •  bisacodyl (DULCOLAX) suppository 10 mg, 10 mg, Rectal, Once, Soraya Wu APRN  •  budesonide-formoterol (SYMBICORT)  160-4.5 MCG/ACT inhaler 2 puff, 2 puff, Inhalation, BID - RT, Soraya Wu, APRN, 2 puff at 09/09/18 0751  •  cephALEXin (KEFLEX) 250 MG/5ML suspension 500 mg, 500 mg, Oral, Q12H, Sammy Barillas MD, 500 mg at 09/08/18 2122  •  cyclobenzaprine (FLEXERIL) tablet 10 mg, 10 mg, Oral, Q8H PRN, Glen Brasher MD, 10 mg at 08/30/18 1229  •  dextrose (D50W) solution 25 g, 25 g, Intravenous, Q15 Min PRN, Glen Brasher MD, 25 g at 08/09/18 1441  •  dextrose (GLUTOSE) oral gel 15 g, 15 g, Oral, Q15 Min PRN, Glen Brasher MD  •  diphenhydrAMINE (BENADRYL) capsule 25 mg, 25 mg, Oral, Q6H PRN, Lina Patiño PA, 25 mg at 08/20/18 1221  •  epoetin roxi (EPOGEN,PROCRIT) injection 10,000 Units, 10,000 Units, Intravenous, Once per day on Mon Wed Fri, Marty Espitia MD, 10,000 Units at 09/07/18 1221  •  ferric gluconate (FERRLECIT) 125 mg in sodium chloride 0.9 % 100 mL IVPB, 125 mg, Intravenous, Once per day on Mon Wed Fri, Marty Espitia MD, Last Rate: 100 mL/hr at 09/07/18 1837, 125 mg at 09/07/18 1837  •  ferrous sulfate tablet 325 mg, 325 mg, Oral, Daily With Breakfast, Lavon Berger MD, 325 mg at 09/08/18 0905  •  fluticasone (FLONASE) 50 MCG/ACT nasal spray 2 spray, 2 spray, Each Nare, Daily, Renetta Hancock APRN, 2 spray at 09/08/18 0905  •  glucagon (human recombinant) (GLUCAGEN DIAGNOSTIC) injection 1 mg, 1 mg, Subcutaneous, PRN, Glen Brasher MD  •  guaiFENesin (MUCINEX) 12 hr tablet 1,200 mg, 1,200 mg, Oral, Q12H, Soraya Wu, IRENE, 1,200 mg at 09/08/18 2122  •  heparin (porcine) 5000 UNIT/ML injection 5,000 Units, 5,000 Units, Intravenous, Take As Directed, Sammy Barillas MD  •  heparin (porcine) injection 3,000 Units, 3,000 Units, Intracatheter, PRN, Marty Espitia MD, 3,000 Units at 09/03/18 1245  •  heparin (porcine) injection 3,000 Units, 3,000 Units, Intracatheter, PRN, Glen Brasher MD, 3,000 Units at 09/05/18 1110  •  Hold medication, 1  each, Does not apply, Continuous PRN, Renetta Hancock, IRENE  •  hydrocortisone 1 % cream, , Topical, Q12H, Lavon Berger MD, 1 application at 18 0905  •  insulin aspart (novoLOG) injection 2-5 Units, 2-5 Units, Subcutaneous, 4x Daily AC & at Bedtime, Trenton Valverde MD  •  insulin aspart (novoLOG) injection 5 Units, 5 Units, Subcutaneous, TID With Meals, Trenton Valverde MD, 5 Units at 18 1739  •  insulin detemir (LEVEMIR) injection 18 Units, 18 Units, Subcutaneous, Nightly, Ata Reza MD, 18 Units at 18 2123  •  ipratropium-albuterol (DUO-NEB) nebulizer solution 3 mL, 3 mL, Nebulization, Q4H PRN, Glen Brasher MD, 3 mL at 18 1315  •  lactulose (CHRONULAC) 10 GM/15ML solution 20 g, 20 g, Oral, Daily, Soraya Wu, APRN, 20 g at 18 1427  •  levothyroxine (SYNTHROID, LEVOTHROID) tablet 50 mcg, 50 mcg, Oral, Q AM, Ata Reza MD, 50 mcg at 18 0637  •  Magnesium Sulfate 2 gram infusion - Mg less than or equal to 1.5 mg/dL, 2 g, Intravenous, PRN **OR** Magesium Sulfate 1 gram infusion - Mg 1.6-1.9 mg/dL, 1 g, Intravenous, PRN, Glen Brasher MD  •  magic mouthwash oral supsension 10 mL, 10 mL, Swish & Spit, Q4H, Glen Brasher MD, 10 mL at 18 0354  •  magnesium hydroxide (MILK OF MAGNESIA) suspension 2400 mg/10mL 10 mL, 10 mL, Oral, Daily PRN, Glen Brasher MD, 10 mL at 18 1833  •  metOLazone (ZAROXOLYN) tablet 5 mg, 5 mg, Oral, Daily, Tl, Sae Kenyon, MD, 5 mg at 18 1334  •  midodrine (PROAMATINE) tablet 5 mg, 5 mg, Oral, TID AC, Sae Boothe MD, 5 mg at 18 1739  •  montelukast (SINGULAIR) tablet 10 mg, 10 mg, Oral, Daily, Soraya Wu APRN, 10 mg at 18 0905  •  [] morphine injection 1 mg, 1 mg, Intravenous, Q4H PRN **AND** naloxone (NARCAN) injection 0.4 mg, 0.4 mg, Intravenous, Q5 Min PRN, Glen Brasher MD  •  nystatin (MYCOSTATIN) 559471 UNIT/ML  suspension 500,000 Units, 5 mL, Swish & Swallow, 4x Daily, Glen Brasher MD, 500,000 Units at 09/08/18 2122  •  nystatin (MYCOSTATIN) powder, , Topical, Q12H, Renetta Hancock APRN  •  ondansetron (ZOFRAN) injection 4 mg, 4 mg, Intravenous, Q6H PRN, Glen Brasher MD, 4 mg at 08/20/18 1823  •  ondansetron (ZOFRAN) tablet 4 mg, 4 mg, Oral, Q6H PRN **OR** ondansetron ODT (ZOFRAN-ODT) disintegrating tablet 4 mg, 4 mg, Oral, Q6H PRN **OR** ondansetron (ZOFRAN) injection 4 mg, 4 mg, Intravenous, Q6H PRN, Javed Huitron MD, 4 mg at 09/08/18 0814  •  ondansetron ODT (ZOFRAN-ODT) disintegrating tablet 4 mg, 4 mg, Oral, Q6H PRN, Oriana Steinberg MD  •  oxyCODONE (ROXICODONE) immediate release tablet 10 mg, 10 mg, Oral, Q4H PRN, Glen Brasher MD, 10 mg at 09/08/18 2123  •  pantoprazole (PROTONIX) EC tablet 40 mg, 40 mg, Oral, Daily, Glen Brasher MD, 40 mg at 09/08/18 0904  •  polyethylene glycol (MIRALAX) powder 17 g, 17 g, Oral, Daily, Soraya Wu APRN, Stopped at 09/07/18 0908  •  potassium chloride (MICRO-K) CR capsule 40 mEq, 40 mEq, Oral, PRN, 40 mEq at 08/06/18 2352 **OR** potassium chloride (KLOR-CON) packet 40 mEq, 40 mEq, Oral, PRN, Glen Brasher MD  •  potassium chloride 10 mEq in 100 mL IVPB, 10 mEq, Intravenous, Q1H PRN **OR** potassium chloride 10 mEq in 100 mL IVPB, 10 mEq, Intravenous, Q1H PRN, Glen Brasher MD  •  promethazine (PHENERGAN) tablet 12.5 mg, 12.5 mg, Oral, Q6H PRN **OR** promethazine (PHENERGAN) injection 12.5 mg, 12.5 mg, Intravenous, Q6H PRN, Glen Brasher MD  •  Scopolamine (TRANSDERM-SCOP) 1.5 MG/3DAYS patch 1 patch, 1 patch, Transdermal, Q72H, Renetta Hancock APRN, 1 patch at 09/03/18 1607  •  sennosides-docusate sodium (SENOKOT-S) 8.6-50 MG tablet 2 tablet, 2 tablet, Oral, Nightly, Glen Brasher MD, 2 tablet at 09/08/18 2123  •  silver sulfadiazine (SILVADENE, SSD) 1 % cream, , Topical, Q24H, Sammy Barillas MD  •  sodium  chloride (OCEAN) nasal spray 2 spray, 2 spray, Each Nare, PRN, Glen Brasher MD  •  sodium chloride 0.9 % infusion, 9 mL/hr, Intravenous, Continuous PRN, Satnam Arguello MD, Stopped at 09/06/18 1642  •  sodium chloride 0.9 % infusion, 9 mL/hr, Intravenous, Continuous PRN, Roseanne Sarkar MD  •  sucralfate (CARAFATE) 1 GM/10ML suspension 1 g, 1 g, Oral, Q6H, Juan Sanz MD, 1 g at 09/09/18 0637  •  torsemide (DEMADEX) tablet 100 mg, 100 mg, Oral, Daily, Donnell Madison MD, 100 mg at 09/08/18 0904  •  zolpidem (AMBIEN) tablet 10 mg, 10 mg, Oral, Nightly PRN, Roslyn Jimenez MD, 10 mg at 09/08/18 2253    Assessment/Plan     Patient Active Problem List   Diagnosis   • Permanent atrial fibrillation (CMS/[facility])   • Right heart failure   • Pulmonary HTN   • Type 2 diabetes mellitus with diabetic peripheral angiopathy without gangrene, without long-term current use of insulin (CMS/[facility])   • Obstructive sleep apnea   • Nonrheumatic aortic valve stenosis   • Non-rheumatic tricuspid valve insufficiency   • CKD (chronic kidney disease) stage 3, GFR 30-59 ml/min   • Generalized edema   • Hyperkalemia   • Pulmonary hypertension   • Acute on chronic systolic congestive heart failure (CMS/[facility])   • Hypoalbuminemia   • Protein malnutrition (CMS/[facility])   • Type 2 diabetes mellitus with complication, with long-term current use of insulin (CMS/[facility])   • Primary hypothyroidism   • End stage renal disease (CMS/[facility])   • Thrombocytopenia (CMS/[facility])   Uncontrolled type 2 diabetes mellitus  Uncontrolled type 2 diabetes mellitus with severe peripheral neuropathy  Patient denied any knowledge of diabetic retinopathy  Uncontrolled type 2 diabetes mellitus with nephropathy  Patient has chronic kidney disease  Multiple amputations of the toes in the right foot and 1 toe inThe left foot  Status post aortic valve replacement and tricuspid valve repair     Patient's blood sugars have been in the  range for the most part  MAR  "suggests that he missed nearly 45 scheduled doses of NovoLog at mealtime out of 88  The nurse taking care of the patient today also reports that patient frequently refuses the mealtime insulin 8 units saying that it is too high'     His insulin dose was adjusted yesterday  Levemir is currently once at night 18 units only     I discussed with the patient and family at the bedside  He is willing to try at least 5 units of NovoLog with meal and the dose will be held if the blood sugars less than 100  He will also be on a very low-dose sliding scale which starts at 200 mg blood sugar and will receive between 2-4 units only  He will continue the Levemir 18 units at night  I advised the patient to consider keeping the blood sugars below 200 for optimal tissue healing and recovery    Overnight BG stable  Tolerated the current regimen of Levemir 18 hs and Novolog 5 tidac with scale  Has not refused the doses yesterday and BG stable  He is tranferring to Rehab today  Recommend continued monitoring and patient to adjust the insulin as deemed appropriate to keep the Bg less than 200  Patient and wife verbalized understanding    Follow-up as congruent Dr. Reza  The total floor time spent  was more than 35 min of which greater than 20 min of time ( greater than 50% of the total time )  was spent face to face with the patient counseling and coordination of care on recommended evaluation and treatment options, instructions for management/treatment and /or follow up  and importance of compliance with chosen management or treatment options  Trenton Valverde MD FACE.  09/09/18  8:53 AM      EMR Dragon / transcription disclaimer:     \"Dictated utilizing Dragon dictation\".   "

## 2018-09-09 NOTE — DISCHARGE SUMMARY
Patient Name: Bj Duarte  :1955  63 y.o.    Date of Admit: 2018  Date of Discharge:  2018    Discharge Diagnosis:  Problem List Items Addressed This Visit        Cardiovascular and Mediastinum    Pulmonary hypertension    Relevant Medications    Hold medication    Other Relevant Orders    Cardiac Catheterization/Vascular Study (Completed)    Case Request Cath Lab: Right Heart Cath with adenosine challenge (Completed)    Cardiac Catheterization/Vascular Study (Completed)    Acute on chronic systolic congestive heart failure (CMS/HCC)    Relevant Medications    midodrine (PROAMATINE) 5 MG tablet    Other Relevant Orders    Cardiac Catheterization/Vascular Study (Completed)    Nonrheumatic aortic valve stenosis    Relevant Medications    fluconazole (DIFLUCAN) tablet 150 mg (Completed)    midodrine (PROAMATINE) 5 MG tablet    Other Relevant Orders    Cardiac Catheterization/Vascular Study (Completed)    Case Request (Completed)    Tissue Pathology Exam (Completed)    Non-rheumatic tricuspid valve insufficiency - Primary    Relevant Medications    midodrine (PROAMATINE) 5 MG tablet    Other Relevant Orders    Case Request (Completed)    Tissue Pathology Exam (Completed)       Respiratory    Obstructive sleep apnea    Relevant Medications    fluticasone (FLONASE) 50 MCG/ACT nasal spray 2 spray    fluticasone (FLONASE) 50 MCG/ACT nasal spray       Other    Generalized edema      Other Visit Diagnoses     Persistent atrial fibrillation (CMS/HCC)        Relevant Medications    warfarin (COUMADIN) tablet 5 mg (Completed)    fluconazole (DIFLUCAN) tablet 200 mg (Completed)    fluconazole (DIFLUCAN) tablet 150 mg (Completed)    torsemide (DEMADEX) tablet 100 mg    torsemide (DEMADEX) 100 MG tablet    midodrine (PROAMATINE) 5 MG tablet    Nausea        Relevant Medications    Scopolamine (TRANSDERM-SCOP) 1.5 MG/3DAYS patch 1 patch          Hospital Course:     1.  Aortic stenosis.  Status post aortic valve  replacement with a 25 mm Medtronic bovine pericardial valve performed on July 26, 2018.  2.  Tricuspid regurgitation.  Status post tricuspid valve repair with a ring on July 26, 2018.  3.  Left atrial appendage resection  4.  Pulmonary hypertension with right heart failure.  Right heart catheterization performed on August 9 showed severe pulmonary hypertension with a pulmonary artery pressure of 84/32 with a wedge of approximately 18.  His pressures did not decline with vasodilator challenge.  Pulmonary has seen him and they do not recommend treatment with Revatio.  I spoke with Waqas Johnson he recommends ultrafiltration until the patient is as dry as possible and then try sildenafil. He said the usual dose is 20 three times a day but he likes to try to get them to 50 three times a day if possible.  It may lower his blood pressure further though.  He is going to continue to get dialysis with ultrafiltration as an outpatient.  He has not yet euvolemic.  I would like him to follow up with Dr. Johnson.  5.  Atrial fibrillation.  Rate controlled.  He was seen by Dr. Salgado regarding low platelets.  I agree with the recommendation that the patient not be on anticoagulation at this time.  6.  Acute on chronic kidney disease.  Dr. Olivares is following.  HD Mon, Wed, Fri  7.  Hyponatremia.  Stable  8.  Anemia.   9. Diabetes.  Endocrinology is following.  10.  Scrotal and penile edema.  Urology recommends we continue the Omalley catheter for now  11.  Hypotension.  On midodrine.  Off blood pressure lowering medications.  12.  Moderate pericardial effusion.  No evidence of tamponade physiology on echo.  13.  Hypoalbuminemia  14.  Thrombocytopenia.   Seen by hematology.  They think this is likely due to splenic and hepatic congestion.  15.  Hypothyroid.  Started on replacement.  16.  Diabetic foot wounds.  He went to the operating room on September 6 and had bilateral debridement of both feet.  Vascular surgery is  following.    I spoke with the daughter again. The daughter wants him discharged today. The pts sister is acting belligerent and the RNs feel intimidated by her. Security has been called on her. Medically I think he's stable for DC especially as he's going to rehab and all other MDs have signed off on it. And, since the family is so adamant he leave today, I will discharge him to rehab.      Procedures Performed  Procedure(s):  BILATERAL FOOT DEBRIDEMENT       Consults     Date and Time Order Name Status Description    9/5/2018 1433 Inpatient Vascular Surgery Consult Completed     8/28/2018 1059 Hematology & Oncology Inpatient Consult Completed     8/27/2018 1030 Inpatient Vascular Surgery Consult Completed     8/22/2018 1544 Inpatient Gastroenterology Consult Completed     8/20/2018 0933 Inpatient Vascular Surgery Consult Completed     8/20/2018 0030 Inpatient Internal Medicine Consult      8/12/2018 1816 Inpatient Podiatry Consult Completed     8/9/2018 1713 Inpatient Pulmonology Consult Completed     8/1/2018 0846 Inpatient Urology Consult Completed     7/29/2018 1200 Inpatient Endocrinology Consult Completed     7/28/2018 1514 Inpatient Nephrology Consult Completed     6/23/2018 1252 Inpatient Gastroenterology Consult Completed     6/23/2018 1250 Inpatient Cardiology Consult Completed     6/22/2018 2016 Inpatient Neurology Consult Completed     6/22/2018 1718 JOSÉ MIGUEL (on-call MD unless specified) Completed           Pertinent Test Results:     Results from last 7 days  Lab Units 09/09/18  0423   SODIUM mmol/L 131*   POTASSIUM mmol/L 4.6   CHLORIDE mmol/L 96*   CO2 mmol/L 24.6   BUN mg/dL 33*   CREATININE mg/dL 2.17*   CALCIUM mg/dL 7.7*   GLUCOSE mg/dL 117*           Results from last 7 days  Lab Units 09/09/18  0423   WBC 10*3/mm3 7.21   HEMOGLOBIN g/dL 10.4*   HEMATOCRIT % 36.0*   PLATELETS 10*3/mm3 86*       Results from last 7 days  Lab Units 09/09/18  0423 09/08/18  0658 09/07/18  0628   INR  1.11* 1.17*  1.14*               Condition on Discharge: stable    Discharge Medications     Discharge Medications      New Medications      Instructions Start Date   aspirin 81 MG EC tablet   81 mg, Oral, Daily      ferrous sulfate 325 (65 FE) MG tablet   325 mg, Oral, Daily With Breakfast      fluticasone 50 MCG/ACT nasal spray  Commonly known as:  FLONASE   2 sprays, Each Nare, Daily      levothyroxine 50 MCG tablet  Commonly known as:  SYNTHROID, LEVOTHROID   50 mcg, Oral, Daily      midodrine 5 MG tablet  Commonly known as:  PROAMATINE   5 mg, Oral, 3 Times Daily Before Meals      oxyCODONE 10 MG tablet  Commonly known as:  ROXICODONE   10 mg, Oral, Every 4 Hours PRN         Changes to Medications      Instructions Start Date   insulin aspart 100 UNIT/ML injection  Commonly known as:  novoLOG  What changed:  · how much to take  · when to take this   8 Units, Subcutaneous, 3 Times Daily With Meals      insulin detemir 100 UNIT/ML injection  Commonly known as:  LEVEMIR  What changed:  how much to take   18 Units, Subcutaneous, Nightly         Continue These Medications      Instructions Start Date   budesonide-formoterol 160-4.5 MCG/ACT inhaler  Commonly known as:  SYMBICORT   2 puffs, Inhalation, 2 Times Daily - RT      guaiFENesin 600 MG 12 hr tablet  Commonly known as:  MUCINEX   600 mg, Oral, 2 Times Daily      montelukast 10 MG tablet  Commonly known as:  SINGULAIR   10 mg, Oral, Daily      pantoprazole 40 MG EC tablet  Commonly known as:  PROTONIX   40 mg, Oral, Daily      torsemide 100 MG tablet  Commonly known as:  DEMADEX   100 mg, Oral, Daily      zolpidem 10 MG tablet  Commonly known as:  AMBIEN   TK 1 T PO QD HS         Stop These Medications    carvedilol 12.5 MG tablet  Commonly known as:  COREG     doxazosin 2 MG tablet  Commonly known as:  CARDURA     potassium chloride 20 MEQ CR tablet  Commonly known as:  K-DUR,KLOR-CON     spironolactone 100 MG tablet  Commonly known as:  ALDACTONE     tiZANidine 4 MG  tablet  Commonly known as:  ZANAFLEX            Discharge Diet:   Diet Instructions     Diet: Consistent Carbohydrate, Renal, Cardiac; Thin       Discharge Diet:   Consistent Carbohydrate  Renal  Cardiac       Fluid Consistency:  Thin          Activity at Discharge:   Activity Instructions     Activity as Tolerated             Discharge disposition: home    Follow-up Appointments  Future Appointments  Date Time Provider Department Center   12/5/2018 11:00 AM Glen Brasher MD [facility] None     Additional Instructions for the Follow-ups that You Need to Schedule     Discharge Follow-up with Specified Provider: Dr Steinberg in 4 weeks.    As directed      To:  Dr Steinberg in 4 weeks.               Test Results Pending at Discharge   Order Current Status    HCV RNA, PCR, Qualitative In process    Needle Stick Pt Source In process           Oriana Steinberg MD, [facility]  09/09/18  2:13 PM    Time: Discharge 60 min

## 2018-09-09 NOTE — DISCHARGE INSTR - ACTIVITY
Continue to use your incentive spirometer for an additional 2 weeks.   Continue to wear your ROSA MARIA hose for an additional 2 weeks. You may remove them at night.   Walk 10 minutes at a time at least 3 times a day.   Do not drive for 2 weeks and no longer taking narcotics.   Do not lift, push or pull greater than 10 pounds for 6 weeks.   You may shower, but do not submerge your incisions until your surgeon approves (i.e., no baths, pools, hot tubs, etc.).   Clean your midsternal incision daily in the shower with Dial or Ivory soap. Do not put any additional lotions, creams, or any other substance on your incision without your surgeon's approval.    Dressing change to bilateral feet daily. Clean with normal saline, apply silvadene cream, Telfa dressing to both, kerlex and ace wraps.  Waffle boots in place at all time.  Dialysis MWF  Tunnel cath dressing due 9/12   Leave ojeda catheter until appointment with Dr. Ortiz

## 2018-09-09 NOTE — PROGRESS NOTES
"Patient Name: Bj Duarte  :1955  63 y.o.      Patient Care Team:  Gus Cordova MD as PCP - General (Internal Medicine)  Alexandria Benitez MD as Consulting Physician (Nephrology)  Mike Johnson MD as Consulting Physician (Pulmonary Disease)  Oriana Steinberg MD as Consulting Physician (Cardiology)    Interval History:   Spoken with the patient about his current status.  His daughter contacted me through social media.  I have also spoken with Dr. Martinez regarding bringing a podiatrist in.  The podiatrist in question has been offered the ability to come in and see the patient but has declined.    Subjective:  Following for right heart failure, aortic valve disease     Objective   Vital Signs  Temp:  [97.2 °F (36.2 °C)-98.1 °F (36.7 °C)] 98.1 °F (36.7 °C)  Heart Rate:  [] 88  Resp:  [16-18] 17  BP: ()/(60-81) 111/67    Intake/Output Summary (Last 24 hours) at 18 1135  Last data filed at 18 0814   Gross per 24 hour   Intake              660 ml   Output              825 ml   Net             -165 ml     Flowsheet Rows      First Filed Value   Admission Height  185.4 cm (73\") Documented at 2018 1111   Admission Weight  125 kg (274 lb 9 oz) Documented at 2018 1111          Physical Exam:   General Appearance:    Alert, cooperative, in no acute distress   Lungs:     Clear to auscultation.  Normal respiratory effort and rate.      Heart:    Irregularly irregular    Chest Wall:    No abnormalities observed   Abdomen:     Soft, nontender, positive bowel sounds.     Extremities:   Generalized edema though improved.       Results Review:      Results from last 7 days  Lab Units 18  0423   SODIUM mmol/L 131*   POTASSIUM mmol/L 4.6   CHLORIDE mmol/L 96*   CO2 mmol/L 24.6   BUN mg/dL 33*   CREATININE mg/dL 2.17*   GLUCOSE mg/dL 117*   CALCIUM mg/dL 7.7*           Results from last 7 days  Lab Units 18  0423   WBC 10*3/mm3 7.21   HEMOGLOBIN g/dL 10.4* "   HEMATOCRIT % 36.0*   PLATELETS 10*3/mm3 86*       Results from last 7 days  Lab Units 09/09/18  0423 09/08/18  0658 09/07/18  0628   INR  1.11* 1.17* 1.14*                     Medication Review:     aspirin 81 mg Oral Daily   atorvastatin 10 mg Oral Nightly   bisacodyl 10 mg Rectal Once   budesonide-formoterol 2 puff Inhalation BID - RT   cephALEXin 500 mg Oral Q12H   epoetin roxi 10,000 Units Intravenous Once per day on Mon Wed Fri   sodium ferric gluconate complex IVPB in 100 mL  mg Intravenous Once per day on Mon Wed Fri   ferrous sulfate 325 mg Oral Daily With Breakfast   fluticasone 2 spray Each Nare Daily   guaiFENesin 1,200 mg Oral Q12H   heparin (porcine) 5,000 Units Intravenous Take As Directed   hydrocortisone  Topical Q12H   insulin aspart 2-5 Units Subcutaneous 4x Daily AC & at Bedtime   insulin aspart 5 Units Subcutaneous TID With Meals   insulin detemir 18 Units Subcutaneous Nightly   lactulose 20 g Oral Daily   levothyroxine 50 mcg Oral Q AM   magic mouthwash 10 mL Swish & Spit Q4H   metOLazone 5 mg Oral Daily   midodrine 5 mg Oral TID AC   montelukast 10 mg Oral Daily   nystatin 5 mL Swish & Swallow 4x Daily   nystatin  Topical Q12H   pantoprazole 40 mg Oral Daily   polyethylene glycol 17 g Oral Daily   Scopolamine 1 patch Transdermal Q72H   sennosides-docusate sodium 2 tablet Oral Nightly   silver sulfadiazine  Topical Q24H   sucralfate 1 g Oral Q6H   torsemide 100 mg Oral Daily          hold 1 each    sodium chloride 9 mL/hr Last Rate: Stopped (09/06/18 1642)   sodium chloride 9 mL/hr        Assessment/Plan     1.  Aortic stenosis.  Status post aortic valve replacement with a 25 mm Medtronic bovine pericardial valve performed on July 26, 2018.  2.  Tricuspid regurgitation.  Status post tricuspid valve repair with a ring on July 26, 2018.  3.  Left atrial appendage resection  4.  Pulmonary hypertension with right heart failure.  Right heart catheterization performed on August 9 showed severe  pulmonary hypertension with a pulmonary artery pressure of 84/32 with a wedge of approximately 18.  His pressures did not decline with vasodilator challenge.  Pulmonary has seen him and they do not recommend treatment with Revatio.  I spoke with Waqas Johnson he recommends ultrafiltration until the patient is as dry as possible and then try sildenafil. He said the usual dose is 20 three times a day but he likes to try to get them to 50 three times a day if possible.  It may lower his blood pressure further though.  He is going to continue to get dialysis with ultrafiltration as an outpatient.  He has not yet euvolemic.  I would like him to follow up with Dr. Johnson.  5.  Atrial fibrillation.  Rate controlled.  He was seen by Dr. Salgado regarding low platelets.  I agree with the recommendation that the patient not be on anticoagulation at this time.  6.  Acute on chronic kidney disease.  Dr. Olivares is following.  HD Mon, Wed, Fri  7.  Hyponatremia.  Stable  8.  Anemia.   9. Diabetes.  Endocrinology is following.  10.  Scrotal and penile edema.  Urology recommends we continue the Omalley catheter for now  11.  Hypotension.  On midodrine.  Off blood pressure lowering medications.  12.  Moderate pericardial effusion.  No evidence of tamponade physiology on echo.  13.  Hypoalbuminemia  14.  Thrombocytopenia.   Seen by hematology.  They think this is likely due to splenic and hepatic congestion.  15.  Hypothyroid.  Started on replacement.  16.  Diabetic foot wounds.  He went to the operating room on September 6 and had bilateral debridement of both feet.  Vascular surgery is following.    - I have spoken with liliana Martinez regarding his family's concerns.  I think it would be a big mistake to move him to [facility] given his complicated medical issues.  The vascular surgeons here are more than capable of handling his foot wounds.  - Continue with hemodialysis and monitoring until all specialists feel he is stable for  rehabilitation.  - I have expressed to him the constant vigilance needed from himself and his family especially while he is at rehabilitation and the need to contact myself or Dr. Olivares if he suspects he is having any problems.    Oriana Steinberg MD, [facility]  09/09/18  11:35 AM

## 2018-09-09 NOTE — DISCHARGE INSTR - LAB
Follow up with Dr. Earl in 3-4 weeks  Follow up with Dr. Sanz as needed for recurrent symptoms  Follow up with Dr. Olivares at rehab for dialysis orders  Follow up with Dr. Reza in 4 weeks

## 2018-09-09 NOTE — PROGRESS NOTES
"   LOS: 46 days    Patient Care Team:  Gus Cordova MD as PCP - General (Internal Medicine)  Alexandria Benitez MD as Consulting Physician (Nephrology)  Mike Johnson MD as Consulting Physician (Pulmonary Disease)  Oriana Steinberg MD as Consulting Physician (Cardiology)    Chief Complaint:  ESRD    Subjective     Interval History:   Feels well.  S/P HD yesterday.  No N/V/D.  No other complaints.    Objective     Vital Sign Min/Max for last 24 hours  Temp  Min: 97.2 °F (36.2 °C)  Max: 98.1 °F (36.7 °C)   BP  Min: 99/61  Max: 119/69   Pulse  Min: 84  Max: 104   Resp  Min: 16  Max: 18   SpO2  Min: 92 %  Max: 100 %   No Data Recorded   No Data Recorded     Flowsheet Rows      First Filed Value   Admission Height  185.4 cm (73\") Documented at 07/25/2018 1111   Admission Weight  125 kg (274 lb 9 oz) Documented at 07/25/2018 1111          I/O this shift:  In: 300 [P.O.:300]  Out: -   I/O last 3 completed shifts:  In: 930 [P.O.:930]  Out: 825 [Urine:825]    Physical Exam:  GEN: Awake, NAD  ENT: PERRL, EOMI, MMM  NECK: Supple, no JVD  CHEST: CTAB, no W/R/C  CV: RRR, no M/G/R  ABD: Soft, NT, +BS  SKIN: Warm and Dry    WBC WBC   Date Value Ref Range Status   09/09/2018 7.21 4.50 - 10.70 10*3/mm3 Final      HGB Hemoglobin   Date Value Ref Range Status   09/09/2018 10.4 (L) 13.7 - 17.6 g/dL Final      HCT Hematocrit   Date Value Ref Range Status   09/09/2018 36.0 (L) 40.4 - 52.2 % Final      Platlets No results found for: LABPLAT   MCV MCV   Date Value Ref Range Status   09/09/2018 81.6 79.8 - 96.2 fL Final          Sodium Sodium   Date Value Ref Range Status   09/09/2018 131 (L) 136 - 145 mmol/L Final   09/08/2018 133 (L) 136 - 145 mmol/L Final   09/07/2018 133 (L) 136 - 145 mmol/L Final      Potassium Potassium   Date Value Ref Range Status   09/09/2018 4.6 3.5 - 5.2 mmol/L Final   09/08/2018 4.3 3.5 - 5.2 mmol/L Final   09/07/2018 4.9 3.5 - 5.2 mmol/L Final      Chloride Chloride   Date Value Ref Range Status "   09/09/2018 96 (L) 98 - 107 mmol/L Final   09/08/2018 98 98 - 107 mmol/L Final   09/07/2018 100 98 - 107 mmol/L Final      CO2 CO2   Date Value Ref Range Status   09/09/2018 24.6 22.0 - 29.0 mmol/L Final   09/08/2018 22.2 22.0 - 29.0 mmol/L Final   09/07/2018 22.3 22.0 - 29.0 mmol/L Final      BUN BUN   Date Value Ref Range Status   09/09/2018 33 (H) 8 - 23 mg/dL Final   09/08/2018 27 (H) 8 - 23 mg/dL Final   09/07/2018 33 (H) 8 - 23 mg/dL Final      Creatinine Creatinine   Date Value Ref Range Status   09/09/2018 2.17 (H) 0.76 - 1.27 mg/dL Final   09/08/2018 1.94 (H) 0.76 - 1.27 mg/dL Final   09/07/2018 2.19 (H) 0.76 - 1.27 mg/dL Final      Calcium Calcium   Date Value Ref Range Status   09/09/2018 7.7 (L) 8.6 - 10.5 mg/dL Final   09/08/2018 7.4 (L) 8.6 - 10.5 mg/dL Final   09/07/2018 7.5 (L) 8.6 - 10.5 mg/dL Final      PO4 No results found for: CAPO4   Albumin Albumin   Date Value Ref Range Status   09/09/2018 1.90 (L) 3.50 - 5.20 g/dL Final   09/08/2018 2.00 (L) 3.50 - 5.20 g/dL Final   09/07/2018 1.70 (L) 3.50 - 5.20 g/dL Final      Magnesium No results found for: MG   Uric Acid No results found for: URICACID        Results Review:     I reviewed the patient's new clinical results.      aspirin 81 mg Oral Daily   atorvastatin 10 mg Oral Nightly   bisacodyl 10 mg Rectal Once   budesonide-formoterol 2 puff Inhalation BID - RT   cephALEXin 500 mg Oral Q12H   epoetin roxi 10,000 Units Intravenous Once per day on Mon Wed Fri   sodium ferric gluconate complex IVPB in 100 mL  mg Intravenous Once per day on Mon Wed Fri   ferrous sulfate 325 mg Oral Daily With Breakfast   fluticasone 2 spray Each Nare Daily   guaiFENesin 1,200 mg Oral Q12H   heparin (porcine) 5,000 Units Intravenous Take As Directed   hydrocortisone  Topical Q12H   insulin aspart 2-5 Units Subcutaneous 4x Daily AC & at Bedtime   insulin aspart 5 Units Subcutaneous TID With Meals   insulin detemir 18 Units Subcutaneous Nightly   lactulose 20 g Oral  Daily   levothyroxine 50 mcg Oral Q AM   magic mouthwash 10 mL Swish & Spit Q4H   metOLazone 5 mg Oral Daily   midodrine 5 mg Oral TID AC   montelukast 10 mg Oral Daily   nystatin 5 mL Swish & Swallow 4x Daily   nystatin  Topical Q12H   pantoprazole 40 mg Oral Daily   polyethylene glycol 17 g Oral Daily   Scopolamine 1 patch Transdermal Q72H   sennosides-docusate sodium 2 tablet Oral Nightly   silver sulfadiazine  Topical Q24H   sucralfate 1 g Oral Q6H   torsemide 100 mg Oral Daily       hold 1 each    sodium chloride 9 mL/hr Last Rate: Stopped (09/06/18 1642)   sodium chloride 9 mL/hr        Medication Review: Reviewed    Assessment/Plan     Active Problems:    Obstructive sleep apnea    Nonrheumatic aortic valve stenosis    Non-rheumatic tricuspid valve insufficiency    Pulmonary hypertension    Acute on chronic systolic congestive heart failure (CMS/HCC)    Hypoalbuminemia    Protein malnutrition (CMS/HCC)    Type 2 diabetes mellitus with complication, with long-term current use of insulin (CMS/HCC)    Primary hypothyroidism    End stage renal disease (CMS/HCC)    Thrombocytopenia (CMS/HCC)    Edema    PLAN: Continue HD MWF.  Patient with significant edema.  On Torsemide.  Added scheduled Metolazone.  UF as tolerated with HD.        Sae Boothe MD   [facility]  09/09/18  9:03 AM

## 2018-09-09 NOTE — PROGRESS NOTES
Name: Bj Duarte ADMIT: 2018   : 1955  PCP: Gus Cordova MD    MRN: [id number] LOS: 46 days   AGE/SEX: 63 y.o. male  ROOM: [facility]     Active Hospital Problems    Diagnosis Date Noted   • Thrombocytopenia (CMS/[facility]) [D69.6] 2018   • End stage renal disease (CMS/[facility]) [N18.6] 2018   • Primary hypothyroidism [E03.9] 08/15/2018   • Type 2 diabetes mellitus with complication, with long-term current use of insulin (CMS/[facility]) [E11.8, Z79.4] 2018   • Protein malnutrition (CMS/[facility]) [E46] 2018   • Hypoalbuminemia [E88.09] 2018   • Pulmonary hypertension [I27.20] 2018   • Acute on chronic systolic congestive heart failure (CMS/[facility]) [I50.23] 2018   • Nonrheumatic aortic valve stenosis [I35.0] 11/15/2017   • Non-rheumatic tricuspid valve insufficiency [I36.1] 11/15/2017   • Obstructive sleep apnea [G47.33] 11/15/2017      Resolved Hospital Problems    Diagnosis Date Noted Date Resolved   No resolved problems to display.     Subjective     63 y.o. male with new hemodialysis dependence after aortic valve replacement, new finding of left lateral 5th MTH foot wound now s/p debridement.     No events overnight    Review of Systems  No complaints  Objective     Vital Signs  Temp:  [97.2 °F (36.2 °C)-98.1 °F (36.7 °C)] 98.1 °F (36.7 °C)  Heart Rate:  [] 88  Resp:  [16-18] 17  BP: ()/(60-81) 111/67    Physical Exam  NAD, A/O x 4  Left foot wound approx 2.5 x 2.5 cm, dark discoloration, no exposed tendon or bone, no bleeding at the skin edges.     Right foot wound with no bleeding, healthy granulation, has decreased in size since sugery, now approx 4x5 mm  Bilateral swelling improved with ACE wraps     Results Review:       I reviewed the patient's new clinical results.    Results from last 7 days  Lab Units 18  0423 18  0509   WBC 10*3/mm3 7.21 7.59 8.77   HEMOGLOBIN g/dL 10.4* 9.8* 10.3*   PLATELETS 10*3/mm3 86* 50* 60*       Results from  last 7 days  Lab Units 09/09/18  0423 09/08/18  0410 09/07/18  0449 09/06/18  0323 09/05/18  0627 09/04/18  0504 09/03/18  0509   SODIUM mmol/L 131* 133* 133* 135* 134* 133* 133*   POTASSIUM mmol/L 4.6 4.3 4.9 4.3 4.7 4.7 5.3*   CHLORIDE mmol/L 96* 98 100 99 99 99 100   CO2 mmol/L 24.6 22.2 22.3 24.1 24.6 22.1 22.8   BUN mg/dL 33* 27* 33* 27* 36* 30* 40*   CREATININE mg/dL 2.17* 1.94* 2.19* 1.89* 2.16* 1.86* 2.16*   GLUCOSE mg/dL 117* 135* 135* 126* 91 173* 148*   Estimated Creatinine Clearance: 50.5 mL/min (A) (by C-G formula based on SCr of 2.17 mg/dL (H)).    Results from last 7 days  Lab Units 09/09/18  0423 09/08/18  0410 09/07/18  0449 09/06/18  0323 09/05/18  0627 09/04/18  0504 09/03/18  0509   CALCIUM mg/dL 7.7* 7.4* 7.5* 7.9* 7.7* 7.8* 7.7*   ALBUMIN g/dL 1.90* 2.00* 1.70* 1.90* 2.10* 2.00* 2.10*   PHOSPHORUS mg/dL 3.8 3.4 4.1 3.7 3.7 3.0 2.9       Assessment/Plan           Active Hospital Problems    Diagnosis Date Noted   • Thrombocytopenia (CMS/H[facility]) [D69.6] 08/31/2018   • End stage renal disease (CMS/[facility]) [N18.6] 08/28/2018   • Primary hypothyroidism [E03.9] 08/15/2018   • Type 2 diabetes mellitus with complication, with long-term current use of insulin (CMS/[facility]) [E11.8, Z79.4] 08/04/2018   • Protein malnutrition (CMS/H[facility]) [E46] 08/01/2018   • Hypoalbuminemia [E88.09] 07/30/2018   • Pulmonary hypertension [I27.20] 07/20/2018   • Acute on chronic systolic congestive heart failure (CMS/HCC) [I50.23] 07/20/2018   • Nonrheumatic aortic valve stenosis [I35.0] 11/15/2017   • Non-rheumatic tricuspid valve insufficiency [I36.1] 11/15/2017   • Obstructive sleep apnea [G47.33] 11/15/2017      Resolved Hospital Problems    Diagnosis Date Noted Date Resolved   No resolved problems to display.        Assessment & Plan  63 y.o. male with new left foot wound, chronic right foot wound, POD # 4 s/p debridement of both.     The left foot wound subcutaneous tissues have progressed to dry gangrene. Silvadene dressings to  these and I did the dressing change today. Waffle boots are on.    His tibial vessels are calcified on x-ray as well.  Due to his post op state, low albumin, diabetic neuropathy, and history of surgery for complications of diabetes in the left foot, he has been at high risk for wound complications and is at elevated risk for further tissue loss including amputation below the knee.    His daughter is interested in having him transferred to rehab and evaluated by Dr. Chaka Gusman, a podiatrist who has treated him for approximately the last year and performed several procedures according to the patient.  I will be available to continue care for his foot wound in any capacity to the family desires.    I will see him in 3 weeks to discuss creation of arteriovenous fistula as long as he is still dialysis dependent.  Continue with no IV sticks or lab draws to the left arm.  I discussed this with the patient.  His platelet count is improving.       Sammy Barillas MD  09/09/18   9:27 AM  Office Number [phone]

## 2018-09-09 NOTE — PLAN OF CARE
Problem: Patient Care Overview  Goal: Plan of Care Review  Outcome: Ongoing (interventions implemented as appropriate)   09/09/18 0530   Coping/Psychosocial   Plan of Care Reviewed With patient   Plan of Care Review   Progress improving   OTHER   Outcome Summary Room Air. Repositioned q2hrs Vital Signs Stable. Will continue to monitor.       Problem: Fall Risk (Adult)  Goal: Absence of Fall  Outcome: Ongoing (interventions implemented as appropriate)      Problem: Cardiac Surgery (Adult)  Goal: Signs and Symptoms of Listed Potential Problems Will be Absent, Minimized or Managed (Cardiac Surgery)  Outcome: Ongoing (interventions implemented as appropriate)      Problem: Cardiac: Heart Failure (Adult)  Goal: Signs and Symptoms of Listed Potential Problems Will be Absent, Minimized or Managed (Cardiac: Heart Failure)  Outcome: Ongoing (interventions implemented as appropriate)      Problem: Skin Injury Risk (Adult)  Goal: Skin Health and Integrity  Outcome: Ongoing (interventions implemented as appropriate)

## 2018-09-09 NOTE — PLAN OF CARE
Problem: Patient Care Overview  Goal: Plan of Care Review   09/09/18 1028   Coping/Psychosocial   Plan of Care Reviewed With patient   OTHER   Outcome Summary Pt is now POD #4 s/p b feet debridement and is NWB L LE and heel WB only R LE. Improved EOB sitting tolerance, including progressing dynamic sitting balance activities. cont to require mod A for bed mobility. Will likely require SNF at [place]. All goals addressed and updated.

## 2018-09-09 NOTE — PROGRESS NOTES
Continued Stay Note  [facility]     Patient Name: Bj Duarte  MRN: [id number]  Today's Date: 9/9/2018    Admit Date: 7/25/2018          Discharge Plan     Row Name 09/09/18 1545       Plan    Plan Comments RM Olivas unable to get ambulance time today with [facility] or [facility]. CCP contacted [facility], [facility] transportation, and [facility]. VM left for [facility], no return call. S/W Martha Junior Director Patient Mgmt. Services who contacted ambulance companies and was able to secure ambulance transport with [facility] for 4:15pm (30 minutes from now). Call to unit and notified RN that EMS will be here to pick patient up at 4:15............Frank ABDALLA               Discharge Codes    No documentation.       Expected Discharge Date and Time     Expected Discharge Date Expected Discharge Time    Sep 9, 2018             Bella Canela, RM

## 2018-10-28 PROBLEM — A41.9 SEPSIS (HCC): Status: ACTIVE | Noted: 2018-01-01

## 2018-10-29 PROBLEM — T83.511A UTI (URINARY TRACT INFECTION) DUE TO URINARY INDWELLING CATHETER (HCC): Status: ACTIVE | Noted: 2018-01-01

## 2018-10-29 PROBLEM — N39.0 UTI (URINARY TRACT INFECTION) DUE TO URINARY INDWELLING CATHETER (HCC): Status: ACTIVE | Noted: 2018-01-01

## 2018-10-31 LAB
BACTERIA SPEC AEROBE CULT: ABNORMAL
BACTERIA SPEC AEROBE CULT: ABNORMAL
GRAM STN SPEC: ABNORMAL
GRAM STN SPEC: ABNORMAL
ISOLATED FROM: ABNORMAL
ISOLATED FROM: ABNORMAL

## 2018-11-01 LAB
BACTERIA SPEC AEROBE CULT: ABNORMAL
BACTERIA SPEC AEROBE CULT: ABNORMAL

## 2021-02-26 NOTE — PROGRESS NOTES
Spoke with daughter today about her concerns for his foot wounds and infection the valve.  I do not think he is showing any signs or symptoms of infection, his WBC is normal, afebrile and wound has been well monitored per wound care.  We should continue with tight control of his blood sugars and great wound care, which is our plan at discharge.  He had wound care involvement prior to this and it will continue, the frequency may need to be adjusted per wound care recommendations. I am not concerned about infecting the valve at this point, we will just need to be diligent and facilitate healing.    Happy to see he is going to rehab it will be good for his mental well-being, long hospitalization and chronic illness can be so taxing on all involved patient and loved ones.  Looks good today. I made follow up appointment for Dr. Brasher in 3 months.   Discussed plans with Daughter, she verbalized understanding.  Patient and daughter can call our office with any questions or concerns.      Addendum   After visualizing with wound care nurse who stated it was larger, the wound on left 5th metatarsal area appears necrotic, Vascular surgery was consulted. Sister at the bedside updated on plan.       IRENE Isabel  Cardiothoracic Surgery  09/05/2018    Agree with asking vascular surgery to evaluate.         <-- Click to add NO pertinent Family History

## 2022-01-27 NOTE — PLAN OF CARE
Problem: Patient Care Overview  Goal: Plan of Care Review  Outcome: Ongoing (interventions implemented as appropriate)   09/08/18 0530   Coping/Psychosocial   Plan of Care Reviewed With patient   Plan of Care Review   Progress no change   OTHER   Outcome Summary Vitals stable. No falls. Pt c/o pain in lower back, medicated per MAR. Dressings on feet remain C/D/I. Omalley remains in place. Daughter requesting discharge to [facility] rehab this AM for pt to be seen by his podiatrist. Pt resting comfortably. Monitoring closely.        Problem: Fall Risk (Adult)  Goal: Absence of Fall  Outcome: Ongoing (interventions implemented as appropriate)   09/08/18 0530   Fall Risk (Adult)   Absence of Fall making progress toward outcome       Problem: Cardiac Surgery (Adult)  Goal: Signs and Symptoms of Listed Potential Problems Will be Absent, Minimized or Managed (Cardiac Surgery)  Outcome: Ongoing (interventions implemented as appropriate)   09/08/18 0530   Goal/Outcome Evaluation   Problems Assessed (Cardiac Surgery) all   Problems Present (Cardiac Surgery) none       Problem: Cardiac: Heart Failure (Adult)  Goal: Signs and Symptoms of Listed Potential Problems Will be Absent, Minimized or Managed (Cardiac: Heart Failure)  Outcome: Ongoing (interventions implemented as appropriate)   09/08/18 0530   Goal/Outcome Evaluation   Problems Assessed (Heart Failure) all   Problems Present (Heart Failure) decreased quality of life;dysrhythmia/arrhythmia;functional decline/self-care deficit;situational response       Problem: Skin Injury Risk (Adult)  Goal: Skin Health and Integrity  Outcome: Ongoing (interventions implemented as appropriate)   09/08/18 0530   Skin Injury Risk (Adult)   Skin Health and Integrity making progress toward outcome          No

## 2023-05-12 NOTE — TELEPHONE ENCOUNTER
I called and spoke with her and told her I want to increase the torsemide 100 mg in the morning and 50 mg in the afternoon.  I encouraged them to get an appointment with the nephrologist.  I put in a referral to see Dr. Gallegos about the pulmonary hypertension though I doubt he is going to want to do much until we can get his wedge pressure down.    I need to send his plastic surgeon a letter. I thought she said the last name was Karrie but I don't see any plastic surgeons by that name.  May be its Bannis?  Will you call her and see if you can get the correct name.   Will discuss Genesight testing at next visit.
